# Patient Record
Sex: MALE | Race: WHITE | NOT HISPANIC OR LATINO | Employment: OTHER | URBAN - NONMETROPOLITAN AREA
[De-identification: names, ages, dates, MRNs, and addresses within clinical notes are randomized per-mention and may not be internally consistent; named-entity substitution may affect disease eponyms.]

---

## 2017-01-11 ENCOUNTER — ANTICOAGULATION THERAPY VISIT (OUTPATIENT)
Dept: ANTICOAGULATION | Facility: OTHER | Age: 77
End: 2017-01-11
Attending: FAMILY MEDICINE
Payer: MEDICARE

## 2017-01-11 DIAGNOSIS — Z79.01 LONG-TERM (CURRENT) USE OF ANTICOAGULANTS: Primary | ICD-10-CM

## 2017-01-11 LAB — INR POINT OF CARE: 1.5 (ref 0.86–1.14)

## 2017-01-11 PROCEDURE — 85610 PROTHROMBIN TIME: CPT | Mod: QW

## 2017-01-11 NOTE — MR AVS SNAPSHOT
Rey Tristan   1/11/2017 10:30 AM   Anticoagulation Therapy Visit    Description:  76 year old male   Provider:   ANTI COAGULATION   Department:   Anti Coagulation           INR as of 1/11/2017     Selected INR 1.5! (1/11/2017)      Anticoagulation Summary as of 1/11/2017     INR goal 2.0-3.0   Selected INR 1.5! (1/11/2017)   Full instructions 1/11: 10 mg; 1/12: 10 mg; Otherwise 6 mg every day   Next INR check 1/25/2017    Indications   Long-term (current) use of anticoagulants [Z79.01] [Z79.01]  DVT (deep venous thrombosis) (H) [I82.409]  Pulmonary embolism (H) [I26.99]         Your next Anticoagulation Clinic appointment(s)     Jan 11, 2017 10:30 AM   Anticoagulation Visit with  ANTI COAGULATION   [facility] ([facility])    [address] [address]   [phone]            Jan 25, 2017 10:30 AM   Anticoagulation Visit with  ANTI COAGULATION   [facility] ([facility])    [address] [facility] [address]   [phone]              January 2017 Details    Sun Mon Tue Wed Thu Fri Sat     1               2               3               4               5               6               7                 8               9               10               11      10 mg   See details      12      10 mg         13      6 mg         14      6 mg           15      6 mg         16      6 mg         17      6 mg         18      6 mg         19      6 mg         20      6 mg         21      6 mg           22      6 mg         23      6 mg         24      6 mg         25            26               27               28                 29               30               31                    Date Details   01/11 This INR check       Date of next INR:  1/25/2017         How to take your warfarin dose     To take:  6 mg Take 1.5 of the 4 mg tablets.    To take:  10 mg Take 2.5 of the 4 mg tablets.

## 2017-01-11 NOTE — PROGRESS NOTES
ANTICOAGULATION FOLLOW-UP CLINIC VISIT    Patient Name:  Rey Tristan  Date:  1/11/2017  Contact Type:  Face to Face    SUBJECTIVE:     Patient Findings     Positives No Problem Findings           OBJECTIVE    INR PROTIME   Date Value Ref Range Status   01/11/2017 1.5* 0.86 - 1.14 Final       ASSESSMENT / PLAN  INR assessment SUB    Recheck INR In: 2 WEEKS    INR Location Clinic      Anticoagulation Summary as of 1/11/2017     INR goal 2.0-3.0   Selected INR 1.5! (1/11/2017)   Maintenance plan 6 mg (4 mg x 1.5) every day   Full instructions 1/11: 10 mg; 1/12: 10 mg; Otherwise 6 mg every day   Weekly total 42 mg   Plan last modified Vanessa Jacob RN (7/27/2016)   Next INR check 1/25/2017   Target end date     Indications   Long-term (current) use of anticoagulants [Z79.01] [Z79.01]  DVT (deep venous thrombosis) (H) [I82.409]  Pulmonary embolism (H) [I26.99]         Anticoagulation Episode Summary     INR check location     Preferred lab     Send INR reminders to  ANTICOAG POOL    Comments       Anticoagulation Care Providers     Provider Role Specialty Phone number    Gianna Richard MD [facility] [phone]            See the Encounter Report to view Anticoagulation Flowsheet and Dosing Calendar (Go to Encounters tab in chart review, and find the Anticoagulation Therapy Visit)        Lala Boss, RN

## 2017-01-25 ENCOUNTER — ANTICOAGULATION THERAPY VISIT (OUTPATIENT)
Dept: ANTICOAGULATION | Facility: OTHER | Age: 77
End: 2017-01-25
Attending: FAMILY MEDICINE
Payer: MEDICARE

## 2017-01-25 ENCOUNTER — OFFICE VISIT (OUTPATIENT)
Dept: FAMILY MEDICINE | Facility: OTHER | Age: 77
End: 2017-01-25
Attending: FAMILY MEDICINE
Payer: COMMERCIAL

## 2017-01-25 VITALS
HEIGHT: 63 IN | RESPIRATION RATE: 20 BRPM | HEART RATE: 67 BPM | DIASTOLIC BLOOD PRESSURE: 62 MMHG | BODY MASS INDEX: 29.59 KG/M2 | OXYGEN SATURATION: 98 % | TEMPERATURE: 97.6 F | SYSTOLIC BLOOD PRESSURE: 100 MMHG | WEIGHT: 167 LBS

## 2017-01-25 DIAGNOSIS — I82.409 DVT (DEEP VENOUS THROMBOSIS) (H): ICD-10-CM

## 2017-01-25 DIAGNOSIS — Z79.01 LONG-TERM (CURRENT) USE OF ANTICOAGULANTS: Primary | ICD-10-CM

## 2017-01-25 DIAGNOSIS — M54.50 ACUTE LOW BACK PAIN WITHOUT SCIATICA, UNSPECIFIED BACK PAIN LATERALITY: Primary | ICD-10-CM

## 2017-01-25 DIAGNOSIS — I26.99 PULMONARY EMBOLISM (H): ICD-10-CM

## 2017-01-25 DIAGNOSIS — E78.5 HYPERLIPIDEMIA, UNSPECIFIED HYPERLIPIDEMIA TYPE: ICD-10-CM

## 2017-01-25 LAB — INR POINT OF CARE: 3.7 (ref 0.86–1.14)

## 2017-01-25 PROCEDURE — 99212 OFFICE O/P EST SF 10 MIN: CPT

## 2017-01-25 PROCEDURE — 99213 OFFICE O/P EST LOW 20 MIN: CPT | Performed by: FAMILY MEDICINE

## 2017-01-25 PROCEDURE — 85610 PROTHROMBIN TIME: CPT | Mod: QW

## 2017-01-25 RX ORDER — ATORVASTATIN CALCIUM 10 MG/1
10 TABLET, FILM COATED ORAL DAILY
Qty: 90 TABLET | Refills: 2 | Status: SHIPPED | OUTPATIENT
Start: 2017-01-25 | End: 2017-10-09

## 2017-01-25 RX ORDER — METHOCARBAMOL 500 MG/1
500 TABLET, FILM COATED ORAL 2 TIMES DAILY PRN
Qty: 15 TABLET | Refills: 1 | Status: SHIPPED | OUTPATIENT
Start: 2017-01-25 | End: 2017-09-03

## 2017-01-25 ASSESSMENT — PAIN SCALES - GENERAL: PAINLEVEL: MODERATE PAIN (5)

## 2017-01-25 NOTE — PROGRESS NOTES
SUBJECTIVE:  Rey is a 76 year old male who comes in today for low back pain for the past week after shoveling.  Feels he twisted it, no fall, but tightened up later that day.  Is on Coumadin, no NSAIDs.  Tylenol doesn't help much.  Had similar issue a year ago and had Methocarbamol with good relief and tolerated well.  Prefers to avoid chiropractors, and doesn't think he needs PT.  Pain is in low back, bilateral, right more than left.  Occasional ache down right calf.  No foot numbness, tingling, weakness.  No swelling or bruising.    Current Outpatient Prescriptions   Medication     Multiple Vitamins-Minerals (MULTIVITAMIN ADULT PO)     atorvastatin (LIPITOR) 10 MG tablet     methocarbamol (ROBAXIN) 500 MG tablet     warfarin (COUMADIN) 4 MG tablet     aspirin 81 MG tablet     Cholecalciferol (VITAMIN D) 400 UNITS tablet     [DISCONTINUED] atorvastatin (LIPITOR) 10 MG tablet     No current facility-administered medications for this visit.      No Known Allergies    Past Medical History   Diagnosis Date     Vitamin D deficiency 1/1/2012     Chronic anticoagulation 1/1/2012     Pulmonary nodule      CT 5/2014, right; consider repeat 1 year if high risk     Hyperlipidemia      Elevated serum alkaline phosphatase level 5/4/2016     normal GGT, normal bone skeletal survery     Normocytic anemia 5/4/2016     Gastric ulcer      endoscopy 6/2016, repeat 6 months; PPI Carafate     Past Surgical History   Procedure Laterality Date     Pulmonary embolism       Left lower extremity dvt       Left knee meniscal tear       C. difficile with intussuseption       Nasal polypectomy       Lens implant       Cataract extraction       Colonoscopy       Angiogram  6/23/2014     C regular echo (fl)  6/23/2014     Dr. Cavazos     Endoscopy upper, colonoscopy, combined N/A 6/17/2016     Procedure: COMBINED ENDOSCOPY UPPER, COLONOSCOPY;  Surgeon: Andrea Stearns DO;  Location: [facility]     Colonoscopy  6/17     Family History  "  Problem Relation Age of Onset     CANCER Mother      Ovarian     Respiratory Father      emphysemia     Lung Cancer Brother      DIABETES No family hx of      Hypertension No family hx of      Hyperlipidemia No family hx of      Thyroid Disease No family hx of      Asthma No family hx of      Colon Cancer No family hx of      Prostate Cancer No family hx of      Social History     Social History     Marital Status:      Spouse Name: N/A     Number of Children: N/A     Years of Education: N/A     Occupational History      [employer]     Retired      Social History Main Topics     Smoking status: Former Smoker     Types: Cigarettes     Quit date: 10/28/2009     Smokeless tobacco: Never Used      Comment: Quit 2009     Alcohol Use: No     Drug Use: No     Sexual Activity: Not on file     Other Topics Concern      Service Yes     [employer]     Blood Transfusions Yes     Permits if needed     Caffeine Concern Yes     Tea     Occupational Exposure No     Hobby Hazards No     Sleep Concern No     Stress Concern No     Weight Concern No     Special Diet No     Back Care No     Exercise No     Seat Belt Yes     Self-Exams Yes     Parent/Sibling W/ Cabg, Mi Or Angioplasty Before 65f 55m? No     Social History Narrative       ROS:  General: negative  Skin: negative for, bruising, lumps or bumps  Musculoskeletal: positive for as above and back pain  Neurologic: negative for, local weakness and numbness or tingling of feet  Hematologic: positive for chronic anticoag    OBJECTIVE:  Filed Vitals:    01/25/17 1038   BP: 100/62   Pulse: 67   Temp: 97.6  F (36.4  C)   TempSrc: Tympanic   Resp: 20   Height: 5' 3\" (1.6 m)   Weight: 167 lb (75.751 kg)   SpO2: 98%     GENERAL APPEARANCE: healthy, alert and no distress  MS: extremities normal- no gross deformities noted, gait normal, normal muscle tone and tender to palpation mildly across lower back and paraspinal muscles, but no point " tenderness; negative SLR bilaterally; able to toe/heel raise; symmetric reflexes  NEURO: Normal strength and tone, sensory exam grossly normal, mentation intact and speech normal  PSYCH: mentation appears normal. and affect normal/bright    ASSESSMENT/ORDERS:    ICD-10-CM    1. Acute low back pain without sciatica, unspecified back pain laterality M54.5 methocarbamol (ROBAXIN) 500 MG tablet   2. Hyperlipidemia, unspecified hyperlipidemia type E78.5 atorvastatin (LIPITOR) 10 MG tablet     PLAN:  Patient Instructions   Symptomatic cares - rest, ice/heat, Tylenol.  Script for muscle relaxant for limited use.  Consider physical therapy if ongoing.  Follow up as needed.          Gianna Mckeon

## 2017-01-25 NOTE — NURSING NOTE
"Chief Complaint   Patient presents with     Back Pain     low back pain onset 1 week     *_* Health Care Directive *_*     Has info       Initial /62 mmHg  Pulse 67  Temp(Src) 97.6  F (36.4  C) (Tympanic)  Resp 20  Ht 5' 3\" (1.6 m)  Wt 167 lb (75.751 kg)  BMI 29.59 kg/m2  SpO2 98% Estimated body mass index is 29.59 kg/(m^2) as calculated from the following:    Height as of this encounter: 5' 3\" (1.6 m).    Weight as of this encounter: 167 lb (75.751 kg).  BP completed using cuff size: regular  Jayleen Fleming LPN      "

## 2017-01-25 NOTE — MR AVS SNAPSHOT
After Visit Summary   1/25/2017    Rey Tristan    MRN: [id number]           Patient Information     Date Of Birth          1940        Visit Information        Provider Department      1/25/2017 11:00 AM Gianna Richard MD [facility]        Today's Diagnoses     Acute low back pain without sciatica, unspecified back pain laterality    -  1     Hyperlipidemia, unspecified hyperlipidemia type           Care Instructions    Symptomatic cares - rest, ice/heat, Tylenol.  Script for muscle relaxant for limited use.  Consider physical therapy if ongoing.  Follow up as needed.        Follow-ups after your visit        Your next 10 appointments already scheduled     Jan 25, 2017 11:00 AM   (Arrive by 10:45 AM)   SHORT with Gianna Richard MD   [facility] ([facility])    [address] [facility] [address]   [phone]           Please have the patient arrive 15 minutes early.            Feb 15, 2017 11:00 AM   Anticoagulation Visit with  ANTI COAGULATION   [facility] ([facility])    [address]  [facility] [address]   [phone]              Who to contact     If you have questions or need follow up information about today's clinic visit or your schedule please contact [facility] directly at [phone].  Normal or non-critical lab and imaging results will be communicated to you by MyChart, letter or phone within 4 business days after the clinic has received the results. If you do not hear from us within 7 days, please contact the clinic through MyChart or phone. If you have a critical or abnormal lab result, we will notify you by phone as soon as possible.  Submit refill requests through [facility] or call your pharmacy and they will forward the refill request to us. Please allow 3 business days for your refill to be completed.          Additional Information About Your Visit        MyChart Information     [facility]  "lets you send messages to your doctor, view your test results, renew your prescriptions, schedule appointments and more. To sign up, go to [email or web address] . Click on \"Log in\" on the left side of the screen, which will take you to the Welcome page. Then click on \"Sign up Now\" on the right side of the page.     You will be asked to enter the access code listed below, as well as some personal information. Please follow the directions to create your username and password.     Your access code is: [email or web address]  Expires: 2017 10:54 AM     Your access code will  in 90 days. If you need help or a new code, please call your [place] clinic or [phone].        Care EveryWhere ID     This is your Care EveryWhere ID. This could be used by other organizations to access your [facility] medical records  [id number]        Your Vitals Were     Pulse Temperature Respirations Height BMI (Body Mass Index) Pulse Oximetry    67 97.6  F (36.4  C) (Tympanic) 20 5' 3\" (1.6 m) 29.59 kg/m2 98%       Blood Pressure from Last 3 Encounters:   17 100/62   16 134/82   16 131/72    Weight from Last 3 Encounters:   17 167 lb (75.751 kg)   16 168 lb (76.204 kg)   16 175 lb (79.379 kg)              Today, you had the following     No orders found for display         Today's Medication Changes          These changes are accurate as of: 17 10:54 AM.  If you have any questions, ask your nurse or doctor.               Start taking these medicines.        Dose/Directions    methocarbamol 500 MG tablet   Commonly known as:  ROBAXIN   Used for:  Acute low back pain without sciatica, unspecified back pain laterality   Started by:  Gianna Richard MD        Dose:  500 mg   Take 1 tablet (500 mg) by mouth 2 times daily as needed for muscle spasms   Quantity:  15 tablet   Refills:  1         These medicines have changed or have updated prescriptions.        Dose/Directions    atorvastatin 10 MG " tablet   Commonly known as:  LIPITOR   This may have changed:  See the new instructions.   Used for:  Hyperlipidemia, unspecified hyperlipidemia type   Changed by:  Gianna Richard MD        Dose:  10 mg   Take 1 tablet (10 mg) by mouth daily   Quantity:  90 tablet   Refills:  2            Where to get your medicines      These medications were sent to [id number] - [place], [place] - [address] AT [facility] [address] [place] [address]     Phone:  [phone]    - atorvastatin 10 MG tablet      These medications were sent to [facility] - [place], [place] - [address] AT [facility] [address]  [address], [facility] [address]     Phone:  [phone]    - methocarbamol 500 MG tablet             Primary Care Provider Office Phone # Fax #    Gianna Richard [phone]        [facility] [address]  [facility] [address]        Thank you!     Thank you for choosing [facility]  for your care. Our goal is always to provide you with excellent care. Hearing back from our patients is one way we can continue to improve our services. Please take a few minutes to complete the written survey that you may receive in the mail after your visit with us. Thank you!             Your Updated Medication List - Protect others around you: Learn how to safely use, store and throw away your medicines at www.disposemymeds.org.          This list is accurate as of: 1/25/17 10:54 AM.  Always use your most recent med list.                   Brand Name Dispense Instructions for use    aspirin 81 MG tablet     30 tablet    Take 1 tablet (81 mg) by mouth daily       atorvastatin 10 MG tablet    LIPITOR    90 tablet    Take 1 tablet (10 mg) by mouth daily       methocarbamol 500 MG tablet    ROBAXIN    15 tablet    Take 1 tablet (500 mg) by mouth 2 times daily as needed for muscle spasms       MULTIVITAMIN ADULT PO      Take 1  tablet by mouth       vitamin D 400 UNITS tablet      Take 1,000 Units by mouth daily       warfarin 4 MG tablet    COUMADIN    150 tablet    Take 6mg (1-1/2 pills) daily or as directed by [facility]

## 2017-01-25 NOTE — PATIENT INSTRUCTIONS
Symptomatic cares - rest, ice/heat, Tylenol.  Script for muscle relaxant for limited use.  Consider physical therapy if ongoing.  Follow up as needed.

## 2017-01-25 NOTE — PROGRESS NOTES
ANTICOAGULATION FOLLOW-UP CLINIC VISIT    Patient Name:  Rey Tristan  Date:  1/25/2017  Contact Type:  Face to Face    SUBJECTIVE:     Patient Findings     Positives OTC meds    Comments Changed multivit from one with vit K in it to one without vit K.           OBJECTIVE    INR PROTIME   Date Value Ref Range Status   01/25/2017 3.7* 0.86 - 1.14 Final       ASSESSMENT / PLAN  INR assessment SUPRA    Recheck INR In: 3 WEEKS    INR Location Clinic      Anticoagulation Summary as of 1/25/2017     INR goal 2.0-3.0   Selected INR 3.7! (1/25/2017)   Maintenance plan 6 mg (4 mg x 1.5) every day   Full instructions 1/25: 2 mg; Otherwise 6 mg every day   Weekly total 42 mg   Plan last modified Vanessa Jacob, RN (7/27/2016)   Next INR check 2/15/2017   Target end date     Indications   Long-term (current) use of anticoagulants [Z79.01] [Z79.01]  DVT (deep venous thrombosis) (H) [I82.409]  Pulmonary embolism (H) [I26.99]         Anticoagulation Episode Summary     INR check location     Preferred lab     Send INR reminders to  ANTICOAG POOL    Comments       Anticoagulation Care Providers     Provider Role Specialty Phone number    Gianna Richard MD [facility] [phone]            See the Encounter Report to view Anticoagulation Flowsheet and Dosing Calendar (Go to Encounters tab in chart review, and find the Anticoagulation Therapy Visit)        Lala Boss, RN

## 2017-02-15 ENCOUNTER — ANTICOAGULATION THERAPY VISIT (OUTPATIENT)
Dept: ANTICOAGULATION | Facility: OTHER | Age: 77
End: 2017-02-15
Attending: FAMILY MEDICINE
Payer: MEDICARE

## 2017-02-15 DIAGNOSIS — Z79.01 LONG-TERM (CURRENT) USE OF ANTICOAGULANTS: ICD-10-CM

## 2017-02-15 LAB — INR POINT OF CARE: 3.2 (ref 0.86–1.14)

## 2017-02-15 PROCEDURE — 85610 PROTHROMBIN TIME: CPT | Mod: QW

## 2017-02-15 NOTE — PROGRESS NOTES
ANTICOAGULATION FOLLOW-UP CLINIC VISIT    Patient Name:  Rey Tristan  Date:  2/15/2017  Contact Type:  Face to Face    SUBJECTIVE:     Patient Findings     Comments Finished multivit without K in it.. States he will be starting [employer] multivit chewables which does have  Vit K in it.           OBJECTIVE    INR Protime   Date Value Ref Range Status   02/15/2017 3.2 (A) 0.86 - 1.14 Final       ASSESSMENT / PLAN  INR assessment SUPRA    Recheck INR In: 2 WEEKS    INR Location Clinic      Anticoagulation Summary as of 2/15/2017     INR goal 2.0-3.0   Today's INR 3.2!   Maintenance plan 6 mg (4 mg x 1.5) every day   Full instructions 6 mg every day   Weekly total 42 mg   Plan last modified Vanessa Jacob RN (7/27/2016)   Next INR check 3/1/2017   Target end date     Indications   Long-term (current) use of anticoagulants [Z79.01] [Z79.01]  DVT (deep venous thrombosis) (H) [I82.409]  Pulmonary embolism (H) [I26.99]         Anticoagulation Episode Summary     INR check location     Preferred lab     Send INR reminders to  ANTICOAG POOL    Comments       Anticoagulation Care Providers     Provider Role Specialty Phone number    Gianna Richard MD [facility] [phone]            See the Encounter Report to view Anticoagulation Flowsheet and Dosing Calendar (Go to Encounters tab in chart review, and find the Anticoagulation Therapy Visit)        Lala Boss, RN

## 2017-03-01 ENCOUNTER — ANTICOAGULATION THERAPY VISIT (OUTPATIENT)
Dept: ANTICOAGULATION | Facility: OTHER | Age: 77
End: 2017-03-01
Attending: FAMILY MEDICINE
Payer: MEDICARE

## 2017-03-01 DIAGNOSIS — Z79.01 LONG-TERM (CURRENT) USE OF ANTICOAGULANTS: ICD-10-CM

## 2017-03-01 LAB — INR POINT OF CARE: 3.8 (ref 0.86–1.14)

## 2017-03-01 PROCEDURE — 85610 PROTHROMBIN TIME: CPT | Mod: QW

## 2017-03-01 NOTE — MR AVS SNAPSHOT
Rey Tristan   3/1/2017 11:15 AM   Anticoagulation Therapy Visit    Description:  76 year old male   Provider:   ANTI COAGULATION   Department:  Hc Anti Coagulation           INR as of 3/1/2017     Today's INR 3.8!      Anticoagulation Summary as of 3/1/2017     INR goal 2.0-3.0   Today's INR 3.8!   Full instructions 3/1: Hold; 3/2: 4 mg; Otherwise 6 mg every day   Next INR check 3/15/2017    Indications   Long-term (current) use of anticoagulants [Z79.01] [Z79.01]  DVT (deep venous thrombosis) (H) [I82.409]  Pulmonary embolism (H) [I26.99]         Your next Anticoagulation Clinic appointment(s)     Mar 01, 2017 11:15 AM CST   Anticoagulation Visit with  ANTI COAGULATION   [facility] ([facility] [place] [facility])    [address]  [place] [address]   [phone]            Mar 15, 2017 11:30 AM CDT   Anticoagulation Visit with  ANTI COAGULATION   [facility] [place] [facility])    [address] [address] [address]   [phone]              March 2017 Details    Sun Mon Tue Wed Thu Fri Sat        1      Hold   See details      2      4 mg         3      6 mg         4      6 mg           5      6 mg         6      6 mg         7      6 mg         8      6 mg         9      6 mg         10      6 mg         11      6 mg           12      6 mg         13      6 mg         14      6 mg         15            16               17               18                 19               20               21               22               23               24               25                 26               27               28               29               30               31                 Date Details   03/01 This INR check       Date of next INR:  3/15/2017         How to take your warfarin dose     To take:  4 mg Take 1 of the 4 mg tablets.    To take:  6 mg Take 1.5 of the 4 mg tablets.    Hold Do not take your warfarin dose. See the Details table to the right for additional  instructions.

## 2017-03-01 NOTE — PROGRESS NOTES
ANTICOAGULATION FOLLOW-UP CLINIC VISIT    Patient Name:  Rey Tristan  Date:  3/1/2017  Contact Type:  Face to Face    SUBJECTIVE:     Patient Findings     Positives OTC meds    Comments New multivit.  He also decreased oral vit K intake           OBJECTIVE    INR Protime   Date Value Ref Range Status   03/01/2017 3.8 (A) 0.86 - 1.14 Final       ASSESSMENT / PLAN  INR assessment SUPRA    Recheck INR In: 2 WEEKS    INR Location Clinic      Anticoagulation Summary as of 3/1/2017     INR goal 2.0-3.0   Today's INR 3.8!   Maintenance plan 6 mg (4 mg x 1.5) every day   Full instructions 3/1: Hold; 3/2: 4 mg; Otherwise 6 mg every day   Weekly total 42 mg   Plan last modified Vanessa Jacob RN (7/27/2016)   Next INR check 3/15/2017   Target end date     Indications   Long-term (current) use of anticoagulants [Z79.01] [Z79.01]  DVT (deep venous thrombosis) (H) [I82.409]  Pulmonary embolism (H) [I26.99]         Anticoagulation Episode Summary     INR check location     Preferred lab     Send INR reminders to  ANTICOAG POOL    Comments       Anticoagulation Care Providers     Provider Role Specialty Phone number    Gianna Richard MD [facility] [phone]            See the Encounter Report to view Anticoagulation Flowsheet and Dosing Calendar (Go to Encounters tab in chart review, and find the Anticoagulation Therapy Visit)        Lala Boss, RN

## 2017-03-15 ENCOUNTER — ANTICOAGULATION THERAPY VISIT (OUTPATIENT)
Dept: ANTICOAGULATION | Facility: OTHER | Age: 77
End: 2017-03-15
Attending: FAMILY MEDICINE
Payer: MEDICARE

## 2017-03-15 DIAGNOSIS — Z79.01 LONG-TERM (CURRENT) USE OF ANTICOAGULANTS: ICD-10-CM

## 2017-03-15 LAB — INR POINT OF CARE: 1.6 (ref 0.86–1.14)

## 2017-03-15 PROCEDURE — 85610 PROTHROMBIN TIME: CPT | Mod: QW

## 2017-03-15 NOTE — MR AVS SNAPSHOT
Rey Tristan   3/15/2017 11:30 AM   Anticoagulation Therapy Visit    Description:  76 year old male   Provider:   ANTI COAGULATION   Department:  Hc Anti Coagulation           INR as of 3/15/2017     Today's INR 1.6!      Anticoagulation Summary as of 3/15/2017     INR goal 2.0-3.0   Today's INR 1.6!   Full instructions 3/15: 8 mg; Otherwise 6 mg every day   Next INR check 3/27/2017    Indications   Long-term (current) use of anticoagulants [Z79.01] [Z79.01]  DVT (deep venous thrombosis) (H) [I82.409]  Pulmonary embolism (H) [I26.99]         Your next Anticoagulation Clinic appointment(s)     Mar 15, 2017 11:30 AM CDT   Anticoagulation Visit with  ANTI COAGULATION   [facility] ([facility])    [address] [address]   [phone]            Mar 27, 2017 11:30 AM CDT   Anticoagulation Visit with  ANTI COAGULATION   [facility] ([facility])    [address] [address]   [phone]              March 2017 Details    Sun Mon Tue Wed Thu Fri Sat        1               2               3               4                 5               6               7               8               9               10               11                 12               13               14               15      8 mg   See details      16      6 mg         17      6 mg         18      6 mg           19      6 mg         20      6 mg         21      6 mg         22      6 mg         23      6 mg         24      6 mg         25      6 mg           26      6 mg         27            28               29               30               31                 Date Details   03/15 This INR check       Date of next INR:  3/27/2017         How to take your warfarin dose     To take:  6 mg Take 1.5 of the 4 mg tablets.    To take:  8 mg Take 2 of the 4 mg tablets.

## 2017-03-15 NOTE — PROGRESS NOTES
ANTICOAGULATION FOLLOW-UP CLINIC VISIT    Patient Name:  Rey Tristan  Date:  3/15/2017  Contact Type:  Face to Face    SUBJECTIVE:     Patient Findings     Comments occassional nosebleeds, missed 2 doses of warfarin           OBJECTIVE    INR Protime   Date Value Ref Range Status   03/15/2017 1.6 (A) 0.86 - 1.14 Final       ASSESSMENT / PLAN  INR assessment SUB    Recheck INR In: 10 DAYS    INR Location Clinic      Anticoagulation Summary as of 3/15/2017     INR goal 2.0-3.0   Today's INR 1.6!   Maintenance plan 6 mg (4 mg x 1.5) every day   Full instructions 3/15: 8 mg; Otherwise 6 mg every day   Weekly total 42 mg   Plan last modified Vanessa Jacob RN (7/27/2016)   Next INR check 3/27/2017   Target end date     Indications   Long-term (current) use of anticoagulants [Z79.01] [Z79.01]  DVT (deep venous thrombosis) (H) [I82.409]  Pulmonary embolism (H) [I26.99]         Anticoagulation Episode Summary     INR check location     Preferred lab     Send INR reminders to  ANTICOAG POOL    Comments       Anticoagulation Care Providers     Provider Role Specialty Phone number    Gianna Richard MD [facility] [phone]            See the Encounter Report to view Anticoagulation Flowsheet and Dosing Calendar (Go to Encounters tab in chart review, and find the Anticoagulation Therapy Visit)        Lala Boss, RN

## 2017-03-19 DIAGNOSIS — E78.5 HYPERLIPEMIA: ICD-10-CM

## 2017-03-20 RX ORDER — ATORVASTATIN CALCIUM 10 MG/1
TABLET, FILM COATED ORAL
Qty: 90 TABLET | Refills: 0 | OUTPATIENT
Start: 2017-03-20

## 2017-03-27 ENCOUNTER — ANTICOAGULATION THERAPY VISIT (OUTPATIENT)
Dept: ANTICOAGULATION | Facility: OTHER | Age: 77
End: 2017-03-27
Attending: FAMILY MEDICINE
Payer: MEDICARE

## 2017-03-27 DIAGNOSIS — Z79.01 LONG-TERM (CURRENT) USE OF ANTICOAGULANTS: ICD-10-CM

## 2017-03-27 DIAGNOSIS — I26.99 PULMONARY EMBOLISM (H): ICD-10-CM

## 2017-03-27 DIAGNOSIS — I82.409 DVT (DEEP VENOUS THROMBOSIS) (H): ICD-10-CM

## 2017-03-27 LAB — INR POINT OF CARE: 2 (ref 0.86–1.14)

## 2017-03-27 PROCEDURE — 85610 PROTHROMBIN TIME: CPT | Mod: QW

## 2017-03-27 NOTE — PROGRESS NOTES
ANTICOAGULATION FOLLOW-UP CLINIC VISIT    Patient Name:  Rey Tristan  Date:  3/27/2017  Contact Type:  Face to Face    SUBJECTIVE:     Patient Findings     Positives No Problem Findings           OBJECTIVE    INR Protime   Date Value Ref Range Status   03/27/2017 2.0 (A) 0.86 - 1.14 Final       ASSESSMENT / PLAN  INR assessment THER    Recheck INR In: 4 WEEKS    INR Location Clinic      Anticoagulation Summary as of 3/27/2017     INR goal 2.0-3.0   Today's INR 2.0   Maintenance plan 6 mg (4 mg x 1.5) every day   Full instructions 6 mg every day   Weekly total 42 mg   No change documented Lala Tracy RN   Plan last modified Vanessa Jacob RN (7/27/2016)   Next INR check 4/25/2017   Target end date Indefinite    Indications   Long-term (current) use of anticoagulants [Z79.01] [Z79.01]  DVT (deep venous thrombosis) (H) [I82.409]  Pulmonary embolism (H) [I26.99]         Anticoagulation Episode Summary     INR check location     Preferred lab     Send INR reminders to  ANTICOAG POOL    Comments       Anticoagulation Care Providers     Provider Role Specialty Phone number    Gianna Richard MD [facility] [phone]            See the Encounter Report to view Anticoagulation Flowsheet and Dosing Calendar (Go to Encounters tab in chart review, and find the Anticoagulation Therapy Visit)        Lala Tracy, HOPE

## 2017-03-27 NOTE — MR AVS SNAPSHOT
Rey Tristan   3/27/2017 11:30 AM   Anticoagulation Therapy Visit    Description:  76 year old male   Provider:   ANTI COAGULATION   Department:  Hc Anti Coagulation           INR as of 3/27/2017     Today's INR 2.0      Anticoagulation Summary as of 3/27/2017     INR goal 2.0-3.0   Today's INR 2.0   Full instructions 6 mg every day   Next INR check 4/25/2017    Indications   Long-term (current) use of anticoagulants [Z79.01] [Z79.01]  DVT (deep venous thrombosis) (H) [I82.409]  Pulmonary embolism (H) [I26.99]         Your next Anticoagulation Clinic appointment(s)     Apr 25, 2017 10:45 AM CDT   Anticoagulation Visit with  ANTI COAGULATION   [facility] [place] ([facility])    [address] [place] [address]   [phone]              March 2017 Details    Sun Mon Tue Wed Thu Fri Sat        1               2               3               4                 5               6               7               8               9               10               11                 12               13               14               15               16               17               18                 19               20               21               22               23               24               25                 26               27      6 mg   See details      28      6 mg         29      6 mg         30      6 mg         31      6 mg           Date Details   03/27 This INR check               How to take your warfarin dose     To take:  6 mg Take 1.5 of the 4 mg tablets.           April 2017 Details    Sun Mon Tue Wed Thu Fri Sat           1      6 mg           2      6 mg         3      6 mg         4      6 mg         5      6 mg         6      6 mg         7      6 mg         8      6 mg           9      6 mg         10      6 mg         11      6 mg         12      6 mg         13      6 mg         14      6 mg         15      6 mg           16      6 mg         17      6 mg          18      6 mg         19      6 mg         20      6 mg         21      6 mg         22      6 mg           23      6 mg         24      6 mg         25            26               27               28               29                 30                      Date Details   No additional details    Date of next INR:  4/25/2017         How to take your warfarin dose     To take:  6 mg Take 1.5 of the 4 mg tablets.

## 2017-03-29 NOTE — PROGRESS NOTES
ANTICOAGULATION FOLLOW-UP CLINIC VISIT    Patient Name:  Rey Tristan  Date:  3/29/2017  Contact Type:  Face to Face    SUBJECTIVE:     Patient Findings     Positives Missed doses, No Problem Findings    Comments Telephoned AC clinic after the INR check to report he missed a dose yesterday.  Pt instructed to take 8mg Coumadin today vs the normal 6mg today.  Call ended with questions answered and understanding stated.             OBJECTIVE    INR Protime   Date Value Ref Range Status   03/27/2017 2.0 (A) 0.86 - 1.14 Final       ASSESSMENT / PLAN  INR assessment THER    Recheck INR In: 4 WEEKS    INR Location Clinic      Anticoagulation Summary as of 3/27/2017     INR goal 2.0-3.0   Today's INR 2.0   Maintenance plan 6 mg (4 mg x 1.5) every day   Full instructions 3/27: 8 mg; Otherwise 6 mg every day   Weekly total 42 mg   Plan last modified Vanessa Jacob RN (7/27/2016)   Next INR check 4/25/2017   Target end date Indefinite    Indications   Long-term (current) use of anticoagulants [Z79.01] [Z79.01]  DVT (deep venous thrombosis) (H) [I82.409]  Pulmonary embolism (H) [I26.99]         Anticoagulation Episode Summary     INR check location     Preferred lab     Send INR reminders to  ANTICOAG POOL    Comments       Anticoagulation Care Providers     Provider Role Specialty Phone number    Gianna Richard MD [facility] [phone]            See the Encounter Report to view Anticoagulation Flowsheet and Dosing Calendar (Go to Encounters tab in chart review, and find the Anticoagulation Therapy Visit)        Lala Tracy RN

## 2017-04-10 DIAGNOSIS — Z79.01 LONG TERM (CURRENT) USE OF ANTICOAGULANTS: ICD-10-CM

## 2017-04-11 RX ORDER — WARFARIN SODIUM 4 MG/1
TABLET ORAL
Qty: 150 TABLET | Refills: 0 | Status: SHIPPED | OUTPATIENT
Start: 2017-04-11 | End: 2017-05-01

## 2017-04-25 ENCOUNTER — ANTICOAGULATION THERAPY VISIT (OUTPATIENT)
Dept: ANTICOAGULATION | Facility: OTHER | Age: 77
End: 2017-04-25
Attending: FAMILY MEDICINE
Payer: MEDICARE

## 2017-04-25 DIAGNOSIS — I26.99 PULMONARY EMBOLISM (H): ICD-10-CM

## 2017-04-25 DIAGNOSIS — I82.409 DVT (DEEP VENOUS THROMBOSIS) (H): ICD-10-CM

## 2017-04-25 DIAGNOSIS — Z79.01 LONG-TERM (CURRENT) USE OF ANTICOAGULANTS: ICD-10-CM

## 2017-04-25 LAB — INR POINT OF CARE: 2.2 (ref 0.86–1.14)

## 2017-04-25 PROCEDURE — 85610 PROTHROMBIN TIME: CPT | Mod: QW

## 2017-04-25 NOTE — PROGRESS NOTES
ANTICOAGULATION FOLLOW-UP CLINIC VISIT    Patient Name:  Rey Tristan  Date:  4/25/2017  Contact Type:  Face to Face    SUBJECTIVE:     Patient Findings     Positives No Problem Findings           OBJECTIVE    INR Protime   Date Value Ref Range Status   04/25/2017 2.2 (A) 0.86 - 1.14 Final       ASSESSMENT / PLAN  INR assessment THER    Recheck INR In: 6 WEEKS    INR Location Clinic      Anticoagulation Summary as of 4/25/2017     INR goal 2.0-3.0   Today's INR 2.2   Maintenance plan 6 mg (4 mg x 1.5) every day   Full instructions 6 mg every day   Weekly total 42 mg   No change documented Lala Tracy RN   Plan last modified Vanessa Jacob RN (7/27/2016)   Next INR check 6/6/2017   Target end date Indefinite    Indications   Long-term (current) use of anticoagulants [Z79.01] [Z79.01]  DVT (deep venous thrombosis) (H) [I82.409]  Pulmonary embolism (H) [I26.99]         Anticoagulation Episode Summary     INR check location     Preferred lab     Send INR reminders to  ANTICOAG POOL    Comments       Anticoagulation Care Providers     Provider Role Specialty Phone number    Gianna Richard MD [facility] [phone]            See the Encounter Report to view Anticoagulation Flowsheet and Dosing Calendar (Go to Encounters tab in chart review, and find the Anticoagulation Therapy Visit)        Lala Tracy, HOPE

## 2017-04-25 NOTE — MR AVS SNAPSHOT
Rey Tristan   4/25/2017 10:45 AM   Anticoagulation Therapy Visit    Description:  77 year old male   Provider:   ANTI COAGULATION   Department:  Hc Anti Coagulation           INR as of 4/25/2017     Today's INR 2.2      Anticoagulation Summary as of 4/25/2017     INR goal 2.0-3.0   Today's INR 2.2   Full instructions 6 mg every day   Next INR check 6/6/2017    Indications   Long-term (current) use of anticoagulants [Z79.01] [Z79.01]  DVT (deep venous thrombosis) (H) [I82.409]  Pulmonary embolism (H) [I26.99]         Your next Anticoagulation Clinic appointment(s)     Jun 06, 2017 10:45 AM CDT   Anticoagulation Visit with  ANTI COAGULATION   [facility] [place] ([facility])    [address] [place] [address]   [phone]              April 2017 Details    Sun Mon Tue Wed Thu Fri Sat           1                 2               3               4               5               6               7               8                 9               10               11               12               13               14               15                 16               17               18               19               20               21               22                 23               24               25      6 mg   See details      26      6 mg         27      6 mg         28      6 mg         29      6 mg           30      6 mg                Date Details   04/25 This INR check               How to take your warfarin dose     To take:  6 mg Take 1.5 of the 4 mg tablets.           May 2017 Details    Sun Mon Tue Wed Thu Fri Sat      1      6 mg         2      6 mg         3      6 mg         4      6 mg         5      6 mg         6      6 mg           7      6 mg         8      6 mg         9      6 mg         10      6 mg         11      6 mg         12      6 mg         13      6 mg           14      6 mg         15      6 mg         16      6 mg         17      6 mg         18      6 mg          19      6 mg         20      6 mg           21      6 mg         22      6 mg         23      6 mg         24      6 mg         25      6 mg         26      6 mg         27      6 mg           28      6 mg         29      6 mg         30      6 mg         31      6 mg             Date Details   No additional details            How to take your warfarin dose     To take:  6 mg Take 1.5 of the 4 mg tablets.           June 2017 Details    Sun Mon Tue Wed Thu Fri Sat         1      6 mg         2      6 mg         3      6 mg           4      6 mg         5      6 mg         6            7               8               9               10                 11               12               13               14               15               16               17                 18               19               20               21               22               23               24                 25               26               27               28               29               30                 Date Details   No additional details    Date of next INR:  6/6/2017         How to take your warfarin dose     To take:  6 mg Take 1.5 of the 4 mg tablets.

## 2017-05-01 ENCOUNTER — TELEPHONE (OUTPATIENT)
Dept: FAMILY MEDICINE | Facility: OTHER | Age: 77
End: 2017-05-01

## 2017-05-01 DIAGNOSIS — Z79.01 LONG TERM (CURRENT) USE OF ANTICOAGULANTS: ICD-10-CM

## 2017-05-01 RX ORDER — WARFARIN SODIUM 4 MG/1
TABLET ORAL
Qty: 150 TABLET | Refills: 0 | Status: SHIPPED | OUTPATIENT
Start: 2017-05-01 | End: 2017-11-09

## 2017-05-01 NOTE — TELEPHONE ENCOUNTER
Reason for call:  Medication    1. Medication Name? Warfarin  2. Is this request for a refill? Yes  3. What Pharmacy do you use? [facility]  4. Have you contacted your pharmacy? Yes    5. If yes, when?  (Please note that the turn-around-time for prescriptions is 72 business hours; I am sending your request at this time. SEND TO  [facility]  )  Description: Pt stated the pharmacy was too busy to send request. If you have any questions please call him back at [phone]  Was an appointment offered for this a call? No   Preferred method for responding to this messageTelephone Call  If we cannot reach you directly, may we leave a detailed response at the number you provided? Yes  Can this message wait until your PCP/Provider returns if not available today? Not applicable

## 2017-05-09 ENCOUNTER — ANTICOAGULATION THERAPY VISIT (OUTPATIENT)
Dept: ANTICOAGULATION | Facility: OTHER | Age: 77
End: 2017-05-09

## 2017-05-09 DIAGNOSIS — Z79.01 LONG-TERM (CURRENT) USE OF ANTICOAGULANTS: ICD-10-CM

## 2017-05-09 DIAGNOSIS — I26.99 PULMONARY EMBOLISM (H): ICD-10-CM

## 2017-05-09 DIAGNOSIS — I82.409 DVT (DEEP VENOUS THROMBOSIS) (H): ICD-10-CM

## 2017-05-09 NOTE — PROGRESS NOTES
"  ANTICOAGULATION FOLLOW-UP CLINIC VISIT    Patient Name:  Rey Tristan  Date:  5/9/2017  Contact Type:  Telephone    SUBJECTIVE:     Patient Findings     Positives Nose bleeds    Comments Pt telephoned the  clinic reporting he was having frequent nose bleeds.  Pt inquiring about increasing his Vit K to raise the INR and if he has done this it there was way to tell if his blood is thin or thick.  Pt informed Vit K thickens the blood/lowers the INR, therefore more Coumadin would maybe warranted.  Pt stated he eats a salad frequently but in researching this during the call, it was revealed the lettuce type is Iceberg.  Pt stated he had the Vit K Foods list and noted it was low in Vit K and thought this would help.  Pt informed there is basically no Vit K value in the foods listed as \"low\" on the guide and of which has no impact on the INR/Coumadin dose.  Pt stated \"well how much Vit K should a person eat to keep the blood where it needs to be?\" - discussed the need for consistency within the diet - discussed the foods that might impact his INR; discussed a regime some pt's follow of eating greens approx 2-3x/wk or more.  Pt inquired about increasing cranberries; pt informed they are known to thin the blood more when consumed while on Coumadin; therefore increasing may cause increased nosebleeds.  Pt informed there may be a medical reason for the frequent nose bleeds that may not be related to the INR and was encouraged at least 3 times to consider scheduling an appt w/his PCP.  Pt stated he had seen someone in the past in the [facility] and had polyps removed.  Pt stated there was no ENTs locally - pt was corrected on this thought and informed Dr Nuñez @ the [facility] is an ENT; pt informed he would need a referral from his PCP to be seen, due to the specialty; which would probably be the process if an ENT is sought elsewhere.  Pt stated he was leaving town for a few days and would not have a chance to " see someone at this time.  Pt also encouraged to swab the nares at HS with a lubricant ie: K-Y; Vaseline, or triple antibiotic ointment; stated he tried that once and it did not work - pt informed he may need to do this nightly for awhile.  Pt was disinterested in this option.  Pt informed the only true way to know if the INR was high or low was to have it checked, but pt was disinterested in this option.  Call ended after 15mins of discussion with pt stating he would keep his AC visit for 6/6/17.           OBJECTIVE    INR Protime   Date Value Ref Range Status   04/25/2017 2.2 (A) 0.86 - 1.14 Final       ASSESSMENT / PLAN  No question data found.  Anticoagulation Summary as of 5/9/2017     INR goal 2.0-3.0   Today's INR No new INR was available at the time of this encounter.   Maintenance plan 6 mg (4 mg x 1.5) every day   Full instructions 6 mg every day   Weekly total 42 mg   No change documented Lala Tracy RN   Plan last modified Vanessa Jacob RN (7/27/2016)   Next INR check 6/6/2017   Target end date Indefinite    Indications   Long-term (current) use of anticoagulants [Z79.01] [Z79.01]  DVT (deep venous thrombosis) (H) [I82.409]  Pulmonary embolism (H) [I26.99]         Anticoagulation Episode Summary     INR check location     Preferred lab     Send INR reminders to  ANTICOAG POOL    Comments       Anticoagulation Care Providers     Provider Role Specialty Phone number    Gianna Richard MD [facility] [phone]            See the Encounter Report to view Anticoagulation Flowsheet and Dosing Calendar (Go to Encounters tab in chart review, and find the Anticoagulation Therapy Visit)        Lala Tracy RN

## 2017-05-09 NOTE — MR AVS SNAPSHOT
Rey Tristan   5/9/2017   Anticoagulation Therapy Visit    Description:  77 year old male   Provider:  Gianna Richard MD   Department:  Hc Anti Coagulation           INR as of 5/9/2017     Today's INR No new INR was available at the time of this encounter.      Anticoagulation Summary as of 5/9/2017     INR goal 2.0-3.0   Today's INR No new INR was available at the time of this encounter.   Full instructions 6 mg every day   Next INR check 6/6/2017    Indications   Long-term (current) use of anticoagulants [Z79.01] [Z79.01]  DVT (deep venous thrombosis) (H) [I82.409]  Pulmonary embolism (H) [I26.99]         Your next Anticoagulation Clinic appointment(s)     Jun 06, 2017 10:45 AM CDT   Anticoagulation Visit with  ANTI COAGULATION   [facility] [place] ([facility])    [address] [facility]  [place] [address]   [phone]              May 2017 Details    Sun Mon Tue Wed Thu Fri Sat      1               2               3               4               5               6                 7               8               9      6 mg   See details      10      6 mg         11      6 mg         12      6 mg         13      6 mg           14      6 mg         15      6 mg         16      6 mg         17      6 mg         18      6 mg         19      6 mg         20      6 mg           21      6 mg         22      6 mg         23      6 mg         24      6 mg         25      6 mg         26      6 mg         27      6 mg           28      6 mg         29      6 mg         30      6 mg         31      6 mg             Date Details   05/09 This INR check               How to take your warfarin dose     To take:  6 mg Take 1.5 of the 4 mg tablets.           June 2017 Details    Sun Mon Tue Wed Thu Fri Sat         1      6 mg         2      6 mg         3      6 mg           4      6 mg         5      6 mg         6            7               8               9               10                 11                12               13               14               15               16               17                 18               19               20               21               22               23               24                 25               26               27               28               29               30                 Date Details   No additional details    Date of next INR:  6/6/2017         How to take your warfarin dose     To take:  6 mg Take 1.5 of the 4 mg tablets.

## 2017-06-06 ENCOUNTER — ANTICOAGULATION THERAPY VISIT (OUTPATIENT)
Dept: ANTICOAGULATION | Facility: OTHER | Age: 77
End: 2017-06-06
Attending: FAMILY MEDICINE
Payer: MEDICARE

## 2017-06-06 DIAGNOSIS — Z79.01 LONG-TERM (CURRENT) USE OF ANTICOAGULANTS: ICD-10-CM

## 2017-06-06 LAB — INR POINT OF CARE: 2.3 (ref 0.86–1.14)

## 2017-06-06 PROCEDURE — 85610 PROTHROMBIN TIME: CPT | Mod: QW,ZL

## 2017-06-06 NOTE — MR AVS SNAPSHOT
Rey Tristan   6/6/2017 10:45 AM   Anticoagulation Therapy Visit    Description:  77 year old male   Provider:   ANTI COAGULATION   Department:   Anti Coagulation           INR as of 6/6/2017     Today's INR 2.3      Anticoagulation Summary as of 6/6/2017     INR goal 2.0-3.0   Today's INR 2.3   Full instructions 6 mg every day   Next INR check 7/18/2017    Indications   Long-term (current) use of anticoagulants [Z79.01] [Z79.01]  DVT (deep venous thrombosis) (H) [I82.409]  Pulmonary embolism (H) [I26.99]         Your next Anticoagulation Clinic appointment(s)     Jun 06, 2017 10:45 AM CDT   Anticoagulation Visit with  ANTI COAGULATION   [facility] [place] ([facility] [place] [facility])    [address]  [place] [address]   [phone]            Jul 18, 2017 11:00 AM CDT   Anticoagulation Visit with  ANTI COAGULATION   [facility] ([facility] [facility])    [address]  [facility] [address]   [phone]              June 2017 Details    Sun Mon Tue Wed Thu Fri Sat         1               2               3                 4               5               6      6 mg   See details      7      6 mg         8      6 mg         9      6 mg         10      6 mg           11      6 mg         12      6 mg         13      6 mg         14      6 mg         15      6 mg         16      6 mg         17      6 mg           18      6 mg         19      6 mg         20      6 mg         21      6 mg         22      6 mg         23      6 mg         24      6 mg           25      6 mg         26      6 mg         27      6 mg         28      6 mg         29      6 mg         30      6 mg           Date Details   06/06 This INR check               How to take your warfarin dose     To take:  6 mg Take 1.5 of the 4 mg tablets.           July 2017 Details    Sun Mon Tue Wed Thu Fri Sat           1      6 mg           2      6 mg         3      6 mg         4      6 mg         5      6 mg         6       6 mg         7      6 mg         8      6 mg           9      6 mg         10      6 mg         11      6 mg         12      6 mg         13      6 mg         14      6 mg         15      6 mg           16      6 mg         17      6 mg         18            19               20               21               22                 23               24               25               26               27               28               29                 30               31                     Date Details   No additional details    Date of next INR:  7/18/2017         How to take your warfarin dose     To take:  6 mg Take 1.5 of the 4 mg tablets.

## 2017-06-06 NOTE — PROGRESS NOTES
ANTICOAGULATION FOLLOW-UP CLINIC VISIT    Patient Name:  Rey Tristan  Date:  6/6/2017  Contact Type:  Face to Face    SUBJECTIVE:     Patient Findings     Positives No Problem Findings           OBJECTIVE    INR Protime   Date Value Ref Range Status   06/06/2017 2.3 (A) 0.86 - 1.14 Final       ASSESSMENT / PLAN  INR assessment THER    Recheck INR In: 6 WEEKS    INR Location Clinic      Anticoagulation Summary as of 6/6/2017     INR goal 2.0-3.0   Today's INR 2.3   Maintenance plan 6 mg (4 mg x 1.5) every day   Full instructions 6 mg every day   Weekly total 42 mg   No change documented Lala Boss RN   Plan last modified Vanessa Jacob RN (7/27/2016)   Next INR check 7/18/2017   Target end date Indefinite    Indications   Long-term (current) use of anticoagulants [Z79.01] [Z79.01]  DVT (deep venous thrombosis) (H) [I82.409]  Pulmonary embolism (H) [I26.99]         Anticoagulation Episode Summary     INR check location     Preferred lab     Send INR reminders to  ANTICOAG POOL    Comments       Anticoagulation Care Providers     Provider Role Specialty Phone number    Gianna Richard MD [facility] [phone]            See the Encounter Report to view Anticoagulation Flowsheet and Dosing Calendar (Go to Encounters tab in chart review, and find the Anticoagulation Therapy Visit)        Lala Boss, RN

## 2017-07-18 ENCOUNTER — ANTICOAGULATION THERAPY VISIT (OUTPATIENT)
Dept: ANTICOAGULATION | Facility: OTHER | Age: 77
End: 2017-07-18
Attending: FAMILY MEDICINE
Payer: MEDICARE

## 2017-07-18 DIAGNOSIS — Z79.01 LONG-TERM (CURRENT) USE OF ANTICOAGULANTS: ICD-10-CM

## 2017-07-18 LAB — INR POINT OF CARE: 2.6 (ref 0.86–1.14)

## 2017-07-18 PROCEDURE — 85610 PROTHROMBIN TIME: CPT | Mod: QW,ZL

## 2017-07-18 NOTE — MR AVS SNAPSHOT
Rey Tristan   7/18/2017 11:00 AM   Anticoagulation Therapy Visit    Description:  77 year old male   Provider:   ANTI COAGULATION   Department:   Anti Coagulation           INR as of 7/18/2017     Today's INR 2.6      Anticoagulation Summary as of 7/18/2017     INR goal 2.0-3.0   Today's INR 2.6   Full instructions 6 mg every day   Next INR check 8/29/2017    Indications   Long-term (current) use of anticoagulants [Z79.01] [Z79.01]  DVT (deep venous thrombosis) (H) [I82.409]  Pulmonary embolism (H) [I26.99]         Your next Anticoagulation Clinic appointment(s)     Jul 18, 2017 11:00 AM CDT   Anticoagulation Visit with  ANTI COAGULATION   [facility] ([facility] )    [address] [facility] [address]   [phone]            Aug 29, 2017 11:00 AM CDT   Anticoagulation Visit with  ANTI COAGULATION   [facility] ([facility] )    [address] [facility] [address]   [phone]              July 2017 Details    Sun Mon Tue Wed Thu Fri Sat           1                 2               3               4               5               6               7               8                 9               10               11               12               13               14               15                 16               17               18      6 mg   See details      19      6 mg         20      6 mg         21      6 mg         22      6 mg           23      6 mg         24      6 mg         25      6 mg         26      6 mg         27      6 mg         28      6 mg         29      6 mg           30      6 mg         31      6 mg               Date Details   07/18 This INR check               How to take your warfarin dose     To take:  6 mg Take 1.5 of the 4 mg tablets.           August 2017 Details    Sun Mon Tue Wed Thu Fri Sat       1      6 mg         2      6 mg         3      6 mg         4      6 mg         5      6 mg            6      6 mg         7      6 mg         8      6 mg         9      6 mg         10      6 mg         11      6 mg         12      6 mg           13      6 mg         14      6 mg         15      6 mg         16      6 mg         17      6 mg         18      6 mg         19      6 mg           20      6 mg         21      6 mg         22      6 mg         23      6 mg         24      6 mg         25      6 mg         26      6 mg           27      6 mg         28      6 mg         29            30               31                  Date Details   No additional details    Date of next INR:  8/29/2017         How to take your warfarin dose     To take:  6 mg Take 1.5 of the 4 mg tablets.

## 2017-07-18 NOTE — PROGRESS NOTES
ANTICOAGULATION FOLLOW-UP CLINIC VISIT    Patient Name:  Rey Tristan  Date:  7/18/2017  Contact Type:  Face to Face    SUBJECTIVE:     Patient Findings     Positives No Problem Findings           OBJECTIVE    INR Protime   Date Value Ref Range Status   07/18/2017 2.6 (A) 0.86 - 1.14 Final       ASSESSMENT / PLAN  INR assessment THER    Recheck INR In: 6 WEEKS    INR Location Clinic      Anticoagulation Summary as of 7/18/2017     INR goal 2.0-3.0   Today's INR 2.6   Maintenance plan 6 mg (4 mg x 1.5) every day   Full instructions 6 mg every day   Weekly total 42 mg   No change documented Lala Boss RN   Plan last modified Vanessa Jacob RN (7/27/2016)   Next INR check 8/29/2017   Target end date Indefinite    Indications   Long-term (current) use of anticoagulants [Z79.01] [Z79.01]  DVT (deep venous thrombosis) (H) [I82.409]  Pulmonary embolism (H) [I26.99]         Anticoagulation Episode Summary     INR check location     Preferred lab     Send INR reminders to  ANTICOAG POOL    Comments       Anticoagulation Care Providers     Provider Role Specialty Phone number    Gianna Richard MD [facility] [phone]            See the Encounter Report to view Anticoagulation Flowsheet and Dosing Calendar (Go to Encounters tab in chart review, and find the Anticoagulation Therapy Visit)        Lala Boss, RN

## 2017-08-22 ENCOUNTER — ANTICOAGULATION THERAPY VISIT (OUTPATIENT)
Dept: ANTICOAGULATION | Facility: OTHER | Age: 77
End: 2017-08-22
Attending: FAMILY MEDICINE
Payer: MEDICARE

## 2017-08-22 DIAGNOSIS — Z79.01 LONG-TERM (CURRENT) USE OF ANTICOAGULANTS: ICD-10-CM

## 2017-08-22 LAB — INR POINT OF CARE: 3.4 (ref 0.86–1.14)

## 2017-08-22 PROCEDURE — 85610 PROTHROMBIN TIME: CPT | Mod: QW,ZL

## 2017-08-22 NOTE — PROGRESS NOTES
ANTICOAGULATION FOLLOW-UP CLINIC VISIT    Patient Name:  Rey Tristan  Date:  8/22/2017  Contact Type:  Face to Face    SUBJECTIVE:     Patient Findings     Positives Missed doses    Comments Missed one dose           OBJECTIVE    INR Protime   Date Value Ref Range Status   08/22/2017 3.4 (A) 0.86 - 1.14 Final       ASSESSMENT / PLAN  INR assessment SUPRA    Recheck INR In: 4 WEEKS    INR Location Clinic      Anticoagulation Summary as of 8/22/2017     INR goal 2.0-3.0   Today's INR 3.4!   Maintenance plan 6 mg (4 mg x 1.5) every day   Full instructions 8/22: 4 mg; Otherwise 6 mg every day   Weekly total 42 mg   Plan last modified Vanessa Jacob, RN (7/27/2016)   Next INR check 9/19/2017   Target end date Indefinite    Indications   Long-term (current) use of anticoagulants [Z79.01] [Z79.01]  DVT (deep venous thrombosis) (H) [I82.409]  Pulmonary embolism (H) [I26.99]         Anticoagulation Episode Summary     INR check location     Preferred lab     Send INR reminders to  ANTICOAG POOL    Comments       Anticoagulation Care Providers     Provider Role Specialty Phone number    Gianna Richard MD [facility] [phone]            See the Encounter Report to view Anticoagulation Flowsheet and Dosing Calendar (Go to Encounters tab in chart review, and find the Anticoagulation Therapy Visit)        Lala Boss, RN

## 2017-08-22 NOTE — MR AVS SNAPSHOT
Rey Tristan   8/22/2017 11:15 AM   Anticoagulation Therapy Visit    Description:  77 year old male   Provider:   ANTI COAGULATION   Department:   Anti Coagulation           INR as of 8/22/2017     Today's INR 3.4!      Anticoagulation Summary as of 8/22/2017     INR goal 2.0-3.0   Today's INR 3.4!   Full instructions 8/22: 4 mg; Otherwise 6 mg every day   Next INR check 9/19/2017    Indications   Long-term (current) use of anticoagulants [Z79.01] [Z79.01]  DVT (deep venous thrombosis) (H) [I82.409]  Pulmonary embolism (H) [I26.99]         Your next Anticoagulation Clinic appointment(s)     Aug 22, 2017 11:15 AM CDT   Anticoagulation Visit with  ANTI COAGULATION   [facility] ([facility] )    [address] [facility] [address]   [phone]            Sep 19, 2017 11:00 AM CDT   Anticoagulation Visit with  ANTI COAGULATION   [facility] )    [address] [address]   [phone]              August 2017 Details    Sun Mon Tue Wed Thu Fri Sat       1               2               3               4               5                 6               7               8               9               10               11               12                 13               14               15               16               17               18               19                 20               21               22      4 mg   See details      23      6 mg         24      6 mg         25      6 mg         26      6 mg           27      6 mg         28      6 mg         29      6 mg         30      6 mg         31      6 mg            Date Details   08/22 This INR check               How to take your warfarin dose     To take:  4 mg Take 1 of the 4 mg tablets.    To take:  6 mg Take 1.5 of the 4 mg tablets.           September 2017 Details    Sun Mon Tue Wed Thu Fri Sat          1      6 mg         2      6 mg           3       6 mg         4      6 mg         5      6 mg         6      6 mg         7      6 mg         8      6 mg         9      6 mg           10      6 mg         11      6 mg         12      6 mg         13      6 mg         14      6 mg         15      6 mg         16      6 mg           17      6 mg         18      6 mg         19            20               21               22               23                 24               25               26               27               28               29               30                Date Details   No additional details    Date of next INR:  9/19/2017         How to take your warfarin dose     To take:  6 mg Take 1.5 of the 4 mg tablets.

## 2017-09-03 ENCOUNTER — HOSPITAL ENCOUNTER (EMERGENCY)
Facility: HOSPITAL | Age: 77
Discharge: HOME | End: 2017-09-03
Payer: MEDICARE

## 2017-09-03 VITALS
TEMPERATURE: 98.1 F | DIASTOLIC BLOOD PRESSURE: 82 MMHG | OXYGEN SATURATION: 96 % | BODY MASS INDEX: 28.34 KG/M2 | SYSTOLIC BLOOD PRESSURE: 113 MMHG | RESPIRATION RATE: 16 BRPM | WEIGHT: 160 LBS | HEART RATE: 66 BPM

## 2017-09-03 DIAGNOSIS — E05.90 HYPERTHYROIDISM: ICD-10-CM

## 2017-09-03 DIAGNOSIS — R55 SYNCOPE, UNSPECIFIED SYNCOPE TYPE: ICD-10-CM

## 2017-09-03 LAB
ALBUMIN SERPL-MCNC: 3.1 G/DL (ref 3.4–5)
ALBUMIN UR-MCNC: NEGATIVE MG/DL
ALP SERPL-CCNC: 152 U/L (ref 40–150)
ALT SERPL W P-5'-P-CCNC: 24 U/L (ref 0–70)
ANION GAP SERPL CALCULATED.3IONS-SCNC: 6 MMOL/L (ref 3–14)
APPEARANCE UR: CLEAR
AST SERPL W P-5'-P-CCNC: 27 U/L (ref 0–45)
BASOPHILS # BLD AUTO: 0 10E9/L (ref 0–0.2)
BASOPHILS NFR BLD AUTO: 0.5 %
BILIRUB SERPL-MCNC: 0.4 MG/DL (ref 0.2–1.3)
BILIRUB UR QL STRIP: NEGATIVE
BUN SERPL-MCNC: 14 MG/DL (ref 7–30)
CALCIUM SERPL-MCNC: 8.4 MG/DL (ref 8.5–10.1)
CHLORIDE SERPL-SCNC: 107 MMOL/L (ref 94–109)
CO2 SERPL-SCNC: 27 MMOL/L (ref 20–32)
COLOR UR AUTO: NORMAL
CREAT SERPL-MCNC: 0.79 MG/DL (ref 0.66–1.25)
DIFFERENTIAL METHOD BLD: ABNORMAL
EOSINOPHIL # BLD AUTO: 0.1 10E9/L (ref 0–0.7)
EOSINOPHIL NFR BLD AUTO: 1.9 %
ERYTHROCYTE [DISTWIDTH] IN BLOOD BY AUTOMATED COUNT: 16.2 % (ref 10–15)
GFR SERPL CREATININE-BSD FRML MDRD: >90 ML/MIN/1.7M2
GLUCOSE SERPL-MCNC: 78 MG/DL (ref 70–99)
GLUCOSE UR STRIP-MCNC: NEGATIVE MG/DL
HCT VFR BLD AUTO: 34.2 % (ref 40–53)
HGB BLD-MCNC: 10.9 G/DL (ref 13.3–17.7)
HGB UR QL STRIP: NEGATIVE
IMM GRANULOCYTES # BLD: 0 10E9/L (ref 0–0.4)
IMM GRANULOCYTES NFR BLD: 0.2 %
INR PPP: 3.5 (ref 0.8–1.2)
KETONES UR STRIP-MCNC: NEGATIVE MG/DL
LEUKOCYTE ESTERASE UR QL STRIP: NEGATIVE
LYMPHOCYTES # BLD AUTO: 0.5 10E9/L (ref 0.8–5.3)
LYMPHOCYTES NFR BLD AUTO: 9 %
MCH RBC QN AUTO: 25.6 PG (ref 26.5–33)
MCHC RBC AUTO-ENTMCNC: 31.9 G/DL (ref 31.5–36.5)
MCV RBC AUTO: 81 FL (ref 78–100)
MONOCYTES # BLD AUTO: 0.6 10E9/L (ref 0–1.3)
MONOCYTES NFR BLD AUTO: 9.7 %
NEUTROPHILS # BLD AUTO: 4.6 10E9/L (ref 1.6–8.3)
NEUTROPHILS NFR BLD AUTO: 78.7 %
NITRATE UR QL: NEGATIVE
NRBC # BLD AUTO: 0 10*3/UL
NRBC BLD AUTO-RTO: 0 /100
PH UR STRIP: 5 PH (ref 4.7–8)
PLATELET # BLD AUTO: 175 10E9/L (ref 150–450)
POTASSIUM SERPL-SCNC: 4.3 MMOL/L (ref 3.4–5.3)
PROT SERPL-MCNC: 7.6 G/DL (ref 6.8–8.8)
RBC # BLD AUTO: 4.25 10E12/L (ref 4.4–5.9)
SODIUM SERPL-SCNC: 140 MMOL/L (ref 133–144)
SOURCE: NORMAL
SP GR UR STRIP: 1 (ref 1–1.03)
T4 FREE SERPL-MCNC: 1.93 NG/DL (ref 0.76–1.46)
TROPONIN I SERPL-MCNC: <0.015 UG/L (ref 0–0.04)
TSH SERPL DL<=0.005 MIU/L-ACNC: <0.01 MU/L (ref 0.4–4)
UROBILINOGEN UR STRIP-MCNC: NORMAL MG/DL (ref 0–2)
WBC # BLD AUTO: 5.9 10E9/L (ref 4–11)

## 2017-09-03 PROCEDURE — 85025 COMPLETE CBC W/AUTO DIFF WBC: CPT

## 2017-09-03 PROCEDURE — 96360 HYDRATION IV INFUSION INIT: CPT

## 2017-09-03 PROCEDURE — 99285 EMERGENCY DEPT VISIT HI MDM: CPT

## 2017-09-03 PROCEDURE — 93005 ELECTROCARDIOGRAM TRACING: CPT

## 2017-09-03 PROCEDURE — 36415 COLL VENOUS BLD VENIPUNCTURE: CPT

## 2017-09-03 PROCEDURE — 85610 PROTHROMBIN TIME: CPT

## 2017-09-03 PROCEDURE — 70450 CT HEAD/BRAIN W/O DYE: CPT | Mod: TC

## 2017-09-03 PROCEDURE — 84439 ASSAY OF FREE THYROXINE: CPT

## 2017-09-03 PROCEDURE — 80053 COMPREHEN METABOLIC PANEL: CPT

## 2017-09-03 PROCEDURE — 99285 EMERGENCY DEPT VISIT HI MDM: CPT | Mod: 25

## 2017-09-03 PROCEDURE — 25000128 H RX IP 250 OP 636

## 2017-09-03 PROCEDURE — 96361 HYDRATE IV INFUSION ADD-ON: CPT

## 2017-09-03 PROCEDURE — 93010 ELECTROCARDIOGRAM REPORT: CPT | Performed by: INTERNAL MEDICINE

## 2017-09-03 PROCEDURE — 84484 ASSAY OF TROPONIN QUANT: CPT

## 2017-09-03 PROCEDURE — 84443 ASSAY THYROID STIM HORMONE: CPT

## 2017-09-03 PROCEDURE — 81003 URINALYSIS AUTO W/O SCOPE: CPT

## 2017-09-03 RX ORDER — SODIUM CHLORIDE 9 MG/ML
1000 INJECTION, SOLUTION INTRAVENOUS CONTINUOUS
Status: DISCONTINUED | OUTPATIENT
Start: 2017-09-03 | End: 2017-09-03 | Stop reason: HOSPADM

## 2017-09-03 RX ORDER — LYCOPENE 10 MG
CAPSULE ORAL
COMMUNITY
End: 2022-02-24

## 2017-09-03 RX ADMIN — SODIUM CHLORIDE 1000 ML: 9 INJECTION, SOLUTION INTRAVENOUS at 15:30

## 2017-09-03 RX ADMIN — SODIUM CHLORIDE 1000 ML: 9 INJECTION, SOLUTION INTRAVENOUS at 14:01

## 2017-09-03 ASSESSMENT — ENCOUNTER SYMPTOMS
HEADACHES: 0
CONFUSION: 0
ARTHRALGIAS: 0
ABDOMINAL PAIN: 0
SHORTNESS OF BREATH: 0
DIFFICULTY URINATING: 0
COLOR CHANGE: 0
NECK STIFFNESS: 0
WEAKNESS: 1
DIZZINESS: 1
FEVER: 0
EYE REDNESS: 0

## 2017-09-03 NOTE — ED NOTES
"Pt declines IV at this time stating, \"ill get my blood drawn but I dont need an IV, I can drink my fluids.\" encouraged to provide urine sample. Pt reports he was incontinent of urine at home. Pt states \"i feel just fine now.\" Pt gowned, monitors on, call light in reach.     "

## 2017-09-03 NOTE — ED NOTES
Pt up to walk around the department with staff. Pt wearing oximeter and oxygen levels reading 91-92% on RA and HR measuring 75-82 while up and walking. Pt asymptomatic on walk and denies SOB, denies Chest pain, denies dizziness or weakness. PA notified.

## 2017-09-03 NOTE — ED AVS SNAPSHOT
[facility]    [address]    [place] [address]    Phone:  [phone]                                       Rey Tristan   MRN: [id number]    Department:  [facility]   Date of Visit:  9/3/2017           Patient Information     Date Of Birth          1940        Your diagnoses for this visit were:     Syncope, unspecified syncope type     Hyperthyroidism        You were seen by Maryse Buck APRN FNP.      Follow-up Information     Follow up with Gianna Richard MD In 1 week.    Specialty:  Family Practice    Why:  recheck    Contact information:    [facility]  [address]  [place] [address] [phone]          Follow up with [facility].    Specialty:  EMERGENCY MEDICINE    Why:  As needed, If symptoms worsen    Contact information:    [address]  [place] [address] [phone]    Additional information:    From [place]: Take [address]. Turn left at [address]. Turn left at the first stoplight on [address]. At the first stop sign, take a right onto [address]. Take a left into the parking lot and continue through until you reach the North enterance of the building.       From [place]: Take [address]. Take the [address] ramp towards [place]. Turn left onto [address]. Take a slight right onto [address]. Turn left at the first stoplight on [address]. At the first stop sign, take a right onto [address]. Take a left into the parking lot and continue through until you reach the North enterance of the building.       From [place]: Take [address]. Take a right at [address]. At the first stop sign, take a right onto [address]. Take a left into the parking lot and continue through until you reach the North enterance of the building.         Discharge Instructions         See attached for home care  Rest, increase  fluids  Fall precautions, deliberate, careful movements  Zio patch - you will be called on Tuesday  F/u with PCP next week for recheck  Return to ED with worsening sx.   Possible Causes of Dizziness or Fainting    Dizziness and fainting can have many causes. Below are some examples of possible causes your healthcare provider will look to rule out.  Benign paroxysmal positional vertigo (BPPV)  BPPV results when calcium crystals inside the inner ear shift into the wrong position. BPPV causes episodes of vertigo (a spinning sensation). Episodes most often happen when the head is moved in a certain way. This is more common in people 65 and older.   Infection or inflammation  The semicircular canals of the ear may become infected or inflamed. In this case, they can send the wrong balance signals. This can cause vertigo.  Meniere disease  Meniere disease happens when there is too much fluid in the semicircular canals. This can cause vertigo. It also can cause hearing problems and buzzing or ringing in the ears (called tinnitus). You may also have a feeling of pressure or fullness in the ear.  Syncope  Syncope is fainting that happens when the brain doesn t get enough oxygen-rich blood. It can be caused by low heart rate or low blood pressure. This is called vasovagal syncope. It can also be caused by sitting or standing up too quickly. This is called orthostatic hypotension. Syncope may also be due to a heart valve problem, an abnormal heart rhythm, or other heart problems. Dizziness can also happen from stroke, hemorrhage in the brain, or other problems in the brain. Your healthcare provider may do certain tests to rule out these conditions.  Other causes  Other causes include:    Medicines. Certain medicines can cause dizziness and even fainting. In some cases, stopping a medicine too quickly can lead to withdrawal symptoms, including dizziness and fainting.    Anxiety. Being anxious can lead to breathing changes, such  as hyperventilation. These can lead to dizziness and fainting.  Additional causes for dizziness and fainting also exist. Talk to your healthcare provider for more information.     Date Last Reviewed: 10/6/2015    2137-1024 [employer]. [address] [place] [address], [place] [address]. All rights reserved. This information is not intended as a substitute for professional medical care. Always follow your healthcare professional's instructions.          Hyperthyroidism    You have hyperthyroidism. This means you have a thyroid gland that makes too much thyroid hormone. This hormone is vital to body growth and metabolism. If you make too much thyroid hormone, many body processes speed up and may not work right. This can cause symptoms throughout the body.  There are a number of causes of hyperthyroidism. The most common cause is Grave s disease. This occurs when the body s immune system causes the thyroid to grow and make more thyroid hormone than needed.  Symptoms of hyperthyroidism include:    Nervousness, anxiety, irritability    Shaking (tremors) that affects the hands and fingers    Weight loss despite having a normal or increased appetite    Low tolerance to heat    Sweating more than normal    Fast or irregular heartbeat    Lighter or irregular periods (women only)    More frequent bowel movements    Enlarged thyroid gland (goiter)    Bulging eyes    Problems sleeping    Muscle weakness    Fatigue    Swelling of the hands, ankles, or feet (older adults only)  Treatment for hyperthyroidism may include taking medicines. For instance, antithyroid medicines may be prescribed. These help lower the amount of thyroid hormone made by the thyroid gland. Beta-blockers may be prescribed as well. Tips for taking medicines are given below.  Radioiodine ablation or surgery may also be advised. Your healthcare provider will tell you more about these options if needed.  Home care  Tips for taking medicines    Take any  medicines you re prescribed as directed.    Take your medicine at the same times each day.    Use a pillbox labeled with the days of the week. This will help you remember to take your medicine each day.    Tell your provider if you have any side effects from your medicines that bother you.    Never stop taking medicines on your own. If you do, your symptoms will return.  General care    Always talk with your provider before trying other medicines or treatments for your thyroid problem.    If you see other healthcare providers, be sure to let them know about your thyroid problem.  Follow-up care  See your healthcare provider for checkups as advised. You may need regular tests to check the level of thyroid hormone in your blood.  When to seek medical advice  Call your healthcare provider right away if any of these occur:    New symptoms occur    Symptoms return, continue, or worsen even after treatment    Extreme fatigue    Puffy hands, face, or feet    Confusion  Call 911  Call 911 right away if any of these occur:    Fainting    Chest pain    Shortness of breath or trouble breathing  Date Last Reviewed: 8/24/2015 2000-2017 [employer]. [address] [place] [address]. All rights reserved. This information is not intended as a substitute for professional medical care. Always follow your healthcare professional's instructions.          Future Appointments        Provider Department Dept Phone Center    9/19/2017 11:00 AM  [facility] [phone] Jonathon Calles         Review of your medicines      Our records show that you are taking the medicines listed below. If these are incorrect, please call your family doctor or clinic.        Dose / Directions Last dose taken    aspirin 81 MG tablet   Dose:  81 mg   Quantity:  30 tablet        Take 1 tablet (81 mg) by mouth daily   Refills:  0        atorvastatin 10 MG tablet   Commonly known as:  LIPITOR   Dose:  10 mg  "  Quantity:  90 tablet        Take 1 tablet (10 mg) by mouth daily   Refills:  2        Lycopene 10 MG Caps        Refills:  0        MULTIVITAMIN ADULT PO   Dose:  1 tablet        Take 1 tablet by mouth   Refills:  0        vitamin D 400 UNITS tablet   Dose:  1000 Units        Take 1,000 Units by mouth daily   Refills:  0        warfarin 4 MG tablet   Commonly known as:  COUMADIN   Quantity:  150 tablet        TAKE 1 1/2 TABLETS BY MOUTH DAILY OR AS DIRECTED PER WARFARIN CLINIC   Refills:  0                Procedures and tests performed during your visit     CBC with platelets differential    CT Head w/o Contrast    Comprehensive metabolic panel    EKG 12-lead, tracing only    INR    T4 free    TSH    Troponin I    UA reflex to Microscopic and Culture      Orders Needing Specimen Collection     None      Pending Results     Date and Time Order Name Status Description    9/3/2017 1535 CT Head w/o Contrast Preliminary             Pending Culture Results     No orders found from 9/1/2017 to 9/4/2017.            Thank you for choosing [facility]       Thank you for choosing [facility] for your care. Our goal is always to provide you with excellent care. Hearing back from our patients is one way we can continue to improve our services. Please take a few minutes to complete the written survey that you may receive in the mail after you visit with us. Thank you!        [employer] Information     [employer] lets you send messages to your doctor, view your test results, renew your prescriptions, schedule appointments and more. To sign up, go to [email or web address] . Click on \"Log in\" on the left side of the screen, which will take you to the Welcome page. Then click on \"Sign up Now\" on the right side of the page.     You will be asked to enter the access code listed below, as well as some personal information. Please follow the directions to create your username and password.     Your access code is: [email or web address]  Expires: " 2017  5:46 PM     Your access code will  in 90 days. If you need help or a new code, please call your [facility] clinic or [phone].        Care EveryWhere ID     This is your Care EveryWhere ID. This could be used by other organizations to access your [facility] medical records  [id number]        Equal Access to Services     NIK GRIDER : Cyndy Garrido, wasandhya diallo, aiden helmaljuanito denise, pb orellana . So [facility] [phone].    ATENCIÓN: Si habla español, tiene a dodson disposición servicios gratuitos de asistencia lingüística. Llame al [phone].    We comply with applicable federal civil rights laws and [place] laws. We do not discriminate on the basis of race, color, national origin, age, disability sex, sexual orientation or gender identity.            After Visit Summary       This is your record. Keep this with you and show to your community pharmacist(s) and doctor(s) at your next visit.

## 2017-09-03 NOTE — ED AVS SNAPSHOT
[facility]    [address]    [place] [address]    Phone:  [phone]                                       Rey Tristan   MRN: [id number]    Department:  [facility]   Date of Visit:  9/3/2017           After Visit Summary Signature Page     I have received my discharge instructions, and my questions have been answered. I have discussed any challenges I see with this plan with the nurse or doctor.    ..........................................................................................................................................  Patient/Patient Representative Signature      ..........................................................................................................................................  Patient Representative Print Name and Relationship to Patient    ..................................................               ................................................  Date                                            Time    ..........................................................................................................................................  Reviewed by Signature/Title    ...................................................              ..............................................  Date                                                            Time

## 2017-09-03 NOTE — ED PROVIDER NOTES
"  History     Chief Complaint   Patient presents with     Loss of Consciousness     The history is provided by the patient. No  was used.     Rey Tristan is a 77 year old male who lives at home with wife, hx of DVT, PE, on long term anticoagulation, PUD, presents to ED via EMS s/p syncopal episode. Pt states he was outside this morning, doing chores when he felt a sudden onset of dizziness. Sat down for a few minutes, felt a little better, went into house, sat in recliner. Next thing he remembers, his wife was calling 911. She reports \"eyes rolled back and he was unresponsive for 2-3 minutes\". Pt did not fall, did not hit head. He was noted to be incontinent of urine. Pt denies hx of seizure disorder, no jerking movements noted by wife, he states he felt well until this morning, denies fever, no cp, no sob, no n/v/d. Reports to normal breakfast this AM.     I have reviewed the Medications, Allergies, Past Medical and Surgical History, and Social History in the Epic system.    Allergies: No Known Allergies      No current facility-administered medications on file prior to encounter.   Current Outpatient Prescriptions on File Prior to Encounter:  warfarin (COUMADIN) 4 MG tablet TAKE 1 1/2 TABLETS BY MOUTH DAILY OR AS DIRECTED PER [facility] (Patient taking differently: 6 mg TAKE 1 1/2 TABLETS BY MOUTH DAILY OR AS DIRECTED PER [facility])   Multiple Vitamins-Minerals (MULTIVITAMIN ADULT PO) Take 1 tablet by mouth   atorvastatin (LIPITOR) 10 MG tablet Take 1 tablet (10 mg) by mouth daily   aspirin 81 MG tablet Take 1 tablet (81 mg) by mouth daily   Cholecalciferol (VITAMIN D) 400 UNITS tablet Take 1,000 Units by mouth daily        Patient Active Problem List   Diagnosis     DVT (deep venous thrombosis) (H)     Pulmonary embolism (H)     Advanced care planning/counseling discussion     Hyperlipidemia     Colonoscopy refused     ACP (advance care planning)     Elevated serum alkaline " "phosphatase level     Normocytic anemia     Colitis due to Clostridium difficile     History of tobacco use     Hyponatremia     Deep vein thrombosis (DVT) of lower extremity (H)     Vitamin D deficiency     Long-term (current) use of anticoagulants [Z79.01]     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 Cavazos     C. Difficile with intussuseption       cataract extraction       colonoscopy       COLONOSCOPY  6/17     ENDOSCOPY UPPER, COLONOSCOPY, COMBINED N/A 6/17/2016    Procedure: COMBINED ENDOSCOPY UPPER, COLONOSCOPY;  Surgeon: Andrea Stearns DO;  Location: [facility]     left knee meniscal tear       Left lower extremity DVT       lens implant       nasal polypectomy       Pulmonary Embolism         Social History   Substance Use Topics     Smoking status: Former Smoker     Types: Cigarettes     Quit date: 10/28/2009     Smokeless tobacco: Never Used      Comment: Quit 2009     Alcohol use No       Most Recent Immunizations   Administered Date(s) Administered     Pneumococcal 23 valent 06/15/2015       BMI: Estimated body mass index is 28.34 kg/(m^2) as calculated from the following:    Height as of 1/25/17: 1.6 m (5' 3\").    Weight as of this encounter: 72.6 kg (160 lb).      Review of Systems   Constitutional: Negative for fever.   HENT: Negative for congestion.    Eyes: Negative for redness.   Respiratory: Negative for shortness of breath.    Cardiovascular: Negative for chest pain.   Gastrointestinal: Negative for abdominal pain.   Genitourinary: Negative for difficulty urinating.   Musculoskeletal: Negative for arthralgias and neck stiffness.   Skin: Negative for color change.   Neurological: Positive for dizziness, syncope and weakness. Negative for headaches.   Psychiatric/Behavioral: Negative for confusion.       Physical Exam      Physical Exam "   Constitutional: He is oriented to person, place, and time. No distress.   Eyes: Conjunctivae are normal. Pupils are equal, round, and reactive to light.   Cardiovascular: Normal rate and regular rhythm.    Pulmonary/Chest: Effort normal and breath sounds normal. No respiratory distress.   Abdominal: Soft. Bowel sounds are normal. He exhibits no distension.   Genitourinary:   Genitourinary Comments: Incontinent of urine   Musculoskeletal: Normal range of motion.   Neurological: He is alert and oriented to person, place, and time. He has normal strength. No cranial nerve deficit or sensory deficit. He displays a negative Romberg sign. GCS eye subscore is 4. GCS verbal subscore is 5. GCS motor subscore is 6.   Skin: Skin is warm and dry. He is not diaphoretic.   Nursing note and vitals reviewed.      ED Course     Procedures        Labs Ordered and Resulted from Time of ED Arrival Up to the Time of Departure from the ED   CBC WITH PLATELETS DIFFERENTIAL - Abnormal; Notable for the following:        Result Value    RBC Count 4.25 (*)     Hemoglobin 10.9 (*)     Hematocrit 34.2 (*)     MCH 25.6 (*)     RDW 16.2 (*)     Absolute Lymphocytes 0.5 (*)     All other components within normal limits   COMPREHENSIVE METABOLIC PANEL - Abnormal; Notable for the following:     Calcium 8.4 (*)     Albumin 3.1 (*)     Alkaline Phosphatase 152 (*)     All other components within normal limits   TSH - Abnormal; Notable for the following:     TSH <0.01 (*)     All other components within normal limits   INR - Abnormal; Notable for the following:     INR 3.50 (*)     All other components within normal limits   T4 FREE - Abnormal; Notable for the following:     T4 Free 1.93 (*)     All other components within normal limits   UA MACROSCOPIC WITH REFLEX TO MICRO AND CULTURE   TROPONIN I          EKG Interpretation:      Interpreted by Maryse Buck  Time reviewed:1330   Symptoms at time of EKG: syncopal episode   Rhythm: Normal sinus    Rate: Normal  Axis: Normal  Ectopy: None  Conduction: Normal  ST Segments/ T Waves: No ST-T wave changes and No acute ischemic changes  Q Waves: None  Comparison to prior: Unchanged    Clinical Impression: normal EKG    HEAD CT    FINDINGS:  The ventricular system is normal in size.  There are no  masses, ventricular shifts or extracerebral collections.  Brainstem  and cerebellum appear normal.  The cranial vault is intact.  There is  complete opacification of the frontal and anterior ethmoid air cells.  Postoperative changes are seen in the maxillary sinuses.    IMPRESSION:  NORMAL BRAIN.  OPACIFIED FRONTAL AND ANTERIOR ETHMOID AIR  CELLS.  Exam Date: Sep 03, 2017 04:19:00 PM  Author: YOANA SAUCEDO    Assessments & Plan (with Medical Decision Making)   Pt presents s/p syncopal episode with associated urinary incontinence. Upon arrival, pt is alert and talkative, oriented x 4. Neuro exam WNL. ECG NSR.   IV established, fluid bolus initiated, labs obtained, results as above, hgb is 10.9 and stable, TSH <0.01 with 1.93 T4 free. INR 3.5. Troponin negative, CT head negative.  Pt observed for 3 hours and remained stable with no recurrent syncopal, dizziness.   Pt's pulse did tabatha down to mid 40's - pt remained asymptomatic.   Consulted with Dr. Gutierrez who felt this was vagovagal type episode and given no return of sx, he could be discharged to home with close f/u. Pt able to tolerate po fluids and ambulated in ED without return of sx. Ordered zio patch, unable to obtain til next Tuesday. Will d/c to home with [facility] discharge instructions to f/u with any return of sx. Pt verbalizes understanding and agrees with plan.  Epic discharge instructions given. Pt discharged in stable condition.     I have reviewed the nursing notes.    I have reviewed the findings, diagnosis, plan and need for follow up with the patient.    Discharge Medication List as of 9/3/2017  5:50 PM          Final diagnoses:   Syncope, unspecified  syncope type   Hyperthyroidism     See attached for home care  Rest, increase fluids  Fall precautions, deliberate, careful movements  Zio patch - you will be called on Tuesday  F/u with PCP next week for recheck  Return to ED with worsening sx.     9/3/2017   [facility]     Maryse Buck, VALERIE FNP  09/03/17 0129

## 2017-09-03 NOTE — ED NOTES
"Pt to room 4 of the ED by [facility]. Around 1200 pt got lightheaded and weak and pt walked from outside to inside and sat down in the recliner and wife said he was unresponsive and eyes rolled into the back of my head for about 3 minutes and pt became diaphoretic. Pt declines IV stating \"i dont need that.\" Pt gowned, monitors on, call light in reach.     "

## 2017-09-03 NOTE — DISCHARGE INSTRUCTIONS
See attached for home care  Rest, increase fluids  Fall precautions, deliberate, careful movements  Zio patch - you will be called on Tuesday  F/u with PCP next week for recheck  Return to ED with worsening sx.   Possible Causes of Dizziness or Fainting    Dizziness and fainting can have many causes. Below are some examples of possible causes your healthcare provider will look to rule out.  Benign paroxysmal positional vertigo (BPPV)  BPPV results when calcium crystals inside the inner ear shift into the wrong position. BPPV causes episodes of vertigo (a spinning sensation). Episodes most often happen when the head is moved in a certain way. This is more common in people 65 and older.   Infection or inflammation  The semicircular canals of the ear may become infected or inflamed. In this case, they can send the wrong balance signals. This can cause vertigo.  Meniere disease  Meniere disease happens when there is too much fluid in the semicircular canals. This can cause vertigo. It also can cause hearing problems and buzzing or ringing in the ears (called tinnitus). You may also have a feeling of pressure or fullness in the ear.  Syncope  Syncope is fainting that happens when the brain doesn t get enough oxygen-rich blood. It can be caused by low heart rate or low blood pressure. This is called vasovagal syncope. It can also be caused by sitting or standing up too quickly. This is called orthostatic hypotension. Syncope may also be due to a heart valve problem, an abnormal heart rhythm, or other heart problems. Dizziness can also happen from stroke, hemorrhage in the brain, or other problems in the brain. Your healthcare provider may do certain tests to rule out these conditions.  Other causes  Other causes include:    Medicines. Certain medicines can cause dizziness and even fainting. In some cases, stopping a medicine too quickly can lead to withdrawal symptoms, including dizziness and fainting.    Anxiety. Being  anxious can lead to breathing changes, such as hyperventilation. These can lead to dizziness and fainting.  Additional causes for dizziness and fainting also exist. Talk to your healthcare provider for more information.     Date Last Reviewed: 10/6/2015    5878-2338 [employer]. [address] [place] [address]36. All rights reserved. This information is not intended as a substitute for professional medical care. Always follow your healthcare professional's instructions.          Hyperthyroidism    You have hyperthyroidism. This means you have a thyroid gland that makes too much thyroid hormone. This hormone is vital to body growth and metabolism. If you make too much thyroid hormone, many body processes speed up and may not work right. This can cause symptoms throughout the body.  There are a number of causes of hyperthyroidism. The most common cause is Grave s disease. This occurs when the body s immune system causes the thyroid to grow and make more thyroid hormone than needed.  Symptoms of hyperthyroidism include:    Nervousness, anxiety, irritability    Shaking (tremors) that affects the hands and fingers    Weight loss despite having a normal or increased appetite    Low tolerance to heat    Sweating more than normal    Fast or irregular heartbeat    Lighter or irregular periods (women only)    More frequent bowel movements    Enlarged thyroid gland (goiter)    Bulging eyes    Problems sleeping    Muscle weakness    Fatigue    Swelling of the hands, ankles, or feet (older adults only)  Treatment for hyperthyroidism may include taking medicines. For instance, antithyroid medicines may be prescribed. These help lower the amount of thyroid hormone made by the thyroid gland. Beta-blockers may be prescribed as well. Tips for taking medicines are given below.  Radioiodine ablation or surgery may also be advised. Your healthcare provider will tell you more about these options if needed.  Home  care  Tips for taking medicines    Take any medicines you re prescribed as directed.    Take your medicine at the same times each day.    Use a pillbox labeled with the days of the week. This will help you remember to take your medicine each day.    Tell your provider if you have any side effects from your medicines that bother you.    Never stop taking medicines on your own. If you do, your symptoms will return.  General care    Always talk with your provider before trying other medicines or treatments for your thyroid problem.    If you see other healthcare providers, be sure to let them know about your thyroid problem.  Follow-up care  See your healthcare provider for checkups as advised. You may need regular tests to check the level of thyroid hormone in your blood.  When to seek medical advice  Call your healthcare provider right away if any of these occur:    New symptoms occur    Symptoms return, continue, or worsen even after treatment    Extreme fatigue    Puffy hands, face, or feet    Confusion  Call 911  Call 911 right away if any of these occur:    Fainting    Chest pain    Shortness of breath or trouble breathing  Date Last Reviewed: 8/24/2015 2000-2017 [employer]. [address] [place] [address], [place] [address]. All rights reserved. This information is not intended as a substitute for professional medical care. Always follow your healthcare professional's instructions.

## 2017-09-03 NOTE — ED NOTES
Pt family reports pts heart monitor was reading 49 HR. PA notified. DI and nuclear med called for zio patch. No staff available today to give zio patch. PA notified.

## 2017-09-06 ENCOUNTER — HOSPITAL ENCOUNTER (OUTPATIENT)
Dept: NUCLEAR MEDICINE | Facility: HOSPITAL | Age: 77
Discharge: HOME | End: 2017-09-06
Payer: MEDICARE

## 2017-09-06 ENCOUNTER — OFFICE VISIT (OUTPATIENT)
Dept: FAMILY MEDICINE | Facility: OTHER | Age: 77
End: 2017-09-06
Attending: FAMILY MEDICINE
Payer: MEDICARE

## 2017-09-06 VITALS
OXYGEN SATURATION: 94 % | SYSTOLIC BLOOD PRESSURE: 110 MMHG | RESPIRATION RATE: 20 BRPM | HEART RATE: 60 BPM | HEIGHT: 63 IN | WEIGHT: 160 LBS | TEMPERATURE: 96.9 F | DIASTOLIC BLOOD PRESSURE: 60 MMHG | BODY MASS INDEX: 28.35 KG/M2

## 2017-09-06 DIAGNOSIS — R79.89 ABNORMAL SERUM THYROXINE (T4) LEVEL: ICD-10-CM

## 2017-09-06 DIAGNOSIS — Z13.1 SCREENING FOR DIABETES MELLITUS: ICD-10-CM

## 2017-09-06 DIAGNOSIS — Z12.5 SCREENING FOR PROSTATE CANCER: ICD-10-CM

## 2017-09-06 DIAGNOSIS — R00.1 BRADYCARDIA: ICD-10-CM

## 2017-09-06 DIAGNOSIS — R79.89 ABNORMAL TSH: Primary | ICD-10-CM

## 2017-09-06 DIAGNOSIS — R55 SYNCOPE, UNSPECIFIED SYNCOPE TYPE: ICD-10-CM

## 2017-09-06 DIAGNOSIS — E78.5 HYPERLIPIDEMIA, UNSPECIFIED: ICD-10-CM

## 2017-09-06 PROCEDURE — 99212 OFFICE O/P EST SF 10 MIN: CPT | Mod: 25

## 2017-09-06 PROCEDURE — 0296T ZZHC  EXT ECG > 48HR TO 21 DAY RCRD W/CONECT INTL RCRD: CPT | Mod: TC

## 2017-09-06 PROCEDURE — 0298T ZZC EXT ECG > 48HR TO 21 DAY REVIEW AND INTERPRETATN: CPT | Performed by: INTERNAL MEDICINE

## 2017-09-06 PROCEDURE — 99214 OFFICE O/P EST MOD 30 MIN: CPT | Performed by: FAMILY MEDICINE

## 2017-09-06 ASSESSMENT — ANXIETY QUESTIONNAIRES
3. WORRYING TOO MUCH ABOUT DIFFERENT THINGS: NOT AT ALL
IF YOU CHECKED OFF ANY PROBLEMS ON THIS QUESTIONNAIRE, HOW DIFFICULT HAVE THESE PROBLEMS MADE IT FOR YOU TO DO YOUR WORK, TAKE CARE OF THINGS AT HOME, OR GET ALONG WITH OTHER PEOPLE: NOT DIFFICULT AT ALL
2. NOT BEING ABLE TO STOP OR CONTROL WORRYING: NOT AT ALL
4. TROUBLE RELAXING: NOT AT ALL
1. FEELING NERVOUS, ANXIOUS, OR ON EDGE: NOT AT ALL
GAD7 TOTAL SCORE: 0
7. FEELING AFRAID AS IF SOMETHING AWFUL MIGHT HAPPEN: NOT AT ALL
6. BECOMING EASILY ANNOYED OR IRRITABLE: NOT AT ALL
5. BEING SO RESTLESS THAT IT IS HARD TO SIT STILL: NOT AT ALL

## 2017-09-06 ASSESSMENT — PAIN SCALES - GENERAL: PAINLEVEL: NO PAIN (0)

## 2017-09-06 ASSESSMENT — PATIENT HEALTH QUESTIONNAIRE - PHQ9: SUM OF ALL RESPONSES TO PHQ QUESTIONS 1-9: 0

## 2017-09-06 NOTE — PROGRESS NOTES
SUBJECTIVE:  Rey is a 77 year old male who comes in today for ER follow up from 9/3 for syncopal episode.  Was outdoors, doing chores, felt dizzy, sat down to rest, went in house, and doesn't recall what happened.  Wife had reported loss of consciousness for 2-3 minutes, eyes rolled back.  No fall, no head injury.  Was incontinence of urine.  Was in his recliner at the time.    EKG stable.  Head CT negative other than sinusitis.  Pulse did go as low as 40s.  Zio patch was ordered.  TSH was noted to be suppressed with elevated free T4.  Glucose in ambulance was 196, but 78 in the ER.  Unsure if first reading accurate.    Prior angiogram after stress test with Dr. Cavazos, 2014, stable.      Current Outpatient Prescriptions   Medication     Lycopene 10 MG CAPS     warfarin (COUMADIN) 4 MG tablet     Multiple Vitamins-Minerals (MULTIVITAMIN ADULT PO)     atorvastatin (LIPITOR) 10 MG tablet     aspirin 81 MG tablet     Cholecalciferol (VITAMIN D) 400 UNITS tablet     No current facility-administered medications for this visit.       No Known Allergies    Past Medical History:   Diagnosis Date     Chronic anticoagulation 1/1/2012     Elevated serum alkaline phosphatase level 5/4/2016    normal GGT, normal bone skeletal survery     Gastric ulcer     endoscopy 6/2016, repeat 6 months; PPI Carafate     Hyperlipidemia      Normocytic anemia 5/4/2016     Pulmonary nodule     CT 5/2014, right; consider repeat 1 year if high risk     Vitamin D deficiency 1/1/2012     Past Surgical History:   Procedure Laterality Date     ANGIOGRAM  6/23/2014     C REGULAR ECHO ([place])  6/23/2014    Dr. Cavazos     C. Difficile with intussuseption       cataract extraction       colonoscopy       COLONOSCOPY  6/17     ENDOSCOPY UPPER, COLONOSCOPY, COMBINED N/A 6/17/2016    Procedure: COMBINED ENDOSCOPY UPPER, COLONOSCOPY;  Surgeon: Andrea Stearns DO;  Location: [facility]     left knee meniscal tear       Left lower extremity DVT       lens  implant       nasal polypectomy       Pulmonary Embolism       Social History     Social History     Marital status:      Spouse name: N/A     Number of children: N/A     Years of education: N/A     Occupational History      [employer]     Retired      Social History Main Topics     Smoking status: Former Smoker     Types: Cigarettes     Quit date: 10/28/2009     Smokeless tobacco: Never Used      Comment: Quit 2009     Alcohol use No     Drug use: No     Sexual activity: Not on file     Other Topics Concern      Service Yes     [employer]     Blood Transfusions Yes     Permits if needed     Caffeine Concern Yes     Tea     Occupational Exposure No     Hobby Hazards No     Sleep Concern No     Stress Concern No     Weight Concern No     Special Diet No     Back Care No     Exercise No     Seat Belt Yes     Self-Exams Yes     Parent/Sibling W/ Cabg, Mi Or Angioplasty Before 65f 55m? No     Social History Narrative         ROS:  General: negative for, fever, chills, headaches, dizziness, fatigue, weakness  Skin: negative for, rash, bruising  Eyes: negative for, visual blurring, double vision  ENT: negative for, ear pain both, sinus congestion  Resp: No shortness of breath, No dyspnea on exertion, No cough and No hemoptysis  CV: positive for as above and syncope or near-syncope, negative for, palpitations, chest pain, exertional chest pain or pressure and lower extremity edema  GI: negative for, poor appetite, nausea, vomiting, abdominal pain, melena, hematochezia, constipation and diarrhea  : negative for, dysuria and frequency  Musculoskeletal: negative for, joint pain, joint swelling and muscular weakness  Neurologic: positive for as above and syncope, negative for, headaches, stroke, seizures, local weakness, numbness or tingling of hands, numbness or tingling of feet and incoordination  Endocrine: positive for as above and thyroid disorder, negative for and  "diabetes      OBJECTIVE:  Vitals:    09/06/17 1000   BP: 110/60   Pulse: 60   Resp: 20   Temp: 96.9  F (36.1  C)   TempSrc: Tympanic   SpO2: 94%   Weight: 160 lb (72.6 kg)   Height: 5' 3\" (1.6 m)     GENERAL APPEARANCE: healthy, alert and no distress  NECK: no adenopathy, no asymmetry, masses, or scars and thyroid normal to palpation  RESP: lungs clear to auscultation - no rales, rhonchi or wheezes  CV: regular rates and rhythm, normal S1 S2, no S3 or S4 and no murmur, click or rub -  ABDOMEN:  soft, nontender, no HSM or masses and bowel sounds normal  MS: extremities normal- no gross deformities noted, no evidence of inflammation in joints, FROM in all extremities.  SKIN: no suspicious lesions or rashes  NEURO: Normal strength and tone, sensory exam grossly normal, mentation intact and speech normal    ASSESSMENT/ORDERS:    ICD-10-CM    1. Abnormal TSH R94.6 TSH     T4 free     T3, Free     Thyroid peroxidase antibody     Thyroglobulin and antibody     US Thyroid   2. Abnormal serum thyroxine (T4) level R79.9 TSH     T4 free     T3, Free     Thyroid peroxidase antibody     Thyroglobulin and antibody     US Thyroid   3. Syncope, unspecified syncope type R55    4. Bradycardia R00.1    5. Hyperlipidemia, unspecified E78.5 Lipid Profile (Chol, Trig, HDL, LDL calc)   6. Screening for diabetes mellitus Z13.1 Glucose   7. Screening for prostate cancer Z12.5 PSA, screen     PLAN:  Patient Instructions   Zio patch to be set up today to evaluate heart rate, rhythm.  Return for fasting labs - due for lipids, prostate, glucose - but will recheck thyroid labs and additional thyroid markers.  Ultrasound of thyroid to be scheduled - can schedule for morning and do fasting labs same day.  Keep hydrated.  Consider additional evaluation - repeat stress testing?  EEG for underlying seizure activity?        Gianna Mckeon    "

## 2017-09-06 NOTE — NURSING NOTE
"Chief Complaint   Patient presents with     RECHECK     Follow up ER syncope episode and low pulse - will have zeo patch placed today     Thyroid Problem     Follow up ER hyperthyroidism     *_* Health Care Directive *_*     Has info       Initial /60  Pulse 60  Temp 96.9  F (36.1  C) (Tympanic)  Resp 20  Ht 5' 3\" (1.6 m)  Wt 160 lb (72.6 kg)  SpO2 94%  BMI 28.34 kg/m2 Estimated body mass index is 28.34 kg/(m^2) as calculated from the following:    Height as of this encounter: 5' 3\" (1.6 m).    Weight as of this encounter: 160 lb (72.6 kg).  Medication Reconciliation: complete   Jayleen Fleming LPN      "

## 2017-09-06 NOTE — MR AVS SNAPSHOT
After Visit Summary   9/6/2017    Rey Tristan    MRN: [id number]           Patient Information     Date Of Birth          1940        Visit Information        Provider Department      9/6/2017 10:00 AM Gianna Richard MD [facility]        Today's Diagnoses     Abnormal TSH    -  1    Abnormal serum thyroxine (T4) level        Syncope, unspecified syncope type        Bradycardia        Hyperlipidemia, unspecified        Screening for diabetes mellitus        Screening for prostate cancer          Care Instructions    Zio patch to be set up today to evaluate heart rate, rhythm.  Return for fasting labs - due for lipids, prostate, glucose - but will recheck thyroid labs and additional thyroid markers.  Ultrasound of thyroid to be scheduled - can schedule for morning and do fasting labs same day.  Keep hydrated.  Consider additional evaluation - repeat stress testing?  EEG for underlying seizure activity?          Follow-ups after your visit        Your next 10 appointments already scheduled     Sep 06, 2017 11:30 AM CDT   Radiology with [facility] Nuclear [facility] ([facility] )    [address] [place] [id number] [phone]            Sep 19, 2017 11:00 AM CDT   Anticoagulation Visit with  ANTI COAGULATION   [facility] ([facility] )    [address] [place] [address]   [phone]              Future tests that were ordered for you today     Open Future Orders        Priority Expected Expires Ordered    PSA, screen Routine  10/6/2017 9/6/2017    Lipid Profile (Chol, Trig, HDL, LDL calc) Routine  10/6/2017 9/6/2017    Glucose Routine  10/6/2017 9/6/2017    TSH Routine  10/6/2017 9/6/2017    T4 free Routine  10/6/2017 9/6/2017    T3, Free Routine  10/6/2017 9/6/2017    Thyroid peroxidase antibody Routine  10/6/2017 9/6/2017    Thyroglobulin and antibody Routine  10/6/2017 9/6/2017            Who to  "contact     If you have questions or need follow up information about today's clinic visit or your schedule please contact [facility] directly at [phone].  Normal or non-critical lab and imaging results will be communicated to you by MyChart, letter or phone within 4 business days after the clinic has received the results. If you do not hear from us within 7 days, please contact the clinic through MyChart or phone. If you have a critical or abnormal lab result, we will notify you by phone as soon as possible.  Submit refill requests through [email or web address] or call your pharmacy and they will forward the refill request to us. Please allow 3 business days for your refill to be completed.          Additional Information About Your Visit        [place]  Information     [email or web address] lets you send messages to your doctor, view your test results, renew your prescriptions, schedule appointments and more. To sign up, go to [email or web address] . Click on \"Log in\" on the left side of the screen, which will take you to the Welcome page. Then click on \"Sign up Now\" on the right side of the page.     You will be asked to enter the access code listed below, as well as some personal information. Please follow the directions to create your username and password.     Your access code is: [email or web address]  Expires: 2017  5:46 PM     Your access code will  in 90 days. If you need help or a new code, please call your [place] clinic or [phone].        Care EveryWhere ID     This is your Care EveryWhere ID. This could be used by other organizations to access your [place] medical records  [id number]        Your Vitals Were     Pulse Temperature Respirations Height Pulse Oximetry BMI (Body Mass Index)    60 96.9  F (36.1  C) (Tympanic) 20 5' 3\" (1.6 m) 94% 28.34 kg/m2       Blood Pressure from Last 3 Encounters:   17 110/60   17 113/82   17 100/62    Weight from Last 3 Encounters:   17 160 lb " (72.6 kg)   09/03/17 160 lb (72.6 kg)   01/25/17 167 lb (75.8 kg)              We Performed the Following     US Thyroid          Today's Medication Changes          These changes are accurate as of: 9/6/17 10:30 AM.  If you have any questions, ask your nurse or doctor.               These medicines have changed or have updated prescriptions.        Dose/Directions    warfarin 4 MG tablet   Commonly known as:  COUMADIN   This may have changed:    - how much to take  - additional instructions   Used for:  Long term (current) use of anticoagulants        TAKE 1 1/2 TABLETS BY MOUTH DAILY OR AS DIRECTED PER [facility]   Quantity:  150 tablet   Refills:  0                Primary Care Provider Office Phone # Fax #    Gianna Richard [phone]       [facility] [address] [facility] [address] [facility] [address]        Equal Access to Services     NIK GRIDER : Cyndy colliero Socharmaine, waaxda luqadaha, qaybta kaalmada maggieyaangel, pb orellana . So [facility] [phone].    ATENCIÓN: Si habla español, tiene a dodson disposición servicios gratuitos de asistencia lingüística. Samy al [phone].    We comply with applicable federal civil rights laws and [place] laws. We do not discriminate on the basis of race, color, national origin, age, disability sex, sexual orientation or gender identity.            Thank you!     Thank you for choosing [facility]  for your care. Our goal is always to provide you with excellent care. Hearing back from our patients is one way we can continue to improve our services. Please take a few minutes to complete the written survey that you may receive in the mail after your visit with us. Thank you!             Your Updated Medication List - Protect others around you: Learn how to safely use, store and throw away your medicines at www.disposemymeds.org.          This list is accurate as of: 9/6/17 10:30 AM.  Always use your most  recent med list.                   Brand Name Dispense Instructions for use Diagnosis    aspirin 81 MG tablet     30 tablet    Take 1 tablet (81 mg) by mouth daily        atorvastatin 10 MG tablet    LIPITOR    90 tablet    Take 1 tablet (10 mg) by mouth daily    Hyperlipidemia, unspecified hyperlipidemia type       Lycopene 10 MG Caps           MULTIVITAMIN ADULT PO      Take 1 tablet by mouth        vitamin D 400 UNITS tablet      Take 1,000 Units by mouth daily        warfarin 4 MG tablet    COUMADIN    150 tablet    TAKE 1 1/2 TABLETS BY MOUTH DAILY OR AS DIRECTED PER [facility]    Long term (current) use of anticoagulants

## 2017-09-06 NOTE — PATIENT INSTRUCTIONS
Zio patch to be set up today to evaluate heart rate, rhythm.  Return for fasting labs - due for lipids, prostate, glucose - but will recheck thyroid labs and additional thyroid markers.  Ultrasound of thyroid to be scheduled - can schedule for morning and do fasting labs same day.  Keep hydrated.  Consider additional evaluation - repeat stress testing?  EEG for underlying seizure activity?

## 2017-09-07 ASSESSMENT — ANXIETY QUESTIONNAIRES: GAD7 TOTAL SCORE: 0

## 2017-09-13 ENCOUNTER — HOSPITAL ENCOUNTER (OUTPATIENT)
Dept: ULTRASOUND IMAGING | Facility: HOSPITAL | Age: 77
Discharge: HOME | End: 2017-09-13
Attending: FAMILY MEDICINE | Admitting: FAMILY MEDICINE
Payer: MEDICARE

## 2017-09-13 DIAGNOSIS — R79.89 ABNORMAL TSH: ICD-10-CM

## 2017-09-13 DIAGNOSIS — R79.89 ABNORMAL SERUM THYROXINE (T4) LEVEL: ICD-10-CM

## 2017-09-13 DIAGNOSIS — Z12.5 SCREENING FOR PROSTATE CANCER: ICD-10-CM

## 2017-09-13 DIAGNOSIS — Z13.1 SCREENING FOR DIABETES MELLITUS: ICD-10-CM

## 2017-09-13 DIAGNOSIS — E78.5 HYPERLIPIDEMIA, UNSPECIFIED: ICD-10-CM

## 2017-09-13 LAB
CHOLEST SERPL-MCNC: 113 MG/DL
GLUCOSE SERPL-MCNC: 98 MG/DL (ref 70–99)
HDLC SERPL-MCNC: 44 MG/DL
LDLC SERPL CALC-MCNC: 56 MG/DL
NONHDLC SERPL-MCNC: 69 MG/DL
PSA SERPL-ACNC: 0.84 UG/L (ref 0–4)
T3FREE SERPL-MCNC: 4.8 PG/ML (ref 2.3–4.2)
T4 FREE SERPL-MCNC: 1.96 NG/DL (ref 0.76–1.46)
TRIGL SERPL-MCNC: 67 MG/DL
TSH SERPL DL<=0.005 MIU/L-ACNC: <0.01 MU/L (ref 0.4–4)

## 2017-09-13 PROCEDURE — 99000 SPECIMEN HANDLING OFFICE-LAB: CPT | Performed by: FAMILY MEDICINE

## 2017-09-13 PROCEDURE — 36415 COLL VENOUS BLD VENIPUNCTURE: CPT | Mod: ZL | Performed by: FAMILY MEDICINE

## 2017-09-13 PROCEDURE — 82947 ASSAY GLUCOSE BLOOD QUANT: CPT | Mod: ZL | Performed by: FAMILY MEDICINE

## 2017-09-13 PROCEDURE — 80061 LIPID PANEL: CPT | Mod: ZL | Performed by: FAMILY MEDICINE

## 2017-09-13 PROCEDURE — 84439 ASSAY OF FREE THYROXINE: CPT | Mod: ZL | Performed by: FAMILY MEDICINE

## 2017-09-13 PROCEDURE — 84443 ASSAY THYROID STIM HORMONE: CPT | Mod: ZL | Performed by: FAMILY MEDICINE

## 2017-09-13 PROCEDURE — 86800 THYROGLOBULIN ANTIBODY: CPT | Mod: ZL | Performed by: FAMILY MEDICINE

## 2017-09-13 PROCEDURE — 76536 US EXAM OF HEAD AND NECK: CPT | Mod: TC

## 2017-09-13 PROCEDURE — 86376 MICROSOMAL ANTIBODY EACH: CPT | Mod: ZL | Performed by: FAMILY MEDICINE

## 2017-09-13 PROCEDURE — 84481 FREE ASSAY (FT-3): CPT | Mod: ZL | Performed by: FAMILY MEDICINE

## 2017-09-13 PROCEDURE — 84432 ASSAY OF THYROGLOBULIN: CPT | Mod: ZL | Performed by: FAMILY MEDICINE

## 2017-09-13 PROCEDURE — G0103 PSA SCREENING: HCPCS | Mod: ZL | Performed by: FAMILY MEDICINE

## 2017-09-14 ENCOUNTER — TELEPHONE (OUTPATIENT)
Dept: FAMILY MEDICINE | Facility: OTHER | Age: 77
End: 2017-09-14

## 2017-09-14 DIAGNOSIS — E05.90 HYPERTHYROIDISM: Primary | ICD-10-CM

## 2017-09-14 NOTE — TELEPHONE ENCOUNTER
Call with lab and u/s results.  Appears to have thyroiditis - overactive thyroid.  Please place endocrinology referral once notified.    I reviewed ultrasound results with patient and lab results, and agreeable to seeing Endocrinology - pended referral

## 2017-09-15 LAB — THYROPEROXIDASE AB SERPL-ACNC: 1085 IU/ML

## 2017-09-19 ENCOUNTER — ANTICOAGULATION THERAPY VISIT (OUTPATIENT)
Dept: ANTICOAGULATION | Facility: OTHER | Age: 77
End: 2017-09-19
Attending: FAMILY MEDICINE
Payer: MEDICARE

## 2017-09-19 ENCOUNTER — TELEPHONE (OUTPATIENT)
Dept: FAMILY MEDICINE | Facility: OTHER | Age: 77
End: 2017-09-19

## 2017-09-19 DIAGNOSIS — Z79.01 LONG-TERM (CURRENT) USE OF ANTICOAGULANTS: ICD-10-CM

## 2017-09-19 LAB — INR POINT OF CARE: 3.3 (ref 0.86–1.14)

## 2017-09-19 PROCEDURE — 85610 PROTHROMBIN TIME: CPT | Mod: QW,ZL

## 2017-09-19 NOTE — PROGRESS NOTES
ANTICOAGULATION FOLLOW-UP CLINIC VISIT    Patient Name:  Rey Tristan  Date:  9/19/2017  Contact Type:  Face to Face    SUBJECTIVE:     Patient Findings     Comments Had episode of loss of consciousness within past month and was seen in ER.  He will be seeing and endocrinologist as his thyroid labs are not normal.  No other episodes. No other complaints           OBJECTIVE    INR Protime   Date Value Ref Range Status   09/19/2017 3.3 (A) 0.86 - 1.14 Final       ASSESSMENT / PLAN  INR assessment SUPRA    Recheck INR In: 3 WEEKS    INR Location Clinic      Anticoagulation Summary as of 9/19/2017     INR goal 2.0-3.0   Today's INR 3.3!   Maintenance plan 6 mg (4 mg x 1.5) every day   Full instructions 9/19: 2 mg; Otherwise 6 mg every day   Weekly total 42 mg   Plan last modified Vanessa Jacob RN (7/27/2016)   Next INR check 10/10/2017   Target end date Indefinite    Indications   Long-term (current) use of anticoagulants [Z79.01] [Z79.01]  DVT (deep venous thrombosis) (H) [I82.409]  Pulmonary embolism (H) [I26.99]         Anticoagulation Episode Summary     INR check location     Preferred lab     Send INR reminders to [facility] POOL    Comments       Anticoagulation Care Providers     Provider Role Specialty Phone number    Gianna Richard MD [facility] [phone]            See the Encounter Report to view Anticoagulation Flowsheet and Dosing Calendar (Go to Encounters tab in chart review, and find the Anticoagulation Therapy Visit)        Lala Boss RN

## 2017-09-19 NOTE — MR AVS SNAPSHOT
Rey Tristan   9/19/2017 11:00 AM   Anticoagulation Therapy Visit    Description:  77 year old male   Provider:   ANTI COAGULATION   Department:   Anti Coagulation           INR as of 9/19/2017     Today's INR 3.3!      Anticoagulation Summary as of 9/19/2017     INR goal 2.0-3.0   Today's INR 3.3!   Full instructions 9/19: 2 mg; Otherwise 6 mg every day   Next INR check 10/10/2017    Indications   Long-term (current) use of anticoagulants [Z79.01] [Z79.01]  DVT (deep venous thrombosis) (H) [I82.409]  Pulmonary embolism (H) [I26.99]         Your next Anticoagulation Clinic appointment(s)     Sep 19, 2017 11:00 AM CDT   Anticoagulation Visit with  ANTI COAGULATION   [facility] ([facility] )    [address]  [place] [address]   [phone]            Oct 10, 2017 11:00 AM CDT   Anticoagulation Visit with  ANTI COAGULATION   [facility] )    [address] [facility] [address]   [phone]              September 2017 Details    Sun Mon Tue Wed Thu Fri Sat          1               2                 3               4               5               6               7               8               9                 10               11               12               13               14               15               16                 17               18               19      2 mg   See details      20      6 mg         21      6 mg         22      6 mg         23      6 mg           24      6 mg         25      6 mg         26      6 mg         27      6 mg         28      6 mg         29      6 mg         30      6 mg          Date Details   09/19 This INR check               How to take your warfarin dose     To take:  2 mg Take 0.5 of a 4 mg tablet.    To take:  6 mg Take 1.5 of the 4 mg tablets.           October 2017 Details    Sun Mon Tue Wed Thu Fri Sat     1      6 mg         2      6 mg         3      6 mg          4      6 mg         5      6 mg         6      6 mg         7      6 mg           8      6 mg         9      6 mg         10            11               12               13               14                 15               16               17               18               19               20               21                 22               23               24               25               26               27               28                 29               30               31                    Date Details   No additional details    Date of next INR:  10/10/2017         How to take your warfarin dose     To take:  6 mg Take 1.5 of the 4 mg tablets.

## 2017-09-19 NOTE — TELEPHONE ENCOUNTER
Patient concerned he has a appointment on 10/30/2017 with DR Simeon for his thyroid. Wanting to know if she has any concerns or treatment plan while waiting for appointment. Please advise

## 2017-09-25 NOTE — TELEPHONE ENCOUNTER
Patient notified of appointment on Thursday and will be able to make his appointment and grateful to be seen earlier

## 2017-09-25 NOTE — TELEPHONE ENCOUNTER
Did discuss case with Dr. Simeon.  He will see patient at noon this Thursday, 9/28/17.   Please notify patient and then forward on to Dannielle to be sure all records, labs, ultrasound, etc, have been sent to [facility] endocrinology.

## 2017-09-28 ENCOUNTER — TRANSFERRED RECORDS (OUTPATIENT)
Dept: HEALTH INFORMATION MANAGEMENT | Facility: HOSPITAL | Age: 77
End: 2017-09-28

## 2017-09-28 LAB — LAB SCANNED RESULT: NORMAL

## 2017-10-04 ENCOUNTER — TELEPHONE (OUTPATIENT)
Dept: FAMILY MEDICINE | Facility: OTHER | Age: 77
End: 2017-10-04

## 2017-10-04 DIAGNOSIS — E06.3 AUTOIMMUNE THYROIDITIS: Primary | ICD-10-CM

## 2017-10-04 NOTE — TELEPHONE ENCOUNTER
Patient had consult with DR. Simeon.  Has autoimmune thyroiditis.  Had transient hyperthyroid, but level now normal.  He recommends monthly thyroid hormone levels to monitor for developing hypothyroid.  I will place standing orders in the lab.  Please notify patient.

## 2017-10-09 ENCOUNTER — OFFICE VISIT (OUTPATIENT)
Dept: FAMILY MEDICINE | Facility: OTHER | Age: 77
End: 2017-10-09
Attending: FAMILY MEDICINE
Payer: COMMERCIAL

## 2017-10-09 VITALS
WEIGHT: 160 LBS | HEIGHT: 63 IN | OXYGEN SATURATION: 96 % | HEART RATE: 66 BPM | TEMPERATURE: 97.6 F | BODY MASS INDEX: 28.35 KG/M2 | RESPIRATION RATE: 20 BRPM | SYSTOLIC BLOOD PRESSURE: 118 MMHG | DIASTOLIC BLOOD PRESSURE: 70 MMHG

## 2017-10-09 DIAGNOSIS — R55 SYNCOPE, UNSPECIFIED SYNCOPE TYPE: Primary | ICD-10-CM

## 2017-10-09 DIAGNOSIS — R00.1 BRADYCARDIA: ICD-10-CM

## 2017-10-09 DIAGNOSIS — E78.5 HYPERLIPIDEMIA, UNSPECIFIED HYPERLIPIDEMIA TYPE: ICD-10-CM

## 2017-10-09 PROCEDURE — 99212 OFFICE O/P EST SF 10 MIN: CPT

## 2017-10-09 PROCEDURE — 99213 OFFICE O/P EST LOW 20 MIN: CPT | Performed by: FAMILY MEDICINE

## 2017-10-09 RX ORDER — ATORVASTATIN CALCIUM 10 MG/1
10 TABLET, FILM COATED ORAL DAILY
Qty: 90 TABLET | Refills: 3 | Status: SHIPPED | OUTPATIENT
Start: 2017-10-09 | End: 2018-09-25

## 2017-10-09 ASSESSMENT — ANXIETY QUESTIONNAIRES
IF YOU CHECKED OFF ANY PROBLEMS ON THIS QUESTIONNAIRE, HOW DIFFICULT HAVE THESE PROBLEMS MADE IT FOR YOU TO DO YOUR WORK, TAKE CARE OF THINGS AT HOME, OR GET ALONG WITH OTHER PEOPLE: NOT DIFFICULT AT ALL
3. WORRYING TOO MUCH ABOUT DIFFERENT THINGS: NOT AT ALL
GAD7 TOTAL SCORE: 0
7. FEELING AFRAID AS IF SOMETHING AWFUL MIGHT HAPPEN: NOT AT ALL
5. BEING SO RESTLESS THAT IT IS HARD TO SIT STILL: NOT AT ALL
1. FEELING NERVOUS, ANXIOUS, OR ON EDGE: NOT AT ALL
6. BECOMING EASILY ANNOYED OR IRRITABLE: NOT AT ALL
4. TROUBLE RELAXING: NOT AT ALL
2. NOT BEING ABLE TO STOP OR CONTROL WORRYING: NOT AT ALL

## 2017-10-09 ASSESSMENT — PAIN SCALES - GENERAL: PAINLEVEL: NO PAIN (0)

## 2017-10-09 ASSESSMENT — PATIENT HEALTH QUESTIONNAIRE - PHQ9: SUM OF ALL RESPONSES TO PHQ QUESTIONS 1-9: 0

## 2017-10-09 NOTE — MR AVS SNAPSHOT
After Visit Summary   10/9/2017    Rey Tristan    MRN: [id number]           Patient Information     Date Of Birth          1940        Visit Information        Provider Department      10/9/2017 11:30 AM Gianna Richard MD [facility] [place]        Today's Diagnoses     Syncope, unspecified syncope type    -  1    Bradycardia          Care Instructions    Continue to monitor thyroid labs every 2-3 months per Dr. Simeon.  Heart monitor without obvious rhythm issue.  Cardiology consult placed - possible need for stress testing vs additional rhythm monitoring.          Follow-ups after your visit        Additional Services     CARDIOLOGY EVAL ADULT REFERRAL       Your provider has referred you to:  Dr Pryor.    Please be aware that coverage of these services is subject to the terms and limitations of your health insurance plan.  Call member services at your health plan with any benefit or coverage questions.      Type of Referral:  New Cardiology Consult    Timeframe requested:  Within 1 month    Please bring the following to your appointment:  >>   Any x-rays, CTs or MRIs which have been performed.  Contact the facility where they were done to arrange for  prior to your scheduled appointment.    >>   List of current medications  >>   This referral request   >>   Any documents/labs given to you for this referral                  Your next 10 appointments already scheduled     Oct 10, 2017 11:00 AM CDT   Anticoagulation Visit with  ANTI COAGULATION   [facility] ([facility] )    [place] [address]   [phone]              Who to contact     If you have questions or need follow up information about today's clinic visit or your schedule please contact [facility] directly at [phone].  Normal or non-critical lab and imaging results will be communicated to you by MyChart, letter or phone within 4 business  "days after the clinic has received the results. If you do not hear from us within 7 days, please contact the clinic through [employer] Website or phone. If you have a critical or abnormal lab result, we will notify you by phone as soon as possible.  Submit refill requests through [employer] Website or call your pharmacy and they will forward the refill request to us. Please allow 3 business days for your refill to be completed.          Additional Information About Your Visit        [employer] Website Information     [employer] Website lets you send messages to your doctor, view your test results, renew your prescriptions, schedule appointments and more. To sign up, go to [email or web address] . Click on \"Log in\" on the left side of the screen, which will take you to the Welcome page. Then click on \"Sign up Now\" on the right side of the page.     You will be asked to enter the access code listed below, as well as some personal information. Please follow the directions to create your username and password.     Your access code is: [email or web address]  Expires: 2017  5:46 PM     Your access code will  in 90 days. If you need help or a new code, please call your [place] clinic or [phone].        Care EveryWhere ID     This is your Care EveryWhere ID. This could be used by other organizations to access your [place] medical records  [id number]        Your Vitals Were     Pulse Temperature Respirations Height Pulse Oximetry BMI (Body Mass Index)    66 97.6  F (36.4  C) (Tympanic) 20 5' 3\" (1.6 m) 96% 28.34 kg/m2       Blood Pressure from Last 3 Encounters:   10/09/17 118/70   17 110/60   17 113/82    Weight from Last 3 Encounters:   10/09/17 160 lb (72.6 kg)   17 160 lb (72.6 kg)   17 160 lb (72.6 kg)              We Performed the Following     CARDIOLOGY EVAL ADULT REFERRAL          Today's Medication Changes          These changes are accurate as of: 10/9/17 11:53 AM.  If you have any questions, ask your nurse or doctor.    "            These medicines have changed or have updated prescriptions.        Dose/Directions    warfarin 4 MG tablet   Commonly known as:  COUMADIN   This may have changed:    - how much to take  - additional instructions   Used for:  Long term (current) use of anticoagulants        TAKE 1 1/2 TABLETS BY MOUTH DAILY OR AS DIRECTED PER [facility]   Quantity:  150 tablet   Refills:  0                Primary Care Provider Office Phone # Fax #    Gianna Richard [phone]       [facility] [address] [facility] [address]        Equal Access to Services     KOBE GRIDER : Hadii aad ku hadasho Soomaali, waaxda luqadaha, qaybta kaalmada adeegyada, waxay idiin hayaan maggie khnicolás orellana . So [facility] [phone].    ATENCIÓN: Si habla español, tiene a dodson disposición servicios gratuitos de asistencia lingüística. [facility] [phone].    We comply with applicable federal civil rights laws and [place] laws. We do not discriminate on the basis of race, color, national origin, age, disability, sex, sexual orientation, or gender identity.            Thank you!     Thank you for choosing [facility]  for your care. Our goal is always to provide you with excellent care. Hearing back from our patients is one way we can continue to improve our services. Please take a few minutes to complete the written survey that you may receive in the mail after your visit with us. Thank you!             Your Updated Medication List - Protect others around you: Learn how to safely use, store and throw away your medicines at www.disposemymeds.org.          This list is accurate as of: 10/9/17 11:53 AM.  Always use your most recent med list.                   Brand Name Dispense Instructions for use Diagnosis    aspirin 81 MG tablet     30 tablet    Take 1 tablet (81 mg) by mouth daily        atorvastatin 10 MG tablet    LIPITOR    90 tablet    Take 1 tablet (10 mg) by mouth daily     Hyperlipidemia, unspecified hyperlipidemia type       Lycopene 10 MG Caps           MULTIVITAMIN ADULT PO      Take 1 tablet by mouth        vitamin D 400 UNITS tablet      Take 1,000 Units by mouth daily        warfarin 4 MG tablet    COUMADIN    150 tablet    TAKE 1 1/2 TABLETS BY MOUTH DAILY OR AS DIRECTED PER [facility]    Long term (current) use of anticoagulants

## 2017-10-09 NOTE — PATIENT INSTRUCTIONS
Continue to monitor thyroid labs every 2-3 months per Dr. Simeon.  Heart monitor without obvious rhythm issue.  Cardiology consult placed - possible need for stress testing vs additional rhythm monitoring.

## 2017-10-09 NOTE — PROGRESS NOTES
SUBJECTIVE:  Rey is a 77 year old male who comes in today for follow up.  Was seen in ER for syncopal episode.  As ongoing work up he was diagnosed with silent hyperthyroiditis.  Saw Dr. Simeon.  Now euthyroid.  Suggest monitoring lab TSH every 2-3 months for developing hypothyroidism.     Also has Ziopatch, as he was bradycardic in ER in the 40s. Ziopatch with 2 short runs SVT, but otherwise normal sinus, average 76.    Very active outdoors and no exertional symptoms.  No recurrent of presyncope or syncope.    Did have prior abnormal stress test 2014, but not adequate heart rate achievement.  Had consult with Dr. Cavazos and negative echo and angiogram.      Current Outpatient Prescriptions   Medication     atorvastatin (LIPITOR) 10 MG tablet     Lycopene 10 MG CAPS     warfarin (COUMADIN) 4 MG tablet     Multiple Vitamins-Minerals (MULTIVITAMIN ADULT PO)     aspirin 81 MG tablet     Cholecalciferol (VITAMIN D) 400 UNITS tablet     [DISCONTINUED] atorvastatin (LIPITOR) 10 MG tablet     No current facility-administered medications for this visit.       No Known Allergies    Past Medical History:   Diagnosis Date     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 Simeon; silent; transient hyperthyroid, now normal; monitor TSH monthly for hypothyroidism     Chronic anticoagulation 1/1/2012     Elevated serum alkaline phosphatase level 5/4/2016    normal GGT, normal bone skeletal survery     Gastric ulcer     endoscopy 6/2016, repeat 6 months; PPI Carafate     Hyperlipidemia      Normocytic anemia 5/4/2016     Pulmonary nodule     CT 5/2014, right; consider repeat 1 year if high risk     Vitamin D deficiency 1/1/2012     Past Surgical History:   Procedure Laterality Date     ANGIOGRAM  6/23/2014     C REGULAR ECHO ([place])  6/23/2014    Dr. Cavazos     C. Difficile with intussuseption       cataract extraction       colonoscopy       COLONOSCOPY  6/17     ENDOSCOPY UPPER, COLONOSCOPY, COMBINED N/A 6/17/2016    Procedure:  "COMBINED ENDOSCOPY UPPER, COLONOSCOPY;  Surgeon: Andrea Stearns DO;  Location: [facility]     left knee meniscal tear       Left lower extremity DVT       lens implant       nasal polypectomy       Pulmonary Embolism       Social History     Social History     Marital status:      Spouse name: N/A     Number of children: N/A     Years of education: N/A     Occupational History      [employer]     Retired      Social History Main Topics     Smoking status: Former Smoker     Types: Cigarettes     Quit date: 10/28/2009     Smokeless tobacco: Never Used      Comment: Quit 2009     Alcohol use No     Drug use: No     Sexual activity: Not on file     Other Topics Concern      Service Yes     [employer]     Blood Transfusions Yes     Permits if needed     Caffeine Concern Yes     Tea     Occupational Exposure No     Hobby Hazards No     Sleep Concern No     Stress Concern No     Weight Concern No     Special Diet No     Back Care No     Exercise No     Seat Belt Yes     Self-Exams Yes     Parent/Sibling W/ Cabg, Mi Or Angioplasty Before 65f 55m? No     Social History Narrative       ROS:  General: negative for, fever, chills  Skin: negative for, rash  Resp: No shortness of breath, No dyspnea on exertion and No cough  CV: positive for as above and syncope or near-syncope, negative for, palpitations, irregular heart beat, chest pain and lower extremity edema  GI: negative for, poor appetite, nausea, vomiting and abdominal pain  : negative  Musculoskeletal: negative for, joint pain and joint swelling  Neurologic: negative for, local weakness, numbness or tingling of hands and numbness or tingling of feet      OBJECTIVE:  Vitals:    10/09/17 1127   BP: 118/70   Pulse: 66   Resp: 20   Temp: 97.6  F (36.4  C)   TempSrc: Tympanic   SpO2: 96%   Weight: 160 lb (72.6 kg)   Height: 5' 3\" (1.6 m)     GENERAL APPEARANCE: healthy, alert and no distress  NECK: no adenopathy, no asymmetry, masses, " or scars and thyroid normal to palpation  RESP: lungs clear to auscultation - no rales, rhonchi or wheezes  CV: regular rates and rhythm, normal S1 S2, no S3 or S4 and no murmur, click or rub -  MS: extremities normal- no gross deformities noted, no evidence of inflammation in joints, FROM in all extremities.  SKIN: no suspicious lesions or rashes  PSYCH: mentation appears normal. and affect normal/bright    ASSESSMENT/ORDERS:    ICD-10-CM    1. Syncope, unspecified syncope type R55 CARDIOLOGY EVAL ADULT REFERRAL   2. Bradycardia R00.1 CARDIOLOGY EVAL ADULT REFERRAL   3. Hyperlipidemia, unspecified hyperlipidemia type E78.5 atorvastatin (LIPITOR) 10 MG tablet     PLAN:  Unclear etiology of his syncopal episode.  This is frustrating for him.  Possible the thyroiditis was the cause.  Possible bradycardia or sick sinus syndrome, but didn't capture on Ziopatch.    Will consult with cardiology for any additional evaluation.      Patient Instructions   Continue to monitor thyroid labs every 2-3 months per Dr. Simeon.  Heart monitor without obvious rhythm issue.  Cardiology consult placed - possible need for stress testing vs additional rhythm monitoring.      Gianna Mckeon

## 2017-10-09 NOTE — NURSING NOTE
"Chief Complaint   Patient presents with     RECHECK     follow up zeo patch and Dr arellano for thyroid       Initial /70  Pulse 66  Temp 97.6  F (36.4  C) (Tympanic)  Resp 20  Ht 5' 3\" (1.6 m)  Wt 160 lb (72.6 kg)  SpO2 96%  BMI 28.34 kg/m2 Estimated body mass index is 28.34 kg/(m^2) as calculated from the following:    Height as of this encounter: 5' 3\" (1.6 m).    Weight as of this encounter: 160 lb (72.6 kg).  Medication Reconciliation: complete   Jayleen Fleming LPN      "

## 2017-10-10 ENCOUNTER — ANTICOAGULATION THERAPY VISIT (OUTPATIENT)
Dept: ANTICOAGULATION | Facility: OTHER | Age: 77
End: 2017-10-10
Attending: FAMILY MEDICINE
Payer: MEDICARE

## 2017-10-10 DIAGNOSIS — I26.99 PULMONARY EMBOLISM (H): ICD-10-CM

## 2017-10-10 DIAGNOSIS — Z79.01 LONG-TERM (CURRENT) USE OF ANTICOAGULANTS: ICD-10-CM

## 2017-10-10 LAB — INR POINT OF CARE: 3.4 (ref 0.86–1.14)

## 2017-10-10 PROCEDURE — 85610 PROTHROMBIN TIME: CPT | Mod: QW,ZL

## 2017-10-10 ASSESSMENT — ANXIETY QUESTIONNAIRES: GAD7 TOTAL SCORE: 0

## 2017-10-10 NOTE — MR AVS SNAPSHOT
Rey Tristan   10/10/2017 11:00 AM   Anticoagulation Therapy Visit    Description:  77 year old male   Provider:  [facility] ANTI COAGULATION   Department:  [facility] Anti Coagulation           INR as of 10/10/2017     Today's INR 3.4!      Anticoagulation Summary as of 10/10/2017     INR goal 2.0-3.0   Today's INR 3.4!   Full instructions 10/10: Hold; Otherwise 6 mg every day   Next INR check 10/20/2017    Indications   Long-term (current) use of anticoagulants [Z79.01] [Z79.01]  DVT (deep venous thrombosis) (H) [I82.409]  Pulmonary embolism (H) [I26.99]         Your next Anticoagulation Clinic appointment(s)     Oct 20, 2017  2:15 PM CDT   Anticoagulation Visit with  ANTI COAGULATION   [facility] [place] ([facility] [place] )    [address]  [place] [address]   [phone]              October 2017 Details    Sun Mon Tue Wed Thu Fri Sat     1               2               3               4               5               6               7                 8               9               10      Hold   See details      11      6 mg         12      6 mg         13      6 mg         14      6 mg           15      6 mg         16      6 mg         17      6 mg         18      6 mg         19      6 mg         20            21                 22               23               24               25               26               27               28                 29               30               31                    Date Details   10/10 This INR check       Date of next INR:  10/20/2017         How to take your warfarin dose     To take:  6 mg Take 1.5 of the 4 mg tablets.    Hold Do not take your warfarin dose. See the Details table to the right for additional instructions.

## 2017-10-10 NOTE — PROGRESS NOTES
ANTICOAGULATION FOLLOW-UP CLINIC VISIT    Patient Name:  Rey Tristan  Date:  10/10/2017  Contact Type:  Face to Face    SUBJECTIVE:     Patient Findings     Positives No Problem Findings           OBJECTIVE    INR Protime   Date Value Ref Range Status   10/10/2017 3.4 (A) 0.86 - 1.14 Final       ASSESSMENT / PLAN  INR assessment SUPRA    Recheck INR In: 10 DAYS    INR Location Clinic      Anticoagulation Summary as of 10/10/2017     INR goal 2.0-3.0   Today's INR 3.4!   Maintenance plan 6 mg (4 mg x 1.5) every day   Full instructions 10/10: Hold; Otherwise 6 mg every day   Weekly total 42 mg   Plan last modified Vanessa Jacob, RN (7/27/2016)   Next INR check 10/20/2017   Target end date Indefinite    Indications   Long-term (current) use of anticoagulants [Z79.01] [Z79.01]  DVT (deep venous thrombosis) (H) [I82.409]  Pulmonary embolism (H) [I26.99]         Anticoagulation Episode Summary     INR check location     Preferred lab     Send INR reminders to [facility] POOL    Comments       Anticoagulation Care Providers     Provider Role Specialty Phone number    Gianna Richard MD [facility] [phone]            See the Encounter Report to view Anticoagulation Flowsheet and Dosing Calendar (Go to Encounters tab in chart review, and find the Anticoagulation Therapy Visit)        Lala Boss, RN

## 2017-10-12 ENCOUNTER — PRE VISIT (OUTPATIENT)
Dept: CARDIOLOGY | Facility: OTHER | Age: 77
End: 2017-10-12

## 2017-10-12 NOTE — TELEPHONE ENCOUNTER
Office Visit     10/9/2017  [facility] Gianna Rapp MD   Family Practice    Syncope, unspecified syncope type +2 more   Dx    RECHECK    Reason for visit    Progress Notes   Gianna Richard MD (Physician)     Family Practice   Expand All Collapse All    SUBJECTIVE:  Rey is a 77 year old male who comes in today for follow up.  Was seen in ER for syncopal episode.  As ongoing work up he was diagnosed with silent hyperthyroiditis.  Saw Dr. Simeon.  Now euthyroid.  Suggest monitoring lab TSH every 2-3 months for developing hypothyroidism.     Also has Ziopatch, as he was bradycardic in ER in the 40s. Ziopatch with 2 short runs SVT, but otherwise normal sinus, average 76.    Very active outdoors and no exertional symptoms.  No recurrent of presyncope or syncope.    Did have prior abnormal stress test 2014, but not adequate heart rate achievement.  Had consult with Dr. Cavazos and negative echo and angiogram.           Current Outpatient Prescriptions   Medication     atorvastatin (LIPITOR) 10 MG tablet     Lycopene 10 MG CAPS     warfarin (COUMADIN) 4 MG tablet     Multiple Vitamins-Minerals (MULTIVITAMIN ADULT PO)     aspirin 81 MG tablet     Cholecalciferol (VITAMIN D) 400 UNITS tablet     [DISCONTINUED] atorvastatin (LIPITOR) 10 MG tablet      No current facility-administered medications for this visit.        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 Simeon; silent; transient hyperthyroid, now normal; monitor TSH monthly for hypothyroidism     Chronic anticoagulation 1/1/2012     Elevated serum alkaline phosphatase level 5/4/2016     normal GGT, normal bone skeletal survery     Gastric ulcer       endoscopy 6/2016, repeat 6 months; PPI Carafate     Hyperlipidemia       Normocytic anemia 5/4/2016     Pulmonary nodule       CT 5/2014, right; consider repeat 1 year if high risk     Vitamin D  deficiency 1/1/2012          Past Surgical History          Past Surgical History:   Procedure Laterality Date     ANGIOGRAM   6/23/2014     C REGULAR ECHO ([place])   6/23/2014     Dr. Cavazos     C. Difficile with intussuseption         cataract extraction         colonoscopy         COLONOSCOPY   6/17     ENDOSCOPY UPPER, COLONOSCOPY, COMBINED N/A 6/17/2016     Procedure: COMBINED ENDOSCOPY UPPER, COLONOSCOPY;  Surgeon: Andrea Stearns DO;  Location: [facility]     left knee meniscal tear         Left lower extremity DVT         lens implant         nasal polypectomy         Pulmonary Embolism              Social History    Social History            Social History     Marital status:        Spouse name: N/A     Number of children: N/A     Years of education: N/A            Occupational History       [employer]       Retired              Social History Main Topics     Smoking status: Former Smoker       Types: Cigarettes       Quit date: 10/28/2009     Smokeless tobacco: Never Used         Comment: Quit 2009     Alcohol use No     Drug use: No     Sexual activity: Not on file            Other Topics Concern      Service Yes       [employer]     Blood Transfusions Yes       Permits if needed     Caffeine Concern Yes       Tea     Occupational Exposure No     Hobby Hazards No     Sleep Concern No     Stress Concern No     Weight Concern No     Special Diet No     Back Care No     Exercise No     Seat Belt Yes     Self-Exams Yes     Parent/Sibling W/ Cabg, Mi Or Angioplasty Before 65f 55m? No      Social History Narrative            ROS:  General: negative for, fever, chills  Skin: negative for, rash  Resp: No shortness of breath, No dyspnea on exertion and No cough  CV: positive for as above and syncope or near-syncope, negative for, palpitations, irregular heart beat, chest pain and lower extremity edema  GI: negative for, poor appetite, nausea, vomiting and abdominal pain  :  "negative  Musculoskeletal: negative for, joint pain and joint swelling  Neurologic: negative for, local weakness, numbness or tingling of hands and numbness or tingling of feet        OBJECTIVE:   Vitals        Vitals:     10/09/17 1127   BP: 118/70   Pulse: 66   Resp: 20   Temp: 97.6  F (36.4  C)   TempSrc: Tympanic   SpO2: 96%   Weight: 160 lb (72.6 kg)   Height: 5' 3\" (1.6 m)         GENERAL APPEARANCE: healthy, alert and no distress  NECK: no adenopathy, no asymmetry, masses, or scars and thyroid normal to palpation  RESP: lungs clear to auscultation - no rales, rhonchi or wheezes  CV: regular rates and rhythm, normal S1 S2, no S3 or S4 and no murmur, click or rub -  MS: extremities normal- no gross deformities noted, no evidence of inflammation in joints, FROM in all extremities.  SKIN: no suspicious lesions or rashes  PSYCH: mentation appears normal. and affect normal/bright     ASSESSMENT/ORDERS:      ICD-10-CM     1. Syncope, unspecified syncope type R55 CARDIOLOGY EVAL ADULT REFERRAL   2. Bradycardia R00.1 CARDIOLOGY EVAL ADULT REFERRAL   3. Hyperlipidemia, unspecified hyperlipidemia type E78.5 atorvastatin (LIPITOR) 10 MG tablet      PLAN:  Unclear etiology of his syncopal episode.  This is frustrating for him.  Possible the thyroiditis was the cause.  Possible bradycardia or sick sinus syndrome, but didn't capture on Ziopatch.    Will consult with cardiology for any additional evaluation.       Patient Instructions   Continue to monitor thyroid labs every 2-3 months per Dr. Arellano.  Heart monitor without obvious rhythm issue.  Cardiology consult placed - possible need for stress testing vs additional rhythm monitoring.        Gianna Mckeon         Nursing Note   Jayleen Fleming LPN (Licensed Nurse)           Chief Complaint   Patient presents with     RECHECK       follow up zeo patch and Dr arellano for thyroid         Initial /70  Pulse 66  Temp 97.6  F (36.4  C) (Tympanic)  Resp 20  " "Ht 5' 3\" (1.6 m)  Wt 160 lb (72.6 kg)  SpO2 96%  BMI 28.34 kg/m2 Estimated body mass index is 28.34 kg/(m^2) as calculated from the following:    Height as of this encounter: 5' 3\" (1.6 m).    Weight as of this encounter: 160 lb (72.6 kg).  Medication Reconciliation: complete   Jayleen Fleming LPN            Instructions   Continue to monitor thyroid labs every 2-3 months per Dr. Simeon.  Heart monitor without obvious rhythm issue.  Cardiology consult placed - possible need for stress testing vs additional rhythm monitoring.          OP AVS (Printed 10/9/2017)   Additional Documentation   Vitals:     /70     Pulse 66     Temp 97.6  F (36.4  C) (Tympanic)      Resp 20     Ht 1.6 m (5' 3\")     Wt 72.6 kg (160 lb)     SpO2 96%     BMI 28.34 kg/m2     BSA 1.8 m2    Flowsheets:     Infection Screenings,     ACP Review/Resources Provided,     Fall Risk Assessment,     Abuse Screening,     ROMERO-7 ([employer], 2002; Used with Permission),     PHQ-9 Developed by Dimple Miller,Cindy Stearns,Alejandro Alfred and Colleagues,with an Educational Barrett From [employer].,     Vitals Reassessment,     Custom Formula Data,     NICU VS,     Anthropometrics     ...(9 more)   Encounter Info:     Billing Info,     History,     Allergies,     Detailed Report,     Reviewed This Encounter        Media   Assessment/Questionnaire - Scan on 10/10/2017 12:54 PM : PATIENT HEALTH QUESTIONNAIRE- 9 (PHQ-9)Assessment/Questionnaire - Scan on 10/10/2017 12:54 PM : PATIENT HEALTH QUESTIONNAIRE- 9 (PHQ-9)   Assessment/Questionnaire - Scan on 10/10/2017 11:59 AM : GENERAL ANXIETY DISORDER ASSESSMENT (ROMERO-7)     INR point of care   Collected:  10/10/2017 Order: [id number]          Ref Range & Units 2d ago       INR Protime 0.86 - 1.14 3.4 (A)     View Full Report                        T3, Free   Ordered:  10/4/2017  7:50 AM Status:  Active Order: [id number]       View Full Report                        T4, free   Ordered:  10/4/2017  7:50 " AM Status:  Active Order: [id number]       View Full Report                        TSH   Ordered:  10/4/2017  7:50 AM Status:  Active Order: [id number]       View Full Report                                     Ref Range & Units 4wk ago       Glucose 70 - 99 mg/dL 98     Comments: Fasting specimen     View Full Report                        Lipid Profile (Chol, Trig, HDL, LDL calc)   Collected:  9/13/2017  9:00 AM Order: [id number]              Ref Range & Units 4wk ago       Cholesterol <200 mg/dL 113     Triglycerides <150 mg/dL 67     Comments: Fasting specimen     HDL Cholesterol >39 mg/dL 44     LDL Cholesterol Calculated <100 mg/dL 56     Comments: Desirable:       <100 mg/dl     Non HDL Cholesterol <130 mg/dL 69     View Full Report                        Troponin I   Collected:  9/3/2017  1:41 PM Order: [id number]          Ref Range & Units 1mo ago       Troponin I ES 0.000 - 0.045 ug/L <0.015     Comments: The 99th percentile for upper reference range is 0.045 ug/L.  Troponin values   in the range of 0.045 - 0.120 ug/L may be associated with risks of adverse   clinical events.        View Full Report                       Comprehensive metabolic panel   Collected:  9/3/2017  1:41 PM Order: [id number]          Ref Range & Units 1mo ago       Sodium 133 - 144 mmol/L 140     Potassium 3.4 - 5.3 mmol/L 4.3     Chloride 94 - 109 mmol/L 107     Carbon Dioxide 20 - 32 mmol/L 27     Anion Gap 3 - 14 mmol/L 6     Glucose 70 - 99 mg/dL 78     Urea Nitrogen 7 - 30 mg/dL 14     Creatinine 0.66 - 1.25 mg/dL 0.79     GFR Estimate >60 mL/min/1.7m2 >90     Comments: Non  GFR Calc     GFR Estimate If Black >60 mL/min/1.7m2 >90     Comments:  GFR Calc     Calcium 8.5 - 10.1 mg/dL 8.4 (L)     Bilirubin Total 0.2 - 1.3 mg/dL 0.4     Albumin 3.4 - 5.0 g/dL 3.1 (L)     Protein Total 6.8 - 8.8 g/dL 7.6     Alkaline Phosphatase 40 - 150 U/L 152 (H)     ALT 0 - 70 U/L 24     AST 0 - 45 U/L 27     View  Full Report                       CBC with platelets differential   Collected:  9/3/2017  1:41 PM Order: [id number]          Ref Range & Units 1mo ago       WBC 4.0 - 11.0 10e9/L 5.9     RBC Count 4.4 - 5.9 10e12/L 4.25 (L)     Hemoglobin 13.3 - 17.7 g/dL 10.9 (L)     Hematocrit 40.0 - 53.0 % 34.2 (L)     MCV 78 - 100 fl 81     MCH 26.5 - 33.0 pg 25.6 (L)     MCHC 31.5 - 36.5 g/dL 31.9     RDW 10.0 - 15.0 % 16.2 (H)     Platelet Count 150 - 450 10e9/L 175     Diff Method  Automated Method     % Neutrophils % 78.7     % Lymphocytes % 9.0     % Monocytes % 9.7     % Eosinophils % 1.9     % Basophils % 0.5     % Immature Granulocytes % 0.2     Nucleated RBCs 0 /100 0     Absolute Neutrophil 1.6 - 8.3 10e9/L 4.6     Absolute Lymphocytes 0.8 - 5.3 10e9/L 0.5 (L)     Absolute Monocytes 0.0 - 1.3 10e9/L 0.6     Absolute Eosinophils 0.0 - 0.7 10e9/L 0.1     Absolute Basophils 0.0 - 0.2 10e9/L 0.0     Abs Immature Granulocytes 0 - 0.4 10e9/L 0.0     Absolute Nucleated RBC  0.0     View Full Report             Final result (Exam End: 9/28/2017  6:37 AM) Reviewed    Narrative       ZIO PATCH REPORT    ORDERING PHYSICIAN:  JOSSELYN Burns    INDICATIONS:  Syncopal episode    ENROLLMENT PERIOD:  September 6, 2014 to September 20, 2017 (14-days  of analysis time available)    MONITOR:  ZIO XT Patch    FINDINGS:  Review of the data show the minimum heart rate was 51,  average heart rate 79, and maximum heart rate was 193 beats per  minute. The patient had underlying sinus rhythm.  There was no  significant bradycardia, pauses, or heart block seen.  There were  occasional rare isolated PAC's.  There were two runs of SVT:  one for  5-beats with an average heart rate of 180 beats per minute, and one  for 9-beats with an average heart rate of 184 beats per minute.  There  were rare PVC's.  No ventricular tachycardia seen.  The patient had  two triggered events showing sinus rhythm.  There were no diary  entries.    ASSESSMENT:  [facility]  Patch Report revealing the followin.  Underlying sinus rhythm.  2.  No significant bradycardia, pauses, or heart block.  3.  Rare PAC's.  There were two SVT runs:  one for 5-beats and one for  9-beats.  The first one averaged  180 beats per minute and the second  one 184 beats per minute.  4.  Rare PVC's.  No ventricular tachycardia.  5.  Two triggered events, showing just sinus rhythm.  6.  There were no diary entries.            SIGNATURE PAGE ONLY  Exam Date: Sep 28, 2017 06:37:23 AM  Author: ISABELLA ESCALANTE  This report is final and signed

## 2017-10-20 ENCOUNTER — ANTICOAGULATION THERAPY VISIT (OUTPATIENT)
Dept: ANTICOAGULATION | Facility: OTHER | Age: 77
End: 2017-10-20
Attending: FAMILY MEDICINE
Payer: MEDICARE

## 2017-10-20 ENCOUNTER — OFFICE VISIT (OUTPATIENT)
Dept: CARDIOLOGY | Facility: OTHER | Age: 77
End: 2017-10-20
Attending: INTERNAL MEDICINE
Payer: COMMERCIAL

## 2017-10-20 VITALS
HEART RATE: 63 BPM | SYSTOLIC BLOOD PRESSURE: 128 MMHG | BODY MASS INDEX: 28.32 KG/M2 | HEIGHT: 65 IN | OXYGEN SATURATION: 95 % | TEMPERATURE: 98 F | WEIGHT: 170 LBS | DIASTOLIC BLOOD PRESSURE: 70 MMHG

## 2017-10-20 DIAGNOSIS — I82.4Y2 DEEP VEIN THROMBOSIS (DVT) OF PROXIMAL VEIN OF LEFT LOWER EXTREMITY, UNSPECIFIED CHRONICITY (H): ICD-10-CM

## 2017-10-20 DIAGNOSIS — Z87.891 HISTORY OF TOBACCO USE: ICD-10-CM

## 2017-10-20 DIAGNOSIS — Z79.01 LONG-TERM (CURRENT) USE OF ANTICOAGULANTS: ICD-10-CM

## 2017-10-20 DIAGNOSIS — Z86.711 HX PULMONARY EMBOLISM: ICD-10-CM

## 2017-10-20 DIAGNOSIS — Z86.718 HISTORY OF DVT OF LOWER EXTREMITY: ICD-10-CM

## 2017-10-20 DIAGNOSIS — R55 SYNCOPE, UNSPECIFIED SYNCOPE TYPE: Primary | ICD-10-CM

## 2017-10-20 DIAGNOSIS — R00.1 BRADYCARDIA: ICD-10-CM

## 2017-10-20 DIAGNOSIS — E78.00 PURE HYPERCHOLESTEROLEMIA: ICD-10-CM

## 2017-10-20 LAB — INR POINT OF CARE: 2.4 (ref 0.86–1.14)

## 2017-10-20 PROCEDURE — 85610 PROTHROMBIN TIME: CPT | Mod: QW,ZL

## 2017-10-20 PROCEDURE — 99204 OFFICE O/P NEW MOD 45 MIN: CPT | Performed by: INTERNAL MEDICINE

## 2017-10-20 PROCEDURE — 99212 OFFICE O/P EST SF 10 MIN: CPT

## 2017-10-20 PROCEDURE — 93010 ELECTROCARDIOGRAM REPORT: CPT | Performed by: INTERNAL MEDICINE

## 2017-10-20 PROCEDURE — 93005 ELECTROCARDIOGRAM TRACING: CPT

## 2017-10-20 ASSESSMENT — PAIN SCALES - GENERAL: PAINLEVEL: NO PAIN (0)

## 2017-10-20 NOTE — MR AVS SNAPSHOT
After Visit Summary   10/20/2017    Rey Tristan    MRN: [id number]           Patient Information     Date Of Birth          1940        Visit Information        Provider Department      10/20/2017 1:00 PM Dylon Pryor, DO [facility]        Today's Diagnoses     Syncope, unspecified syncope type        Bradycardia          Care Instructions    You were seen by Dr. Pryor, 10/20/2017.     1. An order has been placed for an Echocardiogram.  This is an ultrasound of the heart that will indicate heart function.  Diagnostic Imaging will contact you to schedule this procedure.       2. An implanted loop recorder is an option to take a better look at what your heart rate and rhythm is doing on a day to day basis.  Loop recorders are in place for 1 year.      You will follow up with Dr. Pryor in 1 month.       Please call the cardiology office with problems, questions, or concerns at [phone].    If you experience chest pain, chest pressure, chest tightness, shortness of breath, fainting, lightheadedness, nausea, vomiting, or other concerning symptoms, please report to the Emergency Department or call 911. These symptoms may be emergent, and best treated in the Emergency Department.     Sherry Stanford RN  Cardiology   [facility]  [phone]    Causes of Syncope  Syncope (fainting) has many causes. Sometimes it is not serious. In other cases, syncope is a sign of a heart problem. But treatment can help    When syncope is not serious  Your healthcare provider may call your problem vasovagal syncope, reflex syncope, or orthostatic hypotension. These types of syncope are generally not serious. They can be caused by:    Strong feelings, such as anxiety or fear. A nerve signal may briefly change your heart rate and lower your blood pressure too much.    Standing for too long. Standing may cause blood to pool in your legs. When this happens, your brain may not receive  all the blood it needs.    Standing up too quickly. Your blood pressure may not adjust fast enough to changes in posture and may drop too low. Certain medicines can also cause this problem. Examples of medicines that can cause a drop in blood pressure include diuretics, blood pressure medicines, and medicines for chest pain. Your pharmacist or healthcare provider can discuss these with you.    Reaction to normal body functions. When you go to the bathroom, have gastrointestinal discomfort, nausea, or pain, your heart may have a natural reflex to slow down and lower blood pressure. This can result in syncope. This may also follow exercise, eating, laughter, weight lifting, or playing musical instruments like the trumpet or trombone.  When heart trouble causes syncope  A heart problem can decrease the amount of oxygen-rich blood that reaches the brain. Heart trouble can be serious and even life threatening if not treated:    A slow heart rate. Electrical signals tell the chambers of the heart when to pump. But the signals may be slowed or blocked (heart block) as they travel on the heart s electrical pathways. This can be caused by aging, scarred heart tissue, or damage from heart disease. When the heart rate slows, not enough blood is pumped.    A fast heart rate. Certain problems can make the heart race. For instance, after a heart attack, also known as acute myocardial infarction, or AMI, abnormal electrical signals may be created. These signals can make the heart suddenly beat very fast. The heart pumps before the chambers can fill with blood. So less blood reaches the brain and other parts of the body. Illegal drugs, certain medicines, heart disease, or an inherited condition can also cause this.    A heart valve problem. Blood travels through the chambers of the heart as it is pumped. Heart valves open and close to help move blood in the right direction. But a valve may not open or close fully, if it s hardened  or scarred. As a result, less blood is pumped through the heart to the brain and body. Most often, syncope occurs when a person's aortic valve is critically narrowed and he or she participates in  a strenuous activity.    A heart muscle problem. Some people develop a thickened heart muscle that blocks blood flow out of the heart to the body. This is called hypertrophic cardiomyopathy. Being dehydrated and having hypertrophic cardiomyopathy can increase the risk for syncope.  Whatever the cause of syncope, it is important to be evaluated by your healthcare provider. You may need to be seen by a cardiologist, neurologist, or an ear, nose, and throat specialist. Do not drive, operate heavy machinery, or participate in activities in which you would be at risk for falls and injury if you have syncope and have not been evaluated.  Date Last Reviewed: 5/1/2016 2000-2017 [employer] [address] [place] [address]. All rights reserved. This information is not intended as a substitute for professional medical care. Always follow your healthcare professional's instructions.          Fainting: Vagal Reaction  Fainting (syncope) is a temporary loss of consciousness that is associated with a loss of postural tone. It s also called passing out. It occurs when blood flow to the brain is less than normal. Your healthcare provider believes that your fainting was because of a vagal reaction. This condition is not a sign of serious disease.  A vagal reaction is a response in your body that causes your pulse to slow down or the blood vessels to expand. This causes your blood pressure to fall. And this sends less blood to your brain if you are standing or sitting. That results in dizziness, near-fainting, or fainting. Lying down usually stops the reaction within 60 seconds.  This response can occur during sudden fear, severe pain, emotional stress, overexertion, overheating, hunger, nausea or vomiting,  prolonged standing, or standing up after sitting or lying for a long time.  Home care  Follow these guidelines when caring for yourself at home:    Rest today. Go back to your normal activities as soon as you are feeling back to normal.    Stay hydrated and avoid skipping meals.    If you feel lightheaded or dizzy, lie down right away. Or sit with your head lowered between your knees.  Follow-up care  Follow up with your healthcare provider, or as advised.  When to seek medical advice  Call your healthcare provider right away if any of these occur:    Another fainting spell that s not explained by the common causes listed above    Pain in your chest, arm, neck, jaw, back, or abdomen    Shortness of breath    Severe headache or seizure    Your heart beats very rapidly, very slowly, or irregularly (palpitations)  Date Last Reviewed: 12/1/2016 2000-2017 [employer]. [address] [place] [address]. All rights reserved. This information is not intended as a substitute for professional medical care. Always follow your healthcare professional's instructions.                Follow-ups after your visit        Your next 10 appointments already scheduled     Oct 20, 2017  2:15 PM CDT   Anticoagulation Visit with  ANTI COAGULATION   [facility] ([facility] )    [address] [place] [address]  [place] [address]   [phone]              Who to contact     If you have questions or need follow up information about today's clinic visit or your schedule please contact [facility] directly at [phone].  Normal or non-critical lab and imaging results will be communicated to you by MyChart, letter or phone within 4 business days after the clinic has received the results. If you do not hear from us within 7 days, please contact the clinic through MyChart or phone. If you have a critical or abnormal lab result, we will notify you by phone as soon as possible.  Submit  "refill requests through GetNotes or call your pharmacy and they will forward the refill request to us. Please allow 3 business days for your refill to be completed.          Additional Information About Your Visit        [facility] Information     GetNotes lets you send messages to your doctor, view your test results, renew your prescriptions, schedule appointments and more. To sign up, go to [email or web address] . Click on \"Log in\" on the left side of the screen, which will take you to the Welcome page. Then click on \"Sign up Now\" on the right side of the page.     You will be asked to enter the access code listed below, as well as some personal information. Please follow the directions to create your username and password.     Your access code is: [email or web address]  Expires: 2017  5:46 PM     Your access code will  in 90 days. If you need help or a new code, please call your [facility] clinic or [phone].        Care EveryWhere ID     This is your Care EveryWhere ID. This could be used by other organizations to access your [facility] medical records  [id number]        Your Vitals Were     Pulse Temperature Height Pulse Oximetry BMI (Body Mass Index)       63 98  F (36.7  C) (Tympanic) 1.651 m (5' 5\") 95% 28.29 kg/m2        Blood Pressure from Last 3 Encounters:   10/20/17 128/70   10/09/17 118/70   17 110/60    Weight from Last 3 Encounters:   10/20/17 77.1 kg (170 lb)   10/09/17 72.6 kg (160 lb)   17 72.6 kg (160 lb)              Today, you had the following     No orders found for display         Today's Medication Changes          These changes are accurate as of: 10/20/17  1:45 PM.  If you have any questions, ask your nurse or doctor.               These medicines have changed or have updated prescriptions.        Dose/Directions    warfarin 4 MG tablet   Commonly known as:  COUMADIN   This may have changed:    - how much to take  - additional instructions   Used for:  Long term (current) use " of anticoagulants        TAKE 1 1/2 TABLETS BY MOUTH DAILY OR AS DIRECTED PER [facility]   Quantity:  150 tablet   Refills:  0                Primary Care Provider Office Phone # Fax #    Gianna Richard [phone]       [facility] [address] [place] [address]        Equal Access to Services     JACKSONKOBE MARNI : Hadii aad ku hadasho Soomaali, waaxda luqadaha, qaybta kaalmada adeegyada, waxay idiin hayaan adeeg felicechasity lamatteon . So [facility] [phone].    ATENCIÓN: Si habla español, tiene a dodson disposición servicios gratuitos de asistencia lingüística. Llame al [phone].    We comply with applicable federal civil rights laws and [place] laws. We do not discriminate on the basis of race, color, national origin, age, disability, sex, sexual orientation, or gender identity.            Thank you!     Thank you for choosing [facility]  for your care. Our goal is always to provide you with excellent care. Hearing back from our patients is one way we can continue to improve our services. Please take a few minutes to complete the written survey that you may receive in the mail after your visit with us. Thank you!             Your Updated Medication List - Protect others around you: Learn how to safely use, store and throw away your medicines at www.disposemymeds.org.          This list is accurate as of: 10/20/17  1:45 PM.  Always use your most recent med list.                   Brand Name Dispense Instructions for use Diagnosis    aspirin 81 MG tablet     30 tablet    Take 1 tablet (81 mg) by mouth daily        atorvastatin 10 MG tablet    LIPITOR    90 tablet    Take 1 tablet (10 mg) by mouth daily    Hyperlipidemia, unspecified hyperlipidemia type       Lycopene 10 MG Caps           MULTIVITAMIN ADULT PO      Take 1 tablet by mouth        vitamin D 400 UNITS tablet      Take 1,000 Units by mouth daily        warfarin 4 MG tablet    COUMADIN    150 tablet    TAKE 1  1/2 TABLETS BY MOUTH DAILY OR AS DIRECTED PER [facility]    Long term (current) use of anticoagulants

## 2017-10-20 NOTE — PATIENT INSTRUCTIONS
You were seen by Dr. rPyor, 10/20/2017.     1. An order has been placed for an Echocardiogram.  This is an ultrasound of the heart that will indicate heart function.  Diagnostic Imaging will contact you to schedule this procedure.       2. An implanted loop recorder is an option to take a better look at what your heart rate and rhythm is doing on a day to day basis.  Loop recorders are in place for 1 year.      You will follow up with Dr. Pryor in 1 month.       Please call the cardiology office with problems, questions, or concerns at [phone].    If you experience chest pain, chest pressure, chest tightness, shortness of breath, fainting, lightheadedness, nausea, vomiting, or other concerning symptoms, please report to the Emergency Department or call 911. These symptoms may be emergent, and best treated in the Emergency Department.     Sherry Stanford RN  Cardiology   [facility]  [phone]    Causes of Syncope  Syncope (fainting) has many causes. Sometimes it is not serious. In other cases, syncope is a sign of a heart problem. But treatment can help    When syncope is not serious  Your healthcare provider may call your problem vasovagal syncope, reflex syncope, or orthostatic hypotension. These types of syncope are generally not serious. They can be caused by:    Strong feelings, such as anxiety or fear. A nerve signal may briefly change your heart rate and lower your blood pressure too much.    Standing for too long. Standing may cause blood to pool in your legs. When this happens, your brain may not receive all the blood it needs.    Standing up too quickly. Your blood pressure may not adjust fast enough to changes in posture and may drop too low. Certain medicines can also cause this problem. Examples of medicines that can cause a drop in blood pressure include diuretics, blood pressure medicines, and medicines for chest pain. Your pharmacist or healthcare provider can discuss these with  you.    Reaction to normal body functions. When you go to the bathroom, have gastrointestinal discomfort, nausea, or pain, your heart may have a natural reflex to slow down and lower blood pressure. This can result in syncope. This may also follow exercise, eating, laughter, weight lifting, or playing musical instruments like the trumpet or trombone.  When heart trouble causes syncope  A heart problem can decrease the amount of oxygen-rich blood that reaches the brain. Heart trouble can be serious and even life threatening if not treated:    A slow heart rate. Electrical signals tell the chambers of the heart when to pump. But the signals may be slowed or blocked (heart block) as they travel on the heart s electrical pathways. This can be caused by aging, scarred heart tissue, or damage from heart disease. When the heart rate slows, not enough blood is pumped.    A fast heart rate. Certain problems can make the heart race. For instance, after a heart attack, also known as acute myocardial infarction, or AMI, abnormal electrical signals may be created. These signals can make the heart suddenly beat very fast. The heart pumps before the chambers can fill with blood. So less blood reaches the brain and other parts of the body. Illegal drugs, certain medicines, heart disease, or an inherited condition can also cause this.    A heart valve problem. Blood travels through the chambers of the heart as it is pumped. Heart valves open and close to help move blood in the right direction. But a valve may not open or close fully, if it s hardened or scarred. As a result, less blood is pumped through the heart to the brain and body. Most often, syncope occurs when a person's aortic valve is critically narrowed and he or she participates in  a strenuous activity.    A heart muscle problem. Some people develop a thickened heart muscle that blocks blood flow out of the heart to the body. This is called hypertrophic cardiomyopathy.  Being dehydrated and having hypertrophic cardiomyopathy can increase the risk for syncope.  Whatever the cause of syncope, it is important to be evaluated by your healthcare provider. You may need to be seen by a cardiologist, neurologist, or an ear, nose, and throat specialist. Do not drive, operate heavy machinery, or participate in activities in which you would be at risk for falls and injury if you have syncope and have not been evaluated.  Date Last Reviewed: 5/1/2016 2000-2017 [employer]. [address] [place] [address]. All rights reserved. This information is not intended as a substitute for professional medical care. Always follow your healthcare professional's instructions.          Fainting: Vagal Reaction  Fainting (syncope) is a temporary loss of consciousness that is associated with a loss of postural tone. It s also called passing out. It occurs when blood flow to the brain is less than normal. Your healthcare provider believes that your fainting was because of a vagal reaction. This condition is not a sign of serious disease.  A vagal reaction is a response in your body that causes your pulse to slow down or the blood vessels to expand. This causes your blood pressure to fall. And this sends less blood to your brain if you are standing or sitting. That results in dizziness, near-fainting, or fainting. Lying down usually stops the reaction within 60 seconds.  This response can occur during sudden fear, severe pain, emotional stress, overexertion, overheating, hunger, nausea or vomiting, prolonged standing, or standing up after sitting or lying for a long time.  Home care  Follow these guidelines when caring for yourself at home:    Rest today. Go back to your normal activities as soon as you are feeling back to normal.    Stay hydrated and avoid skipping meals.    If you feel lightheaded or dizzy, lie down right away. Or sit with your head lowered between your  knees.  Follow-up care  Follow up with your healthcare provider, or as advised.  When to seek medical advice  Call your healthcare provider right away if any of these occur:    Another fainting spell that s not explained by the common causes listed above    Pain in your chest, arm, neck, jaw, back, or abdomen    Shortness of breath    Severe headache or seizure    Your heart beats very rapidly, very slowly, or irregularly (palpitations)  Date Last Reviewed: 12/1/2016 2000-2017 [employer]. [address] [place], [place] [id number]. All rights reserved. This information is not intended as a substitute for professional medical care. Always follow your healthcare professional's instructions.

## 2017-10-20 NOTE — PROGRESS NOTES
"[facility] HEART CARE   CARDIOLOGY CONSULT     Rey TERRA Kun     Gianna Richard     Chief Complaint   Patient presents with     Establish Care     referred by Dr. Richard -sept 3rd dizzy and \"passed out\" - was brought to ER dehydration- irregular heart rythm - pt states due to thyroid         HPI:   Mr. Tristan is a 77-year-old gentleman who is being seen by cardiology with a recent episode of syncope. He subsequently presentation to the ER. He was recently found to have thyroiditis. 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    He presented to the ER on 9/13/17. He had been working outside in the yard for about 2.5 hours. He was moving around and cutting wood for his new building. While he was doing this, he became dizzy. He described the room spinning. He apparently sat down in a lawn chair outside and when the symptoms did not go away, he went inside and rested in a chair. His wife gave him a cold glass of water and he proceeded to pass out. It was felt to resolve after approximately 3 minutes. Just prior to passing out, he was sweaty. He was not fatigued afterward. He did not have any palpitations, racing heart, or dysrhythmia before or after. He has not had chest pain or chest discomfort. He has not passed out before or after this episode. Once in the ER, he reports he was given 2 L of fluid with minimal urine output. His heart rate was noted to be in the 40's and did bounce between the 40's and a normal heart rate. He was not dizzy when his hrt rate was in the 40's as he could see it drop on the monitor. He was noted to have thyroid issues and has since been seen in [place] and was diagnosed with autoimmune thyroiditis. He continues to deny dizziness, lightheadedness, near syncope, or syncope. 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 He's been on " Coumadin and has been supertherapeutic since August.     He had a Zio patch placed on 17 placed secondary to bradycardia. This showed sinus rhythm with his rhythms as low as 51 beats per minute but no pauses or block. There was rare PAC's and PVC's. He had 2 episodes of SVT lasting 5 and 9 beats with rates in the 180's. He had 2 events that showed sinus rhythm. There were no diary entries.    He does have a history of tobacco abuse. He describes shortness of breath chronically secondary to COPD.    He had a positive stress test completed on 14. He went on to have a  Cardiac catheterization through [facility] and is described as having  insignificant disease.      IMAGING RESULTS:   ZIO PATCH REPORT    ORDERING PHYSICIAN:  JOSSELYN Burns    INDICATIONS:  Syncopal episode    ENROLLMENT PERIOD:  2014 to 2017 (14-days  of analysis time available)    MONITOR:  ZIO XT Patch    FINDINGS:  Review of the data show the minimum heart rate was 51,  average heart rate 79, and maximum heart rate was 193 beats per  minute. The patient had underlying sinus rhythm.  There was no  significant bradycardia, pauses, or heart block seen.  There were  occasional rare isolated PAC's.  There were two runs of SVT:  one for  5-beats with an average heart rate of 180 beats per minute, and one  for 9-beats with an average heart rate of 184 beats per minute.  There  were rare PVC's.  No ventricular tachycardia seen.  The patient had  two triggered events showing sinus rhythm.  There were no diary  entries.    ASSESSMENT:  ZIO Patch Report revealing the followin.  Underlying sinus rhythm.  2.  No significant bradycardia, pauses, or heart block.  3.  Rare PAC's.  There were two SVT runs:  one for 5-beats and one for  9-beats.  The first one averaged  180 beats per minute and the second  one 184 beats per minute.  4.  Rare PVC's.  No ventricular tachycardia.  5.  Two triggered events, showing just sinus  rhythm.  6.  There were no diary entries.    Cardiolite stress test completed on 6/2/14.    IMPRESSION:  Small areas of infarct suggested at apical and  mid-segments of inferior wall and apical segment of lateral wall, with  no discrete wall motion abnormalities in these areas.  Minimal  ischemic change is also suggested at mid-segment of inferior wall.  Minimal wall motion hypokinesia at septal wall.   Normal ejection  fraction of 61%, with no large areas of infarct or ischemia noted.    Exam Date: Jun 02, 2014 09:12:57 AM  Author: ISABELLA ESCALANTE  This report is final and signed      PAST MEDICAL HISTORY:   Past Medical History:   Diagnosis Date     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 Simeon; silent; transient hyperthyroid, now normal; monitor TSH monthly for hypothyroidism     Chronic anticoagulation 1/1/2012     Elevated serum alkaline phosphatase level 5/4/2016    normal GGT, normal bone skeletal survery     Gastric ulcer     endoscopy 6/2016, repeat 6 months; PPI Carafate     Hyperlipidemia      Normocytic anemia 5/4/2016     Pulmonary nodule     CT 5/2014, right; consider repeat 1 year if high risk     Vitamin D deficiency 1/1/2012          FAMILY HISTORY:   Family History   Problem Relation Age of Onset     CANCER Mother      Ovarian     Respiratory Father      emphysemia     Lung Cancer Brother      DIABETES No family hx of      Hypertension No family hx of      Hyperlipidemia No family hx of      Thyroid Disease No family hx of      Asthma No family hx of      Colon Cancer No family hx of      Prostate Cancer No family hx of           PAST SURGICAL HISTORY:   Past Surgical History:   Procedure Laterality Date     ANGIOGRAM  6/23/2014     C REGULAR ECHO ([place])  6/23/2014    Dr. Cavazos     C. Difficile with intussuseption       cataract extraction       colonoscopy       COLONOSCOPY  6/17     ENDOSCOPY UPPER, COLONOSCOPY, COMBINED N/A 6/17/2016    Procedure: COMBINED ENDOSCOPY UPPER, COLONOSCOPY;   Surgeon: Andrea Stearns DO;  Location: [facility]     left knee meniscal tear       Left lower extremity DVT       lens implant       nasal polypectomy       Pulmonary Embolism            SOCIAL HISTORY:   Social History     Social History     Marital status:      Spouse name: N/A     Number of children: N/A     Years of education: N/A     Occupational History      [employer]     Retired      Social History Main Topics     Smoking status: Former Smoker     Types: Cigarettes     Quit date: 10/28/2009     Smokeless tobacco: Never Used      Comment: Quit 2009     Alcohol use No     Drug use: No     Sexual activity: Not Asked     Other Topics Concern      Service Yes     [employer]     Blood Transfusions Yes     Permits if needed     Caffeine Concern Yes     Tea     Occupational Exposure No     Hobby Hazards No     Sleep Concern No     Stress Concern No     Weight Concern No     Special Diet No     Back Care No     Exercise No     Seat Belt Yes     Self-Exams Yes     Parent/Sibling W/ Cabg, Mi Or Angioplasty Before 65f 55m? No     Social History Narrative          CURRENT MEDICATIONS:   Current Outpatient Prescriptions   Medication     atorvastatin (LIPITOR) 10 MG tablet     Lycopene 10 MG CAPS     warfarin (COUMADIN) 4 MG tablet     Multiple Vitamins-Minerals (MULTIVITAMIN ADULT PO)     aspirin 81 MG tablet     Cholecalciferol (VITAMIN D) 400 UNITS tablet     No current facility-administered medications for this visit.           ALLERGIES:   No Known Allergies       ROS:   CONSTITUTIONAL: No weight loss, fever, chills, weakness or fatigue.   HEENT: Eyes: No visual changes. Ears, Nose, Throat: No hearing loss, congestion or difficulty swallowing.   CARDIOVASCULAR: No chest pain, chest pressure or chest discomfort. No palpitations or lower extremity edema.   RESPIRATORY: (+) shortness of breath, but denies dyspnea upon exertion, cough or sputum production.   GASTROINTESTINAL: No  abdominal pain. No anorexia, nausea, vomiting or diarrhea.   NEUROLOGICAL: No headache, lightheadedness, dizziness, but he did have one episode of syncope, but denies ataxia or weakness.   HEMATOLOGIC: No anemia, bleeding or bruising.   PSYCHIATRIC: No history of depression or anxiety.   ENDOCRINOLOGIC: (+)  reports of sweating, but no cold or heat intolerance. No polyuria or polydipsia.   SKIN: No abnormal rashes or itching.       PHYSICAL EXAM:   GENERAL: The patient is a well-developed, well-nourished, in no apparent distress. Alert and oriented x3.   HEENT: Head is normocephalic and atraumatic. Eyes are symmetrical with normal visual tracking.   HEART: Regular rate and rhythm, S1S2 present without murmur, rub or gallop.   LUNGS: Respirations regular and unlabored. Clear to auscultation.   GI: Abdomen is soft and nondistended.  EXTREMITIES: No peripheral edema present.   MUSCULOSKELETAL: No joint swelling.   NEUROLOGIC: Alert and oriented X3.   SKIN: No jaundice. No rashes or visible skin lesions present.       LAB RESULTS:   Anticoagulation Therapy Visit on 10/10/2017   Component Date Value Ref Range Status     INR Protime 10/10/2017 3.4* 0.86 - 1.14 Final   Anticoagulation Therapy Visit on 09/19/2017   Component Date Value Ref Range Status     INR Protime 09/19/2017 3.3* 0.86 - 1.14 Final   Orders Only on 09/13/2017   Component Date Value Ref Range Status     PSA 09/13/2017 0.84  0 - 4 ug/L Final     Cholesterol 09/13/2017 113  <200 mg/dL Final     Triglycerides 09/13/2017 67  <150 mg/dL Final     HDL Cholesterol 09/13/2017 44  >39 mg/dL Final     LDL Cholesterol Calculated 09/13/2017 56  <100 mg/dL Final     Non HDL Cholesterol 09/13/2017 69  <130 mg/dL Final     Glucose 09/13/2017 98  70 - 99 mg/dL Final     TSH 09/13/2017 <0.01* 0.40 - 4.00 mU/L Final     T4 Free 09/13/2017 1.96* 0.76 - 1.46 ng/dL Final     Free T3 09/13/2017 4.8* 2.3 - 4.2 pg/mL Final     Thyroid Peroxidase Antibody 09/13/2017 1085* <35  IU/mL Final     Lab Scanned Result 09/13/2017 THYROG & ANTIBODIES-Scanned   Final   Anticoagulation Therapy Visit on 08/22/2017   Component Date Value Ref Range Status     INR Protime 08/22/2017 3.4* 0.86 - 1.14 Final   Anticoagulation Therapy Visit on 07/18/2017   Component Date Value Ref Range Status     INR Protime 07/18/2017 2.6* 0.86 - 1.14 Final   Anticoagulation Therapy Visit on 06/06/2017   Component Date Value Ref Range Status     INR Protime 06/06/2017 2.3* 0.86 - 1.14 Final   Anticoagulation Therapy Visit on 04/25/2017   Component Date Value Ref Range Status     INR Protime 04/25/2017 2.2* 0.86 - 1.14 Final          ASSESSMENT:   Mr. Tristan is a 77-year-old gentleman who is being seen by cardiology with a recent episode of syncope. He subsequently presentation to the ER. He was recently found to have thyroiditis. 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      PLAN:   1.  He was given the results of the Zio patch. This study did not show a reason for syncope. As noted, in the ER he had heart rates in the 40s that would bounce around. It was explained to him that based on this study his syncope is not the result of his rhythm. However, to fully rule out his rhythm as a cause, he would need a loop recorder which is placed under the skin and watches his heart rate up to 3 years.That way nothing is missed. He was not interested in having this placed today. It was also suggested that he had an echocardiogram to rule out structural causes as a problem of this syncope. He was agreeable to this. It was explained to him that if this happens in the future, he will definitely need additional monitoring.  2.  He will have an echocardiogram to rule out structural abnormalities as a cause of his syncope.  3.  Seems likely that his symptoms are vasovagal in nature.  4.  He'll be seen in 4 weeks to follow up on his  echocardiogram and see how he is doing.      Thank you for allowing me to participate in the care of your patient. Please do not hesitate to contact me if you have any questions.     Dylon Pryor

## 2017-10-20 NOTE — MR AVS SNAPSHOT
Rey Tristan   10/20/2017 2:15 PM   Anticoagulation Therapy Visit    Description:  77 year old male   Provider:   ANTI COAGULATION   Department:  [facility] Anti Coagulation           INR as of 10/20/2017     Today's INR 2.4      Anticoagulation Summary as of 10/20/2017     INR goal 2.0-3.0   Today's INR 2.4   Full instructions 6 mg every day   Next INR check 11/13/2017    Indications   Long-term (current) use of anticoagulants [Z79.01] [Z79.01]  DVT (deep venous thrombosis) (H) [I82.409]  Pulmonary embolism (H) [I26.99]         Your next Anticoagulation Clinic appointment(s)     Oct 20, 2017  2:15 PM CDT   Anticoagulation Visit with  ANTI COAGULATION   [facility] ([facility] )    [address] [facility] [facility] [place] [address]   [phone]            Nov 13, 2017 11:00 AM CST   Anticoagulation Visit with  ANTI COAGULATION   [facility] )    [address] [address]   [phone]              October 2017 Details    Sun Mon Tue Wed Thu Fri Sat     1               2               3               4               5               6               7                 8               9               10               11               12               13               14                 15               16               17               18               19               20      6 mg   See details      21      6 mg           22      6 mg         23      6 mg         24      6 mg         25      6 mg         26      6 mg         27      6 mg         28      6 mg           29      6 mg         30      6 mg         31      6 mg              Date Details   10/20 This INR check               How to take your warfarin dose     To take:  6 mg Take 1.5 of the 4 mg tablets.           November 2017 Details    Sun Mon Tue Wed u Fri Sat        1      6 mg         2      6 mg         3      6 mg         4      6 mg           5      6 mg         6       6 mg         7      6 mg         8      6 mg         9      6 mg         10      6 mg         11      6 mg           12      6 mg         13            14               15               16               17               18                 19               20               21               22               23               24               25                 26               27               28               29               30                  Date Details   No additional details    Date of next INR:  11/13/2017         How to take your warfarin dose     To take:  6 mg Take 1.5 of the 4 mg tablets.

## 2017-10-20 NOTE — PROGRESS NOTES
ANTICOAGULATION FOLLOW-UP CLINIC VISIT    Patient Name:  Rey Tristan  Date:  10/20/2017  Contact Type:  Face to Face    SUBJECTIVE:     Patient Findings     Positives No Problem Findings           OBJECTIVE    INR Protime   Date Value Ref Range Status   10/20/2017 2.4 (A) 0.86 - 1.14 Final       ASSESSMENT / PLAN  INR assessment THER    Recheck INR In: 4 WEEKS    INR Location Clinic      Anticoagulation Summary as of 10/20/2017     INR goal 2.0-3.0   Today's INR 2.4   Maintenance plan 6 mg (4 mg x 1.5) every day   Full instructions 6 mg every day   Weekly total 42 mg   No change documented Lala Boss RN   Plan last modified Vanessa Jacob RN (7/27/2016)   Next INR check 11/13/2017   Target end date Indefinite    Indications   Long-term (current) use of anticoagulants [Z79.01] [Z79.01]  DVT (deep venous thrombosis) (H) [I82.409]  Pulmonary embolism (H) [I26.99]         Anticoagulation Episode Summary     INR check location     Preferred lab     Send INR reminders to  ANTICOAG POOL    Comments       Anticoagulation Care Providers     Provider Role Specialty Phone number    Gianna Richard MD [facility] [phone]            See the Encounter Report to view Anticoagulation Flowsheet and Dosing Calendar (Go to Encounters tab in chart review, and find the Anticoagulation Therapy Visit)        Lala Boss, RN

## 2017-10-25 ENCOUNTER — HOSPITAL ENCOUNTER (OUTPATIENT)
Dept: CARDIOLOGY | Facility: HOSPITAL | Age: 77
Discharge: HOME | End: 2017-10-25
Attending: INTERNAL MEDICINE | Admitting: INTERNAL MEDICINE
Payer: MEDICARE

## 2017-10-25 DIAGNOSIS — R55 SYNCOPE, UNSPECIFIED SYNCOPE TYPE: ICD-10-CM

## 2017-10-25 DIAGNOSIS — R00.1 BRADYCARDIA: ICD-10-CM

## 2017-10-25 PROCEDURE — 93306 TTE W/DOPPLER COMPLETE: CPT | Mod: 26 | Performed by: INTERNAL MEDICINE

## 2017-10-25 PROCEDURE — 93306 TTE W/DOPPLER COMPLETE: CPT | Mod: TC

## 2017-10-26 ENCOUNTER — TELEPHONE (OUTPATIENT)
Dept: CARDIOLOGY | Facility: OTHER | Age: 77
End: 2017-10-26

## 2017-10-26 NOTE — TELEPHONE ENCOUNTER
12:59 PM    Reason for Call: Phone Call    Description: Pt called and stated he had a call from the nurse stating he needs to set up an appt for results. He would prefer just to get info in a phone call. Please call him back at [phone]    Was an appointment offered for this call? No  If yes : Appointment type              Date    Preferred method for responding to this message: Telephone Call  What is your phone number ?    If we cannot reach you directly, may we leave a detailed response at the number you provided? Yes    Can this message wait until your PCP/provider returns, if available today? No, provider out and he would like call back KT Lilly

## 2017-10-26 NOTE — TELEPHONE ENCOUNTER
Patient states preference to receive echocardiogram results via phone. Patient notified of Echo findings, normal left ventricular systolic function with trace to mild diastolic dysfunction (stiff heart that is not relaxing as well). No hemodynamically significant valvular findings. Otherwise normal echocardiogram. No structural heart disease that would be a cause of his recent syncopal episode. He denies any further lightheadedness, near syncope or syncope since his last visit. Largely asymptomatic with no increased shortness of breath, chest discomfort or edema.   Again discussed Loop device if return of symptoms for more complete monitoring, this can watch his heart rate for up to 3 years. He will keep this in mind. He bleives symptoms related to autoimmune thyroiditis for which he was recently diagnosed. He will follow-up on as needed basis.   VALERIE Nunez, LATHANP

## 2017-11-13 ENCOUNTER — ANTICOAGULATION THERAPY VISIT (OUTPATIENT)
Dept: ANTICOAGULATION | Facility: OTHER | Age: 77
End: 2017-11-13
Attending: PHYSICIAN ASSISTANT
Payer: MEDICARE

## 2017-11-13 ENCOUNTER — TRANSFERRED RECORDS (OUTPATIENT)
Dept: HEALTH INFORMATION MANAGEMENT | Facility: HOSPITAL | Age: 77
End: 2017-11-13

## 2017-11-13 DIAGNOSIS — Z79.01 LONG-TERM (CURRENT) USE OF ANTICOAGULANTS: ICD-10-CM

## 2017-11-13 DIAGNOSIS — Z86.711 HX PULMONARY EMBOLISM: ICD-10-CM

## 2017-11-13 DIAGNOSIS — E06.3 AUTOIMMUNE THYROIDITIS: ICD-10-CM

## 2017-11-13 DIAGNOSIS — Z86.718 HISTORY OF DVT OF LOWER EXTREMITY: ICD-10-CM

## 2017-11-13 LAB
INR POINT OF CARE: 3.3 (ref 0.86–1.14)
T3FREE SERPL-MCNC: 2.3 PG/ML (ref 2.3–4.2)
T4 FREE SERPL-MCNC: 0.73 NG/DL (ref 0.76–1.46)
TSH SERPL DL<=0.005 MIU/L-ACNC: 8.52 MU/L (ref 0.4–4)

## 2017-11-13 PROCEDURE — 85610 PROTHROMBIN TIME: CPT | Mod: QW,ZL

## 2017-11-13 PROCEDURE — 84481 FREE ASSAY (FT-3): CPT | Mod: ZL | Performed by: FAMILY MEDICINE

## 2017-11-13 PROCEDURE — 84443 ASSAY THYROID STIM HORMONE: CPT | Mod: ZL | Performed by: FAMILY MEDICINE

## 2017-11-13 PROCEDURE — 99000 SPECIMEN HANDLING OFFICE-LAB: CPT | Performed by: FAMILY MEDICINE

## 2017-11-13 PROCEDURE — 84439 ASSAY OF FREE THYROXINE: CPT | Mod: ZL | Performed by: FAMILY MEDICINE

## 2017-11-13 RX ORDER — LEVOTHYROXINE SODIUM 50 UG/1
50 TABLET ORAL DAILY
Qty: 30 TABLET | Refills: 1 | Status: SHIPPED | OUTPATIENT
Start: 2017-11-13 | End: 2018-11-16

## 2017-11-13 NOTE — MR AVS SNAPSHOT
Rey Tristan   11/13/2017 11:00 AM   Anticoagulation Therapy Visit    Description:  77 year old male   Provider:   ANTI COAGULATION   Department:   Anti Coagulation           INR as of 11/13/2017     Today's INR 3.3!      Anticoagulation Summary as of 11/13/2017     INR goal 2.0-3.0   Today's INR 3.3!   Full instructions 11/13: 4 mg; Otherwise 6 mg every day   Next INR check 12/18/2017    Indications   Long-term (current) use of anticoagulants [Z79.01] [Z79.01]  History of DVT of lower extremity [Z86.718]  Hx pulmonary embolism [Z86.711]         Your next Anticoagulation Clinic appointment(s)     Nov 13, 2017 11:00 AM CST   Anticoagulation Visit with  ANT[facility] COAGULATION   [facility]    [address] [address]   [phone]            Dec 18, 2017 11:15 AM CST   Anticoagulation Visit with  ANTI COAGULATION   [facility]    [address] [address]   [phone]              November 2017 Details    Sun Mon Tue Wed Thu Fri Sat        1               2               3               4                 5               6               7               8               9               10               11                 12               13      4 mg   See details      14      6 mg         15      6 mg         16      6 mg         17      6 mg         18      6 mg           19      6 mg         20      6 mg         21      6 mg         22      6 mg         23      6 mg         24      6 mg         25      6 mg           26      6 mg         27      6 mg         28      6 mg         29      6 mg         30      6 mg            Date Details   11/13 This INR check               How to take your warfarin dose     To take:  4 mg Take 1 of the 4 mg tablets.    To take:  6 mg Take 1.5 of the 4 mg tablets.           December 2017 Details    Sun Mon Tue Wed Thu Fri Sat          1      6 mg         2       6 mg           3      6 mg         4      6 mg         5      6 mg         6      6 mg         7      6 mg         8      6 mg         9      6 mg           10      6 mg         11      6 mg         12      6 mg         13      6 mg         14      6 mg         15      6 mg         16      6 mg           17      6 mg         18            19               20               21               22               23                 24               25               26               27               28               29               30                 31                      Date Details   No additional details    Date of next INR:  12/18/2017         How to take your warfarin dose     To take:  6 mg Take 1.5 of the 4 mg tablets.

## 2017-11-14 ENCOUNTER — DOCUMENTATION ONLY (OUTPATIENT)
Dept: VASCULAR SURGERY | Facility: CLINIC | Age: 77
End: 2017-11-14

## 2017-11-14 DIAGNOSIS — Z13.6 ENCOUNTER FOR ABDOMINAL AORTIC ANEURYSM (AAA) SCREENING: Primary | ICD-10-CM

## 2017-11-15 ENCOUNTER — TELEPHONE (OUTPATIENT)
Dept: FAMILY MEDICINE | Facility: OTHER | Age: 77
End: 2017-11-15

## 2017-11-15 NOTE — TELEPHONE ENCOUNTER
"Please update patient.  We received fax from Dr. Simeon.    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  This frequently can be a transient condition, and so unless he is symptomatic, I recommend taking a watchful waiting approach and repeating his thyroid hormone levels again in 1 month.\"    I had called in Synthroid for him, but as noted above, if no symptoms, can just hold off.  Repeat labs again 1 month.   "

## 2017-11-21 ENCOUNTER — TELEPHONE (OUTPATIENT)
Dept: CARDIOLOGY | Facility: OTHER | Age: 77
End: 2017-11-21

## 2017-11-21 NOTE — TELEPHONE ENCOUNTER
Called patient to schedule his cardiology follow up with Dr Pryor and the patient would like to speak with his nurse regarding why he needs to be seen and patient stated he already had his echocardiogram results.

## 2017-11-21 NOTE — TELEPHONE ENCOUNTER
----- Message from Norma Jennings RN sent at 11/21/2017 12:14 PM CST -----  Kalli, please attempt to reach this patient again.  Thank you, Norma  ----- Message -----     From: Giuliana Jeong     Sent: 10/26/2017   4:57 PM       To: Norma Jennings RN    [facility] for him to schedule      ----- Message -----     From: Norma Jennings RN     Sent: 10/26/2017  11:15 AM       To: Giuliana Giordano  Please call patient and schedule one month follow up for Dr. Pryor.  Patient was seen 10/20/17  Thanks, Norma

## 2017-11-22 NOTE — TELEPHONE ENCOUNTER
Patient called and informed of message recorded below per Dr. Pryor.  Patient declined an appointment with Dr. Pryor at this time and states he has not had any further syncope episodes.  Patient also states he does not wish to have a loop recorder placed at this time.  Patient states he will call back to schedule an appointment on a PRN basis with Dr. Pryor and will that he will continue to follow-up with Dr. Richard.  Patient states he has been following with Dr. Richard and his specialist regarding his thyroid.  This message sent to Dr. Richard as ROSLYN.

## 2017-11-22 NOTE — TELEPHONE ENCOUNTER
If he has not had anymore episodes of syncope, he would not need to be seen in follow-up. He could be seen PRN. We did discuss putting in a loop recorder. He did not want this placed. If he changes his mind on this with concerns for ongoing syncope, then he should be seen. Otherwise, he can cont to follow-up with his PCP. Thanks!    Devon Pryor

## 2017-11-30 ENCOUNTER — ANESTHESIA EVENT (OUTPATIENT)
Dept: SURGERY | Facility: HOSPITAL | Age: 77
DRG: 378 | End: 2017-11-30
Payer: MEDICARE

## 2017-11-30 ENCOUNTER — HOSPITAL ENCOUNTER (INPATIENT)
Facility: HOSPITAL | Age: 77
LOS: 1 days | Discharge: HOME | DRG: 378 | End: 2017-12-01
Attending: INTERNAL MEDICINE | Admitting: INTERNAL MEDICINE
Payer: MEDICARE

## 2017-11-30 ENCOUNTER — APPOINTMENT (OUTPATIENT)
Dept: GENERAL RADIOLOGY | Facility: HOSPITAL | Age: 77
DRG: 378 | End: 2017-11-30
Attending: INTERNAL MEDICINE
Payer: MEDICARE

## 2017-11-30 ENCOUNTER — ANESTHESIA (OUTPATIENT)
Dept: SURGERY | Facility: HOSPITAL | Age: 77
DRG: 378 | End: 2017-11-30
Payer: MEDICARE

## 2017-11-30 DIAGNOSIS — K22.70 BARRETT'S ESOPHAGUS WITHOUT DYSPLASIA: ICD-10-CM

## 2017-11-30 DIAGNOSIS — K92.2 ACUTE UPPER GASTROINTESTINAL BLEEDING: Primary | ICD-10-CM

## 2017-11-30 DIAGNOSIS — K92.2 GI BLEED: ICD-10-CM

## 2017-11-30 DIAGNOSIS — E06.3 AUTOIMMUNE THYROIDITIS: ICD-10-CM

## 2017-11-30 LAB
ABO + RH BLD: NORMAL
ABO + RH BLD: NORMAL
ALBUMIN SERPL-MCNC: 2.5 G/DL (ref 3.4–5)
ALP SERPL-CCNC: 104 U/L (ref 40–150)
ALT SERPL W P-5'-P-CCNC: 27 U/L (ref 0–70)
ANION GAP SERPL CALCULATED.3IONS-SCNC: 9 MMOL/L (ref 3–14)
AST SERPL W P-5'-P-CCNC: 28 U/L (ref 0–45)
BASOPHILS # BLD AUTO: 0 10E9/L (ref 0–0.2)
BASOPHILS NFR BLD AUTO: 0.3 %
BILIRUB SERPL-MCNC: 0.2 MG/DL (ref 0.2–1.3)
BLD GP AB SCN SERPL QL: NORMAL
BLD PROD TYP BPU: NORMAL
BLD UNIT ID BPU: 0
BLOOD BANK CMNT PATIENT-IMP: NORMAL
BLOOD BANK CMNT PATIENT-IMP: NORMAL
BLOOD PRODUCT CODE: NORMAL
BPU ID: NORMAL
BUN SERPL-MCNC: 46 MG/DL (ref 7–30)
CALCIUM SERPL-MCNC: 7.7 MG/DL (ref 8.5–10.1)
CHLORIDE SERPL-SCNC: 106 MMOL/L (ref 94–109)
CO2 SERPL-SCNC: 23 MMOL/L (ref 20–32)
CREAT SERPL-MCNC: 0.7 MG/DL (ref 0.66–1.25)
DIFFERENTIAL METHOD BLD: ABNORMAL
EOSINOPHIL # BLD AUTO: 0.1 10E9/L (ref 0–0.7)
EOSINOPHIL NFR BLD AUTO: 0.6 %
ERYTHROCYTE [DISTWIDTH] IN BLOOD BY AUTOMATED COUNT: 16.9 % (ref 10–15)
GFR SERPL CREATININE-BSD FRML MDRD: >90 ML/MIN/1.7M2
GLUCOSE SERPL-MCNC: 155 MG/DL (ref 70–99)
HCT VFR BLD AUTO: 20.6 % (ref 40–53)
HEMOCCULT SP1 STL QL: POSITIVE
HGB BLD-MCNC: 6.5 G/DL (ref 13.3–17.7)
HGB BLD-MCNC: 6.5 G/DL (ref 13.3–17.7)
HGB BLD-MCNC: 8.2 G/DL (ref 13.3–17.7)
HGB BLD-MCNC: 9.1 G/DL (ref 13.3–17.7)
IMM GRANULOCYTES # BLD: 0.1 10E9/L (ref 0–0.4)
IMM GRANULOCYTES NFR BLD: 0.6 %
INR PPP: 1.72 (ref 0.8–1.2)
INR PPP: 3.89 (ref 0.8–1.2)
LACTATE SERPL-SCNC: 3.3 MMOL/L (ref 0.4–2)
LIPASE SERPL-CCNC: 192 U/L (ref 73–393)
LYMPHOCYTES # BLD AUTO: 1.8 10E9/L (ref 0.8–5.3)
LYMPHOCYTES NFR BLD AUTO: 15.8 %
MCH RBC QN AUTO: 25.6 PG (ref 26.5–33)
MCHC RBC AUTO-ENTMCNC: 31.6 G/DL (ref 31.5–36.5)
MCV RBC AUTO: 81 FL (ref 78–100)
MONOCYTES # BLD AUTO: 0.9 10E9/L (ref 0–1.3)
MONOCYTES NFR BLD AUTO: 8 %
NEUTROPHILS # BLD AUTO: 8.4 10E9/L (ref 1.6–8.3)
NEUTROPHILS NFR BLD AUTO: 74.7 %
NRBC # BLD AUTO: 0 10*3/UL
NRBC BLD AUTO-RTO: 0 /100
NUM BPU REQUESTED: 4
PLATELET # BLD AUTO: 166 10E9/L (ref 150–450)
POTASSIUM SERPL-SCNC: 4.2 MMOL/L (ref 3.4–5.3)
PROT SERPL-MCNC: 6.1 G/DL (ref 6.8–8.8)
RBC # BLD AUTO: 2.54 10E12/L (ref 4.4–5.9)
SODIUM SERPL-SCNC: 138 MMOL/L (ref 133–144)
SPECIMEN EXP DATE BLD: NORMAL
TRANSFUSION STATUS PATIENT QL: NORMAL
WBC # BLD AUTO: 11.2 10E9/L (ref 4–11)

## 2017-11-30 PROCEDURE — 86901 BLOOD TYPING SEROLOGIC RH(D): CPT | Performed by: FAMILY MEDICINE

## 2017-11-30 PROCEDURE — 36415 COLL VENOUS BLD VENIPUNCTURE: CPT | Performed by: INTERNAL MEDICINE

## 2017-11-30 PROCEDURE — 80053 COMPREHEN METABOLIC PANEL: CPT | Performed by: INTERNAL MEDICINE

## 2017-11-30 PROCEDURE — 86850 RBC ANTIBODY SCREEN: CPT | Performed by: INTERNAL MEDICINE

## 2017-11-30 PROCEDURE — 85018 HEMOGLOBIN: CPT | Performed by: INTERNAL MEDICINE

## 2017-11-30 PROCEDURE — 25000128 H RX IP 250 OP 636: Performed by: INTERNAL MEDICINE

## 2017-11-30 PROCEDURE — 40000344 ZZHCL STATISTIC THAWING COMPONENT: Performed by: INTERNAL MEDICINE

## 2017-11-30 PROCEDURE — 25000128 H RX IP 250 OP 636: Performed by: NURSE ANESTHETIST, CERTIFIED REGISTERED

## 2017-11-30 PROCEDURE — 0DJ08ZZ INSPECTION OF UPPER INTESTINAL TRACT, VIA NATURAL OR ARTIFICIAL OPENING ENDOSCOPIC: ICD-10-PCS | Performed by: SURGERY

## 2017-11-30 PROCEDURE — 86900 BLOOD TYPING SEROLOGIC ABO: CPT | Performed by: INTERNAL MEDICINE

## 2017-11-30 PROCEDURE — 20000003 ZZH R&B ICU

## 2017-11-30 PROCEDURE — 36000050 ZZH SURGERY LEVEL 2 1ST 30 MIN: Performed by: SURGERY

## 2017-11-30 PROCEDURE — 96361 HYDRATE IV INFUSION ADD-ON: CPT

## 2017-11-30 PROCEDURE — 85610 PROTHROMBIN TIME: CPT | Performed by: INTERNAL MEDICINE

## 2017-11-30 PROCEDURE — 25000125 ZZHC RX 250: Performed by: INTERNAL MEDICINE

## 2017-11-30 PROCEDURE — 96365 THER/PROPH/DIAG IV INF INIT: CPT

## 2017-11-30 PROCEDURE — 36430 TRANSFUSION BLD/BLD COMPNT: CPT

## 2017-11-30 PROCEDURE — 30233N1 TRANSFUSION OF NONAUTOLOGOUS RED BLOOD CELLS INTO PERIPHERAL VEIN, PERCUTANEOUS APPROACH: ICD-10-PCS | Performed by: INTERNAL MEDICINE

## 2017-11-30 PROCEDURE — 43239 EGD BIOPSY SINGLE/MULTIPLE: CPT | Performed by: NURSE ANESTHETIST, CERTIFIED REGISTERED

## 2017-11-30 PROCEDURE — 93005 ELECTROCARDIOGRAM TRACING: CPT

## 2017-11-30 PROCEDURE — 71010 XR CHEST PORT 1 VW: CPT | Mod: TC

## 2017-11-30 PROCEDURE — 40000786 ZZHCL STATISTIC ACTIVE MRSA SURVEILLANCE CULTURE: Performed by: INTERNAL MEDICINE

## 2017-11-30 PROCEDURE — 86900 BLOOD TYPING SEROLOGIC ABO: CPT | Performed by: FAMILY MEDICINE

## 2017-11-30 PROCEDURE — 86901 BLOOD TYPING SEROLOGIC RH(D): CPT | Performed by: INTERNAL MEDICINE

## 2017-11-30 PROCEDURE — 99100 ANES PT EXTEME AGE<1 YR&>70: CPT | Performed by: NURSE ANESTHETIST, CERTIFIED REGISTERED

## 2017-11-30 PROCEDURE — 30233K1 TRANSFUSION OF NONAUTOLOGOUS FROZEN PLASMA INTO PERIPHERAL VEIN, PERCUTANEOUS APPROACH: ICD-10-PCS | Performed by: INTERNAL MEDICINE

## 2017-11-30 PROCEDURE — P9059 PLASMA, FRZ BETWEEN 8-24HOUR: HCPCS | Performed by: INTERNAL MEDICINE

## 2017-11-30 PROCEDURE — 86922 COMPATIBILITY TEST ANTIGLOB: CPT | Performed by: FAMILY MEDICINE

## 2017-11-30 PROCEDURE — 87040 BLOOD CULTURE FOR BACTERIA: CPT | Performed by: INTERNAL MEDICINE

## 2017-11-30 PROCEDURE — 99222 1ST HOSP IP/OBS MODERATE 55: CPT | Mod: 25 | Performed by: SURGERY

## 2017-11-30 PROCEDURE — 85610 PROTHROMBIN TIME: CPT | Performed by: FAMILY MEDICINE

## 2017-11-30 PROCEDURE — 96372 THER/PROPH/DIAG INJ SC/IM: CPT

## 2017-11-30 PROCEDURE — 93010 ELECTROCARDIOGRAM REPORT: CPT | Performed by: INTERNAL MEDICINE

## 2017-11-30 PROCEDURE — 99285 EMERGENCY DEPT VISIT HI MDM: CPT | Performed by: INTERNAL MEDICINE

## 2017-11-30 PROCEDURE — 96375 TX/PRO/DX INJ NEW DRUG ADDON: CPT

## 2017-11-30 PROCEDURE — 85025 COMPLETE CBC W/AUTO DIFF WBC: CPT | Performed by: FAMILY MEDICINE

## 2017-11-30 PROCEDURE — 82274 ASSAY TEST FOR BLOOD FECAL: CPT | Performed by: INTERNAL MEDICINE

## 2017-11-30 PROCEDURE — 37000008 ZZH ANESTHESIA TECHNICAL FEE, 1ST 30 MIN: Performed by: SURGERY

## 2017-11-30 PROCEDURE — 99285 EMERGENCY DEPT VISIT HI MDM: CPT | Mod: 25

## 2017-11-30 PROCEDURE — P9016 RBC LEUKOCYTES REDUCED: HCPCS | Performed by: FAMILY MEDICINE

## 2017-11-30 PROCEDURE — 25000125 ZZHC RX 250: Performed by: NURSE ANESTHETIST, CERTIFIED REGISTERED

## 2017-11-30 PROCEDURE — 99223 1ST HOSP IP/OBS HIGH 75: CPT | Mod: AI | Performed by: INTERNAL MEDICINE

## 2017-11-30 PROCEDURE — 86920 COMPATIBILITY TEST SPIN: CPT | Performed by: FAMILY MEDICINE

## 2017-11-30 PROCEDURE — 40000306 ZZH STATISTIC PRE PROC ASSESS II: Performed by: SURGERY

## 2017-11-30 PROCEDURE — 83690 ASSAY OF LIPASE: CPT | Performed by: INTERNAL MEDICINE

## 2017-11-30 PROCEDURE — 36416 COLLJ CAPILLARY BLOOD SPEC: CPT | Performed by: INTERNAL MEDICINE

## 2017-11-30 PROCEDURE — 86850 RBC ANTIBODY SCREEN: CPT | Performed by: FAMILY MEDICINE

## 2017-11-30 PROCEDURE — S0164 INJECTION PANTROPRAZOLE: HCPCS | Performed by: INTERNAL MEDICINE

## 2017-11-30 PROCEDURE — 83605 ASSAY OF LACTIC ACID: CPT | Performed by: INTERNAL MEDICINE

## 2017-11-30 PROCEDURE — 43235 EGD DIAGNOSTIC BRUSH WASH: CPT | Performed by: SURGERY

## 2017-11-30 RX ORDER — PROCHLORPERAZINE 25 MG
12.5 SUPPOSITORY, RECTAL RECTAL EVERY 12 HOURS PRN
Status: DISCONTINUED | OUTPATIENT
Start: 2017-11-30 | End: 2017-12-01 | Stop reason: HOSPADM

## 2017-11-30 RX ORDER — BISACODYL 10 MG
10 SUPPOSITORY, RECTAL RECTAL DAILY PRN
Status: DISCONTINUED | OUTPATIENT
Start: 2017-11-30 | End: 2017-12-01 | Stop reason: HOSPADM

## 2017-11-30 RX ORDER — LEVOTHYROXINE SODIUM ANHYDROUS 100 UG/5ML
25 INJECTION, POWDER, LYOPHILIZED, FOR SOLUTION INTRAVENOUS DAILY
Status: DISCONTINUED | OUTPATIENT
Start: 2017-11-30 | End: 2017-11-30

## 2017-11-30 RX ORDER — CEFTRIAXONE SODIUM 1 G/50ML
1 INJECTION, SOLUTION INTRAVENOUS ONCE
Status: COMPLETED | OUTPATIENT
Start: 2017-11-30 | End: 2017-11-30

## 2017-11-30 RX ORDER — SODIUM CHLORIDE 9 MG/ML
INJECTION, SOLUTION INTRAVENOUS
Status: DISCONTINUED
Start: 2017-11-30 | End: 2017-11-30 | Stop reason: HOSPADM

## 2017-11-30 RX ORDER — SODIUM CHLORIDE, SODIUM LACTATE, POTASSIUM CHLORIDE, CALCIUM CHLORIDE 600; 310; 30; 20 MG/100ML; MG/100ML; MG/100ML; MG/100ML
INJECTION, SOLUTION INTRAVENOUS CONTINUOUS
Status: DISCONTINUED | OUTPATIENT
Start: 2017-11-30 | End: 2017-12-01

## 2017-11-30 RX ORDER — PROCHLORPERAZINE MALEATE 5 MG
5 TABLET ORAL EVERY 6 HOURS PRN
Status: DISCONTINUED | OUTPATIENT
Start: 2017-11-30 | End: 2017-12-01 | Stop reason: HOSPADM

## 2017-11-30 RX ORDER — HYDROMORPHONE HYDROCHLORIDE 1 MG/ML
.5-1 INJECTION, SOLUTION INTRAMUSCULAR; INTRAVENOUS; SUBCUTANEOUS
Status: DISCONTINUED | OUTPATIENT
Start: 2017-11-30 | End: 2017-12-01

## 2017-11-30 RX ORDER — PANTOPRAZOLE SODIUM 40 MG/10ML
INJECTION, POWDER, LYOPHILIZED, FOR SOLUTION INTRAVENOUS
Status: DISCONTINUED
Start: 2017-11-30 | End: 2017-11-30 | Stop reason: HOSPADM

## 2017-11-30 RX ORDER — ONDANSETRON 2 MG/ML
4 INJECTION INTRAMUSCULAR; INTRAVENOUS EVERY 6 HOURS PRN
Status: DISCONTINUED | OUTPATIENT
Start: 2017-11-30 | End: 2017-12-01 | Stop reason: HOSPADM

## 2017-11-30 RX ORDER — PROPOFOL 10 MG/ML
INJECTION, EMULSION INTRAVENOUS PRN
Status: DISCONTINUED | OUTPATIENT
Start: 2017-11-30 | End: 2017-11-30

## 2017-11-30 RX ORDER — LIDOCAINE HYDROCHLORIDE 20 MG/ML
INJECTION, SOLUTION INFILTRATION; PERINEURAL PRN
Status: DISCONTINUED | OUTPATIENT
Start: 2017-11-30 | End: 2017-11-30

## 2017-11-30 RX ORDER — LIDOCAINE 40 MG/G
CREAM TOPICAL
Status: DISCONTINUED | OUTPATIENT
Start: 2017-11-30 | End: 2017-12-01

## 2017-11-30 RX ORDER — LIDOCAINE 40 MG/G
CREAM TOPICAL
Status: DISCONTINUED | OUTPATIENT
Start: 2017-11-30 | End: 2017-11-30 | Stop reason: HOSPADM

## 2017-11-30 RX ORDER — PHYTONADIONE 10 MG/ML
10 INJECTION, EMULSION INTRAMUSCULAR; INTRAVENOUS; SUBCUTANEOUS ONCE
Status: COMPLETED | OUTPATIENT
Start: 2017-11-30 | End: 2017-11-30

## 2017-11-30 RX ORDER — NALOXONE HYDROCHLORIDE 0.4 MG/ML
.1-.4 INJECTION, SOLUTION INTRAMUSCULAR; INTRAVENOUS; SUBCUTANEOUS
Status: DISCONTINUED | OUTPATIENT
Start: 2017-11-30 | End: 2017-12-01 | Stop reason: HOSPADM

## 2017-11-30 RX ORDER — ONDANSETRON 4 MG/1
4 TABLET, ORALLY DISINTEGRATING ORAL EVERY 6 HOURS PRN
Status: DISCONTINUED | OUTPATIENT
Start: 2017-11-30 | End: 2017-12-01 | Stop reason: HOSPADM

## 2017-11-30 RX ORDER — KETAMINE HYDROCHLORIDE 10 MG/ML
INJECTION, SOLUTION INTRAMUSCULAR; INTRAVENOUS PRN
Status: DISCONTINUED | OUTPATIENT
Start: 2017-11-30 | End: 2017-11-30

## 2017-11-30 RX ADMIN — CEFTRIAXONE SODIUM 1 G: 1 INJECTION, SOLUTION INTRAVENOUS at 05:05

## 2017-11-30 RX ADMIN — PROPOFOL 30 MG: 10 INJECTION, EMULSION INTRAVENOUS at 15:02

## 2017-11-30 RX ADMIN — LIDOCAINE HYDROCHLORIDE 40 MG: 20 INJECTION, SOLUTION INFILTRATION; PERINEURAL at 14:58

## 2017-11-30 RX ADMIN — SODIUM CHLORIDE 80 MG: 9 INJECTION, SOLUTION INTRAVENOUS at 06:00

## 2017-11-30 RX ADMIN — PROPOFOL 50 MG: 10 INJECTION, EMULSION INTRAVENOUS at 14:58

## 2017-11-30 RX ADMIN — SODIUM CHLORIDE 8 MG/HR: 9 INJECTION, SOLUTION INTRAVENOUS at 08:54

## 2017-11-30 RX ADMIN — PANTOPRAZOLE SODIUM 40 MG: 40 INJECTION, POWDER, FOR SOLUTION INTRAVENOUS at 04:19

## 2017-11-30 RX ADMIN — PHYTONADIONE 10 MG: 10 INJECTION, EMULSION INTRAMUSCULAR; INTRAVENOUS; SUBCUTANEOUS at 04:59

## 2017-11-30 RX ADMIN — SODIUM CHLORIDE 8 MG/HR: 9 INJECTION, SOLUTION INTRAVENOUS at 15:47

## 2017-11-30 RX ADMIN — SODIUM CHLORIDE 8 MG/HR: 9 INJECTION, SOLUTION INTRAVENOUS at 23:08

## 2017-11-30 RX ADMIN — SODIUM CHLORIDE 1000 ML: 9 INJECTION, SOLUTION INTRAVENOUS at 03:58

## 2017-11-30 RX ADMIN — KETAMINE HYDROCHLORIDE 20 MG: 10 INJECTION, SOLUTION INTRAMUSCULAR; INTRAVENOUS at 14:58

## 2017-11-30 RX ADMIN — SODIUM CHLORIDE, POTASSIUM CHLORIDE, SODIUM LACTATE AND CALCIUM CHLORIDE: 600; 310; 30; 20 INJECTION, SOLUTION INTRAVENOUS at 05:41

## 2017-11-30 RX ADMIN — SODIUM CHLORIDE, POTASSIUM CHLORIDE, SODIUM LACTATE AND CALCIUM CHLORIDE: 600; 310; 30; 20 INJECTION, SOLUTION INTRAVENOUS at 18:50

## 2017-11-30 ASSESSMENT — ENCOUNTER SYMPTOMS
COLOR CHANGE: 0
ANAL BLEEDING: 0
NAUSEA: 1
ABDOMINAL DISTENTION: 0
VOICE CHANGE: 0
COUGH: 0
HEADACHES: 0
VOMITING: 1
DYSURIA: 0
CHEST TIGHTNESS: 0
NUMBNESS: 0
MYALGIAS: 0
NECK PAIN: 0
FREQUENCY: 0
BLOOD IN STOOL: 1
SLEEP DISTURBANCE: 0
WEAKNESS: 0
WHEEZING: 0
FLANK PAIN: 0
ABDOMINAL PAIN: 0
DIZZINESS: 1
FEVER: 0
BACK PAIN: 0
PALPITATIONS: 0
LIGHT-HEADEDNESS: 0
CONFUSION: 0
DIAPHORESIS: 0
SHORTNESS OF BREATH: 0
CHILLS: 0

## 2017-11-30 ASSESSMENT — COPD QUESTIONNAIRES: COPD: 1

## 2017-11-30 NOTE — PROVIDER NOTIFICATION
Critical result of  Lactic acid value of 3.3  Reported to  Dr. Escoto  Time given to provider  4:57 AM  Lidia Phipps RN 4:57 AM 11/30/2017

## 2017-11-30 NOTE — PLAN OF CARE
[facility] Inpatient Admission Note:    Patient admitted to 3126/3126-1 at approximately 0520 via cart accompanied by spouse, transport tech and nurse from emergency room . Report received from HOPE Munoz in SBAR format at 0520 via face to face in room. Patient ambulated to bed via self.. Patient is alert and oriented X 3, denies pain; rates at 0 on 0-10 scale.  Patient oriented to room, unit, hourly rounding, and plan of care. Explained admission packet and patient handbook with patient bill of rights brochure. Will continue to monitor and document as needed.     Inpatient Nursing criteria listed below was met:      Health care directives status obtained and documented: Yes      Care Everywhere authorization obtained No      MRSA swab completed for patient 65 years and older: Yes      Patient identifies a surrogate decision maker: Yes If yes, who: matt Salas Contact Information: [phone]      Core Measure diagnosis present:No. If yes, state diagnosis: NA       Is initial lactic acid >2.0? Yes.       Vaccination assessment and education completed: Yes   Vaccinations received prior to admission: Pneumovax yes  Influenza(seasonal)  NO   Vaccination(s) ordered: patient declines      Clergy visit ordered if patient requests: No      Skin issues/needs documented: No      Isolation Patient: no Education given, correct sign in place and documentation row added to PCS:  NA      Fall Prevention Yes: Care plan updated, education given and documented, sticker and magnet in place: Yes      Care Plan initiated: Yes      Education Documented (including assessment): Yes      Patient has discharge needs : No If yes, please explain: NA

## 2017-11-30 NOTE — PROVIDER NOTIFICATION
DATE:  11/30/2017   TIME OF RECEIPT FROM LAB:  1136  LAB TEST:  Hemoglobin  LAB VALUE:  6.5  RESULTS GIVEN WITH READ-BACK TO (PROVIDER):  Dr. Conde  TIME LAB VALUE REPORTED TO PROVIDER:   0936

## 2017-11-30 NOTE — ANESTHESIA CARE TRANSFER NOTE
Patient: Rey Tristan    Procedure(s):  UPPER ENDOSCOPY - Wound Class: II-Clean Contaminated    Diagnosis: GI BLEED  Diagnosis Additional Information: No value filed.    Anesthesia Type:   MAC     Note:  Airway :Nasal Cannula  Patient transferred to:ICU  Comments: VSS.  Pt awake and comfortable.  Report to ICU RN.ICU Handoff: Call for PAUSE to initiate/utilize ICU HANDOFF, Identified Patient, Identified Responsible Provider, Reviewed the Pertinent Medical History, Discussed Surgical Course, Reviewed Intra-OP Anesthesia Management and Issues during Anesthesia, Set Expectations for Post Procedure Period and Allowed Opportunity for Questions and Acknowledgement of Understanding      Vitals: (Last set prior to Anesthesia Care Transfer)    CRNA VITALS  11/30/2017 1439 - 11/30/2017 1526      11/30/2017             Pulse: 79    SpO2: 97 %    Resp Rate (set): 8                Electronically Signed By: VALERIE Esteban CRNA  November 30, 2017  3:26 PM

## 2017-11-30 NOTE — ANESTHESIA PREPROCEDURE EVALUATION
Anesthesia Evaluation     . Pt has had prior anesthetic. Type: MAC    No history of anesthetic complications          ROS/MED HX    ENT/Pulmonary:     (+)COPD, , . .   (-) recent URI   Neurologic:  - neg neurologic ROS     Cardiovascular:     (+) Dyslipidemia, ----. : . . fainting (syncope) (dx as dehydration). :. . Previous cardiac testing Echodate:results:normaldate: results:ECG reviewed date: results:+/- per H&P date: results:          METS/Exercise Tolerance:     Hematologic:     (+) History of blood clots Anemia (Hgb 6.5 today), History of Transfusion no previous transfusion reaction Other Hematologic Disorder (anticoagulated on warfarin)-INR 1.72      Musculoskeletal:  - neg musculoskeletal ROS       GI/Hepatic:     (+) GERD (occasionally; diet related; asymptomatic today) Other GI/Hepatic (acute upper GIB)       Renal/Genitourinary:  - ROS Renal section negative       Endo:     (+) thyroid problem  Thyroid disease - Other autoimmune thyroiditis, .      Psychiatric:  - neg psychiatric ROS       Infectious Disease:  - neg infectious disease ROS       Malignancy:      - no malignancy   Other:    (+) no H/O Chronic Pain,no other significant disability                    Physical Exam  Normal systems: cardiovascular and pulmonary    Airway   Mallampati: I  TM distance: >3 FB  Neck ROM: full    Dental   (+) lower dentures and upper dentures  Comment: removed    Cardiovascular   Rhythm and rate: regular and normal      Pulmonary    breath sounds clear to auscultation                    Anesthesia Plan      History & Physical Review  History and physical reviewed and following examination; no interval change.    ASA Status:  3 emergent.    NPO Status:  > 8 hours    Plan for MAC with Intravenous and Propofol induction. Maintenance will be TIVA.  Reason for MAC:  Deep or markedly invasive procedure (G8)  PONV prophylaxis:  Ondansetron (or other 5HT-3)  Risks and benefits of MAC anesthetic discussed including dental  damage, aspiration, loss of airway, conversion to general anesthetic, CV complications, MI, stroke, death. Pt wishes to proceed.         Postoperative Care  Postoperative pain management:  IV analgesics.      Consents  Anesthetic plan, risks, benefits and alternatives discussed with:  Patient.  Use of blood products discussed: Yes.   Use of blood products discussed with Patient.  Consented to blood products.  .                          .

## 2017-11-30 NOTE — PROGRESS NOTES
[facility]    Hospitalist Progress Note    Date of Service (when I saw the patient): 11/30/2017    Assessment & Plan   Rey Tristan is a 77 year old male who was admitted on 11/30/2017.     1.  Acute Upper GI Bleed:  Patient presented with acute onset  hematemesis and melanotic stools last evening  No prior problems i.e. abdominal pain or bleeding is on warfarin for previous DVT/PE. Is on ASA 81 mg for cardiac prophylaxis.  No other nsaids.  Has ad EGD and colonoscopy done back in 6/2016,  Colon clean had a villous adenoma.  EGD  Stomach clear did have significant esophagitis though  Was on prilosec and Carafate ended up stopping.  Hgb last check was 10.9  On 9/3/17.  Presented with episode of syncope at home also.  Here BP 85-90 systolic .  HGB was 6.5 and  INR at 3.89.  Is getting  IVF and blood and FFP. Will follow serial HGB and follow up INR.  Does not appear to be actively bleeding no further stools here or hematemesis.  Once is fluid resuscitated and stable jeannine likely require repeat EGD.  Is on Protonix drip currently also.    2.  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    3. Cardiac:  Had episode of syncope while out working hard in yard.  Felt to be just dehydration   Was evaluated  With normal ECHO  zio patch with no arrhythmias.  Had previous heart cath done for Stress test that was +/- and had no obstructive disease.  Is on ASA and Lipitor prophylaxis    4.  Autoimmune thyroiditis  Seen by Michael on no therapy was hyper and now appear hypo on last TSH, but Endo telling not to start replacement yet is to follow up later this month.      DVT Prophylaxis: Pneumatic Compression Devices  Code Status: Full Code    Disposition: Expected discharge in 2-3 days once bleeding stopped.    Ortega Conde    Interval History     As above came in  with heat emesis melena and syncope.  Currently feels good no pain or sob or  lightheadedness.  No further obvious bleeding here.    -Data reviewed today: I reviewed all new labs and imaging results over the last 24 hours. I personally reviewed no images or EKG's today.    Physical Exam   Temp: 96.6  F (35.9  C) Temp src: Tympanic BP: 103/57 Pulse: 63 Heart Rate: 66 Resp: 14 SpO2: 96 % O2 Device: None (Room air)    Vitals:    11/30/17 0357   Weight: 71.7 kg (158 lb)     Vital Signs with Ranges  Temp:  [96.6  F (35.9  C)-97.8  F (36.6  C)] 96.6  F (35.9  C)  Pulse:  [63-67] 63  Heart Rate:  [60-95] 66  Resp:  [6-22] 14  BP: ()/(42-67) 103/57  SpO2:  [90 %-100 %] 96 %       Peripheral IV 11/30/17 Left (Active)   Site Assessment [facility] 11/30/2017  6:00 AM   Line Status Infusing 11/30/2017  6:00 AM   Phlebitis Scale 0-->no symptoms 11/30/2017  6:00 AM   Infiltration Scale 0 11/30/2017  6:00 AM   Number of days:0       Peripheral IV 11/30/17 Right (Active)   Site Assessment [facility] 11/30/2017  6:00 AM   Line Status Infusing 11/30/2017  6:00 AM   Phlebitis Scale 0-->no symptoms 11/30/2017  6:00 AM   Infiltration Scale 0 11/30/2017  6:00 AM   Number of days:0     No line/device    Constitutional: Alert and oriented x3. No distress    HEENT: NC/AT, PERRL, EOMI, mouth msit  Blood dried around mouth, neck supple, no LN.     Cardiovascular: RRR. no Murmur, no  rubs, or gallops.  JVD flat.  Bruits no.  Pulses 2+    Respiratory: Clear to auscultation bilaterally    Abdomen: Soft, NTND, no organomegaly. Bowel sounds present    Extremities: Warm/dry. No edema    Neuro:   Non focal, cranial nerves intact, Moves all extremities.    Medications     lactated ringers 75 mL/hr at 11/30/17 0541     pantoprazole (PROTONIX) infusion ADULT/PEDS GREATER than or EQUAL to 45 kg         sodium chloride (PF)  3 mL Intracatheter Q8H     levothyroxine  25 mcg Intravenous Daily     pantoprazole         NaCl         pantoprazole         NaCl           Data     Recent Labs  Lab 11/30/17  0400   WBC 11.2*   HGB 6.5*   MCV 81       INR 3.89*      POTASSIUM 4.2   CHLORIDE 106   CO2 23   BUN 46*   CR 0.70   ANIONGAP 9   RANDI 7.7*   *   ALBUMIN 2.5*   PROTTOTAL 6.1*   BILITOTAL 0.2   ALKPHOS 104   ALT 27   AST 28   LIPASE 192     Lactic Acid   Date Value Ref Range Status   11/30/2017 3.3 (H) 0.4 - 2.0 mmol/L Final     Comment:     Significant value called to and read back by  JERRI OG AT 0457 ON 11/30/17 BY WAL         No results found for this or any previous visit (from the past 24 hour(s)).

## 2017-11-30 NOTE — PLAN OF CARE
Patient transferred down to surgery for EGD at 1412. Patient A&O. VSS at time of transfer. Accompanied by wife.    Unit of blood transfusing to right peripheral IV at time of transfer with approximately 15 minutes left. Per Dr. Torres, may hold off on hanging second unit of RBC until patient returns to ICU.    Report called to Norma EATON RN in same day surgery at 1415.

## 2017-11-30 NOTE — CONSULTS
[facility] Surgery Consultation    CC:  GI bleed     HPI:  This 77 year old year old male is seen at the request of Dr. Conde for evaluation of upper GI bleed. This is a 77 y.o. Male who presents to the ED this am by EMS after a syncopal event. He notes that starting about this past Wednesday that he started noticing his stools darkening. Then he awoke early this am to go to the bathroom and passed out. He felt very dizzy. He then began vomiting blood. He is on Coumadin for a DVT/PE in the past. He did have an anemia work-up in 2016 that showed some esophagitis. When he presented he was supra-theraputic on his INR. He has received 2 units blood and 4 of FFP on arrival. His hbg remained unchanged so he is again receiving a blood transfusion.     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 Simeon; silent; transient hyperthyroid, now normal; monitor TSH monthly for hypothyroidism     Chronic anticoagulation 1/1/2012     Elevated serum alkaline phosphatase level 5/4/2016    normal GGT, normal bone skeletal survery     Gastric ulcer     endoscopy 6/2016, repeat 6 months; PPI Carafate     Hyperlipidemia      Normocytic anemia 5/4/2016     Pulmonary nodule     CT 5/2014, right; consider repeat 1 year if high risk     Vitamin D deficiency 1/1/2012       Past Surgical History:   Procedure Laterality Date     ANGIOGRAM  6/23/2014     C REGULAR ECHO (FL)  6/23/2014    Dr. Cavazos     C. Difficile with intussuseption       cataract extraction       colonoscopy       COLONOSCOPY  6/17     ENDOSCOPY UPPER, COLONOSCOPY, COMBINED N/A 6/17/2016    Procedure: COMBINED ENDOSCOPY UPPER, COLONOSCOPY;  Surgeon: Andrea Stearns, DO;  Location: [facility] OR     left knee meniscal tear       Left lower extremity DVT       lens implant       nasal polypectomy       Pulmonary Embolism         No Known Allergies    Current Facility-Administered Medications   Medication     lidocaine 1 % 1 mL      "lidocaine (LMX4) kit     sodium chloride (PF) 0.9% PF flush 3 mL     sodium chloride (PF) 0.9% PF flush 3 mL     naloxone (NARCAN) injection 0.1-0.4 mg     lactated ringers infusion     HYDROmorphone (PF) (DILAUDID) injection 0.5-1 mg     bisacodyl (DULCOLAX) Suppository 10 mg     ondansetron (ZOFRAN-ODT) ODT tab 4 mg    Or     ondansetron (ZOFRAN) injection 4 mg     prochlorperazine (COMPAZINE) injection 5 mg    Or     prochlorperazine (COMPAZINE) tablet 5 mg    Or     prochlorperazine (COMPAZINE) Suppository 12.5 mg     pantoprazole (PROTONIX) 80 mg in NaCl 0.9 % 100 mL infusion     pantoprazole (PROTONIX) 40 MG injection     NaCl 0.9 % infusion     pantoprazole (PROTONIX) 40 MG injection     NaCl 0.9 % infusion       HABITS:    Social History   Substance Use Topics     Smoking status: Former Smoker     Types: Cigarettes     Quit date: 10/28/2009     Smokeless tobacco: Never Used      Comment: Quit 2009     Alcohol use No       Family History   Problem Relation Age of Onset     CANCER Mother      Ovarian     Respiratory Father      emphysemia     Lung Cancer Brother      DIABETES No family hx of      Hypertension No family hx of      Hyperlipidemia No family hx of      Thyroid Disease No family hx of      Asthma No family hx of      Colon Cancer No family hx of      Prostate Cancer No family hx of        REVIEW OF SYSTEMS:  Ten point review of systems negative except those mentioned in the HPI.     OBJECTIVE:    /64  Pulse 63  Temp 99.5  F (37.5  C) (Tympanic)  Resp 15  Ht 5' 5\" (1.651 m)  Wt 167 lb 5.3 oz (75.9 kg)  SpO2 96%  BMI 27.85 kg/m2    GENERAL: Generally appears well, in no distress with appropriate affect.  HEENT:   Sclerae anicteric - No cervical, supra/infraclavciular lymphadenopathy,   Respiratory:  No acute distress, no splinting   Cardiovascular:  Regular Rate and Rhythm  Abdomen: Soft non-tender non-distended   :  deferred  Extremities:  Extremities normal. No deformities, edema, or " skin discoloration.  Skin:  no suspicious lesions or rashes  Neurological: grossly intact  Psych:  Alert, oriented, affect appropriate with normal decision making ability.    IMPRESSION:    With hx of black stools and vomiting blood upper GI source is most likely. Unclear if he is still bleeding but remains hemodynamically stable.     PLAN:    EGD to look for active bleeding source.     Narinder Torres MD,     11/30/2017  1:39 PM

## 2017-11-30 NOTE — ED NOTES
Patient presents to the emergency room with vomiting blood, bloody stools and fainting.  Patient is awake/alert and interactive.  Skin is pale, dry and intact.  Patient vomited x 2 during initial assessment of dark red blood.  Initially BP low, IV placed, labs drawn and IV fluids started.  Cardiac monitor on, pulse ox on and oxygen placed.  EKG obtained.  Patient's clothing removed and gown on.  Call light within reach and warm blanket given.  With minimal activity patient becomes very weak/winded.

## 2017-11-30 NOTE — IP AVS SNAPSHOT
14 Intensive Care Unit    [address] [place] [id number]    Phone:  [phone]    Fax:  [phone]                                       After Visit Summary   11/30/2017    Rey Tristan    MRN: [id number]           After Visit Summary Signature Page     I have received my discharge instructions, and my questions have been answered. I have discussed any challenges I see with this plan with the nurse or doctor.    ..........................................................................................................................................  Patient/Patient Representative Signature      ..........................................................................................................................................  Patient Representative Print Name and Relationship to Patient    ..................................................               ................................................  Date                                            Time    ..........................................................................................................................................  Reviewed by Signature/Title    ...................................................              ..............................................  Date                                                            Time

## 2017-11-30 NOTE — ED NOTES
Pt and spouse related that he got up to go to the BR around 0200 and passed liquid, dark stool. When he came back to bed, he bent down to pick something up off the floor and fainted. Wife states he was out for about 4 min.

## 2017-11-30 NOTE — ED NOTES
Spoke with lab.  Verified blood and plasma are still being prepared for the patient.  15-20 minutes for blood to be ready and 30 min for plasma to be ready.  ICU initially requesting patient to be held in ED until blood products could be started.

## 2017-11-30 NOTE — PROVIDER NOTIFICATION
O2 sats 89-91% on RA. Pt denies SOB. Lungs remain clear to auscultation. Dr. Conde updated. Will place on oxygen NC.

## 2017-11-30 NOTE — PROVIDER NOTIFICATION
Critical result of  HBG value of 6.5  Reported to  Dr. Escoto  Time given to provider  4:19 AM  Lidia Phipps RN 4:19 AM 11/30/2017

## 2017-11-30 NOTE — PROVIDER NOTIFICATION
IV Synthroid ordered for patient. Patient states he was directed by his primary MD to stop taking synthroid at home. Dr. Codne notified. See discontinuation order.

## 2017-11-30 NOTE — PROVIDER NOTIFICATION
Patient received plasma unit 4 of 4 and tolerated well. Currently infusing RBC unit 2 of 2. Per Dr. Conde, recheck HGB and INR once blood unit complete. Also, per MD, patient may have ice chips.

## 2017-11-30 NOTE — OP NOTE
Rey Tristan MRN# [id number]   YOB: 1940 Age: 77 year old      Date of Admission:  11/30/2017    Primary care provider: Gianna Richard    PREOPERATIVE DIAGNOSIS:  Gastrointestinal bleed       POSTOPERATIVE DIAGNOSES:  Mild duodenitis, hiatal hernia, small peptic stricture.       PROCEDURE:  Esophagogastroduodenoscopy       HISTORY OF PRESENT ILLNESS:  Please see consult note from earlier.      OPERATIVE FINDINGS: Small hiatal hernia, with small peptic stricture easily passed scope through, mild irritation of the duodenum, no ulcers or polyps. No stigmata of bleeding in the stomach.    Specimen(s) submitted to pathology: none     PROCEDURE:  With the patient in the supine position on the transport cart, IV sedation was administered by the nurse anesthetist.  His correct identity and planned procedure were confirmed during a requisite timeout pause.  A bite block was placed and the fiberoptic endoscope was introduced and negotiated through the cricopharyngeus without difficulty.  The length of the esophagus was examined with small peptic stricture at the proximal end of a hiatal hernia.    The stomach was entered and the pylorus was traversed easily.  The gastric mucosa was normal; there was no evidence of gastric polyp, ulcer or bleeding source.  The duodenum was similarly without finding though some mild irritation noted. .  The endoscope was returned to the body of the stomach and retroflex confirmed the absence of abnormality in the cardia.   The procedure was satisfactorially tolerated; there was no appreciable blood loss and the patient was returned to Day Surgery in good condition.      Narinder Torres  11/30/2017  3:52 PM

## 2017-11-30 NOTE — PROGRESS NOTES
Assessment completed see flow sheet.    Rey is laying in bed, his wife is present during the assessment. His PCP is Dr Richard, his dentist is Dr Kevin Escalera and he goes to the [facility]. He sees Dr Pryor for Cardiology and a thyroid specialist in [place]. He does not have a POA or a healthcare directive, he already has paperwork. He uses [facility]. He is a . [facility] has been notified.     Rey lives at home with his wife Maritza, he sates he feels safe and that his home is well maintained. He performs his own self cares, manages his medication and appointment schedules. They shop and cook together, they both drive and have reliable transportation. He retired in 2009.    Rey sleeps well and has no mood concerns. He quit smoking in 2010 and does not consume alcohol. His ivonne is not important to him. He denies stressors or worries. His support system is his wife, daughter Maame and son-in-law. He enjoys making and repairing things and working on cars.     His plan is to discharge to home, [facility] No other needs identified. CM will remain available.       LOC: alert    Others present: Patient and Family    Dx: GI Bleed    Lives with: Lives With: spouse    Living at: Living Arrangements: house    Equipment used:  None    Support System: Description of Support System: Supportive, Involved    Primary PCP: Gianna Richard    POA/Guardian: No    Health Care Directive: NO    Pharmacy: [facility]    :  No    Homecare/County Services:   No    Adequate Resources for needs (housing, utilities, food/med): YES    Meds and appointments management: YES    Work: NO    Transportation: YES     ADLs: Independent    Falls: No    Able to Return to Prior Living Arrangements: YES    : YES    Goals: To Get Out    Barriers: None identified    Needs: Demonstrates Competency    SHONA: None    ED Visits: 1

## 2017-11-30 NOTE — PLAN OF CARE
Problem: Patient Care Overview  Goal: Plan of Care/Patient Progress Review  Outcome: No Change  BP 80's-100's/50's-60's.  SR 60's.  Pale skin.  Temp 96.8 F.  Received 1of 2 unit RBC, and 3 of 4 units plasma, tolerated well.  Received 80 mg Protonix bolus IV over 15 minutes.  A&O.  C/o dizziness after transfer to bed from ED cart.  No stools or vomiting.  Lungs are clear on auscultation.    Face to face report given with opportunity to observe patient.    Report given to HOPE Rodrigues.    Kathi Nieves   11/30/2017  7:30 AM

## 2017-11-30 NOTE — ED PROVIDER NOTES
History     Chief Complaint   Patient presents with     Hematemesis     started yesterday around 1800     Rectal Bleeding     This AM around 0200     Fatigue     Loss of Consciousness     this AM     Patient is a 77 year old male presenting with vomiting. The history is provided by the patient, the EMS personnel and a relative.   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 08/23/2016     Priority: Medium     PUD (peptic ulcer disease) 08/23/2016     Priority: Medium     Long-term (current) use of anticoagulants [Z79.01] 07/27/2016     Priority: Medium     Elevated serum alkaline phosphatase level 05/04/2016     Priority: Medium     Normocytic anemia 05/04/2016     Priority: Medium     Colonoscopy refused 04/28/2016     Priority: Medium     Patient refused colonoscopy given occult stool kit         ACP (advance care planning) 04/28/2016     Priority: Medium     Advance Care Planning 4/28/2016: ACP Review of Chart / Resources Provided:  Reviewed chart for advance care plan.  Rey Tristan has been provided information and resources to begin or update their advance care plan.  Added by Jayleen Fleming             Hyperlipidemia      Priority: Medium     Hx pulmonary embolism 05/03/2013     Priority: Medium     History of DVT of lower extremity 04/19/2013     Priority: Medium     Advanced care  planning/counseling discussion 11/27/2012     Priority: Medium     Colitis due to Clostridium difficile 05/19/2011     Priority: Medium     History of tobacco use 05/17/2011     Priority: Medium     Hyponatremia 05/17/2011     Priority: Medium     Deep vein thrombosis (DVT) of lower extremity ([facility]) 05/17/2011     Priority: Medium     Vitamin D deficiency 05/17/2011     Priority: Medium        Past Medical History:    Past Medical History:   Diagnosis Date     Autoimmune thyroiditis 09/2017     Chronic anticoagulation 1/1/2012     Elevated serum alkaline phosphatase level 5/4/2016     Gastric ulcer      Hyperlipidemia      Normocytic anemia 5/4/2016     Pulmonary nodule      Vitamin D deficiency 1/1/2012       Past Surgical History:    Past Surgical History:   Procedure Laterality Date     ANGIOGRAM  6/23/2014     C REGULAR ECHO ([place])  6/23/2014    Dr. Cavazos     C. Difficile with intussuseption       cataract extraction       colonoscopy       COLONOSCOPY  6/17     ENDOSCOPY UPPER, COLONOSCOPY, COMBINED N/A 6/17/2016    Procedure: COMBINED ENDOSCOPY UPPER, COLONOSCOPY;  Surgeon: Andrea Stearns DO;  Location: [facility]     left knee meniscal tear       Left lower extremity DVT       lens implant       nasal polypectomy       Pulmonary Embolism         Family History:    Family History   Problem Relation Age of Onset     CANCER Mother      Ovarian     Respiratory Father      emphysemia     Lung Cancer Brother      DIABETES No family hx of      Hypertension No family hx of      Hyperlipidemia No family hx of      Thyroid Disease No family hx of      Asthma No family hx of      Colon Cancer No family hx of      Prostate Cancer No family hx of        Social History:  Marital Status:   [2]  Social History   Substance Use Topics     Smoking status: Former Smoker     Types: Cigarettes     Quit date: 10/28/2009     Smokeless tobacco: Never Used      Comment: Quit 2009     Alcohol use No        Medications:      No  "current outpatient prescriptions on file.      Review of Systems   Constitutional: Negative for chills, diaphoresis and fever.   HENT: Negative for voice change.    Eyes: Negative for visual disturbance.   Respiratory: Negative for cough, chest tightness, shortness of breath and wheezing.    Cardiovascular: Negative for chest pain, palpitations and leg swelling.   Gastrointestinal: Positive for blood in stool, nausea and vomiting. Negative for abdominal distention, abdominal pain and anal bleeding.   Genitourinary: Negative for decreased urine volume, dysuria, flank pain and frequency.   Musculoskeletal: Negative for back pain, gait problem, myalgias and neck pain.   Skin: Negative for color change, pallor and rash.   Neurological: Positive for dizziness and syncope. Negative for weakness, light-headedness, numbness and headaches.   Psychiatric/Behavioral: Negative for confusion, sleep disturbance and suicidal ideas.       Physical Exam   BP: 94/67  Pulse: 67  Heart Rate: 95  Temp: 97.4  F (36.3  C)  Resp: 22  Height: 167.6 cm (5' 6\")  Weight: 71.7 kg (158 lb)  SpO2: 94 %  Sitting Orthostatic BP: 90/55  Sitting Orthostatic Pulse: 76 bpm  Standing Orthostatic BP: 84/51  Standing Orthostatic Pulse: 78 bpm      Physical Exam   Constitutional: He is oriented to person, place, and time. He appears well-developed and well-nourished.   HENT:   Head: Normocephalic and atraumatic.   Mouth/Throat: No oropharyngeal exudate.   Eyes: Conjunctivae are normal. Pupils are equal, round, and reactive to light.   Neck: Normal range of motion. Neck supple. No JVD present. No tracheal deviation present. No thyromegaly present.   Cardiovascular: Normal rate, regular rhythm, normal heart sounds and intact distal pulses.  Exam reveals no gallop and no friction rub.    No murmur heard.  Pulmonary/Chest: Effort normal and breath sounds normal. No stridor. No respiratory distress. He has no wheezes. He has no rales. He exhibits no tenderness. "   Abdominal: Soft. Bowel sounds are normal. He exhibits no distension and no mass. There is no tenderness. There is no rebound and no guarding.   Genitourinary: Rectal exam shows guaiac positive stool. Rectal exam shows no external hemorrhoid, no internal hemorrhoid, no fissure, no mass, no tenderness and anal tone normal.   Genitourinary Comments: Black tarry stool   Musculoskeletal: Normal range of motion. He exhibits no edema or tenderness.   Lymphadenopathy:     He has no cervical adenopathy.   Neurological: He is alert and oriented to person, place, and time.   Skin: Skin is warm and dry. No rash noted. No erythema. No pallor.   Psychiatric: His behavior is normal.   Nursing note and vitals reviewed.      ED Course     ED Course     Procedures             Labs Ordered and Resulted from Time of ED Arrival Up to the Time of Departure from the ED   CBC WITH PLATELETS DIFFERENTIAL - Abnormal; Notable for the following:        Result Value    WBC 11.2 (*)     RBC Count 2.54 (*)     Hemoglobin 6.5 (*)     Hematocrit 20.6 (*)     MCH 25.6 (*)     RDW 16.9 (*)     Absolute Neutrophil 8.4 (*)     All other components within normal limits   INR - Abnormal; Notable for the following:     INR 3.89 (*)     All other components within normal limits   COMPREHENSIVE METABOLIC PANEL - Abnormal; Notable for the following:     Glucose 155 (*)     Urea Nitrogen 46 (*)     Calcium 7.7 (*)     Albumin 2.5 (*)     Protein Total 6.1 (*)     All other components within normal limits   IMMUNOS OCCULT BLOOD - Abnormal; Notable for the following:     Occult Blood Slide 1 Positive (*)     All other components within normal limits   LACTIC ACID - Abnormal; Notable for the following:     Lactic Acid 3.3 (*)     All other components within normal limits   LIPASE   PERIPHERAL IV CATHETER   CARDIAC CONTINUOUS MONITORING   PULSE OXIMETRY NURSING   CARDIAC CONTINUOUS MONITORING   ORTHOSTATIC BLOOD PRESSURE AND PULSE   ABO/RH TYPE AND SCREEN   RED  BLOOD CELL PREPARE ORDER UNIT   PLASMA PREPARE ORDER UNIT   BLOOD COMPONENT   BLOOD COMPONENT   ABO/RH TYPE AND SCREEN   BLOOD CULTURE   BLOOD CULTURE       Assessments & Plan (with Medical Decision Making)   Multiple episode of bloody vomiting accompanied with black and bloody stool  Feeling dizzy and had a brief episode of near syncope, denies head trauma  On warfarin for DVT  PPI, cardiac radha, IVF, labs  Type and screen and blood trasfusion starte, Hb 6.5  FFP + vitamin K  Lactate 3.3, blood cultures was sent, Iv ceftriaxone given due to high risk of sepsis complicating of GI bleeding  Spoke to Dr Enamorado, accepted for ICU admission    I have reviewed the nursing notes.    I have reviewed the findings, diagnosis, plan and need for follow up with the patient.      Current Discharge Medication List          Final diagnoses:   GI bleed       11/30/2017   14 INTENSIVE CARE UNIT     Paco Escoto MD  11/30/17 0599

## 2017-11-30 NOTE — PLAN OF CARE
Problem: Patient Care Overview  Goal: Plan of Care/Patient Progress Review  Outcome: Improving  Patient remains A&O. VSS. Afebrile. Bear Hugger on for comfort this AM. HR 70s-80s, sinus rhythm. BP stable 100s/60s. Pt denies any dizziness with standing today. Stand by assist out of bed. Required 1L of O2 this afternoon for O2 sats in 80s, but able to wean back to RA this evening. Lungs remain clear to auscultation bilaterally. Frequent cough, productive of clear sputum. Occasionally pt clears small burgandy/brown flecks out of his throat per pt report. No signs of active bleeding. No emesis or stools this shift. Upper scope performed this afternoon. Pt tolerated well. Has now received total of 4 units of plasma and 4 units of RBC. Voiding adequately. Trace edema to bilateral ankles pt states is normal for him. HGB remained 6.5 this AM. Recheck ordered for this evening. INR 1.72. Pt advanced to clear liquid diet. Tolerating well. Wife at bedside.    Face to face report given with opportunity to observe patient.    Report given to Ally J., RN Annelyse J. Stromberg   11/30/2017  3:15 PM

## 2017-11-30 NOTE — IP AVS SNAPSHOT
MRN:[id number]                      After Visit Summary   11/30/2017    Rey Tristan    MRN: [id number]           Thank you!     Thank you for choosing [facility] for your care. Our goal is always to provide you with excellent care. Hearing back from our patients is one way we can continue to improve our services. Please take a few minutes to complete the written survey that you may receive in the mail after you visit with us. Thank you!        Patient Information     Date Of Birth          1940        Designated Caregiver       Most Recent Value    Caregiver    Will someone help with your care after discharge? yes    Name of designated caregiver Maritza Tristan (spouse)    Phone number of caregiver see facesheet    Caregiver address see facesheet      About your hospital stay     You were admitted on:  November 30, 2017 You last received care in the:  14 Intensive Care Unit    You were discharged on:  December 1, 2017        Reason for your hospital stay       Upper GI bleed                  Who to Call     For medical emergencies, please call 911.  For non-urgent questions about your medical care, please call your primary care provider or clinic, [phone]  For questions related to your surgery, please call your surgery clinic        Attending Provider     Provider Specialty    Paco Escoto MD Emergency Medicine    Tyler Downing MD Internal Medicine    Ortega Conde MD Internal Medicine    Cayla Lambert, NP Nurse Practitioner       Primary Care Provider Office Phone # Fax #    Gianna Richard [phone]       When to contact your care team       Notify or present to ED if you develop nausea/vomiting, bloody stools, dizziness, shortness of breath, chest pain.                  After Care Instructions     Activity       Your activity upon discharge: activity as tolerated,avoid overly strenuous activities at least until recheck with Dr. Richard.             Diet       Follow this diet upon discharge: Orders Placed This Encounter      Advance Diet as Tolerated: Soft, bland            Discharge Instructions       Stop both Aspirin and the coumadin until seen in follow-up with Dr. Richard at which time you can discuss whether to restart them or not and when.                  Follow-up Appointments     Follow Up and recommended labs and tests       Follow up with primary care provider, Gianna Richard, within 7 days for hospital follow- up.  The following labs/tests are recommended: CBC, also states he is due for thyroid studies 12/13 and would like to get them done at same time.                  Your next 10 appointments already scheduled     Dec 08, 2017 11:30 AM CST   (Arrive by 11:15 AM)   SHORT with Gianna Richard MD   [facility] ([facility] )    [address]  [place] [address]   [phone]           Please have the patient arrive 15 minutes early.            Dec 18, 2017 11:15 AM CST   Anticoagulation Visit with  ANTI COAGULATION   [facility] ([facility]    [address]  [place] [address]   [phone]              Future tests that were ordered for you     **CBC with platelets FUTURE 14d       Last Lab Result: Hemoglobin (g/dL)       Date                     Value                 12/01/2017               8.1 (L)          ----------            **TSH with free T4 reflex FUTURE anytime       Last Lab Result: TSH (mU/L)       Date                     Value                 11/13/2017               8.52 (H)         ----------                  Pending Results     Date and Time Order Name Status Description    11/30/2017 0442 Plasma prepare order unit In process     11/30/2017 0428 Blood culture Preliminary     11/30/2017 0428 Blood culture Preliminary             Statement of Approval     Ordered          12/01/17 1059  I have reviewed and agree with all the recommendations  "and orders detailed in this document.  EFFECTIVE NOW     Approved and electronically signed by:  Cayla Lambert NP             Admission Information     Date & Time Department Dept. Phone    2017 14 Intensive Care Unit [phone]      Your Vitals Were     Blood Pressure Pulse Temperature Respirations Height Weight    96/62 63 98.2  F (36.8  C) (Tympanic) 12 1.651 m (5' 5\") 75.8 kg (167 lb 1.7 oz)    Pulse Oximetry BMI (Body Mass Index)                92% 27.81 kg/m2          [employer] Information     [employer] lets you send messages to your doctor, view your test results, renew your prescriptions, schedule appointments and more. To sign up, go to [email or web address] . Click on \"Log in\" on the left side of the screen, which will take you to the Welcome page. Then click on \"Sign up Now\" on the right side of the page.     You will be asked to enter the access code listed below, as well as some personal information. Please follow the directions to create your username and password.     Your access code is: [email or web address]  Expires: 2017  4:46 PM     Your access code will  in 90 days. If you need help or a new code, please call your [facility] clinic or [phone].        Care EveryWhere ID     This is your Care EveryWhere ID. This could be used by other organizations to access your [facility] medical records  [id number]        Equal Access to Services     [facility] MARNI AH: Hadii yajaira colliero Socharmaine, waaxda luqadaha, qaybta kaalmada adeegyada, pb raymundo. So [facility] [phone].    ATENCIÓN: Si habla español, tiene a dodson disposición servicios gratuitos de asistencia lingüística. Llame al [phone].    We comply with applicable federal civil rights laws and [place] laws. We do not discriminate on the basis of race, color, national origin, age, disability, sex, sexual orientation, or gender identity.               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        Dose:  40 mg   Take 1 capsule (40 mg) by mouth daily   Quantity:  30 capsule   Refills:  1         CONTINUE these medicines which have NOT CHANGED        Dose / Directions    atorvastatin 10 MG tablet   Commonly known as:  LIPITOR   Used for:  Hyperlipidemia, unspecified hyperlipidemia type        Dose:  10 mg   Take 1 tablet (10 mg) by mouth daily   Quantity:  90 tablet   Refills:  3       levothyroxine 50 MCG tablet   Commonly known as:  SYNTHROID/LEVOTHROID   Used for:  Autoimmune thyroiditis        Dose:  50 mcg   Take 1 tablet (50 mcg) by mouth daily   Quantity:  30 tablet   Refills:  1       Lycopene 10 MG Caps        Refills:  0       MULTIVITAMIN ADULT PO        Dose:  1 tablet   Take 1 tablet by mouth   Refills:  0       vitamin D 400 UNITS tablet        Dose:  1000 Units   Take 1,000 Units by mouth daily   Refills:  0         STOP taking     aspirin 81 MG tablet           warfarin 4 MG tablet   Commonly known as:  COUMADIN                Where to get your medicines      These medications were sent to [facility] [address] [place] [address], [place] - [address] [place] [facility] [address], [place] [address]     Phone:  [phone]     omeprazole 40 MG capsule                Protect others around you: Learn how to safely use, store and throw away your medicines at www.disposemymeds.org.             Medication List: This is a list of all your medications and when to take them. Check marks below indicate your daily home schedule. Keep this list as a reference.      Medications           Morning Afternoon Evening Bedtime As Needed    atorvastatin 10 MG tablet   Commonly known as:  LIPITOR   Take 1 tablet (10 mg) by mouth daily                                levothyroxine 50 MCG tablet   Commonly known as:  SYNTHROID/LEVOTHROID   Take 1 tablet (50 mcg) by mouth daily                                 Lycopene 10 MG Caps                                MULTIVITAMIN ADULT PO   Take 1 tablet by mouth                                omeprazole 40 MG capsule   Commonly known as:  priLOSEC   Take 1 capsule (40 mg) by mouth daily                                vitamin D 400 UNITS tablet   Take 1,000 Units by mouth daily                                          More Information        When You Have Gastrointestinal (GI) Bleeding    Blood in your vomit or stool can be a sign of gastrointestinal (GI) bleeding. GI bleeding can be scary. But the cause may not be serious. You should always see a doctor if GI bleeding occurs.  The GI tract  The GI tract is the path through which food travels in the body. Food passes from the mouth down the esophagus (the tube from the mouth to the stomach). Food begins to break down in the stomach. It then moves through the duodenum, the first part of the small intestine. Nutrients are absorbed as food travels through the small intestine. What is left passes into the colon (large intestine) as waste. The colon removes water from the waste. Waste continues from the colon to the rectum (where stool is stored). Waste then leaves the body through the anus.  Causes of GI bleeding  GI bleeding can be caused by many different problems. Some of the more common causes include:    Swollen veins in the anus (hemorrhoids)    Swollen veins in the esophagus (varices)    Sore on the lining of the GI tract (ulcer)    Cuts or scrapes in the mouth or throat    Infection caused by germs such as bacteria or parasites    Food allergies, such as milk allergy in young children    Medicines    Inflammation of the GI tract (gastritis or esophagitis)    Colitis (Crohn's disease or ulcerative colitis)    Cancer (tumors or polyps)    Abnormal pouches in the colon (diverticula)    Tears in the esophagus or anus    Nosebleed    Abnormal blood vessels in the GI tract (angiodysplasia)  Diagnosing  the cause of blood in stool  If blood is coming out in your stool, you may have a lower GI tract problem or a very fast upper GI tract bleed. Bleeding from the GI tract can be bright red. Or it may look dark and tarry. Tests may also find blood in your stool that can t be seen with the eye (occult blood). To find out the cause, tests that may be ordered include:    Blood tests. A blood sample is taken and sent to a lab for exam.    Hemoccult test. Checks a stool sample for blood.    Stool culture. Checks a stool sample for bacteria or parasites.    X-ray, ultrasound, or CT scan. Imaging tests that take pictures of the digestive tract.    Colonoscopy or sigmoidoscopy. This test uses a flexible tube with a tiny camera. The tube is inserted through your anus into your rectum to see the inside of your colon. Your provider can also take a tiny tissue sample (biopsy) and treat a bleeding source  Diagnosing the cause of blood in vomit  If you are vomiting blood or something that looks like coffee grounds, you may have an upper GI tract problem. To find the cause, tests that may be done include:    Upper Endoscopy. A flexible tube with a tiny camera is inserted through your mouth and throat to see inside your upper GI tract. This lets your provider take a tiny tissue sample (biopsy) and treat a bleeding source.    Nasogastric lavage. This can tell if you have upper GI or lower GI bleeding.    X-ray, ultrasound, or CT scan. Imaging tests that take pictures of your digestive tract.    Upper GI series. X-rays of the upper part of your GI tract taken from inside your body.    Enteroscopy. This sends a flexible tube or a small, swallowed capsule camera into your small intestine.  When to call your healthcare provider  Call your healthcare provider right away if you have any of the following:    Bleeding from your mouth or anus that can't be stopped    Fever of 100.4 F (38.0 ) or higher    Bleeding along with feeling lightheaded  or dizzy    Signs of fluid loss (dehydration). These include a dry, sticky mouth, decreased urine output; and very dark urine.    Belly (abdominal) pain   Date Last Reviewed: 7/1/2016 2000-2017 [employer]. [address] [place] [address]. All rights reserved. This information is not intended as a substitute for professional medical care. Always follow your healthcare professional's instructions.                Pantoprazole injection  Brand Name: Protonix  What is this medicine?  PANTOPRAZOLE (pan TOE pra zole) prevents the production of acid in the stomach. It is used to treat gastroesophageal reflux disease (GERD), inflammation of the esophagus, and Zollinger-Shane syndrome.  How should I use this medicine?  This medicine is for infusion into a vein. It is given by a health care professional in a hospital or clinic setting.  Talk to your pediatrician regarding the use of this medicine in children. Special care may be needed.  What side effects may I notice from receiving this medicine?  Side effects that you should report to your doctor or health care professional as soon as possible:    allergic reactions like skin rash, itching or hives, swelling of the face, lips, or tongue    bone, muscle or joint pain    breathing problems    chest pain or chest tightness    dark yellow or brown urine    dizziness    fast, irregular heartbeat    feeling faint or lightheaded    fever or sore throat    muscle spasm    palpitations    rash on cheeks or arms that gets worse in the sun    redness, blistering, peeling or loosening of the skin, including inside the mouth    seizures    tremors    unusual bleeding or bruising    unusually weak or tired    yellowing of the eyes or skin  Side effects that usually do not require medical attention (report to your doctor or health care professional if they continue or are bothersome):    constipation    diarrhea    dry mouth    headache    nausea  What may  interact with this medicine?  Do not take this medicine with any of the following medications:    atazanavir    nelfinavir  This medicine may also interact with the following medications:    ampicillin    delavirdine    erlotinib    iron salts    medicines for fungal infections like ketoconazole, itraconazole and voriconazole    methotrexate    mycophenolate mofetil    warfarin  What if I miss a dose?  This does not apply.  Where should I keep my medicine?  This does not apply. You will not be given this medicine to store at home.  What should I tell my health care provider before I take this medicine?  They need to know if you have any of these conditions:    liver disease    low levels of magnesium in the blood    lupus    an unusual or allergic reaction to omeprazole, lansoprazole, pantoprazole, rabeprazole, other medicines, foods, dyes, or preservatives    pregnant or trying to get pregnant    breast-feeding  What should I watch for while using this medicine?  It can take several days before your stomach pain gets better. Check with your doctor or health care professional if your condition does not start to get better, or if it gets worse.  You may need blood work done while you are taking this medicine.  NOTE:This sheet is a summary. It may not cover all possible information. If you have questions about this medicine, talk to your doctor, pharmacist, or health care provider. Copyright  2017 [employer]              Blood Transfusion  Questions and Answers  What is a transfusion?  During your treatment, we may need to give you blood. This is called a transfusion. Blood is given through a needle in the vein. It takes 1 to 4 hours. It may include:    Red blood cells. These help replace blood you may have lost through bleeding or illness. They will increase your blood s ability to carry oxygen.    Platelets. These help blood to clot. They are used for low platelet counts and some bleeding disorders.    Plasma and  cryoprecipitate. These help the blood to clot. They are used to treat some bleeding disorders.    Granulocytes (a type of white blood cell). These help your body fight infection.  If you need a transfusion, your doctor will prescribe one. We will ask you to sign a consent form before your first transfusion. Do not sign this form until all your questions have been answered.  Before your transfusion, we will double check your identity for your safety.  What is the risk of getting a disease from a transfusion?  The chances are low. Donors are carefully screened before giving blood. Also, before any donated blood is used, it must be tested for infectious diseases.   Here are example of your risk for infection:    Hepatitis B: 1 in 200,000    Hepatitis C: 1 in 1.8 million    HIV: 1 in 2.3 million    HTLV-I: 1 in 3 million    Bacterial infection:    1 in 500,000 if receiving red blood cells    1 in 75,000 if receiving platelets    Other infections (such as West Nile virus): less than 1 in 7 million    Other diseases (such as Chagas s disease): very rare    Babesiosis: rare, but risk is higher in summer  What are some of the side effects?  While most transfusions have no side effects, you could have some of the symptoms listed below. If you think you may be having a reaction, tell your doctor or nurse right away.  Common reactions include:    Fever or chills    Skin rash, hives, itching or flushing    Wheezing or trouble breathing    Chest or back pain    Facial swelling    New or ongoing cough    Bruising    Red or brown urine, or less urine output    Jaundice (yellow eyes and skin)    Irritation at the infusion site.  Some symptoms may occur up to four weeks after a transfusion. If you have any kind of symptom, call your doctor.  What are the risks of not having a transfusion?    Anemia (low red blood cells). This might cause a fast heartbeat and make you feel weak, tired, and breathless. If severe, it might lead to  organ failure and death.    Bleeding. If your blood doesn t clot well, blood loss might lead to anemia, brain damage or death.    Weaker immune system. Your body might be less able to fight infection or heal wounds.  Are there other options besides transfusion?  Medicine (such as erythropoietin or iron pills) can cause the body to make more red blood cells. It may take months to replace all of the red blood cells you have lost.  In some cases, you can donate your own blood before surgery. The blood may be given back to you during your surgery. This is called autologous donation.   For informational purposes only. Not to replace the advice of your health care provider.   Copyright   2005 [facility]. All rights reserved. [employer] 271934 - REV 04/16.

## 2017-12-01 VITALS
OXYGEN SATURATION: 92 % | HEIGHT: 65 IN | WEIGHT: 167.11 LBS | DIASTOLIC BLOOD PRESSURE: 62 MMHG | RESPIRATION RATE: 12 BRPM | SYSTOLIC BLOOD PRESSURE: 96 MMHG | TEMPERATURE: 98.2 F | HEART RATE: 63 BPM | BODY MASS INDEX: 27.84 KG/M2

## 2017-12-01 LAB
ALBUMIN SERPL-MCNC: 2.6 G/DL (ref 3.4–5)
ALP SERPL-CCNC: 95 U/L (ref 40–150)
ALT SERPL W P-5'-P-CCNC: 24 U/L (ref 0–70)
ANION GAP SERPL CALCULATED.3IONS-SCNC: 6 MMOL/L (ref 3–14)
AST SERPL W P-5'-P-CCNC: 25 U/L (ref 0–45)
BACTERIA SPEC CULT: NORMAL
BASOPHILS # BLD AUTO: 0 10E9/L (ref 0–0.2)
BASOPHILS NFR BLD AUTO: 0.6 %
BILIRUB SERPL-MCNC: 0.6 MG/DL (ref 0.2–1.3)
BUN SERPL-MCNC: 15 MG/DL (ref 7–30)
CALCIUM SERPL-MCNC: 7.5 MG/DL (ref 8.5–10.1)
CHLORIDE SERPL-SCNC: 111 MMOL/L (ref 94–109)
CO2 SERPL-SCNC: 25 MMOL/L (ref 20–32)
CREAT SERPL-MCNC: 0.7 MG/DL (ref 0.66–1.25)
DIFFERENTIAL METHOD BLD: ABNORMAL
EOSINOPHIL # BLD AUTO: 0.2 10E9/L (ref 0–0.7)
EOSINOPHIL NFR BLD AUTO: 3.2 %
ERYTHROCYTE [DISTWIDTH] IN BLOOD BY AUTOMATED COUNT: 16.6 % (ref 10–15)
GFR SERPL CREATININE-BSD FRML MDRD: >90 ML/MIN/1.7M2
GLUCOSE SERPL-MCNC: 91 MG/DL (ref 70–99)
HCT VFR BLD AUTO: 24.3 % (ref 40–53)
HGB BLD-MCNC: 8.1 G/DL (ref 13.3–17.7)
IMM GRANULOCYTES # BLD: 0 10E9/L (ref 0–0.4)
IMM GRANULOCYTES NFR BLD: 0.3 %
INR PPP: 1.32 (ref 0.8–1.2)
LYMPHOCYTES # BLD AUTO: 1.2 10E9/L (ref 0.8–5.3)
LYMPHOCYTES NFR BLD AUTO: 18 %
MCH RBC QN AUTO: 27.6 PG (ref 26.5–33)
MCHC RBC AUTO-ENTMCNC: 33.3 G/DL (ref 31.5–36.5)
MCV RBC AUTO: 83 FL (ref 78–100)
MONOCYTES # BLD AUTO: 0.7 10E9/L (ref 0–1.3)
MONOCYTES NFR BLD AUTO: 9.7 %
NEUTROPHILS # BLD AUTO: 4.7 10E9/L (ref 1.6–8.3)
NEUTROPHILS NFR BLD AUTO: 68.2 %
NRBC # BLD AUTO: 0 10*3/UL
NRBC BLD AUTO-RTO: 0 /100
PLATELET # BLD AUTO: 109 10E9/L (ref 150–450)
POTASSIUM SERPL-SCNC: 3.9 MMOL/L (ref 3.4–5.3)
PROT SERPL-MCNC: 6 G/DL (ref 6.8–8.8)
RBC # BLD AUTO: 2.94 10E12/L (ref 4.4–5.9)
SODIUM SERPL-SCNC: 142 MMOL/L (ref 133–144)
SPECIMEN SOURCE: NORMAL
WBC # BLD AUTO: 6.9 10E9/L (ref 4–11)

## 2017-12-01 PROCEDURE — 99239 HOSP IP/OBS DSCHRG MGMT >30: CPT | Performed by: NURSE PRACTITIONER

## 2017-12-01 PROCEDURE — 36415 COLL VENOUS BLD VENIPUNCTURE: CPT | Performed by: INTERNAL MEDICINE

## 2017-12-01 PROCEDURE — 80053 COMPREHEN METABOLIC PANEL: CPT | Performed by: INTERNAL MEDICINE

## 2017-12-01 PROCEDURE — 85610 PROTHROMBIN TIME: CPT | Performed by: INTERNAL MEDICINE

## 2017-12-01 PROCEDURE — 85025 COMPLETE CBC W/AUTO DIFF WBC: CPT | Performed by: INTERNAL MEDICINE

## 2017-12-01 RX ORDER — OMEPRAZOLE 40 MG/1
40 CAPSULE, DELAYED RELEASE ORAL DAILY
Qty: 30 CAPSULE | Refills: 1 | Status: SHIPPED | OUTPATIENT
Start: 2017-12-01 | End: 2018-01-26

## 2017-12-01 NOTE — PLAN OF CARE
Problem: Gastrointestinal Bleeding (Adult)  Goal: Signs and Symptoms of Listed Potential Problems Will be Absent, Minimized or Managed (Gastrointestinal Bleeding)  Signs and symptoms of listed potential problems will be absent, minimized or managed by discharge/transition of care (reference Gastrointestinal Bleeding (Adult) CPG).   Outcome: Improving   12/01/17 0545   Gastrointestinal Bleeding   Problems Assessed (GI Bleeding) all   Problems Present (GI Bleeding) none     Pt hgb down to 8.1 from 9.1 after blood. Denies pain. No signs of bleeding. Vitals stable. Did throw on 2L NC after sats persistently 88% when asleep. Using urinal. Up for standing weight this AM without dizziness. Refused SCDs so encouraged to walk today. Tolerating sips of clear. States don't want to over do it.     Face to face report given with opportunity to observe patient.    Report given to Bryleigh RN JILL K. FAUST   12/1/2017  7:16 AM

## 2017-12-01 NOTE — ANESTHESIA POSTPROCEDURE EVALUATION
Patient: Rey Tristan    Procedure(s):  UPPER ENDOSCOPY - Wound Class: II-Clean Contaminated    Diagnosis:GI BLEED  Diagnosis Additional Information: No value filed.    Anesthesia Type:  MAC    Note:  Anesthesia Post Evaluation    Patient location during evaluation: ICU  Patient participation: Able to fully participate in evaluation  Level of consciousness: awake and alert  Pain management: adequate  Airway patency: patent  Cardiovascular status: acceptable  Respiratory status: acceptable  Hydration status: acceptable  PONV: none     Anesthetic complications: None          Last vitals:  Vitals:    12/01/17 0624 12/01/17 0700 12/01/17 0800   BP:  114/63 96/62   Pulse:      Resp:  14 12   Temp:   98.2  F (36.8  C)   SpO2: 99% 93% 92%         Electronically Signed By: VALERIE Esteban CRNA  December 1, 2017  11:11 AM

## 2017-12-01 NOTE — PLAN OF CARE
Took over care of patient at 1530. Last unit of blood transfused, Hgb redraw at 1800 was 9.5. Protonix drip and maintenance fluid continue to infuse. Advanced to clear liquids after upper scope was completed, tolerating well. Voiding adequately. Makes needs known.     Face to face report given with opportunity to observe patient.    Report given to HOPE Antonio   11/30/2017  7:18 PM

## 2017-12-01 NOTE — DISCHARGE SUMMARY
[facility]    Discharge Summary  Hospitalist    Date of Admission:  11/30/2017  Date of Discharge:  12/1/2017 12:02 PM  Discharging Provider: Cayla Lambert CNP  Date of Service (when I saw the patient): 12/01/17    Discharge Diagnoses   Active Problems:    Acute upper gastrointestinal bleeding    Hyperlipidemia    Hx of PE    Chronic anticoagulation     PUD     Holt's esophagus     Autoimmune thyroiditis       History of Present Illness   Mr. Rey Tristan is a 77-year-old patient who reports that on the night prior to presentation after dinner he felt uneasy and fullness in the stomach and then vomited.  He notices blood in the first vomit.  Subsequently he had repeat bouts of vomiting blood and then later had dark stool.  The patient came to the emergency room in the early a.m. hours of the following morning.  He was evaluated.  Evaluation was consistent with an upper GI bleed and patient was admitted for further management.  The patient is on chronic warfarin for a DVT and aspirin for preventative measures.     Hospital Course   Rey Tristan was admitted on 11/30/2017.  The following problems were addressed during his hospitalization:    Active Problems:    Acute upper gastrointestinal bleeding: He did not have any further vomiting after arrival to the floor. Hemoglobin 6.5 on arrival, received  4 units of PRBC's bringing hemoglobin up to 9.1 post transfusion, 8.2 last evening and 8.1 this morning. He also received 2 units of FFP due to INR of 3.89. INR this AM<2. No signs of acute bleeding or obvious source of bleeding found on EGD-please see op note for specifics. He does have some current duodenitis as well as history of PUD and Holt's esophagus. He is instructed to stop ASA and Coumadin at least until follow up with his PCP at which time they can decide risk vs benefit of continuing, particularly on the coumadin. Prescription written for Prilosec for the next 60 days. Instructed  to return for any return of symptoms. He as able to eat breakfast, ambulate about the hallways and feels good. He is discharged home and will follow-up with PCP.      Chronic anticoagulation: As above       Autoimmune thyroiditis: he is currently being followed by endocrinology, he states he was instructed not to take synthroid and recheck Dec 13th.        Cayla Lambert, CNP      Code Status   Full Code       Primary Care Physician   Gianna Mckeon        Discharge Disposition   Discharged to home  Condition at discharge: Stable    Consultations This Hospital Stay   None    Time Spent on this Encounter   I, Cayla Lambert, personally saw the patient today and spent greater than 30 minutes discharging this patient.    Discharge Orders     Reason for your hospital stay   Upper GI bleed     Follow Up and recommended labs and tests   Follow up with primary care provider, Gianna Mckeon, within 7 days for hospital follow- up.  The following labs/tests are recommended: CBC, also states he is due for thyroid studies 12/13 and would like to get them done at same time.     When to contact your care team   Notify or present to ED if you develop nausea/vomiting, bloody stools, dizziness, shortness of breath, chest pain.     Full Code     Diet   Follow this diet upon discharge: Orders Placed This Encounter     Advance Diet as Tolerated: Soft, bland       Discharge Medications   Current Discharge Medication List      START taking these medications    Details   omeprazole (PRILOSEC) 40 MG capsule Take 1 capsule (40 mg) by mouth daily  Qty: 30 capsule, Refills: 1    Associated Diagnoses: Acute upper gastrointestinal bleeding; 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         CONTINUE these medications which have NOT CHANGED    Details   atorvastatin (LIPITOR) 10 MG tablet Take 1 tablet (10 mg) by mouth daily  Qty: 90 tablet, Refills: 3    Associated Diagnoses: Hyperlipidemia, unspecified hyperlipidemia type       Lycopene 10 MG CAPS       Multiple Vitamins-Minerals (MULTIVITAMIN ADULT PO) Take 1 tablet by mouth      Cholecalciferol (VITAMIN D) 400 UNITS tablet Take 1,000 Units by mouth daily       levothyroxine (SYNTHROID/LEVOTHROID) 50 MCG tablet Take 1 tablet (50 mcg) by mouth daily  Qty: 30 tablet, Refills: 1    Associated Diagnoses: Autoimmune thyroiditis         STOP taking these medications       warfarin (COUMADIN) 4 MG tablet Comments:   Reason for Stopping:         aspirin 81 MG tablet Comments:   Reason for Stopping:             Allergies   No Known Allergies  Data   Most Recent 3 CBC's:  Recent Labs   Lab Test  12/01/17 0515 11/30/17   2302  11/30/17   1807   11/30/17   0400  09/03/17   1341   WBC  6.9   --    --    --   11.2*  5.9   HGB  8.1*  8.2*  9.1*   < >  6.5*  10.9*   MCV  83   --    --    --   81  81   PLT  109*   --    --    --   166  175    < > = values in this interval not displayed.      Most Recent 3 BMP's:  Recent Labs   Lab Test  12/01/17 0515 11/30/17   0400  09/13/17   0900  09/03/17   1341   NA  142  138   --   140   POTASSIUM  3.9  4.2   --   4.3   CHLORIDE  111*  106   --   107   CO2  25  23   --   27   BUN  15  46*   --   14   CR  0.70  0.70   --   0.79   ANIONGAP  6  9   --   6   RANDI  7.5*  7.7*   --   8.4*   GLC  91  155*  98  78     Most Recent 2 LFT's:  Recent Labs   Lab Test  12/01/17 0515 11/30/17   0400   AST  25  28   ALT  24  27   ALKPHOS  95  104   BILITOTAL  0.6  0.2     Most Recent INR's and Anticoagulation Dosing History:  Anticoagulation Dose History     Recent Dosing and Labs Latest Ref Rng & Units 9/19/2017 10/10/2017 10/20/2017 11/13/2017 11/30/2017 11/30/2017 12/1/2017    INR 0.80 - 1.20 - - - - 3.89(H) 1.72(H) 1.32(H)    INR 0.86 - 1.14 3.3(A) 3.4(A) 2.4(A) 3.3(A) - - -    INR Point of Care 0.86 - 1.14 - - - - - - -        Most Recent 3 Troponin's:  Recent Labs   Lab Test  09/03/17   1341   TROPI  <0.015     Most Recent Cholesterol Panel:  Recent Labs   Lab Test   09/13/17   0900   CHOL  113   LDL  56   HDL  44   TRIG  67     Most Recent 6 Bacteria Isolates From Any Culture (See EPIC Reports for Culture Details):  Recent Labs   Lab Test  11/30/17   0635  11/30/17   0455  11/30/17   0450   CULT  No MRSA isolated  No growth after 1 day  No growth after 1 day     Most Recent TSH, T4 and A1c Labs:  Recent Labs   Lab Test  11/13/17   1015   TSH  8.52*   T4  0.73*     Results for orders placed or performed during the hospital encounter of 11/30/17   XR Chest Port 1 View    Narrative    PROCEDURE: XR CHEST PORT 1 VW 11/30/2017 4:40 AM    HISTORY: fall;     COMPARISONS: 5/22/2014.    TECHNIQUE: Single view.    FINDINGS: Heart is stable in size. There is mild elongation of the  thoracic aorta. No acute infiltrate or effusion is seen.         Impression    IMPRESSION: No acute disease.    LIMA KENT MD

## 2017-12-01 NOTE — PROGRESS NOTES
[facility] Progress Note    Hbg appears stable this am, no bleeding source identified on EGD. Agree with advancing diet and holding on placing the patient back on coumadin. If continues to have issues would try localizing study like tagged red cell scan with appropriate intervention to follow. Surgery will sign off.       Narinder Torres

## 2017-12-01 NOTE — H&P
CHIEF COMPLAINT:  Vomiting blood and dark stool.      HISTORY OF PRESENT ILLNESS:  Mr. Rey Tristan is a 77-year-old patient who reports that on the night prior to presentation after dinner he felt uneasy and fullness in the stomach and then vomited.  He notices blood in the first vomit.  Subsequently he had repeat bouts of vomiting blood and then later had dark stool.  The patient came to the emergency room in the early a.m. hours of the following morning.  He was evaluated.  Evaluation was consistent with an upper GI bleed and patient was admitted for further management.  The patient is on chronic warfarin for a DVT and aspirin for preventative measures.  The patient reports not taking any NSAIDs or other additional medications.  The patient reports he has never had an ulcer or GI bleed before.      PAST MEDICAL HISTORY:   1.  Hypothyroidism.   2.  Hypercholesterolemia.   3.  Chronic warfarin anticoagulation for DVT.   4.  Vitamin D deficiency.   5.  History of DVT and PE.   6.  Previous colonoscopies reported as normal.      MEDICATIONS ON PRESENTATION:   1.  Aspirin 81 mg p.o. daily.   2.  Warfarin.   3.  Levothyroxine 50 mcg daily.   4.  Vitamin D.   5.  Lipitor.   6.  Lycopene caps.   7.  MVI.      ALLERGIES:  No known drug allergies.      SOCIAL HISTORY:  The patient is living with his wife.  The patient served in the  as a .  The patient quit smoking years ago, he started at age 17.  No significant alcohol use.      FAMILY HISTORY:  No relevant family history.      REVIEW OF SYSTEMS:  Other than discussed in the HPI above, noncontributory on comprehensive review.      PHYSICAL EXAMINATION:   VITAL SIGNS:  Blood pressure was as low as 85/42, pulse of 70-92, oxygenation is normal on room air.   GENERAL:  The patient is resting in bed in no acute distress.   PSYCHIATRIC:  Pleasant affect.   NEUROLOGIC:  Alert and oriented.   HEENT:  Face is symmetrical.   PRECORDIUM:  Normal S1, normal S2.   Regular rhythm.   LUNGS:  Clear with no added lung sounds.   ABDOMEN:  Soft, nontender, no peritoneal signs.  Bowel sounds present.   EXTREMITIES:  No muscle wasting.   SKIN:  Warm and nondiaphoretic.      LABORATORY DATA:  Hemoglobin is 6.5.  MCV is normal.  MCHC is normal.  INR is 3.89.  Occult blood is positive.      IMPRESSION:   1.  Upper gastrointestinal bleed:  NPO.  Proton pump inhibitor was started, consider endoscopy.   2.  Acute blood loss anemia:  Blood has been ordered and will be started expeditiously.   3.  Elevated INR:  This is in the process of being reversed.   4.  Hypothyroidism, seems euthyroid:  Will continue replacement intravenously.   5.  Hypercholesterolemia:  We will stop statin for now, will restart when able to eat.   6.  History of deep vein thrombosis and pulmonary embolus:  The patient needs to stop warfarin due to his upper gastrointestinal bleed.  Depending on the upper gastrointestinal findings, will consider stopping warfarin indefinitely or continuing depending on the clinical course.   7.  Prophylaxis for deep venous thrombosis:  Strictly mechanical means.      CODE STATUS:  Full code.         AMBER MCGARRY MD             D: 2017 05:16   T: 2017 03:38   MT: [email or web address]      Name:     RADHA VALVERDE   MRN:      [id number]        Account:      [id number]   :      1940           Admitted:     [id number]      Document: [id number]

## 2017-12-01 NOTE — PROGRESS NOTES
Name: Rey Tristan    MRN#: [id number]    Reason for Hospitalization: GI bleed [K92.2]    Discharge Date: 12/1/2017    Patient / Family response to discharge plan: in agreement    Follow-Up Appt: Future Appointments  Date Time Provider Department Center   12/8/2017 11:30 AM Gianna Richard MD [facility]   12/18/2017 11:15 AM [facility] ANTI COAGULATION [facility]       Other Providers (Care Coordinator, County Services, PCA services etc): No    Discharge Disposition: home with spouse.    Moraima Staples

## 2017-12-01 NOTE — PLAN OF CARE
Patient discharged at 12:05 PM via ambulation accompanied by spouse and staff. Prescriptions sent to patients preferred pharmacy. All belongings sent with patient.     Discharge instructions reviewed with Rey and wife. Listed belongings gathered and returned to patient. Pt affirms he has all belongings    Patient discharged to home.   Report called to N/A    Core Measures and Vaccines  Core Measures applicable during stay: N/A. If yes, state diagnosis: n/A  Pneumonia and Influenza given prior to discharge, if indicated: N/A    Surgical Patient   Surgical Procedures during stay: n/a  Did patient receive discharge instruction on wound care and recognition of infection symptoms? N/A    MISC  Follow up appointment made:  Yes  Home and hospital aquired medications returned to patient: Yes  Patient reports pain was well managed at discharge: Yes

## 2017-12-02 ENCOUNTER — TELEPHONE (OUTPATIENT)
Dept: CASE MANAGEMENT | Facility: HOSPITAL | Age: 77
End: 2017-12-02

## 2017-12-02 NOTE — TELEPHONE ENCOUNTER
Rey ELLISON Tristan, was discharged to home on  12/1/17 from [facility]. I spoke today with him regarding his  discharge.   He indicates he has receive(d) sufficient information upon discharge. Medications were reviewed in full on discharge, including: Medications to be started; medications to be stopped; medications to be continued from preadmission and any side effects. Prescriptions were received at discharge and were able to be filled. Medications are being taken as prescribed.   He indicates they have an appointment scheduled for Dec 8  and have transportation available. They are able to confirm their appointment time and have it written down.   He did not have any questions regarding discharge instructions or condition.  Per their request, the following employee (s) can be recognized for their outstanding services delivered:  Wonderful care. I was spoiled .  Suggestions to improve service: nothing indicated  He was informed they may receive a survey in the mail from the hospital. Asked if they would kindly complete the survey in order for [facility] to know if services fully met patient needs.

## 2017-12-05 ENCOUNTER — TELEPHONE (OUTPATIENT)
Dept: FAMILY MEDICINE | Facility: OTHER | Age: 77
End: 2017-12-05

## 2017-12-05 NOTE — TELEPHONE ENCOUNTER
Contacted patient; advised to monitor for signs and/or symptoms of infection.  Advised to use a warm pack to area.  Patient does have a future appointment with Dr. Richard on Friday 12/8.

## 2017-12-05 NOTE — TELEPHONE ENCOUNTER
10:48 AM    Reason for Call: Phone Call    Description:Pt called and states that he had something put in his arm at the hospital for a blood transfusion and states that he has a knot in that spot now and does not know if he should be worried about it or what he should do about it now     Was an appointment offered for this call? No  If yes : Appointment type              Date    Preferred method for responding to this message: Telephone Call  What is your phone number ?    If we cannot reach you directly, may we leave a detailed response at the number you provided? Yes    Can this message wait until your PCP/provider returns, if available today? Not applicable, PCP is in     Lolis Lehman

## 2017-12-06 LAB
BACTERIA SPEC CULT: NORMAL
BACTERIA SPEC CULT: NORMAL
SPECIMEN SOURCE: NORMAL
SPECIMEN SOURCE: NORMAL

## 2017-12-08 DIAGNOSIS — K92.2 ACUTE UPPER GASTROINTESTINAL BLEEDING: ICD-10-CM

## 2017-12-08 DIAGNOSIS — E06.3 AUTOIMMUNE THYROIDITIS: ICD-10-CM

## 2017-12-08 LAB
ERYTHROCYTE [DISTWIDTH] IN BLOOD BY AUTOMATED COUNT: 17.5 % (ref 10–15)
HCT VFR BLD AUTO: 32.8 % (ref 40–53)
HGB BLD-MCNC: 10.1 G/DL (ref 13.3–17.7)
MCH RBC QN AUTO: 27.2 PG (ref 26.5–33)
MCHC RBC AUTO-ENTMCNC: 30.8 G/DL (ref 31.5–36.5)
MCV RBC AUTO: 88 FL (ref 78–100)
PLATELET # BLD AUTO: 223 10E9/L (ref 150–450)
RBC # BLD AUTO: 3.71 10E12/L (ref 4.4–5.9)
T4 FREE SERPL-MCNC: 0.84 NG/DL (ref 0.76–1.46)
TSH SERPL DL<=0.005 MIU/L-ACNC: 7.27 MU/L (ref 0.4–4)
WBC # BLD AUTO: 5 10E9/L (ref 4–11)

## 2017-12-08 PROCEDURE — 84439 ASSAY OF FREE THYROXINE: CPT | Mod: ZL | Performed by: NURSE PRACTITIONER

## 2017-12-08 PROCEDURE — 85027 COMPLETE CBC AUTOMATED: CPT | Mod: ZL | Performed by: NURSE PRACTITIONER

## 2017-12-08 PROCEDURE — 84443 ASSAY THYROID STIM HORMONE: CPT | Mod: ZL | Performed by: NURSE PRACTITIONER

## 2017-12-08 PROCEDURE — 36415 COLL VENOUS BLD VENIPUNCTURE: CPT | Mod: ZL | Performed by: NURSE PRACTITIONER

## 2017-12-11 ENCOUNTER — OFFICE VISIT (OUTPATIENT)
Dept: FAMILY MEDICINE | Facility: OTHER | Age: 77
End: 2017-12-11
Attending: FAMILY MEDICINE
Payer: COMMERCIAL

## 2017-12-11 VITALS
HEIGHT: 65 IN | SYSTOLIC BLOOD PRESSURE: 110 MMHG | HEART RATE: 83 BPM | BODY MASS INDEX: 27.84 KG/M2 | DIASTOLIC BLOOD PRESSURE: 52 MMHG | TEMPERATURE: 97.6 F | WEIGHT: 167.11 LBS | RESPIRATION RATE: 20 BRPM | OXYGEN SATURATION: 96 %

## 2017-12-11 DIAGNOSIS — D62 ANEMIA DUE TO BLOOD LOSS, ACUTE: ICD-10-CM

## 2017-12-11 DIAGNOSIS — Z86.718 HISTORY OF DVT OF LOWER EXTREMITY: ICD-10-CM

## 2017-12-11 DIAGNOSIS — Z79.01 CHRONIC ANTICOAGULATION: ICD-10-CM

## 2017-12-11 DIAGNOSIS — E06.3 AUTOIMMUNE THYROIDITIS: Primary | ICD-10-CM

## 2017-12-11 DIAGNOSIS — Z86.711 HX PULMONARY EMBOLISM: ICD-10-CM

## 2017-12-11 PROCEDURE — 99212 OFFICE O/P EST SF 10 MIN: CPT

## 2017-12-11 PROCEDURE — 99213 OFFICE O/P EST LOW 20 MIN: CPT | Performed by: FAMILY MEDICINE

## 2017-12-11 ASSESSMENT — PATIENT HEALTH QUESTIONNAIRE - PHQ9
SUM OF ALL RESPONSES TO PHQ QUESTIONS 1-9: 0
5. POOR APPETITE OR OVEREATING: NOT AT ALL

## 2017-12-11 ASSESSMENT — ANXIETY QUESTIONNAIRES
1. FEELING NERVOUS, ANXIOUS, OR ON EDGE: NOT AT ALL
IF YOU CHECKED OFF ANY PROBLEMS ON THIS QUESTIONNAIRE, HOW DIFFICULT HAVE THESE PROBLEMS MADE IT FOR YOU TO DO YOUR WORK, TAKE CARE OF THINGS AT HOME, OR GET ALONG WITH OTHER PEOPLE: NOT DIFFICULT AT ALL
7. FEELING AFRAID AS IF SOMETHING AWFUL MIGHT HAPPEN: NOT AT ALL
5. BEING SO RESTLESS THAT IT IS HARD TO SIT STILL: NOT AT ALL
6. BECOMING EASILY ANNOYED OR IRRITABLE: NOT AT ALL
GAD7 TOTAL SCORE: 0
3. WORRYING TOO MUCH ABOUT DIFFERENT THINGS: NOT AT ALL
2. NOT BEING ABLE TO STOP OR CONTROL WORRYING: NOT AT ALL

## 2017-12-11 ASSESSMENT — PAIN SCALES - GENERAL: PAINLEVEL: NO PAIN (0)

## 2017-12-11 NOTE — PROGRESS NOTES
SUBJECTIVE:  Rey is a 77 year old male who comes in today for hospital follow up.  Admitted 11/30-12/1/17 with upper GI bleed.  He was on chronic coumadin for prior DVT, PE.  He was transfused 4 units PRBC''s and 2 units FFP.  All anticoagulation placed on hold, including Aspirin.  He was started on Prilosec.    He has not had any recurrence.  Denies any fever, chills.  Is eating well.  Still with some fatigue, noted with shoveling snow.  Is a bit frustrated that there is no specific source of his GI bleed.    Also, had monthly thyroid levels, following his autoimmune thyroiditis, now in subclinical hypothyroid state, per Dr. Simeon.    Current Outpatient Prescriptions   Medication     omeprazole (PRILOSEC) 40 MG capsule     atorvastatin (LIPITOR) 10 MG tablet     Lycopene 10 MG CAPS     Multiple Vitamins-Minerals (MULTIVITAMIN ADULT PO)     Cholecalciferol (VITAMIN D) 400 UNITS tablet     levothyroxine (SYNTHROID/LEVOTHROID) 50 MCG tablet     No current facility-administered medications for this visit.       No Known Allergies    Past Medical History:   Diagnosis Date     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 Simeon; silent; transient hyperthyroid, now normal; monitor TSH monthly for hypothyroidism     Chronic anticoagulation 1/1/2012     Elevated serum alkaline phosphatase level 5/4/2016    normal GGT, normal bone skeletal survery     Gastric ulcer     endoscopy 6/2016, repeat 6 months; PPI Carafate     Hyperlipidemia      Normocytic anemia 5/4/2016     Pulmonary nodule     CT 5/2014, right; consider repeat 1 year if high risk     Upper GI bleed 11/30/2017     Vitamin D deficiency 1/1/2012     Past Surgical History:   Procedure Laterality Date     ANGIOGRAM  6/23/2014     C REGULAR ECHO ([place])  6/23/2014    Dr. Cavazos     C. Difficile with intussuseption       cataract extraction       colonoscopy       COLONOSCOPY  6/17     ENDOSCOPY UPPER, COLONOSCOPY, COMBINED N/A 6/17/2016    Procedure: COMBINED  ENDOSCOPY UPPER, COLONOSCOPY;  Surgeon: Andrea Stearns DO;  Location: [facility]     ESOPHAGOSCOPY, GASTROSCOPY, DUODENOSCOPY (EGD), COMBINED N/A 11/30/2017    Procedure: COMBINED ESOPHAGOSCOPY, GASTROSCOPY, DUODENOSCOPY (EGD);  UPPER ENDOSCOPY;  Surgeon: Narinder Torres MD;  Location: [facility] OR     left knee meniscal tear       Left lower extremity DVT       lens implant       nasal polypectomy       Pulmonary Embolism       Family History   Problem Relation Age of Onset     CANCER Mother      Ovarian     Respiratory Father      emphysemia     Lung Cancer Brother      DIABETES No family hx of      Hypertension No family hx of      Hyperlipidemia No family hx of      Thyroid Disease No family hx of      Asthma No family hx of      Colon Cancer No family hx of      Prostate Cancer No family hx of      Social History     Social History     Marital status:      Spouse name: N/A     Number of children: N/A     Years of education: N/A     Occupational History      [employer]     Retired      Social History Main Topics     Smoking status: Former Smoker     Types: Cigarettes     Quit date: 10/28/2009     Smokeless tobacco: Never Used      Comment: Quit 2009     Alcohol use No     Drug use: No     Sexual activity: Not on file     Other Topics Concern      Service Yes     [employer]     Blood Transfusions Yes     Permits if needed     Caffeine Concern Yes     Tea     Occupational Exposure No     Hobby Hazards No     Sleep Concern No     Stress Concern No     Weight Concern No     Special Diet No     Back Care No     Exercise No     Seat Belt Yes     Self-Exams Yes     Parent/Sibling W/ Cabg, Mi Or Angioplasty Before 65f 55m? No     Social History Narrative       ROS:  General: positive for fatigue, negative for, fever, chills, dizziness  Skin: negative for, rash  Resp: Shortness of breath- mild and No cough  CV: negative for, chest pain, lower extremity edema and syncope or  "near-syncope  GI: negative for, poor appetite, nausea, vomiting, abdominal pain, melena, hematochezia, constipation and diarrhea  : negative for, dysuria and hematuria  Musculoskeletal: negative for, joint pain and joint swelling  Neurologic: negative for, local weakness, numbness or tingling of hands and numbness or tingling of feet  Hematologic: positive for as above  PHQ-9 SCORE 12/11/2017   Total Score 0       OBJECTIVE:  Vitals:    12/11/17 0940   BP: 110/52   Pulse: 83   Resp: 20   Temp: 97.6  F (36.4  C)   TempSrc: Tympanic   SpO2: 96%   Weight: 167 lb 1.7 oz (75.8 kg)   Height: 5' 5\" (1.651 m)     GENERAL APPEARANCE: healthy, alert and no distress  RESP: lungs clear to auscultation - no rales, rhonchi or wheezes  CV: regular rates and rhythm, normal S1 S2, no S3 or S4 and no murmur, click or rub -  ABDOMEN:  soft, nontender, no HSM or masses and bowel sounds normal  MS: extremities normal- no gross deformities noted, no evidence of inflammation in joints, FROM in all extremities.  SKIN: no suspicious lesions or rashes  PSYCH: mentation appears normal. and affect normal/bright    Results for orders placed or performed in visit on 12/08/17   **CBC with platelets FUTURE 14d   Result Value Ref [facility]    WBC 5.0 4.0 - 11.0 10e9/L    RBC Count 3.71 (L) 4.4 - 5.9 10e12/L    Hemoglobin 10.1 (L) 13.3 - 17.7 g/dL    Hematocrit 32.8 (L) 40.0 - 53.0 %    MCV 88 78 - 100 fl    MCH 27.2 26.5 - 33.0 pg    MCHC 30.8 (L) 31.5 - 36.5 g/dL    RDW 17.5 (H) 10.0 - 15.0 %    Platelet Count 223 150 - 450 10e9/L   **TSH with free T4 reflex FUTURE anytime   Result Value Ref [facility]    TSH 7.27 (H) 0.40 - 4.00 mU/L   T4 free   Result Value Ref [facility]    T4 Free 0.84 0.76 - 1.46 ng/dL     ASSESSMENT/ORDERS:    ICD-10-CM    1. Autoimmune thyroiditis E06.3 TSH     T4 free   2. Anemia due to blood loss, acute D62 Hemoglobin     ONC/HEME ADULT REFERRAL   3. History of DVT of lower extremity Z86.718 ONC/HEME ADULT REFERRAL   4. Hx pulmonary " embolism Z86.711 ONC/HEME ADULT REFERRAL   5. Chronic anticoagulation Z79.01 ONC/HEME ADULT REFERRAL     PLAN:  TSH stable from last month.  T4 still normal.  Hemoglobin steady.  See below.    Patient Instructions   Continue to remain off any blood thinners.  Continue the daily Prilosec to protect stomach.  Referral to Dr. Alfaro to discuss any need for restarting anticoagulation and at what point.    Standing lab orders for thyroid and hemoglobin monthly.  Last done 12/8, so due 1/8 week.         Gianna Mckeon

## 2017-12-11 NOTE — NURSING NOTE
"Chief Complaint   Patient presents with     RECHECK     Hospital follow up from GI bleed and labs       Initial /52  Pulse 83  Temp 97.6  F (36.4  C) (Tympanic)  Resp 20  Ht 5' 5\" (1.651 m)  Wt 167 lb 1.7 oz (75.8 kg)  SpO2 96%  BMI 27.81 kg/m2 Estimated body mass index is 27.81 kg/(m^2) as calculated from the following:    Height as of this encounter: 5' 5\" (1.651 m).    Weight as of this encounter: 167 lb 1.7 oz (75.8 kg).  Medication Reconciliation: complete   AILEEN ESCOBAR LPN  "

## 2017-12-11 NOTE — PATIENT INSTRUCTIONS
Continue to remain off any blood thinners.  Continue the daily Prilosec to protect stomach.  Referral to Dr. Alfaro to discuss any need for restarting anticoagulation and at what point.    Standing lab orders for thyroid and hemoglobin monthly.  Last done 12/8, so due 1/8 week.

## 2017-12-11 NOTE — MR AVS SNAPSHOT
After Visit Summary   12/11/2017    Rey Tristan    MRN: [id number]           Patient Information     Date Of Birth          1940        Visit Information        Provider Department      12/11/2017 10:00 AM Gianna Richard MD [facility]        Today's Diagnoses     Autoimmune thyroiditis    -  1    Anemia due to blood loss, acute        History of DVT of lower extremity        Hx pulmonary embolism        Chronic anticoagulation          Care Instructions    Continue to remain off any blood thinners.  Continue the daily Prilosec to protect stomach.  Referral to Dr. Alfaro to discuss any need for restarting anticoagulation and at what point.    Standing lab orders for thyroid and hemoglobin monthly.  Last done 12/8, so due 1/8 week.          Follow-ups after your visit        Additional Services     ONC/HEME ADULT REFERRAL       Your provider has referred you to: Dr Alfaro or Nirmala    Please be aware that coverage of these services is subject to the terms and limitations of your health insurance plan.  Call member services at your health plan with any benefit or coverage questions.      Please bring the following with you to your appointment:    (1) Any X-Rays, CTs or MRIs which have been performed.  Contact the facility where they were done to arrange for  prior to your scheduled appointment.   (2) List of current medications  (3) This referral request   (4) Any documents/labs given to you for this referral                  Your next 10 appointments already scheduled     Dec 18, 2017 11:15 AM CST   Anticoagulation Visit with  ANTI COAGULATION   [facility] ([facility] )    [address] [place] [address]   [phone]              Future tests that were ordered for you today     Open Standing Orders        Priority Remaining Interval Expires Ordered    Hemoglobin Routine 12/12 12/11/2018 12/11/2017    TSH Routine 12/12   "2018    T4 free Routine 2018            Who to contact     If you have questions or need follow up information about today's clinic visit or your schedule please contact [facility] directly at [phone].  Normal or non-critical lab and imaging results will be communicated to you by MyChart, letter or phone within 4 business days after the clinic has received the results. If you do not hear from us within 7 days, please contact the clinic through MyChart or phone. If you have a critical or abnormal lab result, we will notify you by phone as soon as possible.  Submit refill requests through [email or web address] or call your pharmacy and they will forward the refill request to us. Please allow 3 business days for your refill to be completed.          Additional Information About Your Visit        MyChart Information     [email or web address] lets you send messages to your doctor, view your test results, renew your prescriptions, schedule appointments and more. To sign up, go to [email or web address] . Click on \"Log in\" on the left side of the screen, which will take you to the Welcome page. Then click on \"Sign up Now\" on the right side of the page.     You will be asked to enter the access code listed below, as well as some personal information. Please follow the directions to create your username and password.     Your access code is: [email or web address]  Expires: 3/11/2018 10:04 AM     Your access code will  in 90 days. If you need help or a new code, please call your [place] clinic or [phone].        Care EveryWhere ID     This is your Care EveryWhere ID. This could be used by other organizations to access your [place] medical records  [id number]        Your Vitals Were     Pulse Temperature Respirations Height Pulse Oximetry BMI (Body Mass Index)    83 97.6  F (36.4  C) (Tympanic) 20 5' 5\" (1.651 m) 96% 27.81 kg/m2       Blood Pressure from Last 3 Encounters:   17 " 110/52   12/01/17 96/62   10/20/17 128/70    Weight from Last 3 Encounters:   12/11/17 167 lb 1.7 oz (75.8 kg)   12/01/17 167 lb 1.7 oz (75.8 kg)   10/20/17 170 lb (77.1 kg)              We Performed the Following     ONC/HEME ADULT REFERRAL        Primary Care Provider Office Phone # Fax #    Gianna Richrad [phone]       [facility] [address]  [facility] [address]        Equal Access to Services     [facility]: Hadii aad ku hadasho Soomaali, waaxda luqadaha, qaybta kaalmada adeegyada, waxjamar orellana . So [facility] [phone].    ATENCIÓN: Si habla español, tiene a dodson disposición servicios gratuitos de asistencia lingüística. [facility] [phone].    We comply with applicable federal civil rights laws and [place] laws. We do not discriminate on the basis of race, color, national origin, age, disability, sex, sexual orientation, or gender identity.            Thank you!     Thank you for choosing [facility]  for your care. Our goal is always to provide you with excellent care. Hearing back from our patients is one way we can continue to improve our services. Please take a few minutes to complete the written survey that you may receive in the mail after your visit with us. Thank you!             Your Updated Medication List - Protect others around you: Learn how to safely use, store and throw away your medicines at www.disposemymeds.org.          This list is accurate as of: 12/11/17 10:04 AM.  Always use your most recent med list.                   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

## 2017-12-12 ASSESSMENT — ANXIETY QUESTIONNAIRES: GAD7 TOTAL SCORE: 0

## 2018-01-02 ENCOUNTER — ONCOLOGY VISIT (OUTPATIENT)
Dept: ONCOLOGY | Facility: OTHER | Age: 78
End: 2018-01-02
Attending: FAMILY MEDICINE
Payer: MEDICARE

## 2018-01-02 VITALS
BODY MASS INDEX: 29.59 KG/M2 | TEMPERATURE: 97.9 F | OXYGEN SATURATION: 94 % | SYSTOLIC BLOOD PRESSURE: 105 MMHG | RESPIRATION RATE: 18 BRPM | WEIGHT: 177.6 LBS | HEIGHT: 65 IN | DIASTOLIC BLOOD PRESSURE: 59 MMHG | HEART RATE: 70 BPM

## 2018-01-02 DIAGNOSIS — Z79.01 CHRONIC ANTICOAGULATION: ICD-10-CM

## 2018-01-02 DIAGNOSIS — Z86.711 HX PULMONARY EMBOLISM: ICD-10-CM

## 2018-01-02 DIAGNOSIS — Z86.718 HISTORY OF DVT OF LOWER EXTREMITY: ICD-10-CM

## 2018-01-02 DIAGNOSIS — D62 ANEMIA DUE TO BLOOD LOSS, ACUTE: ICD-10-CM

## 2018-01-02 LAB
ALBUMIN SERPL-MCNC: 3.6 G/DL (ref 3.4–5)
ALP SERPL-CCNC: 160 U/L (ref 40–150)
ALT SERPL W P-5'-P-CCNC: 25 U/L (ref 0–70)
ANION GAP SERPL CALCULATED.3IONS-SCNC: 7 MMOL/L (ref 3–14)
AST SERPL W P-5'-P-CCNC: 28 U/L (ref 0–45)
BASOPHILS # BLD AUTO: 0.1 10E9/L (ref 0–0.2)
BASOPHILS NFR BLD AUTO: 1.2 %
BILIRUB SERPL-MCNC: 0.3 MG/DL (ref 0.2–1.3)
BUN SERPL-MCNC: 16 MG/DL (ref 7–30)
CALCIUM SERPL-MCNC: 8.5 MG/DL (ref 8.5–10.1)
CHLORIDE SERPL-SCNC: 103 MMOL/L (ref 94–109)
CO2 SERPL-SCNC: 28 MMOL/L (ref 20–32)
CREAT SERPL-MCNC: 0.77 MG/DL (ref 0.66–1.25)
DIFFERENTIAL METHOD BLD: ABNORMAL
EOSINOPHIL # BLD AUTO: 0.2 10E9/L (ref 0–0.7)
EOSINOPHIL NFR BLD AUTO: 4 %
ERYTHROCYTE [DISTWIDTH] IN BLOOD BY AUTOMATED COUNT: 16.5 % (ref 10–15)
ERYTHROCYTE [SEDIMENTATION RATE] IN BLOOD BY WESTERGREN METHOD: 82 MM/H (ref 0–20)
FERRITIN SERPL-MCNC: 11 NG/ML (ref 26–388)
GFR SERPL CREATININE-BSD FRML MDRD: >90 ML/MIN/1.7M2
GLUCOSE SERPL-MCNC: 83 MG/DL (ref 70–99)
HCT VFR BLD AUTO: 31.8 % (ref 40–53)
HGB BLD-MCNC: 10.2 G/DL (ref 13.3–17.7)
IMM GRANULOCYTES # BLD: 0 10E9/L (ref 0–0.4)
IMM GRANULOCYTES NFR BLD: 0.3 %
IRON SATN MFR SERPL: 6 % (ref 15–46)
IRON SERPL-MCNC: 28 UG/DL (ref 35–180)
LDH SERPL L TO P-CCNC: 171 U/L (ref 85–227)
LYMPHOCYTES # BLD AUTO: 1 10E9/L (ref 0.8–5.3)
LYMPHOCYTES NFR BLD AUTO: 16.6 %
MCH RBC QN AUTO: 26.4 PG (ref 26.5–33)
MCHC RBC AUTO-ENTMCNC: 32.1 G/DL (ref 31.5–36.5)
MCV RBC AUTO: 82 FL (ref 78–100)
MONOCYTES # BLD AUTO: 0.6 10E9/L (ref 0–1.3)
MONOCYTES NFR BLD AUTO: 10.3 %
NEUTROPHILS # BLD AUTO: 4.1 10E9/L (ref 1.6–8.3)
NEUTROPHILS NFR BLD AUTO: 67.6 %
NRBC # BLD AUTO: 0 10*3/UL
NRBC BLD AUTO-RTO: 0 /100
PLATELET # BLD AUTO: 231 10E9/L (ref 150–450)
POTASSIUM SERPL-SCNC: 4 MMOL/L (ref 3.4–5.3)
PROT SERPL-MCNC: 8.7 G/DL (ref 6.8–8.8)
RBC # BLD AUTO: 3.86 10E12/L (ref 4.4–5.9)
RETICS # AUTO: 34 10E9/L (ref 25–95)
RETICS/RBC NFR AUTO: 0.9 % (ref 0.5–2)
SODIUM SERPL-SCNC: 138 MMOL/L (ref 133–144)
TIBC SERPL-MCNC: 464 UG/DL (ref 240–430)
WBC # BLD AUTO: 6 10E9/L (ref 4–11)

## 2018-01-02 PROCEDURE — 85303 CLOT INHIBIT PROT C ACTIVITY: CPT | Mod: ZL | Performed by: INTERNAL MEDICINE

## 2018-01-02 PROCEDURE — G0463 HOSPITAL OUTPT CLINIC VISIT: HCPCS

## 2018-01-02 PROCEDURE — 84165 PROTEIN E-PHORESIS SERUM: CPT | Mod: ZL | Performed by: INTERNAL MEDICINE

## 2018-01-02 PROCEDURE — 83021 HEMOGLOBIN CHROMOTOGRAPHY: CPT | Mod: ZL | Performed by: INTERNAL MEDICINE

## 2018-01-02 PROCEDURE — 81291 MTHFR GENE: CPT | Mod: ZL | Performed by: INTERNAL MEDICINE

## 2018-01-02 PROCEDURE — 86146 BETA-2 GLYCOPROTEIN ANTIBODY: CPT | Mod: ZL | Performed by: INTERNAL MEDICINE

## 2018-01-02 PROCEDURE — 82746 ASSAY OF FOLIC ACID SERUM: CPT | Mod: ZL | Performed by: INTERNAL MEDICINE

## 2018-01-02 PROCEDURE — 00000167 ZZHCL STATISTIC INR NC: Mod: ZL | Performed by: INTERNAL MEDICINE

## 2018-01-02 PROCEDURE — 40000847 ZZHCL STATISTIC MORPHOLOGY W/INTERP HISTOLOGY TC 85060: Mod: ZL | Performed by: INTERNAL MEDICINE

## 2018-01-02 PROCEDURE — 85045 AUTOMATED RETICULOCYTE COUNT: CPT | Mod: ZL | Performed by: INTERNAL MEDICINE

## 2018-01-02 PROCEDURE — 83550 IRON BINDING TEST: CPT | Mod: ZL | Performed by: INTERNAL MEDICINE

## 2018-01-02 PROCEDURE — 85300 ANTITHROMBIN III ACTIVITY: CPT | Mod: ZL | Performed by: INTERNAL MEDICINE

## 2018-01-02 PROCEDURE — 86431 RHEUMATOID FACTOR QUANT: CPT | Mod: ZL | Performed by: INTERNAL MEDICINE

## 2018-01-02 PROCEDURE — 85025 COMPLETE CBC W/AUTO DIFF WBC: CPT | Mod: ZL | Performed by: INTERNAL MEDICINE

## 2018-01-02 PROCEDURE — 85730 THROMBOPLASTIN TIME PARTIAL: CPT | Mod: ZL | Performed by: INTERNAL MEDICINE

## 2018-01-02 PROCEDURE — 82784 ASSAY IGA/IGD/IGG/IGM EACH: CPT | Mod: ZL | Performed by: INTERNAL MEDICINE

## 2018-01-02 PROCEDURE — 86334 IMMUNOFIX E-PHORESIS SERUM: CPT | Mod: ZL | Performed by: INTERNAL MEDICINE

## 2018-01-02 PROCEDURE — 83540 ASSAY OF IRON: CPT | Mod: ZL | Performed by: INTERNAL MEDICINE

## 2018-01-02 PROCEDURE — 82607 VITAMIN B-12: CPT | Mod: ZL | Performed by: INTERNAL MEDICINE

## 2018-01-02 PROCEDURE — 85652 RBC SED RATE AUTOMATED: CPT | Mod: ZL | Performed by: INTERNAL MEDICINE

## 2018-01-02 PROCEDURE — 85306 CLOT INHIBIT PROT S FREE: CPT | Mod: ZL | Performed by: INTERNAL MEDICINE

## 2018-01-02 PROCEDURE — 36415 COLL VENOUS BLD VENIPUNCTURE: CPT | Mod: ZL | Performed by: INTERNAL MEDICINE

## 2018-01-02 PROCEDURE — 00000402 ZZHCL STATISTIC TOTAL PROTEIN: Mod: ZL | Performed by: INTERNAL MEDICINE

## 2018-01-02 PROCEDURE — 82728 ASSAY OF FERRITIN: CPT | Mod: ZL | Performed by: INTERNAL MEDICINE

## 2018-01-02 PROCEDURE — 83615 LACTATE (LD) (LDH) ENZYME: CPT | Mod: ZL | Performed by: INTERNAL MEDICINE

## 2018-01-02 PROCEDURE — 80053 COMPREHEN METABOLIC PANEL: CPT | Mod: ZL | Performed by: INTERNAL MEDICINE

## 2018-01-02 PROCEDURE — 85613 RUSSELL VIPER VENOM DILUTED: CPT | Mod: ZL | Performed by: INTERNAL MEDICINE

## 2018-01-02 PROCEDURE — 81240 F2 GENE: CPT | Mod: ZL | Performed by: INTERNAL MEDICINE

## 2018-01-02 PROCEDURE — 86147 CARDIOLIPIN ANTIBODY EA IG: CPT | Mod: ZL | Performed by: INTERNAL MEDICINE

## 2018-01-02 PROCEDURE — 83010 ASSAY OF HAPTOGLOBIN QUANT: CPT | Mod: ZL | Performed by: INTERNAL MEDICINE

## 2018-01-02 PROCEDURE — 99205 OFFICE O/P NEW HI 60 MIN: CPT | Performed by: INTERNAL MEDICINE

## 2018-01-02 PROCEDURE — 99000 SPECIMEN HANDLING OFFICE-LAB: CPT | Performed by: INTERNAL MEDICINE

## 2018-01-02 PROCEDURE — 81241 F5 GENE: CPT | Mod: ZL | Performed by: INTERNAL MEDICINE

## 2018-01-02 PROCEDURE — 83090 ASSAY OF HOMOCYSTEINE: CPT | Mod: ZL | Performed by: INTERNAL MEDICINE

## 2018-01-02 ASSESSMENT — PATIENT HEALTH QUESTIONNAIRE - PHQ9: SUM OF ALL RESPONSES TO PHQ QUESTIONS 1-9: 0

## 2018-01-02 NOTE — NURSING NOTE
"Chief Complaint   Patient presents with     Establish Care     anemia       Initial /59  Pulse 70  Temp 97.9  F (36.6  C) (Tympanic)  Resp 18  Ht 1.651 m (5' 5\")  Wt 80.6 kg (177 lb 9.6 oz)  SpO2 94%  BMI 29.55 kg/m2 Estimated body mass index is 29.55 kg/(m^2) as calculated from the following:    Height as of this encounter: 1.651 m (5' 5\").    Weight as of this encounter: 80.6 kg (177 lb 9.6 oz).  Medication Reconciliation: complete     Patient was assessed using the NCCN psychosocial distress thermometer. Patient rated the score as a 1. Patient rated current stressors as driving here. Stressors will be brought to the attention of provider or Oncology RN Care Coordinator for a score of 6 or greater or per nurses discretion.   Cherelle Mares                "

## 2018-01-02 NOTE — PATIENT INSTRUCTIONS
We would like to see you back after your bone marrow biopsy and CT chest,abdomen and pelvis.Need to have a history and physical with your PCP before your bone marrow biopsy.When you are in need of a refill of your medications, please call your pharmacy and they will send us the request. If you have any questions please call [phone]      Bone Marrow Aspiration and Biopsy  Does this test have other names?  Bone marrow exam  What is this test?  This is a two-part test that looks at the blood cells in a sample of bone marrow, the spongy tissue within certain bones. This test may help your healthcare provider diagnose or monitor a blood disease or health condition affecting your marrow.  Your bone marrow has a liquid part and a solid part. Aspiration uses a needle to remove a sample of the liquid part of bone marrow. Biopsy uses a larger needle to remove a small amount of bone with its marrow.  Part of the job of bone marrow is to make blood cells. This test can find out how well your bone marrow is working. This test is also done to find some types of cancer.  Why do I need this test?  You might have this test if your healthcare provider wants to find out the health of your bone marrow or to check on how well your marrow is making blood cells.  You may have an aspiration to check for:    The health of your bone marrow for a transplant    Acute leukemia    Multiple myeloma  In some cases, bone marrow aspiration is used to confirm chromosome disorders in newborns.  You may have an aspiration followed by a biopsy if you could have:    Bacterial, fungal, or parasitic infection    Unexplained anemia, leucopenia, or thrombocytopenia    Metastatic cancer or many other diseases  What other tests might I have along with this test?  Your healthcare provider may also order these tests:    Complete blood count, or CBC    Reticulocyte count to find out your red blood cell survival rate  What do my test results mean?  Many  things may affect your lab test results. These include the method each lab uses to do the test. Even if your test results are different from the normal value, you may not have a problem. To learn what the results mean for you, talk with your healthcare provider.  The lab will look at different aspects of your bone marrow to help find certain diseases or conditions. These aspects include:    Type and number of blood cells    Any abnormalities in the size, shape, or look of cells    Level of iron in the bone marrow    Abnormal amount of young white blood cells, called blasts    Any chromosomal abnormalities  Depending on what is seen, your results may mean you have an infection, a blood disease, leukemia, or cancer that has spread to the bone marrow from another site.  Your healthcare provider will take your results and combine this information with information from your physical exam, health history, and other types of tests to make a diagnosis.   If your results are negative, your provider may order other tests to diagnose your condition.   How is this test done?  These tests require a sample of bone marrow. A number of sites on your body can be used for marrow aspiration, but the hip bone is a common spot. You will likely lie on your side or stomach on an exam table. Your healthcare provider will numb the area of the test. You may feel a slight prick from the needle that the provider uses to give the numbing agent.  Does this test pose any risks?  It's not possible to numb the bone, so you may feel slight pain during the procedure. But you shouldn't feel any pain afterward. Risks from a bone marrow test are rare, but you could have bleeding or an infection.  What might affect my test results?  Other factors aren't likely to affect your results.  How do I get ready for this test?  Tell your healthcare provider if you take aspirin or have any allergies. Also tell your provider if you are pregnant, take any  blood-thinner medicines, or have a history of bleeding problems.  Be sure your provider knows about all other medicines, herbs, vitamins, and supplements you are taking. This includes medicines that don't need a prescription and any illicit drugs you may use.       1537-2138 [employer]. [address] [place] [address]. All rights reserved. This information is not intended as a substitute for professional medical care. Always follow your healthcare professional's instructions.

## 2018-01-02 NOTE — MR AVS SNAPSHOT
After Visit Summary   1/2/2018    Rey Tristan    MRN: [id number]           Patient Information     Date Of Birth          1940        Visit Information        Provider Department      1/2/2018 11:30 AM Tio Alfaro MD [facility] [place]        Today's Diagnoses     Anemia due to blood loss, acute        History of DVT of lower extremity        Hx pulmonary embolism        Chronic anticoagulation          Care Instructions    We would like to see you back after your bone marrow biopsy and CT chest,abdomen and pelvis.Need to have a history and physical with your PCP before your bone marrow biopsy.When you are in need of a refill of your medications, please call your pharmacy and they will send us the request. If you have any questions please call [phone]      Bone Marrow Aspiration and Biopsy  Does this test have other names?  Bone marrow exam  What is this test?  This is a two-part test that looks at the blood cells in a sample of bone marrow, the spongy tissue within certain bones. This test may help your healthcare provider diagnose or monitor a blood disease or health condition affecting your marrow.  Your bone marrow has a liquid part and a solid part. Aspiration uses a needle to remove a sample of the liquid part of bone marrow. Biopsy uses a larger needle to remove a small amount of bone with its marrow.  Part of the job of bone marrow is to make blood cells. This test can find out how well your bone marrow is working. This test is also done to find some types of cancer.  Why do I need this test?  You might have this test if your healthcare provider wants to find out the health of your bone marrow or to check on how well your marrow is making blood cells.  You may have an aspiration to check for:    The health of your bone marrow for a transplant    Acute leukemia    Multiple myeloma  In some cases, bone marrow aspiration is used to confirm chromosome disorders in  newborns.  You may have an aspiration followed by a biopsy if you could have:    Bacterial, fungal, or parasitic infection    Unexplained anemia, leucopenia, or thrombocytopenia    Metastatic cancer or many other diseases  What other tests might I have along with this test?  Your healthcare provider may also order these tests:    Complete blood count, or CBC    Reticulocyte count to find out your red blood cell survival rate  What do my test results mean?  Many things may affect your lab test results. These include the method each lab uses to do the test. Even if your test results are different from the normal value, you may not have a problem. To learn what the results mean for you, talk with your healthcare provider.  The lab will look at different aspects of your bone marrow to help find certain diseases or conditions. These aspects include:    Type and number of blood cells    Any abnormalities in the size, shape, or look of cells    Level of iron in the bone marrow    Abnormal amount of young white blood cells, called blasts    Any chromosomal abnormalities  Depending on what is seen, your results may mean you have an infection, a blood disease, leukemia, or cancer that has spread to the bone marrow from another site.  Your healthcare provider will take your results and combine this information with information from your physical exam, health history, and other types of tests to make a diagnosis.   If your results are negative, your provider may order other tests to diagnose your condition.   How is this test done?  These tests require a sample of bone marrow. A number of sites on your body can be used for marrow aspiration, but the hip bone is a common spot. You will likely lie on your side or stomach on an exam table. Your healthcare provider will numb the area of the test. You may feel a slight prick from the needle that the provider uses to give the numbing agent.  Does this test pose any risks?  It's not  possible to numb the bone, so you may feel slight pain during the procedure. But you shouldn't feel any pain afterward. Risks from a bone marrow test are rare, but you could have bleeding or an infection.  What might affect my test results?  Other factors aren't likely to affect your results.  How do I get ready for this test?  Tell your healthcare provider if you take aspirin or have any allergies. Also tell your provider if you are pregnant, take any blood-thinner medicines, or have a history of bleeding problems.  Be sure your provider knows about all other medicines, herbs, vitamins, and supplements you are taking. This includes medicines that don't need a prescription and any illicit drugs you may use.       4582-9501 [employer]. [address] [place] [address], [place] [address]. All rights reserved. This information is not intended as a substitute for professional medical care. Always follow your healthcare professional's instructions.                  Follow-ups after your visit        Your next 10 appointments already scheduled     Jan 09, 2018 12:00 PM CST   (Arrive by 11:45 AM)   CT CHEST W CONTRAST with HICT1   HI CT SCAN ([facility] )    [address] [place] [id number] [phone]           Please bring any scans or X-rays taken at other hospitals, if similar tests were done. Also bring a list of your medicines, including vitamins, minerals and over-the-counter drugs. It is safest to leave personal items at home.  Be sure to tell your doctor:   If you have any allergies.   If there s any chance you are pregnant.   If you are breastfeeding.   If you have any special needs.  You will have contrast for this exam. To prepare:   Do not eat or drink for 2 hours before your exam. If you need to take medicine, you may take it with small sips of water. (We may ask you to take liquid medicine as well.)   The day before your exam, drink extra fluids at least six 8-ounce glasses  (unless your doctor tells you to restrict your fluids).  Patients over 70 or patients with diabetes or kidney problems:   If you haven t had a blood test (creatinine test) within the last 30 days, go to your clinic or Diagnostic Imaging Department for this test.  If you have diabetes:   If your kidney function is normal, continue taking your metformin (Avandamet, Glucophage, Glucovance, Metaglip) on the day of your exam.   If your kidney function is abnormal, wait 48 hours before restarting this medicine.  Please wear loose clothing, such as a sweat suit or jogging clothes. Avoid snaps, zippers and other metal. We may ask you to undress and put on a hospital gown.  If you have any questions, please call the Imaging Department where you will have your exam.            Jan 09, 2018 12:30 PM CST   (Arrive by 11:00 AM)   CT ABDOMEN PELVIS W CONTRAST with HICT1   HI CT SCAN ([facility] )    [address] [place] [id number] [phone]           Please bring any scans or X-rays taken at other hospitals, if similar tests were done. Also bring a list of your medicines, including vitamins, minerals and over-the-counter drugs. It is safest to leave personal items at home.  Be sure to tell your doctor:   If you have any allergies.   If there s any chance you are pregnant.   If you are breastfeeding.   If you have any special needs.  You may have contrast for this exam. To prepare:   Do not eat or drink for 2 hours before your exam. If you need to take medicine, you may take it with small sips of water. (We may ask you to take liquid medicine as well.)   The day before your exam, drink extra fluids at least six 8-ounce glasses (unless your doctor tells you to restrict your fluids).  Patients over 70 or patients with diabetes or kidney problems:   If you haven t had a blood test (creatinine test) within the last 30 days, go to your clinic or Diagnostic Imaging Department for this test.  If you have  diabetes:   If your kidney function is normal, continue taking your metformin (Avandamet, Glucophage, Glucovance, Metaglip) on the day of your exam.   If your kidney function is abnormal, wait 48 hours before restarting this medicine.  You will have oral contrast for this exam:   You will drink the contrast at home. Get this from your clinic or Diagnostic Imaging Department. Please follow the directions given.  Please wear loose clothing, such as a sweat suit or jogging clothes. Avoid snaps, zippers and other metal. We may ask you to undress and put on a hospital gown.  If you have any questions, please call the Imaging Department where you will have your exam.            Jan 12, 2018 10:15 AM CST   (Arrive by 10:00 AM)   Pre-Op physical with Gianna Richard MD   [facility] [place] [facility] ([facility] )    [address] [place] [address]   [phone]              Future tests that were ordered for you today     Open Future Orders        Priority Expected Expires Ordered    CT Chest w Contrast Routine  2/16/2018 1/2/2018    CT Abdomen Pelvis w Contrast Routine  2/16/2018 1/2/2018    Bone marrow biopsy Routine  7/1/2018 1/2/2018    Leukemia Lymphoma Evaluation (Flow Cytometry) Routine  7/1/2018 1/2/2018            Who to contact     If you have questions or need follow up information about today's clinic visit or your schedule please contact [facility] at [phone].  Normal or non-critical lab and imaging results will be communicated to you by MyChart, letter or phone within 4 business days after the clinic has received the results. If you do not hear from us within 7 days, please contact the clinic through MyChart or phone. If you have a critical or abnormal lab result, we will notify you by phone as soon as possible.  Submit refill requests through [employer] or call your pharmacy and they will forward the refill request to us. Please allow 3 business days for your  "refill to be completed.          Additional Information About Your Visit        Ethe[employer] Information     [employer] lets you send messages to your doctor, view your test results, renew your prescriptions, schedule appointments and more. To sign up, go to [email or web address] . Click on \"Log in\" on the left side of the screen, which will take you to the Welcome page. Then click on \"Sign up Now\" on the right side of the page.     You will be asked to enter the access code listed below, as well as some personal information. Please follow the directions to create your username and password.     Your access code is: [email or web address]  Expires: 3/11/2018 10:04 AM     Your access code will  in 90 days. If you need help or a new code, please call your [place] clinic or [phone].        Care EveryWhere ID     This is your Care EveryWhere ID. This could be used by other organizations to access your [place] medical records  [id number]        Your Vitals Were     Pulse Temperature Respirations Height Pulse Oximetry BMI (Body Mass Index)    70 97.9  F (36.6  C) (Tympanic) 18 1.651 m (5' 5\") 94% 29.55 kg/m2       Blood Pressure from Last 3 Encounters:   18 105/59   17 110/52   17 96/62    Weight from Last 3 Encounters:   18 80.6 kg (177 lb 9.6 oz)   17 75.8 kg (167 lb 1.7 oz)   17 75.8 kg (167 lb 1.7 oz)              We Performed the Following     Antithrombin III     Beta 2 Glycoprotein 1 Antibody IgG     Beta 2 Glycoprotein 1 Antibody IgM     Bld morphology pathology review     Cardiolipin Tamica IgG and IgM     CBC with platelets differential     Comprehensive metabolic panel     ELP reflex to IELP     Erythrocyte sedimentation rate auto     F2 prothrombin 08319M Mut Anal     Factor 5 leiden mutation analysis     Ferritin     Folate     Haptoglobin     HGB Eval Reflex to ELP or RBC Solubility     Homocysteine     Iron and iron binding capacity     Lactate Dehydrogenase     Lupus " Anticoagulant Panel     MTHFR genotype     Protein C chromogenic     Protein S Antigen Free     Reticulocyte count     Rheumatoid factor     Vitamin B12        Primary Care Provider Office Phone # Fax #    Gianna Richard [phone]       [facility] [address] [place] [address]        Equal Access to Services     NIK GRIDER : Hadii aad ku hadasho Soomaali, waaxda luqadaha, qaybta kaalmada adeegyada, waxay idiin hayaan maggie plazamichaelchasity raymundo. So [facility] [phone].    ATENCIÓN: Si habla español, tiene a dodson disposición servicios gratuitos de asistencia lingüística. Pedroame al [phone].    We comply with applicable federal civil rights laws and [place] laws. We do not discriminate on the basis of race, color, national origin, age, disability, sex, sexual orientation, or gender identity.            Thank you!     Thank you for choosing [facility]  for your care. Our goal is always to provide you with excellent care. Hearing back from our patients is one way we can continue to improve our services. Please take a few minutes to complete the written survey that you may receive in the mail after your visit with us. Thank you!             Your Updated Medication List - Protect others around you: Learn how to safely use, store and throw away your medicines at www.disposemymeds.org.          This list is accurate as of: 1/2/18 12:52 PM.  Always use your most recent med list.                   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

## 2018-01-03 DIAGNOSIS — D64.9 ANEMIA, UNSPECIFIED TYPE: Primary | ICD-10-CM

## 2018-01-03 LAB
COPATH REPORT: NORMAL
VIT B12 SERPL-MCNC: 787 PG/ML (ref 193–986)

## 2018-01-03 RX ORDER — LIDOCAINE HYDROCHLORIDE 10 MG/ML
8-10 INJECTION, SOLUTION EPIDURAL; INFILTRATION; INTRACAUDAL; PERINEURAL
Status: CANCELLED | OUTPATIENT
Start: 2018-01-03

## 2018-01-03 NOTE — PROGRESS NOTES
HEMATOLOGY ONCOLOGY CONSULTATION       DATE OF SERVICE:  01/02/2018      REASON FOR CONSULTATION:  Anemia.      REQUESTING PHYSICIAN:  Gianna Richard MD      HISTORY OF PRESENT ILLNESS:  Mr. Rey Tristan is a 77-year-old white male with a previous history of DVT, pulmonary embolus, chronic anemia , status post GI bleed.  Apparently was recently admitted to the hospital after he developed emesis associated with blood to the point where he was vomiting pure blood.  This occurred on 11/30/2017.  He was admitted to the hospital with an upper GI bleed.  It was noted he was on chronic warfarin therapy for DVT and history of PE.  It was also noted that he had dark stools.  Approximately a year ago he says he had been noted to be anemic and had undergone a colonoscopy which was negative.  During this admission his hemoglobin was noted to be 6.5.  He received 4 units of packed red cells and 2 units of FFP.  His INR was 3.89.  An EGD was performed and he was found to have duodenitis as well as peptic ulcer disease and Holt esophagus.  He was told to stop aspirin and Coumadin.  He has been off Coumadin since then.  He was discharged on Prilosec 40 mg daily.  The patient otherwise does not recall having a hypercoagulability workup for his DVT and PE.  Otherwise currently he denies any melena or hematemesis.  He says he is feeling much better.  He still gets some epigastric discomfort but this has been relieved with Prilosec.  He does have a history of COPD and a history of tobacco abuse.      PAST MEDICAL HISTORY:  Hypothyroidism, hyperlipidemia, chronic warfarin anticoagulation for DVT and PE.  Vitamin D deficiency.      MEDICATIONS:   1.  Prilosec 40 mg daily.   2.  Synthroid 50 mcg daily.   3.  Lipitor 10 mg daily.   4.  Vitamin D 1000 units daily.   5.  Multivitamin 1 tablet daily.      SOCIAL HISTORY:  He smoked half pack per day for 40 years He quit approximately 10 years ago.  Alcohol is negative.  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      FAMILY HISTORY:  Significant for mother with question of breast cancer and ovarian cancer.      REVIEW OF SYSTEMS:  As per HPI.      PHYSICAL EXAMINATION:   GENERAL:  He is an elderly white male in no acute distress.   VITAL SIGNS:  Blood pressure 105/59, pulse 70, respirations 18, temperature 97.9.   HEENT:  Atraumatic, normocephalic.  Oropharynx is nonerythematous.   NECK:  Supple.  No thyromegaly.   LUNGS:  Reveal a few expiratory wheezes.   HEART:  Regular rhythm, S1, S2 normal.   ABDOMEN:  Soft, normoactive bowel sounds.  No masses or tenderness.   LYMPHATICS:  No cervical, supraclavicular or axillary nodes.   EXTREMITIES:  No edema.   NEUROLOGIC:  Nonfocal.      LABORATORY DATA:  CBC:  White count 6.0, H&H 10.2 and 31.8, MCV 82, RDW 16.5.  BUN 16, creatinine 0.77.  LFTs reveal an alk phos of 160.  Sed rate is 82.      IMPRESSION:     1.  History of chronic anemia, now exacerbated by acute GI bleed requiring packed red cell transfusions, felt to be secondary to duodenitis, peptic ulcer disease.  The patient still remains anemic.  We would like to confirm that he has iron deficiency or possibly other etiologies of his anemia including MDS by obtaining a bone marrow aspiration biopsy to confirm iron deficiency for his MDS.  Given his elevated sed rate would like to rule out underlying leukemic process as well or MDS by obtaining bone marrow aspiration biopsy.  Will send it for flow cytometry.  Will also obtain serum protein electrophoresis, iron studies, B12, folate.  Will also obtain scans given his history of tobacco abuse to rule out occult malignancy including CT chest, abdomen and pelvis.     2.  History of deep venous thrombosis and pulmonary embolus.  Would like to rule out hypercoagulable state.  The patient is currently off Coumadin.  Will obtain factor V Leiden, prothrombin 2 mutation, sed rate, rheumatoid factor, AMOR, lupus anticoagulant, anticardiolipin  antibodies, beta-2 glycoprotein antibodies.        Otherwise will see the patient after the above workup.  The patient agrees and wants to proceed.       Seventy minutes were spent with the patient, greater than half the time was spent in counseling, discussing lab results, multiple etiologies of underlying anemia including MDS, iron deficiency, and we would like to obtain a bone marrow aspiration biopsy to evaluate his acute acute on chronic anemia.  We also spent time ordering thrombophilia workup to assess the role of anticoagulation in this patient.         DUY KEYES MD             D: 2018 20:32   T: 2018 06:14   MT: lisa      Name:     RADHA VALVERDE   MRN:      [id number]        Account:      [id number]   :      1940           Visit Date:   2018      Document: [id number]       cc: Gianna Richard MD

## 2018-01-04 LAB
ALBUMIN SERPL ELPH-MCNC: 3.9 G/DL (ref 3.7–5.1)
ALPHA1 GLOB SERPL ELPH-MCNC: 0.4 G/DL (ref 0.2–0.4)
ALPHA2 GLOB SERPL ELPH-MCNC: 0.9 G/DL (ref 0.5–0.9)
AT III ACT/NOR PPP CHRO: 88 % (ref 85–135)
B-GLOBULIN SERPL ELPH-MCNC: 2.1 G/DL (ref 0.6–1)
B2 GLYCOPROT1 IGG SERPL IA-ACNC: 1.2 U/ML
B2 GLYCOPROT1 IGM SERPL IA-ACNC: 1.9 U/ML
CARDIOLIPIN ANTIBODY IGG: <1.6 GPL-U/ML (ref 0–19.9)
CARDIOLIPIN ANTIBODY IGM: 0.3 MPL-U/ML (ref 0–19.9)
FOLATE SERPL-MCNC: 58.9 NG/ML
GAMMA GLOB SERPL ELPH-MCNC: 0.8 G/DL (ref 0.7–1.6)
HAPTOGLOB SERPL-MCNC: 226 MG/DL (ref 35–175)
IGA SERPL-MCNC: 170 MG/DL (ref 70–380)
IGG SERPL-MCNC: 917 MG/DL (ref 695–1620)
IGM SERPL-MCNC: 1530 MG/DL (ref 60–265)
M PROTEIN SERPL ELPH-MCNC: 1.1 G/DL
PROT PATTERN SERPL ELPH-IMP: ABNORMAL
PROT PATTERN SERPL IFE-IMP: ABNORMAL
RHEUMATOID FACT SER NEPH-ACNC: <20 IU/ML (ref 0–20)

## 2018-01-05 LAB
COPATH REPORT: NORMAL
COPATH REPORT: NORMAL
HGB A1 MFR BLD: 97.1 % (ref 95–97.9)
HGB A2 MFR BLD: 2.6 % (ref 2–3.5)
HGB C MFR BLD: 0 % (ref 0–0)
HGB E MFR BLD: 0 % (ref 0–0)
HGB F MFR BLD: 0.3 % (ref 0–2.1)
HGB FRACT BLD ELPH-IMP: NORMAL
HGB OTHER MFR BLD: 0 % (ref 0–0)
HGB S BLD QL SOLY: NORMAL
HGB S MFR BLD: 0 % (ref 0–0)
PATH INTERP BLD-IMP: NORMAL

## 2018-01-06 LAB — LA PPP-IMP: NEGATIVE

## 2018-01-08 LAB
PROT C ACT/NOR PPP CHRO: 97 % (ref 70–170)
PROT S FREE AG ACT/NOR PPP IA: 82 % (ref 70–148)

## 2018-01-10 LAB — HCYS SERPL-SCNC: 7.3 UMOL/L (ref 4–12)

## 2018-01-12 ENCOUNTER — HOSPITAL ENCOUNTER (OUTPATIENT)
Dept: CT IMAGING | Facility: HOSPITAL | Age: 78
Discharge: HOME | End: 2018-01-12
Attending: INTERNAL MEDICINE | Admitting: FAMILY MEDICINE
Payer: MEDICARE

## 2018-01-12 ENCOUNTER — OFFICE VISIT (OUTPATIENT)
Dept: FAMILY MEDICINE | Facility: OTHER | Age: 78
End: 2018-01-12
Attending: FAMILY MEDICINE
Payer: MEDICARE

## 2018-01-12 ENCOUNTER — TELEPHONE (OUTPATIENT)
Dept: FAMILY MEDICINE | Facility: OTHER | Age: 78
End: 2018-01-12

## 2018-01-12 VITALS
HEIGHT: 65 IN | BODY MASS INDEX: 27.49 KG/M2 | RESPIRATION RATE: 14 BRPM | TEMPERATURE: 98.4 F | SYSTOLIC BLOOD PRESSURE: 115 MMHG | HEART RATE: 74 BPM | OXYGEN SATURATION: 96 % | DIASTOLIC BLOOD PRESSURE: 68 MMHG | WEIGHT: 165 LBS

## 2018-01-12 DIAGNOSIS — Z86.718 HISTORY OF DVT OF LOWER EXTREMITY: ICD-10-CM

## 2018-01-12 DIAGNOSIS — E06.3 AUTOIMMUNE THYROIDITIS: ICD-10-CM

## 2018-01-12 DIAGNOSIS — Z86.711 HX PULMONARY EMBOLISM: ICD-10-CM

## 2018-01-12 DIAGNOSIS — D62 ANEMIA DUE TO BLOOD LOSS, ACUTE: ICD-10-CM

## 2018-01-12 DIAGNOSIS — Z01.818 PREOP GENERAL PHYSICAL EXAM: Primary | ICD-10-CM

## 2018-01-12 DIAGNOSIS — Z79.01 CHRONIC ANTICOAGULATION: ICD-10-CM

## 2018-01-12 LAB
HGB BLD-MCNC: 9.5 G/DL (ref 13.3–17.7)
T4 FREE SERPL-MCNC: 0.81 NG/DL (ref 0.76–1.46)
TSH SERPL DL<=0.005 MIU/L-ACNC: 6.79 MU/L (ref 0.4–4)

## 2018-01-12 PROCEDURE — 84439 ASSAY OF FREE THYROXINE: CPT | Mod: ZL | Performed by: FAMILY MEDICINE

## 2018-01-12 PROCEDURE — 25000128 H RX IP 250 OP 636: Performed by: RADIOLOGY

## 2018-01-12 PROCEDURE — 85018 HEMOGLOBIN: CPT | Mod: ZL | Performed by: FAMILY MEDICINE

## 2018-01-12 PROCEDURE — 36415 COLL VENOUS BLD VENIPUNCTURE: CPT | Mod: ZL | Performed by: FAMILY MEDICINE

## 2018-01-12 PROCEDURE — 99214 OFFICE O/P EST MOD 30 MIN: CPT | Performed by: FAMILY MEDICINE

## 2018-01-12 PROCEDURE — 84443 ASSAY THYROID STIM HORMONE: CPT | Mod: ZL | Performed by: FAMILY MEDICINE

## 2018-01-12 PROCEDURE — 74177 CT ABD & PELVIS W/CONTRAST: CPT | Mod: TC

## 2018-01-12 RX ORDER — IOPAMIDOL 612 MG/ML
100 INJECTION, SOLUTION INTRAVASCULAR ONCE
Status: COMPLETED | OUTPATIENT
Start: 2018-01-12 | End: 2018-01-12

## 2018-01-12 RX ADMIN — DIATRIZOATE MEGLUMINE AND DIATRIZOATE SODIUM 30 ML: 660; 100 SOLUTION ORAL; RECTAL at 12:12

## 2018-01-12 RX ADMIN — IOPAMIDOL 100 ML: 612 INJECTION, SOLUTION INTRAVENOUS at 12:13

## 2018-01-12 ASSESSMENT — ANXIETY QUESTIONNAIRES
2. NOT BEING ABLE TO STOP OR CONTROL WORRYING: NOT AT ALL
5. BEING SO RESTLESS THAT IT IS HARD TO SIT STILL: NOT AT ALL
7. FEELING AFRAID AS IF SOMETHING AWFUL MIGHT HAPPEN: NOT AT ALL
4. TROUBLE RELAXING: NOT AT ALL
3. WORRYING TOO MUCH ABOUT DIFFERENT THINGS: NOT AT ALL
IF YOU CHECKED OFF ANY PROBLEMS ON THIS QUESTIONNAIRE, HOW DIFFICULT HAVE THESE PROBLEMS MADE IT FOR YOU TO DO YOUR WORK, TAKE CARE OF THINGS AT HOME, OR GET ALONG WITH OTHER PEOPLE: NOT DIFFICULT AT ALL
GAD7 TOTAL SCORE: 0
6. BECOMING EASILY ANNOYED OR IRRITABLE: NOT AT ALL
1. FEELING NERVOUS, ANXIOUS, OR ON EDGE: NOT AT ALL

## 2018-01-12 ASSESSMENT — PAIN SCALES - GENERAL: PAINLEVEL: NO PAIN (0)

## 2018-01-12 ASSESSMENT — PATIENT HEALTH QUESTIONNAIRE - PHQ9: SUM OF ALL RESPONSES TO PHQ QUESTIONS 1-9: 0

## 2018-01-12 NOTE — MR AVS SNAPSHOT
After Visit Summary   1/12/2018    Rey Tristan    MRN: [id number]           Patient Information     Date Of Birth          1940        Visit Information        Provider Department      1/12/2018 10:15 AM Gianna Richard MD [facility]        Today's Diagnoses     Preop general physical exam    -  1      Care Instructions      Before Your Surgery      Call your surgeon if there is any change in your health. This includes signs of a cold or flu (such as a sore throat, runny nose, cough, rash or fever).    Do not smoke, drink alcohol or take over the counter medicine (unless your surgeon or primary care doctor tells you to) for the 24 hours before and after surgery.    If you take prescribed drugs: Follow your doctor s orders about which medicines to take and which to stop until after surgery.    Eating and drinking prior to surgery: follow the instructions from your surgeon    Take a shower or bath the night before surgery. Use the soap your surgeon gave you to gently clean your skin. If you do not have soap from your surgeon, use your regular soap. Do not shave or scrub the surgery site.  Wear clean pajamas and have clean sheets on your bed.           Follow-ups after your visit        Your next 10 appointments already scheduled     Jan 12, 2018 12:00 PM CST   (Arrive by 11:00 AM)   CT ABDOMEN PELVIS W CONTRAST with HICT1   HI CT SCAN ([facility] )    [address] [place] [id number] [phone]           Please bring any scans or X-rays taken at other hospitals, if similar tests were done. Also bring a list of your medicines, including vitamins, minerals and over-the-counter drugs. It is safest to leave personal items at home.  Be sure to tell your doctor:   If you have any allergies.   If there s any chance you are pregnant.   If you are breastfeeding.   If you have any special needs.  You may have contrast for this exam. To prepare:   Do not eat  or drink for 2 hours before your exam. If you need to take medicine, you may take it with small sips of water. (We may ask you to take liquid medicine as well.)   The day before your exam, drink extra fluids at least six 8-ounce glasses (unless your doctor tells you to restrict your fluids).  Patients over 70 or patients with diabetes or kidney problems:   If you haven t had a blood test (creatinine test) within the last 30 days, go to your clinic or Diagnostic Imaging Department for this test.  If you have diabetes:   If your kidney function is normal, continue taking your metformin (Avandamet, Glucophage, Glucovance, Metaglip) on the day of your exam.   If your kidney function is abnormal, wait 48 hours before restarting this medicine.  You will have oral contrast for this exam:   You will drink the contrast at home. Get this from your clinic or Diagnostic Imaging Department. Please follow the directions given.  Please wear loose clothing, such as a sweat suit or jogging clothes. Avoid snaps, zippers and other metal. We may ask you to undress and put on a hospital gown.  If you have any questions, please call the Imaging Department where you will have your exam.            Jan 16, 2018   Procedure with Nuno Castro MD   [facility] ([facility] )    [address] [place] [id number] [phone]            Jan 23, 2018 10:30 AM CST   (Arrive by 10:15 AM)   Return Visit with Tio Alfaro MD   [facility] ([facility] )    [address] [place] [address]   [phone]              Who to contact     If you have questions or need follow up information about today's clinic visit or your schedule please contact [facility] directly at [phone].  Normal or non-critical lab and imaging results will be communicated to you by MyChart, letter or phone within 4 business days after the clinic has received the results. If you do  "not hear from us within 7 days, please contact the clinic through [employer] or phone. If you have a critical or abnormal lab result, we will notify you by phone as soon as possible.  Submit refill requests through [employer] or call your pharmacy and they will forward the refill request to us. Please allow 3 business days for your refill to be completed.          Additional Information About Your Visit        [facility] Information     [employer] lets you send messages to your doctor, view your test results, renew your prescriptions, schedule appointments and more. To sign up, go to [email or web address] . Click on \"Log in\" on the left side of the screen, which will take you to the Welcome page. Then click on \"Sign up Now\" on the right side of the page.     You will be asked to enter the access code listed below, as well as some personal information. Please follow the directions to create your username and password.     Your access code is: [email or web address]  Expires: 3/11/2018 10:04 AM     Your access code will  in 90 days. If you need help or a new code, please call your [facility] clinic or [phone].        Care EveryWhere ID     This is your Care EveryWhere ID. This could be used by other organizations to access your [facility] medical records  [id number]        Your Vitals Were     Pulse Temperature Respirations Height Pulse Oximetry BMI (Body Mass Index)    74 98.4  F (36.9  C) (Tympanic) 14 5' 5\" (1.651 m) 96% 27.46 kg/m2       Blood Pressure from Last 3 Encounters:   18 115/68   18 105/59   17 110/52    Weight from Last 3 Encounters:   18 165 lb (74.8 kg)   18 177 lb 9.6 oz (80.6 kg)   17 167 lb 1.7 oz (75.8 kg)              Today, you had the following     No orders found for display       Primary Care Provider Office Phone # Fax #    Gianna Richard [phone]       [address]  [place] [address]        Equal Access to Services  "    NIK GRIDER : Hadii aad ku viet Garrido, waaxda luqadaha, qaybta kaalmada lenorekathrynangel, waxjamar barbara haydeonna plazamichaelchasity orellana . So [facility] [phone].    ATENCIÓN: Si habla español, tiene a dodson disposición servicios gratuitos de asistencia lingüística. Llame al [phone].    We comply with applicable federal civil rights laws and [place] laws. We do not discriminate on the basis of race, color, national origin, age, disability, sex, sexual orientation, or gender identity.            Thank you!     Thank you for choosing [facility]  for your care. Our goal is always to provide you with excellent care. Hearing back from our patients is one way we can continue to improve our services. Please take a few minutes to complete the written survey that you may receive in the mail after your visit with us. Thank you!             Your Updated Medication List - Protect others around you: Learn how to safely use, store and throw away your medicines at www.disposemymeds.org.          This list is accurate as of: 1/12/18 10:53 AM.  Always use your most recent med list.                   Brand Name Dispense Instructions for use Diagnosis    atorvastatin 10 MG tablet    LIPITOR    90 tablet    Take 1 tablet (10 mg) by mouth daily    Hyperlipidemia, unspecified hyperlipidemia type       levothyroxine 50 MCG tablet    SYNTHROID/LEVOTHROID    30 tablet    Take 1 tablet (50 mcg) by mouth daily    Autoimmune thyroiditis       Lycopene 10 MG Caps           MULTIVITAMIN ADULT PO      Take 1 tablet by mouth        omeprazole 40 MG capsule    priLOSEC    30 capsule    Take 1 capsule (40 mg) by mouth daily    Acute upper gastrointestinal bleeding, 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       vitamin D 400 UNITS tablet      Take 1,000 Units by mouth daily

## 2018-01-12 NOTE — H&P (VIEW-ONLY)
[facility] [place]  [address] [place] [address]  [phone]  Dept: [phone]    PRE-OP EVALUATION:  Today's date: 2018    Rey Tristan (: 1940) presents for pre-operative evaluation assessment as requested by Dr. Castro.  He requires evaluation and anesthesia risk assessment prior to undergoing surgery/procedure for evaluation of chronic anemia.  Proposed procedure: Bone Marrow Biopsy    Date of Surgery/ Procedure: 18  Time of Surgery/ Procedure: TBD-9:30  Hospital/Surgical Facility: [facility]  Primary Physician: Gianna Richard  Type of Anesthesia Anticipated: to be determined    Patient has a Health Care Directive or Living Will:  NO    1. NO - Do you have a history of heart attack, stroke, stent, bypass or surgery on an artery in the head, neck, heart or legs?  2. NO - Do you ever have any pain or discomfort in your chest?  3. NO - Do you have a history of  Heart Failure?  4. YES - ARE YOUR TROUBLED BY SHORTNESS OF BREATH WHEN WALKING ON THE LEVEL, UP A SLIGHT HILL OR AT NIGHT? Patient reports having COPD  5. NO - Do you currently have a cold, bronchitis or other respiratory infection?  6. NO - Do you have a cough, shortness of breath or wheezing?  7. NO - Do you sometimes get pains in the calves of your legs when you walk?  8. YES - DO YOU OR ANYONE IN YOUR FAMILY HAVE PREVIOUS HISTORY OF BLOOD CLOTS?  Prior DVT, PE  9. YES - DO YOU OR DOES ANYONE IN YOUR FAMILY HAVE A SERIOUS BLEEDING PROBLEM SUCH AS PROLONGED BLEEDING FOLLOWING SURGERIES OR CUTS? Recent GI bleed; gastric ulcer  10. YES - HAVE YOU EVER HAD PROBLEMS WITH ANEMIA OR BEEN TOLD TO TAKE IRON PILLS? Is anemic but not currently taking any iron  11. YES - HAVE YOU HAD ANY ABNORMAL BLOOD LOSS SUCH AS BLACK, TARRY OR BLOODY STOOLS, OR ABNORMAL VAGINAL BLEEDING? GI bleed 17  12. YES - HAVE YOU EVER HAD A BLOOD TRANSFUSION? 17  13. NO - Have you or any of your relatives ever had problems with  anesthesia?  14. YES - DO YOU HAVE SLEEP APNEA, EXCESSIVE SNORING OR DAYTIME DROWSINESS? Patient reports snoring only  15. NO - Do you have any prosthetic heart valves?  16. NO - Do you have prosthetic joints?  17. NO - Is there any chance that you may be pregnant?        HPI:                                                      Brief HPI related to upcoming procedure: patient with complicated recent medical history.  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  He has had a recurrent GI bleed, requiring transfusion.  Did see Dr. Alfaro in consultation, and bone marrow biopsy has been recommended as part of his anemia evaluation.  He is also undergoing hypercoaguable evaluation.    HYPERLIPIDEMIA - Patient has a long history of significant Hyperlipidemia requiring medication for treatment with recent good control. Patient reports no problems or side effects with the medication.                                                                                                                                                         COPD - Patient is a former smoker and reports COPD.  No active symptoms or treatment.                                                                                                   HYPOTHYROIDISM - Patient has recent disgnosis of autoimmune thyroiditis.  Endocrinology recommends following labs and monitoring for hypothyroidism.  Currently, has remained in subclinical hypothyroidism status.                                                                                               MEDICAL HISTORY:                                                    Patient Active Problem List    Diagnosis Date Noted     Acute upper gastrointestinal bleeding 11/30/2017     Priority: Medium     Syncope, unspecified syncope type 10/20/2017     Priority: Medium     Bradycardia 10/20/2017     Priority: Medium     Autoimmune thyroiditis 09/01/2017     Priority: Medium     Dr. Simeon;  silent; transient hyperthyroid, now normal; monitor TSH monthly for hypothyroidism       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 08/23/2016     Priority: Medium     PUD (peptic ulcer disease) 08/23/2016     Priority: Medium     Long-term (current) use of anticoagulants [Z79.01] 07/27/2016     Priority: Medium     Elevated serum alkaline phosphatase level 05/04/2016     Priority: Medium     Normocytic anemia 05/04/2016     Priority: Medium     Colonoscopy refused 04/28/2016     Priority: Medium     Patient refused colonoscopy given occult stool kit         ACP (advance care planning) 04/28/2016     Priority: Medium     Advance Care Planning 4/28/2016: ACP Review of Chart / Resources Provided:  Reviewed chart for advance care plan.  Rey ELLISON Kun has been provided information and resources to begin or update their advance care plan.  Added by Jayleen Fleming             Hyperlipidemia      Priority: Medium     Hx pulmonary embolism 05/03/2013     Priority: Medium     History of DVT of lower extremity 04/19/2013     Priority: Medium     Advanced care planning/counseling discussion 11/27/2012     Priority: Medium     Colitis due to Clostridium difficile 05/19/2011     Priority: Medium     History of tobacco use 05/17/2011     Priority: Medium     Hyponatremia 05/17/2011     Priority: Medium     Deep vein thrombosis (DVT) of lower extremity (H) 05/17/2011     Priority: Medium     Vitamin D deficiency 05/17/2011     Priority: Medium      Past Medical History:   Diagnosis Date     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 Simeon; silent; transient hyperthyroid, now normal; monitor TSH monthly for hypothyroidism     Chronic anticoagulation 1/1/2012     Elevated serum alkaline phosphatase level 5/4/2016    normal GGT, normal bone skeletal survery     Gastric ulcer     endoscopy 6/2016, repeat 6 months; PPI Carafate     Hyperlipidemia      Normocytic anemia 5/4/2016     Pulmonary nodule     CT 5/2014, right; consider repeat  1 year if high risk     Upper GI bleed 11/30/2017     Vitamin D deficiency 1/1/2012     Past Surgical History:   Procedure Laterality Date     ANGIOGRAM  6/23/2014     C REGULAR ECHO (FL)  6/23/2014    Dr. Cavazos     C. Difficile with intussuseption       cataract extraction       colonoscopy       COLONOSCOPY  6/17     ENDOSCOPY UPPER, COLONOSCOPY, COMBINED N/A 6/17/2016    Procedure: COMBINED ENDOSCOPY UPPER, COLONOSCOPY;  Surgeon: Andrea Stearns DO;  Location: [facility] OR     ESOPHAGOSCOPY, GASTROSCOPY, DUODENOSCOPY (EGD), COMBINED N/A 11/30/2017    Procedure: COMBINED ESOPHAGOSCOPY, GASTROSCOPY, DUODENOSCOPY (EGD);  UPPER ENDOSCOPY;  Surgeon: Narinder Torres MD;  Location: [facility] OR     left knee meniscal tear       Left lower extremity DVT       lens implant       nasal polypectomy       Pulmonary Embolism       Current Outpatient Prescriptions   Medication Sig Dispense Refill     omeprazole (PRILOSEC) 40 MG capsule Take 1 capsule (40 mg) by mouth daily 30 capsule 1     atorvastatin (LIPITOR) 10 MG tablet Take 1 tablet (10 mg) by mouth daily 90 tablet 3     Lycopene 10 MG CAPS        Multiple Vitamins-Minerals (MULTIVITAMIN ADULT PO) Take 1 tablet by mouth       Cholecalciferol (VITAMIN D) 400 UNITS tablet Take 1,000 Units by mouth daily        levothyroxine (SYNTHROID/LEVOTHROID) 50 MCG tablet Take 1 tablet (50 mcg) by mouth daily (Patient not taking: Reported on 1/12/2018) 30 tablet 1     OTC products: None, except as noted above    No Known Allergies   Latex Allergy: NO    Social History   Substance Use Topics     Smoking status: Former Smoker     Types: Cigarettes     Quit date: 10/28/2009     Smokeless tobacco: Never Used      Comment: Quit 2009     Alcohol use No     History   Drug Use No       REVIEW OF SYSTEMS:                                                    C: NEGATIVE for fever, chills, change in weight  I: NEGATIVE for worrisome rashes, moles or lesions  E: NEGATIVE for vision changes or  "irritation  E/M: NEGATIVE for ear, mouth and throat problems  R: NEGATIVE for significant cough or SOB  B: NEGATIVE for masses, tenderness or discharge  CV: NEGATIVE for chest pain, palpitations or peripheral edema  GI: NEGATIVE for nausea, abdominal pain, heartburn, or change in bowel habits  : NEGATIVE for frequency, dysuria, or hematuria  M: NEGATIVE for significant arthralgias or myalgia  N: NEGATIVE for weakness, dizziness or paresthesias  E: NEGATIVE for temperature intolerance, skin/hair changes  HEME/ALLERGY/IMMUNE: POSITIVE  for anemia  P: NEGATIVE for changes in mood or affect    EXAM:                                                    /68 (BP Location: Left arm, Patient Position: Sitting, Cuff Size: Adult Large)  Pulse 74  Temp 98.4  F (36.9  C) (Tympanic)  Resp 14  Ht 5' 5\" (1.651 m)  Wt 165 lb (74.8 kg)  SpO2 96%  BMI 27.46 kg/m2    GENERAL APPEARANCE: healthy, alert and no distress     EYES: EOMI,  PERRL     HENT: ear canals and TM's normal and nose and mouth without ulcers or lesions     HENT: dentures     NECK: no adenopathy, no asymmetry, masses, or scars and thyroid normal to palpation     RESP: lungs clear to auscultation - no rales, rhonchi or wheezes     CV: regular rates and rhythm, normal S1 S2, no S3 or S4 and no murmur, click or rub     ABDOMEN:  soft, nontender, no HSM or masses and bowel sounds normal     MS: extremities normal- no gross deformities noted, no evidence of inflammation in joints, FROM in all extremities.     SKIN: dry; few small patches of pink/erythema, lower legs     NEURO: Normal strength and tone, sensory exam grossly normal, mentation intact and speech normal     PSYCH: mentation appears normal. and affect normal/bright     LYMPHATICS: No axillary, cervical, or supraclavicular nodes    DIAGNOSTICS:                                                    EKG: appears normal, NSR, normal axis, normal intervals, no acute ST/T changes c/w ischemia, no LVH by " voltage criteria, unchanged from previous tracings, and this EKG was done 11/30/17    Results for orders placed or performed in visit on 01/12/18 (from the past 24 hour(s))   Hemoglobin   Result Value Ref Range    Hemoglobin 9.5 (L) 13.3 - 17.7 g/dL   TSH   Result Value Ref Range    TSH 6.79 (H) 0.40 - 4.00 mU/L   T4 free   Result Value Ref Range    T4 Free 0.81 0.76 - 1.46 ng/dL       Recent Labs   Lab Test  01/12/18   0922  01/02/18   1248  12/08/17   0857  12/01/17   0515   11/30/17   1115   HGB  9.5*  10.2*  10.1*  8.1*   < >  6.5*   PLT   --   231  223  109*   --    --    INR   --    --    --   1.32*   --   1.72*   NA   --   138   --   142   --    --    POTASSIUM   --   4.0   --   3.9   --    --    CR   --   0.77   --   0.70   --    --     < > = values in this interval not displayed.      IMPRESSION:                                                    Reason for surgery/procedure: anemia, chronic; bone marrow biopsy  Diagnosis/reason for consult: cardiopulmonary clearance    The proposed surgical procedure is considered INTERMEDIATE risk.    REVISED CARDIAC RISK INDEX  The patient has the following serious cardiovascular risks for perioperative complications such as (MI, PE, VFib and 3  AV Block):  No serious cardiac risks  INTERPRETATION: 0 risks: Class I (very low risk - 0.4% complication rate)    The patient has the following additional risks for perioperative complications:    Significant bleeding history - as discussed above        ICD-10-CM    1. Preop general physical exam Z01.818        RECOMMENDATIONS:                                                        --Patient is to take all scheduled medications on the day of surgery EXCEPT for modifications listed below.  No Aspirin, NSAIDs, anticoagulation - all on hold.    APPROVAL GIVEN to proceed with proposed procedure, without further diagnostic evaluation     Declines flu shot.  Defers PCV13 to after procedure.    Signed Electronically by: Gianna DA SILVA  MD Mike    Copy of this evaluation report is provided to requesting physician.    [facility] Preop Guidelines

## 2018-01-12 NOTE — NURSING NOTE
"Chief Complaint   Patient presents with     Pre-Op Exam     Bone Marrow biopsy  1/16/18       Initial /68 (BP Location: Left arm, Patient Position: Sitting, Cuff Size: Adult Large)  Pulse 74  Temp 98.4  F (36.9  C) (Tympanic)  Resp 14  Ht 5' 5\" (1.651 m)  Wt 165 lb (74.8 kg)  SpO2 96%  BMI 27.46 kg/m2 Estimated body mass index is 27.46 kg/(m^2) as calculated from the following:    Height as of this encounter: 5' 5\" (1.651 m).    Weight as of this encounter: 165 lb (74.8 kg).  Medication Reconciliation: complete     Ritika Sosa      "

## 2018-01-12 NOTE — PROGRESS NOTES
[facility] [place]  [address] [place] [address]  [phone]  Dept: [phone]    PRE-OP EVALUATION:  Today's date: 2018    Rey Tristan (: 1940) presents for pre-operative evaluation assessment as requested by Dr. Castro.  He requires evaluation and anesthesia risk assessment prior to undergoing surgery/procedure for evaluation of chronic anemia.  Proposed procedure: Bone Marrow Biopsy    Date of Surgery/ Procedure: 18  Time of Surgery/ Procedure: TBD-9:30  [place] [facility] [place] Physician: Gianna Richard  Type of Anesthesia Anticipated: to be determined    Patient has a Health Care Directive or Living Will:  NO    1. NO - Do you have a history of heart attack, stroke, stent, bypass or surgery on an artery in the head, neck, heart or legs?  2. NO - Do you ever have any pain or discomfort in your chest?  3. NO - Do you have a history of  Heart Failure?  4. YES - ARE YOUR TROUBLED BY SHORTNESS OF BREATH WHEN WALKING ON THE LEVEL, UP A SLIGHT HILL OR AT NIGHT? Patient reports having COPD  5. NO - Do you currently have a cold, bronchitis or other respiratory infection?  6. NO - Do you have a cough, shortness of breath or wheezing?  7. NO - Do you sometimes get pains in the calves of your legs when you walk?  8. YES - DO YOU OR ANYONE IN YOUR FAMILY HAVE PREVIOUS HISTORY OF BLOOD CLOTS?  Prior DVT, PE  9. YES - DO YOU OR DOES ANYONE IN YOUR FAMILY HAVE A SERIOUS BLEEDING PROBLEM SUCH AS PROLONGED BLEEDING FOLLOWING SURGERIES OR CUTS? Recent GI bleed; gastric ulcer  10. YES - HAVE YOU EVER HAD PROBLEMS WITH ANEMIA OR BEEN TOLD TO TAKE IRON PILLS? Is anemic but not currently taking any iron  11. YES - HAVE YOU HAD ANY ABNORMAL BLOOD LOSS SUCH AS BLACK, TARRY OR BLOODY STOOLS, OR ABNORMAL VAGINAL BLEEDING? GI bleed 17  12. YES - HAVE YOU EVER HAD A BLOOD TRANSFUSION? 17  13. NO - Have you or any of your relatives ever had problems with  anesthesia?  14. YES - DO YOU HAVE SLEEP APNEA, EXCESSIVE SNORING OR DAYTIME DROWSINESS? Patient reports snoring only  15. NO - Do you have any prosthetic heart valves?  16. NO - Do you have prosthetic joints?  17. NO - Is there any chance that you may be pregnant?        HPI:                                                      Brief HPI related to upcoming procedure: patient with complicated recent medical history.  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  He has had a recurrent GI bleed, requiring transfusion.  Did see Dr. Alfaro in consultation, and bone marrow biopsy has been recommended as part of his anemia evaluation.  He is also undergoing hypercoaguable evaluation.    HYPERLIPIDEMIA - Patient has a long history of significant Hyperlipidemia requiring medication for treatment with recent good control. Patient reports no problems or side effects with the medication.                                                                                                                                                         COPD - Patient is a former smoker and reports COPD.  No active symptoms or treatment.                                                                                                   HYPOTHYROIDISM - Patient has recent disgnosis of autoimmune thyroiditis.  Endocrinology recommends following labs and monitoring for hypothyroidism.  Currently, has remained in subclinical hypothyroidism status.                                                                                               MEDICAL HISTORY:                                                    Patient Active Problem List    Diagnosis Date Noted     Acute upper gastrointestinal bleeding 11/30/2017     Priority: Medium     Syncope, unspecified syncope type 10/20/2017     Priority: Medium     Bradycardia 10/20/2017     Priority: Medium     Autoimmune thyroiditis 09/01/2017     Priority: Medium     Dr. Simeon;  silent; transient hyperthyroid, now normal; monitor TSH monthly for hypothyroidism       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 08/23/2016     Priority: Medium     PUD (peptic ulcer disease) 08/23/2016     Priority: Medium     Long-term (current) use of anticoagulants [Z79.01] 07/27/2016     Priority: Medium     Elevated serum alkaline phosphatase level 05/04/2016     Priority: Medium     Normocytic anemia 05/04/2016     Priority: Medium     Colonoscopy refused 04/28/2016     Priority: Medium     Patient refused colonoscopy given occult stool kit         ACP (advance care planning) 04/28/2016     Priority: Medium     Advance Care Planning 4/28/2016: ACP Review of Chart / Resources Provided:  Reviewed chart for advance care plan.  Rey ELLISON Kun has been provided information and resources to begin or update their advance care plan.  Added by Jayleen Fleming             Hyperlipidemia      Priority: Medium     Hx pulmonary embolism 05/03/2013     Priority: Medium     History of DVT of lower extremity 04/19/2013     Priority: Medium     Advanced care planning/counseling discussion 11/27/2012     Priority: Medium     Colitis due to Clostridium difficile 05/19/2011     Priority: Medium     History of tobacco use 05/17/2011     Priority: Medium     Hyponatremia 05/17/2011     Priority: Medium     Deep vein thrombosis (DVT) of lower extremity (H) 05/17/2011     Priority: Medium     Vitamin D deficiency 05/17/2011     Priority: Medium      Past Medical History:   Diagnosis Date     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 Simeon; silent; transient hyperthyroid, now normal; monitor TSH monthly for hypothyroidism     Chronic anticoagulation 1/1/2012     Elevated serum alkaline phosphatase level 5/4/2016    normal GGT, normal bone skeletal survery     Gastric ulcer     endoscopy 6/2016, repeat 6 months; PPI Carafate     Hyperlipidemia      Normocytic anemia 5/4/2016     Pulmonary nodule     CT 5/2014, right; consider repeat  1 year if high risk     Upper GI bleed 11/30/2017     Vitamin D deficiency 1/1/2012     Past Surgical History:   Procedure Laterality Date     ANGIOGRAM  6/23/2014     C REGULAR ECHO (FL)  6/23/2014    Dr. Cavazos     C. Difficile with intussuseption       cataract extraction       colonoscopy       COLONOSCOPY  6/17     ENDOSCOPY UPPER, COLONOSCOPY, COMBINED N/A 6/17/2016    Procedure: COMBINED ENDOSCOPY UPPER, COLONOSCOPY;  Surgeon: Andrea Stearns DO;  Location: [facility] OR     ESOPHAGOSCOPY, GASTROSCOPY, DUODENOSCOPY (EGD), COMBINED N/A 11/30/2017    Procedure: COMBINED ESOPHAGOSCOPY, GASTROSCOPY, DUODENOSCOPY (EGD);  UPPER ENDOSCOPY;  Surgeon: Narinder Torres MD;  Location: [facility] OR     left knee meniscal tear       Left lower extremity DVT       lens implant       nasal polypectomy       Pulmonary Embolism       Current Outpatient Prescriptions   Medication Sig Dispense Refill     omeprazole (PRILOSEC) 40 MG capsule Take 1 capsule (40 mg) by mouth daily 30 capsule 1     atorvastatin (LIPITOR) 10 MG tablet Take 1 tablet (10 mg) by mouth daily 90 tablet 3     Lycopene 10 MG CAPS        Multiple Vitamins-Minerals (MULTIVITAMIN ADULT PO) Take 1 tablet by mouth       Cholecalciferol (VITAMIN D) 400 UNITS tablet Take 1,000 Units by mouth daily        levothyroxine (SYNTHROID/LEVOTHROID) 50 MCG tablet Take 1 tablet (50 mcg) by mouth daily (Patient not taking: Reported on 1/12/2018) 30 tablet 1     OTC products: None, except as noted above    No Known Allergies   Latex Allergy: NO    Social History   Substance Use Topics     Smoking status: Former Smoker     Types: Cigarettes     Quit date: 10/28/2009     Smokeless tobacco: Never Used      Comment: Quit 2009     Alcohol use No     History   Drug Use No       REVIEW OF SYSTEMS:                                                    C: NEGATIVE for fever, chills, change in weight  I: NEGATIVE for worrisome rashes, moles or lesions  E: NEGATIVE for vision changes or  "irritation  E/M: NEGATIVE for ear, mouth and throat problems  R: NEGATIVE for significant cough or SOB  B: NEGATIVE for masses, tenderness or discharge  CV: NEGATIVE for chest pain, palpitations or peripheral edema  GI: NEGATIVE for nausea, abdominal pain, heartburn, or change in bowel habits  : NEGATIVE for frequency, dysuria, or hematuria  M: NEGATIVE for significant arthralgias or myalgia  N: NEGATIVE for weakness, dizziness or paresthesias  E: NEGATIVE for temperature intolerance, skin/hair changes  HEME/ALLERGY/IMMUNE: POSITIVE  for anemia  P: NEGATIVE for changes in mood or affect    EXAM:                                                    /68 (BP Location: Left arm, Patient Position: Sitting, Cuff Size: Adult Large)  Pulse 74  Temp 98.4  F (36.9  C) (Tympanic)  Resp 14  Ht 5' 5\" (1.651 m)  Wt 165 lb (74.8 kg)  SpO2 96%  BMI 27.46 kg/m2    GENERAL APPEARANCE: healthy, alert and no distress     EYES: EOMI,  PERRL     HENT: ear canals and TM's normal and nose and mouth without ulcers or lesions     HENT: dentures     NECK: no adenopathy, no asymmetry, masses, or scars and thyroid normal to palpation     RESP: lungs clear to auscultation - no rales, rhonchi or wheezes     CV: regular rates and rhythm, normal S1 S2, no S3 or S4 and no murmur, click or rub     ABDOMEN:  soft, nontender, no HSM or masses and bowel sounds normal     MS: extremities normal- no gross deformities noted, no evidence of inflammation in joints, FROM in all extremities.     SKIN: dry; few small patches of pink/erythema, lower legs     NEURO: Normal strength and tone, sensory exam grossly normal, mentation intact and speech normal     PSYCH: mentation appears normal. and affect normal/bright     LYMPHATICS: No axillary, cervical, or supraclavicular nodes    DIAGNOSTICS:                                                    EKG: appears normal, NSR, normal axis, normal intervals, no acute ST/T changes c/w ischemia, no LVH by " voltage criteria, unchanged from previous tracings, and this EKG was done 11/30/17    Results for orders placed or performed in visit on 01/12/18 (from the past 24 hour(s))   Hemoglobin   Result Value Ref Range    Hemoglobin 9.5 (L) 13.3 - 17.7 g/dL   TSH   Result Value Ref Range    TSH 6.79 (H) 0.40 - 4.00 mU/L   T4 free   Result Value Ref Range    T4 Free 0.81 0.76 - 1.46 ng/dL       Recent Labs   Lab Test  01/12/18   0922  01/02/18   1248  12/08/17   0857  12/01/17   0515   11/30/17   1115   HGB  9.5*  10.2*  10.1*  8.1*   < >  6.5*   PLT   --   231  223  109*   --    --    INR   --    --    --   1.32*   --   1.72*   NA   --   138   --   142   --    --    POTASSIUM   --   4.0   --   3.9   --    --    CR   --   0.77   --   0.70   --    --     < > = values in this interval not displayed.      IMPRESSION:                                                    Reason for surgery/procedure: anemia, chronic; bone marrow biopsy  Diagnosis/reason for consult: cardiopulmonary clearance    The proposed surgical procedure is considered INTERMEDIATE risk.    REVISED CARDIAC RISK INDEX  The patient has the following serious cardiovascular risks for perioperative complications such as (MI, PE, VFib and 3  AV Block):  No serious cardiac risks  INTERPRETATION: 0 risks: Class I (very low risk - 0.4% complication rate)    The patient has the following additional risks for perioperative complications:    Significant bleeding history - as discussed above        ICD-10-CM    1. Preop general physical exam Z01.818        RECOMMENDATIONS:                                                        --Patient is to take all scheduled medications on the day of surgery EXCEPT for modifications listed below.  No Aspirin, NSAIDs, anticoagulation - all on hold.    APPROVAL GIVEN to proceed with proposed procedure, without further diagnostic evaluation     Declines flu shot.  Defers PCV13 to after procedure.    Signed Electronically by: Gianna DA SILVA  MD Mike    Copy of this evaluation report is provided to requesting physician.    [facility] Preop Guidelines

## 2018-01-13 ASSESSMENT — ANXIETY QUESTIONNAIRES: GAD7 TOTAL SCORE: 0

## 2018-01-15 ENCOUNTER — ANESTHESIA EVENT (OUTPATIENT)
Dept: SURGERY | Facility: HOSPITAL | Age: 78
End: 2018-01-15
Payer: MEDICARE

## 2018-01-15 ASSESSMENT — COPD QUESTIONNAIRES: COPD: 1

## 2018-01-15 ASSESSMENT — LIFESTYLE VARIABLES: TOBACCO_USE: 1

## 2018-01-15 NOTE — ANESTHESIA PREPROCEDURE EVALUATION
Anesthesia Evaluation     . Pt has had prior anesthetic. Type: General and MAC    No history of anesthetic complications          ROS/MED HX    ENT/Pulmonary:     (+)[place] risk factors snores loudly, tobacco use, Past use COPD, , . .    Neurologic:  - neg neurologic ROS   (+)other neuro Hx syncope    Cardiovascular:     (+) Dyslipidemia, ----. : . . BATISTA, . :. . Previous cardiac testing date:results:date: results:ECG reviewed date:11/30/17 results:NSR date: results:          METS/Exercise Tolerance:     Hematologic:     (+) History of blood clots pt is anticoagulated, Anemia, History of Transfusion no previous transfusion reaction -      Musculoskeletal:  - neg musculoskeletal ROS       GI/Hepatic:     (+) GERD Asymptomatic on medication, Other GI/Hepatic PUD, Barrets     Liver disease: PUD.   Renal/Genitourinary:  - ROS Renal section negative       Endo:     (+) thyroid problem hypothyroidism, .      Psychiatric:  - neg psychiatric ROS       Infectious Disease:   (+) Other Infectious Disease hx C Diff      Malignancy:      - no malignancy   Other:    - neg other ROS                 Physical Exam  Normal systems: cardiovascular and pulmonary    Airway   Mallampati: II  TM distance: >3 FB  Neck ROM: full    Dental   (+) upper dentures and lower dentures    Cardiovascular   Rhythm and rate: regular and normal      Pulmonary    breath sounds clear to auscultation                    Anesthesia Plan      History & Physical Review  History and physical reviewed and following examination; no interval change.    ASA Status:  3 .        Plan for MAC with Intravenous and Propofol induction. Maintenance will be TIVA.  Reason for MAC:  Chronic cardiopulmonary disease (G9) and Deep or markedly invasive procedure (G8)         Postoperative Care      Consents  Anesthetic plan, risks, benefits and alternatives discussed with:  Patient..                          .

## 2018-01-16 ENCOUNTER — ANESTHESIA (OUTPATIENT)
Dept: SURGERY | Facility: HOSPITAL | Age: 78
End: 2018-01-16
Payer: MEDICARE

## 2018-01-16 ENCOUNTER — HOSPITAL ENCOUNTER (OUTPATIENT)
Facility: HOSPITAL | Age: 78
Discharge: HOME | End: 2018-01-16
Attending: PATHOLOGY | Admitting: PATHOLOGY
Payer: MEDICARE

## 2018-01-16 VITALS
WEIGHT: 168 LBS | HEIGHT: 65 IN | OXYGEN SATURATION: 95 % | SYSTOLIC BLOOD PRESSURE: 129 MMHG | RESPIRATION RATE: 16 BRPM | BODY MASS INDEX: 27.99 KG/M2 | TEMPERATURE: 97.1 F | DIASTOLIC BLOOD PRESSURE: 78 MMHG

## 2018-01-16 DIAGNOSIS — D62 ANEMIA DUE TO BLOOD LOSS, ACUTE: ICD-10-CM

## 2018-01-16 DIAGNOSIS — Z79.01 CHRONIC ANTICOAGULATION: ICD-10-CM

## 2018-01-16 DIAGNOSIS — Z86.718 HISTORY OF DVT OF LOWER EXTREMITY: ICD-10-CM

## 2018-01-16 DIAGNOSIS — Z86.711 HX PULMONARY EMBOLISM: ICD-10-CM

## 2018-01-16 LAB
BASOPHILS # BLD AUTO: 0.1 10E9/L (ref 0–0.2)
BASOPHILS NFR BLD AUTO: 1.1 %
DIFFERENTIAL METHOD BLD: ABNORMAL
EOSINOPHIL # BLD AUTO: 0.1 10E9/L (ref 0–0.7)
EOSINOPHIL NFR BLD AUTO: 2.5 %
ERYTHROCYTE [DISTWIDTH] IN BLOOD BY AUTOMATED COUNT: 16.5 % (ref 10–15)
HCT VFR BLD AUTO: 31.3 % (ref 40–53)
HGB BLD-MCNC: 9.8 G/DL (ref 13.3–17.7)
IMM GRANULOCYTES # BLD: 0 10E9/L (ref 0–0.4)
IMM GRANULOCYTES NFR BLD: 0.2 %
LYMPHOCYTES # BLD AUTO: 0.8 10E9/L (ref 0.8–5.3)
LYMPHOCYTES NFR BLD AUTO: 17.1 %
MCH RBC QN AUTO: 25.3 PG (ref 26.5–33)
MCHC RBC AUTO-ENTMCNC: 31.3 G/DL (ref 31.5–36.5)
MCV RBC AUTO: 81 FL (ref 78–100)
MONOCYTES # BLD AUTO: 0.5 10E9/L (ref 0–1.3)
MONOCYTES NFR BLD AUTO: 10.9 %
NEUTROPHILS # BLD AUTO: 3 10E9/L (ref 1.6–8.3)
NEUTROPHILS NFR BLD AUTO: 68.2 %
NRBC # BLD AUTO: 0 10*3/UL
NRBC BLD AUTO-RTO: 0 /100
PLATELET # BLD AUTO: 199 10E9/L (ref 150–450)
RBC # BLD AUTO: 3.88 10E12/L (ref 4.4–5.9)
RETICS # AUTO: 31.7 10E9/L (ref 25–95)
RETICS/RBC NFR AUTO: 0.8 % (ref 0.5–2)
WBC # BLD AUTO: 4.4 10E9/L (ref 4–11)

## 2018-01-16 PROCEDURE — 88184 FLOWCYTOMETRY/ TC 1 MARKER: CPT | Performed by: PATHOLOGY

## 2018-01-16 PROCEDURE — 25000125 ZZHC RX 250: Performed by: PATHOLOGY

## 2018-01-16 PROCEDURE — 88185 FLOWCYTOMETRY/TC ADD-ON: CPT | Performed by: PATHOLOGY

## 2018-01-16 PROCEDURE — 88341 IMHCHEM/IMCYTCHM EA ADD ANTB: CPT | Mod: TC | Performed by: INTERNAL MEDICINE

## 2018-01-16 PROCEDURE — 88311 DECALCIFY TISSUE: CPT | Mod: TC | Performed by: INTERNAL MEDICINE

## 2018-01-16 PROCEDURE — 25000128 H RX IP 250 OP 636: Performed by: NURSE ANESTHETIST, CERTIFIED REGISTERED

## 2018-01-16 PROCEDURE — 85025 COMPLETE CBC W/AUTO DIFF WBC: CPT | Performed by: PATHOLOGY

## 2018-01-16 PROCEDURE — 88342 IMHCHEM/IMCYTCHM 1ST ANTB: CPT | Mod: TC,59 | Performed by: INTERNAL MEDICINE

## 2018-01-16 PROCEDURE — 40001005 ZZHCL STATISTIC FLOW >15 ABY TC 88189: Performed by: PATHOLOGY

## 2018-01-16 PROCEDURE — 85045 AUTOMATED RETICULOCYTE COUNT: CPT | Performed by: PATHOLOGY

## 2018-01-16 PROCEDURE — 88305 TISSUE EXAM BY PATHOLOGIST: CPT | Mod: TC | Performed by: INTERNAL MEDICINE

## 2018-01-16 PROCEDURE — 40000948 ZZHCL STATISTIC BONE MARROW ASP TC 85097: Performed by: INTERNAL MEDICINE

## 2018-01-16 PROCEDURE — 37000008 ZZH ANESTHESIA TECHNICAL FEE, 1ST 30 MIN: Performed by: PATHOLOGY

## 2018-01-16 PROCEDURE — 88161 CYTOPATH SMEAR OTHER SOURCE: CPT | Mod: TC | Performed by: INTERNAL MEDICINE

## 2018-01-16 PROCEDURE — 36000050 ZZH SURGERY LEVEL 2 1ST 30 MIN: Performed by: PATHOLOGY

## 2018-01-16 PROCEDURE — 88313 SPECIAL STAINS GROUP 2: CPT | Mod: TC | Performed by: INTERNAL MEDICINE

## 2018-01-16 PROCEDURE — 27210794 ZZH OR GENERAL SUPPLY STERILE: Performed by: PATHOLOGY

## 2018-01-16 PROCEDURE — 40000305 ZZH STATISTIC PRE PROC ASSESS I: Performed by: PATHOLOGY

## 2018-01-16 PROCEDURE — 36415 COLL VENOUS BLD VENIPUNCTURE: CPT | Performed by: PATHOLOGY

## 2018-01-16 PROCEDURE — 71000027 ZZH RECOVERY PHASE 2 EACH 15 MINS: Performed by: PATHOLOGY

## 2018-01-16 PROCEDURE — 99100 ANES PT EXTEME AGE<1 YR&>70: CPT | Performed by: NURSE ANESTHETIST, CERTIFIED REGISTERED

## 2018-01-16 PROCEDURE — 40000847 ZZHCL STATISTIC MORPHOLOGY W/INTERP HISTOLOGY TC 85060: Performed by: INTERNAL MEDICINE

## 2018-01-16 PROCEDURE — 38221 DX BONE MARROW BIOPSIES: CPT | Performed by: NURSE ANESTHETIST, CERTIFIED REGISTERED

## 2018-01-16 RX ORDER — SODIUM CHLORIDE, SODIUM LACTATE, POTASSIUM CHLORIDE, CALCIUM CHLORIDE 600; 310; 30; 20 MG/100ML; MG/100ML; MG/100ML; MG/100ML
INJECTION, SOLUTION INTRAVENOUS CONTINUOUS PRN
Status: DISCONTINUED | OUTPATIENT
Start: 2018-01-16 | End: 2018-01-16

## 2018-01-16 RX ORDER — DEXAMETHASONE SODIUM PHOSPHATE 4 MG/ML
4 INJECTION, SOLUTION INTRA-ARTICULAR; INTRALESIONAL; INTRAMUSCULAR; INTRAVENOUS; SOFT TISSUE EVERY 10 MIN PRN
Status: DISCONTINUED | OUTPATIENT
Start: 2018-01-16 | End: 2018-01-16 | Stop reason: HOSPADM

## 2018-01-16 RX ORDER — LIDOCAINE HYDROCHLORIDE 10 MG/ML
INJECTION, SOLUTION EPIDURAL; INFILTRATION; INTRACAUDAL; PERINEURAL
Status: DISCONTINUED
Start: 2018-01-16 | End: 2018-01-17 | Stop reason: WASHOUT

## 2018-01-16 RX ORDER — ONDANSETRON 4 MG/1
4 TABLET, ORALLY DISINTEGRATING ORAL EVERY 30 MIN PRN
Status: DISCONTINUED | OUTPATIENT
Start: 2018-01-16 | End: 2018-01-16 | Stop reason: HOSPADM

## 2018-01-16 RX ORDER — FENTANYL CITRATE 50 UG/ML
25-50 INJECTION, SOLUTION INTRAMUSCULAR; INTRAVENOUS
Status: DISCONTINUED | OUTPATIENT
Start: 2018-01-16 | End: 2018-01-16 | Stop reason: HOSPADM

## 2018-01-16 RX ORDER — FENTANYL CITRATE 50 UG/ML
INJECTION, SOLUTION INTRAMUSCULAR; INTRAVENOUS PRN
Status: DISCONTINUED | OUTPATIENT
Start: 2018-01-16 | End: 2018-01-16

## 2018-01-16 RX ORDER — NALOXONE HYDROCHLORIDE 0.4 MG/ML
.1-.4 INJECTION, SOLUTION INTRAMUSCULAR; INTRAVENOUS; SUBCUTANEOUS
Status: DISCONTINUED | OUTPATIENT
Start: 2018-01-16 | End: 2018-01-16 | Stop reason: HOSPADM

## 2018-01-16 RX ORDER — LIDOCAINE HYDROCHLORIDE 10 MG/ML
8-10 INJECTION, SOLUTION EPIDURAL; INFILTRATION; INTRACAUDAL; PERINEURAL
Status: DISCONTINUED | OUTPATIENT
Start: 2018-01-16 | End: 2018-01-16 | Stop reason: HOSPADM

## 2018-01-16 RX ORDER — SODIUM CHLORIDE, SODIUM LACTATE, POTASSIUM CHLORIDE, CALCIUM CHLORIDE 600; 310; 30; 20 MG/100ML; MG/100ML; MG/100ML; MG/100ML
INJECTION, SOLUTION INTRAVENOUS CONTINUOUS
Status: DISCONTINUED | OUTPATIENT
Start: 2018-01-16 | End: 2018-01-16 | Stop reason: HOSPADM

## 2018-01-16 RX ORDER — METOPROLOL TARTRATE 1 MG/ML
1-2 INJECTION, SOLUTION INTRAVENOUS EVERY 5 MIN PRN
Status: DISCONTINUED | OUTPATIENT
Start: 2018-01-16 | End: 2018-01-16 | Stop reason: HOSPADM

## 2018-01-16 RX ORDER — ONDANSETRON 2 MG/ML
4 INJECTION INTRAMUSCULAR; INTRAVENOUS EVERY 30 MIN PRN
Status: DISCONTINUED | OUTPATIENT
Start: 2018-01-16 | End: 2018-01-16 | Stop reason: HOSPADM

## 2018-01-16 RX ORDER — PROPOFOL 10 MG/ML
INJECTION, EMULSION INTRAVENOUS PRN
Status: DISCONTINUED | OUTPATIENT
Start: 2018-01-16 | End: 2018-01-16

## 2018-01-16 RX ADMIN — PROPOFOL 10 MG: 10 INJECTION, EMULSION INTRAVENOUS at 09:44

## 2018-01-16 RX ADMIN — SODIUM CHLORIDE, POTASSIUM CHLORIDE, SODIUM LACTATE AND CALCIUM CHLORIDE: 600; 310; 30; 20 INJECTION, SOLUTION INTRAVENOUS at 09:15

## 2018-01-16 RX ADMIN — FENTANYL CITRATE 50 MCG: 50 INJECTION, SOLUTION INTRAMUSCULAR; INTRAVENOUS at 09:26

## 2018-01-16 RX ADMIN — SODIUM CHLORIDE, POTASSIUM CHLORIDE, SODIUM LACTATE AND CALCIUM CHLORIDE: 600; 310; 30; 20 INJECTION, SOLUTION INTRAVENOUS at 09:19

## 2018-01-16 RX ADMIN — PROPOFOL 20 MG: 10 INJECTION, EMULSION INTRAVENOUS at 09:34

## 2018-01-16 RX ADMIN — FENTANYL CITRATE 50 MCG: 50 INJECTION, SOLUTION INTRAMUSCULAR; INTRAVENOUS at 09:32

## 2018-01-16 RX ADMIN — PROPOFOL 10 MG: 10 INJECTION, EMULSION INTRAVENOUS at 09:41

## 2018-01-16 RX ADMIN — PROPOFOL 30 MG: 10 INJECTION, EMULSION INTRAVENOUS at 09:32

## 2018-01-16 RX ADMIN — PROPOFOL 10 MG: 10 INJECTION, EMULSION INTRAVENOUS at 09:38

## 2018-01-16 RX ADMIN — PROPOFOL 10 MG: 10 INJECTION, EMULSION INTRAVENOUS at 09:36

## 2018-01-16 NOTE — OR NURSING
Patient and responsible adult given discharge instructions with no questions regarding instructions. Ravi score 20/20. Pain level 0/10.  Discharged from unit via ambulatory. Patient discharged to home.

## 2018-01-16 NOTE — PROCEDURES
Bone Marrow Biopsy or Aspiration:       Rey Tristan  MRN# [id number]   2018, 10:00 AM Diagnosis or Indication: anemia     Patient denies allergies to iodine or local anesthesia.  ALLERGIES:  No Known Allergies       Premedication per physician order (see patient's chart).    The informed consent process including the risks, benefits, and alternatives were discussed with the patient/representative.  Information discussed included the followin. The major risk is bleeding, which occurs in approximately 1 out of 100 patients.  If that occurs, the patient is to return to the Emergency Room and the treatment is pressure.  The patient would notice blood soaking through the bandage or running down the leg.    2. It's extremely rare, but the patient may get a skin infection or an infection of the bone.  If the patient gets a skin infection, the patient may notice a red or draining skin wound. The patient is to go to the clinic or the Emergency Room, depending on the time of day or the day of the week, for treatment.  Infection in the bone may show up days later with increasing pain, fever, chills, or even confusion.  This would be a medical emergency requiring antibiotic therapy, for which the patient needs to go immediately to the clinic or the Emergency Room.    The patient does understand the risks, benefits and the procedure and agrees to the performance of a bone marrow biopsy and aspirate.        The patient's identification was positively verified by identification band and verbal assent (name and date of birth).  Informed consent was obtained (see the completed Affirmation of Consent for Bone Marrow Aspiration and/or Biopsy Procedure(s) form in the patient's chart).   The patient was placed in the prone position and the bony landmarks of the pelvis were identified.  The patient was then given anesthesia and was under MAC.  Medical staff reconfirmed the patient's name, date of birth,  procedure, and the procedure site marking.  The skin surface over the posterior superior iliac crest was scrubbed with Betadine and draped in a sterile fashion with sterile towels to create a sterile field.  The skin and periosteal surface of the:    Left posterior iliac crest (LPIC)    was anesthetized with a total of 5 mL of 1% Lidocaine and a small incision was made through the skin over the corresponding site with a scalpel.    Trephine bone marrow core was obtained from the LPIC (1 cm).  His bone is soft so it took two attempts to get one cm.  Bone marrow aspirate was obtained from the LPIC (9 mL) for:      Morphology  Immunophenotyping  Cytogenetics    The aspirate was dilute and  It took three attempts to get adequate particles.  Direct pressure was applied to the biopsy site with sterile gauze.  The biopsy site was cleaned and dressed by attending nurse.   Post-procedure wound care instructions, including routine dressing instructions and analgesia, were given to the patient.  Discharge instructions included refraining from excessive physical activity for the remainder of the day and keep the wound clean and dry.  Tomorrow, the patient may resume all normal activities, including bathing.  The patient may remove the bandage tomorrow and replace it with a band-aid, if it is still oozing.                 Attestation:  This patient has been seen and evaluated by me, Nuno Castro MD.                                      The patient is told to refrain from excessive physical activity for the remainder of the day and keep the wound clean and dry.  Tomorrow, the patient may resume all normal activities, including bathing.  The patient may remove the bandage tomorrow and replace it with a band-aid, if it is still oozing.     l.

## 2018-01-16 NOTE — IP AVS SNAPSHOT
[facility]    [address] [address]    Phone:  [phone]                                       After Visit Summary   1/16/2018    Rey Tristan    MRN: [id number]           After Visit Summary Signature Page     I have received my discharge instructions, and my questions have been answered. I have discussed any challenges I see with this plan with the nurse or doctor.    ..........................................................................................................................................  Patient/Patient Representative Signature      ..........................................................................................................................................  Patient Representative Print Name and Relationship to Patient    ..................................................               ................................................  Date                                            Time    ..........................................................................................................................................  Reviewed by Signature/Title    ...................................................              ..............................................  Date                                                            Time

## 2018-01-16 NOTE — ANESTHESIA POSTPROCEDURE EVALUATION
Patient: Rey Tristan    Procedure(s):  BONE MARROW BIOPSY - Wound Class: I-Clean    Diagnosis:ANEMIA  Diagnosis Additional Information: No value filed.    Anesthesia Type:  MAC    Note:  Anesthesia Post Evaluation    Patient location during evaluation: Phase 2  Patient participation: Able to fully participate in evaluation  Level of consciousness: awake and alert  Pain management: adequate  Airway patency: patent  Cardiovascular status: acceptable  Respiratory status: acceptable  Hydration status: acceptable  PONV: none     Anesthetic complications: None          Last vitals:  Vitals:    01/16/18 0913   BP: 134/76   Resp: 16   Temp: 97.1  F (36.2  C)   SpO2: 96%         Electronically Signed By: VALERIE Esteban CRNA  January 16, 2018  10:00 AM

## 2018-01-16 NOTE — ANESTHESIA CARE TRANSFER NOTE
Patient: Rey Tristan    Procedure(s):  BONE MARROW BIOPSY - Wound Class: I-Clean    Diagnosis: ANEMIA  Diagnosis Additional Information: No value filed.    Anesthesia Type:   MAC     Note:  Airway :Nasal Cannula  Patient transferred to:Phase II  Handoff Report: Identifed the Patient, Identified the Reponsible Provider, Reviewed the pertinent medical history, Discussed the surgical course, Reviewed Intra-OP anesthesia mangement and issues during anesthesia, Set expectations for post-procedure period and Allowed opportunity for questions and acknowledgement of understanding      Vitals: (Last set prior to Anesthesia Care Transfer)    CRNA VITALS  1/16/2018 0924 - 1/16/2018 0957      1/16/2018             Ht Rate: 63                Electronically Signed By: VALERIE Young CRNA  January 16, 2018  9:57 AM

## 2018-01-16 NOTE — IP AVS SNAPSHOT
MRN:[id number]                      After Visit Summary   1/16/2018    Rey Tristan    MRN: [id number]           Thank you!     Thank you for choosing [facility] for your care. Our goal is always to provide you with excellent care. Hearing back from our patients is one way we can continue to improve our services. Please take a few minutes to complete the written survey that you may receive in the mail after you visit with us. Thank you!        Patient Information     Date Of Birth          1940        About your hospital stay     You were admitted on:  January 16, 2018 You last received care in the:  [facility] Preop/Phase II    You were discharged on:  January 16, 2018        Reason for your hospital stay       Bone Marrow Biopsy and Aspirate                  Who to Call     For medical emergencies, please call 911.  For non-urgent questions about your medical care, please call your primary care provider or clinic, [phone]  For questions related to your surgery, please call your surgery clinic        Attending Provider     Provider Specialty    Nuno Castro MD Pathology       Primary Care Provider Office Phone # Fax #    Gianna Richard [phone]      After Care Instructions     Activity       Keep the wound clean and dry.  Refrain from excessive physical activity for the remainder of the day.  Resume all normal activities including bathing tomorrow.            Wound care and dressings       Keep wound clean and dry.  Tomorrow you may remove bandage and resume all activities including bathing.  If the wound is still bleeding slightly you may use a bandaid or a self applied bandage.                  Your next 10 appointments already scheduled     Jan 23, 2018 10:30 AM CST   (Arrive by 10:15 AM)   Return Visit with Tio Alfaro MD   [facility] ([facility] [place] ([facility] )    [address] [place] [address]   [phone]               Further instructions from your care team           DISCHARGE INSTRUCTIONS  Bone Marrow Biopsy      IMPORTANT: As you prepare for discharge, the following information will help you return to your best level of health.       This Information Is About Your Follow Up Care   Call your doctor if you do not get better. Call sooner if you feel worse. You can reach your doctor by calling their clinic phone number.                                    This Information Is About Procedures    BONE MARROW BIOPSY.  Your bone marrow was taken out through the hip bone. The lab will check for abnormal cells. We will examine it and your doctor will know the results in 2-3 days. Your biopsy site may be tender.    Do the following:    Rest today and slowly increase your activity.    Avoid heavy exercise and activity for 6 weeks.    Take pain medicines exactly as prescribed.    Call your doctor if you have:    Increased bleeding from the site of the biopsy.    Increased drainage, redness, or swelling from the wound.    Red streaks from the wound.    Foul odor or pus from the dressing or wound.    Chills or fever over 101 degrees (by mouth).  Any new problems or concerns.                      Post-Anesthesia Patient Instructions    IMMEDIATELY FOLLOWING SURGERY:  Do not drive or operate machinery for the first twenty four hours after surgery.  Do not make any important decisions for twenty four hours after surgery or while taking narcotic pain medications or sedatives.  If you develop intractable nausea and vomiting or a severe headache please notify your doctor immediately.    FOLLOW-UP:  Please make an appointment with your surgeon as instructed. You do not need to follow up with anesthesia unless specifically instructed to do so.    WOUND CARE INSTRUCTIONS (if applicable):  Keep a dry clean dressing on the anesthesia/puncture wound site if there is drainage.  Once the wound has quit draining you may leave it open to air.  Generally  "you should leave the bandage intact for twenty four hours unless there is drainage.  If the epidural site drains for more than 36-48 hours please call the anesthesia department.    QUESTIONS?:  Please feel free to call your physician or the hospital  if you have any questions, and they will be happy to assist you.       Pending Results     No orders found from 2018 to 2018.            Admission Information     Date & Time Provider Department Dept. Phone    2018 Nuno Castro MD HI Preop/Phase [phone]      Your Vitals Were     Blood Pressure Temperature Respirations Height Weight Pulse Oximetry    120/78 97.1  F (36.2  C) (Oral) 16 1.651 m (5' 5\") 76.2 kg (168 lb) 95%    BMI (Body Mass Index)                   27.96 kg/m2           [employer] Information     [email or web address] lets you send messages to your doctor, view your test results, renew your prescriptions, schedule appointments and more. To sign up, go to [email or web address] . Click on \"Log in\" on the left side of the screen, which will take you to the Welcome page. Then click on \"Sign up Now\" on the right side of the page.     You will be asked to enter the access code listed below, as well as some personal information. Please follow the directions to create your username and password.     Your access code is: [email or web address]  Expires: 3/11/2018 10:04 AM     Your access code will  in 90 days. If you need help or a new code, please call your [facility] clinic or [phone].        Care EveryWhere ID     This is your Care EveryWhere ID. This could be used by other organizations to access your [facility] medical records  [id number]        Equal Access to Services     [facility] CenterJESSICA : Hadii yajaira Garrido, wasandhya diallo, qayblion kaalmada howie, pb raymundo. So [facility] [phone].    ATENCIÓN: Si habla español, tiene a dodson disposición servicios gratuitos de asistencia lingüística. Llame al " [phone].    We comply with applicable federal civil rights laws and [place] laws. We do not discriminate on the basis of race, color, national origin, age, disability, sex, sexual orientation, or gender identity.               Review of your medicines      UNREVIEWED medicines. Ask your doctor about these medicines        Dose / Directions    atorvastatin 10 MG tablet   Commonly known as:  LIPITOR   Used for:  Hyperlipidemia, unspecified hyperlipidemia type        Dose:  10 mg   Take 1 tablet (10 mg) by mouth daily   Quantity:  90 tablet   Refills:  3       levothyroxine 50 MCG tablet   Commonly known as:  SYNTHROID/LEVOTHROID   Used for:  Autoimmune thyroiditis        Dose:  50 mcg   Take 1 tablet (50 mcg) by mouth daily   Quantity:  30 tablet   Refills:  1       Lycopene 10 MG Caps        Refills:  0       MULTIVITAMIN ADULT PO        Dose:  1 tablet   Take 1 tablet by mouth   Refills:  0       omeprazole 40 MG capsule   Commonly known as:  priLOSEC   Used for:  Acute upper gastrointestinal bleeding, 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             Medication List: This is a list of all your medications and when to take them. Check marks below indicate your daily home schedule. Keep this list as a reference.      Medications           Morning Afternoon Evening Bedtime As Needed    atorvastatin 10 MG tablet   Commonly known as:  LIPITOR   Take 1 tablet (10 mg) by mouth daily                                levothyroxine 50 MCG tablet   Commonly known as:  SYNTHROID/LEVOTHROID   Take 1 tablet (50 mcg) by mouth daily                                Lycopene 10 MG Caps                                 MULTIVITAMIN ADULT PO   Take 1 tablet by mouth                                omeprazole 40 MG capsule   Commonly known as:  priLOSEC   Take 1 capsule (40 mg) by mouth daily                                vitamin D 400 UNITS tablet   Take 1,000 Units by mouth daily

## 2018-01-17 LAB — COPATH REPORT: NORMAL

## 2018-01-23 ENCOUNTER — ONCOLOGY VISIT (OUTPATIENT)
Dept: ONCOLOGY | Facility: OTHER | Age: 78
End: 2018-01-23
Attending: INTERNAL MEDICINE
Payer: MEDICARE

## 2018-01-23 ENCOUNTER — ANTICOAGULATION THERAPY VISIT (OUTPATIENT)
Dept: ANTICOAGULATION | Facility: OTHER | Age: 78
End: 2018-01-23
Payer: COMMERCIAL

## 2018-01-23 VITALS
WEIGHT: 176.8 LBS | OXYGEN SATURATION: 95 % | HEART RATE: 72 BPM | TEMPERATURE: 97.3 F | DIASTOLIC BLOOD PRESSURE: 68 MMHG | HEIGHT: 65 IN | BODY MASS INDEX: 29.46 KG/M2 | SYSTOLIC BLOOD PRESSURE: 122 MMHG | RESPIRATION RATE: 18 BRPM

## 2018-01-23 DIAGNOSIS — K92.2 ACUTE UPPER GASTROINTESTINAL BLEEDING: ICD-10-CM

## 2018-01-23 DIAGNOSIS — D47.2 MONOCLONAL PARAPROTEINEMIA: Primary | ICD-10-CM

## 2018-01-23 DIAGNOSIS — Z86.711 HX PULMONARY EMBOLISM: ICD-10-CM

## 2018-01-23 DIAGNOSIS — Z86.718 HISTORY OF DVT OF LOWER EXTREMITY: ICD-10-CM

## 2018-01-23 DIAGNOSIS — D50.9 IRON DEFICIENCY ANEMIA, UNSPECIFIED IRON DEFICIENCY ANEMIA TYPE: ICD-10-CM

## 2018-01-23 DIAGNOSIS — D68.59 PRIMARY HYPERCOAGULABLE STATE (H): ICD-10-CM

## 2018-01-23 DIAGNOSIS — Z79.01 LONG-TERM (CURRENT) USE OF ANTICOAGULANTS: ICD-10-CM

## 2018-01-23 LAB
ALBUMIN SERPL-MCNC: 3.3 G/DL (ref 3.4–5)
ALP SERPL-CCNC: 173 U/L (ref 40–150)
ALT SERPL W P-5'-P-CCNC: 26 U/L (ref 0–70)
ANION GAP SERPL CALCULATED.3IONS-SCNC: 8 MMOL/L (ref 3–14)
AST SERPL W P-5'-P-CCNC: 27 U/L (ref 0–45)
BASOPHILS # BLD AUTO: 0.1 10E9/L (ref 0–0.2)
BASOPHILS NFR BLD AUTO: 1.2 %
BILIRUB SERPL-MCNC: 0.3 MG/DL (ref 0.2–1.3)
BUN SERPL-MCNC: 12 MG/DL (ref 7–30)
CALCIUM SERPL-MCNC: 8.7 MG/DL (ref 8.5–10.1)
CHLORIDE SERPL-SCNC: 105 MMOL/L (ref 94–109)
CO2 SERPL-SCNC: 26 MMOL/L (ref 20–32)
COPATH REPORT: NORMAL
CREAT SERPL-MCNC: 0.69 MG/DL (ref 0.66–1.25)
DIFFERENTIAL METHOD BLD: ABNORMAL
EOSINOPHIL # BLD AUTO: 0.1 10E9/L (ref 0–0.7)
EOSINOPHIL NFR BLD AUTO: 2.7 %
ERYTHROCYTE [DISTWIDTH] IN BLOOD BY AUTOMATED COUNT: 16.1 % (ref 10–15)
FERRITIN SERPL-MCNC: 10 NG/ML (ref 26–388)
GFR SERPL CREATININE-BSD FRML MDRD: >90 ML/MIN/1.7M2
GLUCOSE SERPL-MCNC: 96 MG/DL (ref 70–99)
HCT VFR BLD AUTO: 31.5 % (ref 40–53)
HGB BLD-MCNC: 9.8 G/DL (ref 13.3–17.7)
IMM GRANULOCYTES # BLD: 0 10E9/L (ref 0–0.4)
IMM GRANULOCYTES NFR BLD: 0.2 %
IRON SATN MFR SERPL: 5 % (ref 15–46)
IRON SERPL-MCNC: 22 UG/DL (ref 35–180)
LDH SERPL L TO P-CCNC: 174 U/L (ref 85–227)
LYMPHOCYTES # BLD AUTO: 0.9 10E9/L (ref 0.8–5.3)
LYMPHOCYTES NFR BLD AUTO: 16.5 %
MCH RBC QN AUTO: 24.9 PG (ref 26.5–33)
MCHC RBC AUTO-ENTMCNC: 31.1 G/DL (ref 31.5–36.5)
MCV RBC AUTO: 80 FL (ref 78–100)
MONOCYTES # BLD AUTO: 0.5 10E9/L (ref 0–1.3)
MONOCYTES NFR BLD AUTO: 9.6 %
NEUTROPHILS # BLD AUTO: 3.6 10E9/L (ref 1.6–8.3)
NEUTROPHILS NFR BLD AUTO: 69.8 %
NRBC # BLD AUTO: 0 10*3/UL
NRBC BLD AUTO-RTO: 0 /100
PLATELET # BLD AUTO: 216 10E9/L (ref 150–450)
POTASSIUM SERPL-SCNC: 4 MMOL/L (ref 3.4–5.3)
PROT SERPL-MCNC: 8.3 G/DL (ref 6.8–8.8)
RBC # BLD AUTO: 3.93 10E12/L (ref 4.4–5.9)
SODIUM SERPL-SCNC: 139 MMOL/L (ref 133–144)
TIBC SERPL-MCNC: 435 UG/DL (ref 240–430)
WBC # BLD AUTO: 5.2 10E9/L (ref 4–11)

## 2018-01-23 PROCEDURE — G0463 HOSPITAL OUTPT CLINIC VISIT: HCPCS

## 2018-01-23 PROCEDURE — 85025 COMPLETE CBC W/AUTO DIFF WBC: CPT | Mod: ZL | Performed by: INTERNAL MEDICINE

## 2018-01-23 PROCEDURE — 82784 ASSAY IGA/IGD/IGG/IGM EACH: CPT | Mod: ZL | Performed by: INTERNAL MEDICINE

## 2018-01-23 PROCEDURE — 82746 ASSAY OF FOLIC ACID SERUM: CPT | Mod: ZL | Performed by: INTERNAL MEDICINE

## 2018-01-23 PROCEDURE — 82607 VITAMIN B-12: CPT | Mod: ZL | Performed by: INTERNAL MEDICINE

## 2018-01-23 PROCEDURE — 83883 ASSAY NEPHELOMETRY NOT SPEC: CPT | Mod: ZL | Performed by: INTERNAL MEDICINE

## 2018-01-23 PROCEDURE — 36415 COLL VENOUS BLD VENIPUNCTURE: CPT | Mod: ZL | Performed by: INTERNAL MEDICINE

## 2018-01-23 PROCEDURE — 85810 BLOOD VISCOSITY EXAMINATION: CPT | Mod: ZL | Performed by: INTERNAL MEDICINE

## 2018-01-23 PROCEDURE — 84165 PROTEIN E-PHORESIS SERUM: CPT | Mod: ZL | Performed by: INTERNAL MEDICINE

## 2018-01-23 PROCEDURE — 00000402 ZZHCL STATISTIC TOTAL PROTEIN: Mod: ZL | Performed by: INTERNAL MEDICINE

## 2018-01-23 PROCEDURE — 86334 IMMUNOFIX E-PHORESIS SERUM: CPT | Mod: ZL | Performed by: INTERNAL MEDICINE

## 2018-01-23 PROCEDURE — 99214 OFFICE O/P EST MOD 30 MIN: CPT | Performed by: INTERNAL MEDICINE

## 2018-01-23 PROCEDURE — 83615 LACTATE (LD) (LDH) ENZYME: CPT | Mod: ZL | Performed by: INTERNAL MEDICINE

## 2018-01-23 PROCEDURE — 83540 ASSAY OF IRON: CPT | Mod: ZL | Performed by: INTERNAL MEDICINE

## 2018-01-23 PROCEDURE — 80053 COMPREHEN METABOLIC PANEL: CPT | Mod: ZL | Performed by: INTERNAL MEDICINE

## 2018-01-23 PROCEDURE — 83550 IRON BINDING TEST: CPT | Mod: ZL | Performed by: INTERNAL MEDICINE

## 2018-01-23 PROCEDURE — 82728 ASSAY OF FERRITIN: CPT | Mod: ZL | Performed by: INTERNAL MEDICINE

## 2018-01-23 PROCEDURE — 99000 SPECIMEN HANDLING OFFICE-LAB: CPT | Performed by: INTERNAL MEDICINE

## 2018-01-23 PROCEDURE — 82232 ASSAY OF BETA-2 PROTEIN: CPT | Mod: ZL | Performed by: INTERNAL MEDICINE

## 2018-01-23 PROCEDURE — 86335 IMMUNFIX E-PHORSIS/URINE/CSF: CPT | Mod: ZL | Performed by: INTERNAL MEDICINE

## 2018-01-23 RX ORDER — FERROUS SULFATE 325(65) MG
325 TABLET ORAL
Qty: 90 TABLET | Refills: 11 | Status: SHIPPED | OUTPATIENT
Start: 2018-01-23 | End: 2018-12-28

## 2018-01-23 RX ORDER — WARFARIN SODIUM 4 MG/1
TABLET ORAL
Qty: 30 TABLET | COMMUNITY
Start: 2018-01-23 | End: 2018-09-27 | Stop reason: DRUGHIGH

## 2018-01-23 RX ORDER — LANOLIN ALCOHOL/MO/W.PET/CERES
50 CREAM (GRAM) TOPICAL DAILY
Qty: 90 TABLET | Refills: 3 | Status: SHIPPED | OUTPATIENT
Start: 2018-01-23

## 2018-01-23 RX ORDER — FOLIC ACID 1 MG/1
1 TABLET ORAL DAILY
Qty: 90 TABLET | Refills: 3 | Status: SHIPPED | OUTPATIENT
Start: 2018-01-23 | End: 2018-08-15

## 2018-01-23 ASSESSMENT — PATIENT HEALTH QUESTIONNAIRE - PHQ9: SUM OF ALL RESPONSES TO PHQ QUESTIONS 1-9: 0

## 2018-01-23 NOTE — PROGRESS NOTES
Visit Date:   01/23/2018      HISTORY OF PRESENT ILLNESS:  Mr. Tristan returns for followup of anemia.  We had seen him in consultation at the request of the Gianna Richard on 01/02/2018.  At that time, he was a 77-year-old white male with previous history of DVT, pulmonary embolus, chronic anemia, status post GI bleed.  Apparently he was recently admitted to the hospital after he developed emesis associated with blood to the point where he was vomiting pure blood.  This occurred on 11/30/2007.  He was admitted to the hospital with an upper GI bleed.  It was noted he was on chronic warfarin therapy for DVT and history of PE.  It was also noted that he had dark stools.  Approximately a year ago he says he had been noted to be anemic, had undergone a colonoscopy which was negative during this admission.  His hemoglobin was noted to be 6.5, he received 4 units of packed red cells and 2 units of FFP.  His INR was 2.89.  EGD was performed and he was found to have duodenitis as well as peptic ulcer disease and Holt's esophagus.  He was told to stop aspirin and Coumadin and he had been off Coumadin since 11/2017.  He was discharged on Prilosec 40 mg daily.  The patient did not have a hypercoagulability workup.  So as part of the workup, we proceeded with a hypercoagulability workup.  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  Protein C was negative, protein S was negative, antiphospholipid profile was negative.  Sedimentation rate was elevated at 82.  Ferritin came back low at 11.  He also had a serum protein electrophoresis which revealed a monoclonal peak of 1.1.  Serum immunofixation revealed a monoclonal IgM immunoglobulin kappa light chain type, IgM was 1530.  We went ahead and ordered a bone marrow aspiration biopsy, the final results are still pending, but there were absent iron stores,  according to Dr. Castro, the pathologist.  Otherwise, the patient now returns for followup.  He denies any shortness of breath, chest pain, abdominal pain, fevers, night sweats, weight loss.  He does note some fatigue.      PHYSICAL EXAMINATION:   GENERAL:  He is an elderly white male in no acute distress.   VITAL SIGNS:  Reveal blood pressure 132/68, pulse 72, respirations 18, temperature 97.2.   HEENT:  Atraumatic, normocephalic.  Oropharynx is nonerythematous.   NECK:  Supple.   LUNGS:  Clear to auscultation and percussion.     HEART:  Regular rhythm, S1, S2 normal.   ABDOMEN:  Soft, normoactive bowel sounds.  No mass, nontender.   LYMPHATICS:  No cervical, supraclavicular or axillary lymph nodes.   EXTREMITIES:  No edema.   NEUROLOGIC:  Nonfocal.      LABORATORY DATA:  Reveals CBC with white count 4.4, H and H 9.8 and 31.3, MCV 81, platelet count was 199, retic count is 31.7.      IMPRESSION:   1.  Iron deficiency anemia secondary to acute gastrointestinal bleed requiring packed red cell transfusions, felt to be secondary to duodenitis, peptic ulcer disease confirmed by bone marrow aspiration biopsy.  The plan is to start oral iron 325 mg p.o. t.i.d.   2.  Monoclonal gammopathy.  Bone marrow results are still pending.  We would like to proceed with myeloma profile including quantitative immunoglobulin, serum kappa lambda light chains, beta-2 microglobulin, serum viscosity, urine for immunofixation.   3.  Hypercoagulable state.  The patient is heterozygous for the C677T mutation which puts him at risk for hyperhomocystinemia.  Therefore, we would recommend going back on anticoagulation but try to keep the INR around 2 as opposed to 2.5.  We will refer the patient to the Coumadin clinic, otherwise, we will start the patient on B12, folate and pyridoxine supplementation.  We will treat with vitamin B6 50 mg daily, vitamin B12 1000 mcg daily, folic acid 1 mg p.o. daily.  Most likely obtain skeletal bone survey.   Otherwise, will await the results of bone marrow aspiration biopsy to absolutely rule out myeloma or Waldenstrom's macroglobulinemia.  Otherwise, we will see the patient in approximately 2-3 weeks.  Obtain CBC, CMP, LDH.      Forty minutes were spent with patient, greater than half the time spent in counseling.  The rest of the time spent ordering skeletal bone survey, lab work and iron and B12 supplementation.         DUY KEYES MD             D: 2018   T: 2018   MT: KASI      Name:     RADHA VALVERDE   MRN:      [id number]        Account:      [id number]   :      1940           Visit Date:   2018      Document: [id number]       cc: Gianna Richard MD

## 2018-01-23 NOTE — MR AVS SNAPSHOT
After Visit Summary   1/23/2018    Rey Tristan    MRN: [id number]           Patient Information     Date Of Birth          1940        Visit Information        Provider Department      1/23/2018 10:30 AM Tio Alfaro MD [facility]        Today's Diagnoses     Monoclonal paraproteinemia    -  1    Anemia, iron deficiency        Primary hypercoagulable state (H)           Follow-ups after your visit        Additional Services     INR CLINIC REFERRAL       Your provider has referred you to INR Services. Plan to start Coumadin. Maintain INR between 1.5-2.0    Please be aware that coverage of these services is subject to the terms and limitations of your health insurance plan.  Call member services at your health plan with any benefit or coverage questions.    Indication for Anticoagulation:   If nonstandard INR is desired, indicate goal range and explanation: 1.5-2.0  Expected Duration of Therapy: Indefinite                  Your next 10 appointments already scheduled     Jan 26, 2018 11:30 AM CST   Anticoagulation Visit with  ANTI COAGULATION   [facility] )    [address] [address]   [phone]            Feb 06, 2018 11:00 AM CST   XR BONE SURVEY LIMITED with HIXR1   [facility] ([facility] )    [address] [place] [phone] [phone]           Please bring a list of your current medicines to your exam. (Include vitamins, minerals and over-thecounter medicines.) Leave your valuables at home.  Tell your doctor if there is a chance you may be pregnant.  You do not need to do anything special for this exam.            Feb 13, 2018 10:00 AM CST   LAB with HC LAB   [facility] ([facility] )    [address] [facility] [address]   [phone]            Feb 13, 2018 10:30 AM CST   (Arrive by 10:15 AM)   Return Visit with Tio Alfaro  "MD   [place] [place] ([facility] )    [address] [place] [address]   [phone]              Future tests that were ordered for you today     Open Future Orders        Priority Expected Expires Ordered    X-ray Metastatic bone survey Routine 2018    INR Routine  2019            Who to contact     If you have questions or need follow up information about today's clinic visit or your schedule please contact [facility] [place] directly at [phone].  Normal or non-critical lab and imaging results will be communicated to you by [employer], letter or phone within 4 business days after the clinic has received the results. If you do not hear from us within 7 days, please contact the clinic through [employer] or phone. If you have a critical or abnormal lab result, we will notify you by phone as soon as possible.  Submit refill requests through [employer] or call your pharmacy and they will forward the refill request to us. Please allow 3 business days for your refill to be completed.          Additional Information About Your Visit        MyChart Information     [employer] lets you send messages to your doctor, view your test results, renew your prescriptions, schedule appointments and more. To sign up, go to [email or web address] . Click on \"Log in\" on the left side of the screen, which will take you to the Welcome page. Then click on \"Sign up Now\" on the right side of the page.     You will be asked to enter the access code listed below, as well as some personal information. Please follow the directions to create your username and password.     Your access code is: [email or web address]  Expires: 3/11/2018 10:04 AM     Your access code will  in 90 days. If you need help or a new code, please call your [facility] or [phone].        Care EveryWhere ID     This is your Care EveryWhere ID. This could be used by other organizations to access " "your [facility] medical records  [id number]        Your Vitals Were     Pulse Temperature Respirations Height Pulse Oximetry BMI (Body Mass Index)    72 97.3  F (36.3  C) (Oral) 18 1.651 m (5' 5\") 95% 29.42 kg/m2       Blood Pressure from Last 3 Encounters:   01/23/18 122/68   01/16/18 129/78   01/12/18 115/68    Weight from Last 3 Encounters:   01/23/18 80.2 kg (176 lb 12.8 oz)   01/16/18 76.2 kg (168 lb)   01/12/18 74.8 kg (165 lb)              We Performed the Following     Beta 2 microglobulin     CBC with platelets differential     Comprehensive metabolic panel     ELP reflex to IELP     Ferritin     Folate     INR CLINIC REFERRAL     Iron and iron binding capacity     Kappa and lambda light chain     Lactate Dehydrogenase     Macroglobulins     Protein Immunofixation Serum     Protein immunofixation urine     Vitamin B12          Today's Medication Changes          These changes are accurate as of: 1/23/18 11:55 AM.  If you have any questions, ask your nurse or doctor.               Start taking these medicines.        Dose/Directions    cyanocobalamin 1000 MCG Tabs   Commonly known as:  [employer] vitamin  B12   Used for:  Primary hypercoagulable state (H)   Started by:  Tio Alfaro MD        Dose:  1000 mcg   Take 1,000 mcg by mouth daily   Quantity:  90 tablet   Refills:  3       ferrous sulfate 325 (65 FE) MG tablet   Commonly known as:  IRON   Used for:  Anemia, iron deficiency   Started by:  Tio Alfaro MD        Dose:  325 mg   Take 1 tablet (325 mg) by mouth 3 times daily (with meals)   Quantity:  90 tablet   Refills:  11       folic acid 1 MG tablet   Commonly known as:  FOLVITE   Used for:  Anemia, iron deficiency, Primary hypercoagulable state (H)   Started by:  Tio Alfaro MD        Dose:  1 mg   Take 1 tablet (1 mg) by mouth daily   Quantity:  90 tablet   Refills:  3       pyridoxine 50 MG Tabs   Commonly known as:  VITAMIN B-6   Used for:  Primary hypercoagulable state (H) "   Started by:  Tio Alfaro MD        Dose:  50 mg   Take 1 tablet (50 mg) by mouth daily   Quantity:  90 tablet   Refills:  3            Where to get your medicines      These medications were sent to [id number] - [place] - [address] AT [facility] [address], [place] [address]     Phone:  [phone]     cyanocobalamin 1000 MCG Tabs    ferrous sulfate 325 (65 FE) MG tablet    folic acid 1 MG tablet    pyridoxine 50 MG Tabs                Primary Care Provider Office Phone # Fax #    Gianna Richard [phone]       [facility] [address]  [place] [address]        Equal Access to Services     [employer] AH: Hadii yajaira delgado hadasho Soomaali, waaxda luqadaha, qaybta kaalmada adeegyada, pb orellana . So [facility] [phone].    ATENCIÓN: Si habla español, tiene a dodson disposición servicios gratuitos de asistencia lingüística. [facility] [phone].    We comply with applicable federal civil rights laws and [place] laws. We do not discriminate on the basis of race, color, national origin, age, disability, sex, sexual orientation, or gender identity.            Thank you!     Thank you for choosing [facility]  for your care. Our goal is always to provide you with excellent care. Hearing back from our patients is one way we can continue to improve our services. Please take a few minutes to complete the written survey that you may receive in the mail after your visit with us. Thank you!             Your Updated Medication List - Protect others around you: Learn how to safely use, store and throw away your medicines at www.disposemymeds.org.          This list is accurate as of: 1/23/18 11:55 AM.  Always use your most recent med list.                   Brand Name Dispense Instructions for use Diagnosis    atorvastatin 10 MG tablet    LIPITOR    90 tablet    Take 1 tablet (10 mg) by mouth daily     Hyperlipidemia, unspecified hyperlipidemia type       cyanocobalamin 1000 MCG Tabs    CVS vitamin  B12    90 tablet    Take 1,000 mcg by mouth daily    Primary hypercoagulable state (H)       ferrous sulfate 325 (65 FE) MG tablet    IRON    90 tablet    Take 1 tablet (325 mg) by mouth 3 times daily (with meals)    Anemia, iron deficiency       folic acid 1 MG tablet    FOLVITE    90 tablet    Take 1 tablet (1 mg) by mouth daily    Anemia, iron deficiency, Primary hypercoagulable state (H)       levothyroxine 50 MCG tablet    SYNTHROID/LEVOTHROID    30 tablet    Take 1 tablet (50 mcg) by mouth daily    Autoimmune thyroiditis       Lycopene 10 MG Caps           MULTIVITAMIN ADULT PO      Take 1 tablet by mouth        omeprazole 40 MG capsule    priLOSEC    30 capsule    Take 1 capsule (40 mg) by mouth daily    Acute upper gastrointestinal bleeding, 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

## 2018-01-23 NOTE — NURSING NOTE
"Chief Complaint   Patient presents with     RECHECK     anemia       Initial /68  Pulse 72  Temp 97.3  F (36.3  C) (Oral)  Resp 18  Ht 1.651 m (5' 5\")  Wt 80.2 kg (176 lb 12.8 oz)  SpO2 95%  BMI 29.42 kg/m2 Estimated body mass index is 29.42 kg/(m^2) as calculated from the following:    Height as of this encounter: 1.651 m (5' 5\").    Weight as of this encounter: 80.2 kg (176 lb 12.8 oz).  Medication Reconciliation: complete    "

## 2018-01-23 NOTE — MR AVS SNAPSHOT
After Visit Summary   1/23/2018    Rey Tristan    MRN: [id number]           Patient Information     Date Of Birth          1940        Visit Information        Provider Department      1/23/2018 4:50 PM Gianna Richard MD [facility]        Today's Diagnoses     Long-term (current) use of anticoagulants        Hx pulmonary embolism        History of DVT of lower extremity        Acute upper gastrointestinal bleeding           Follow-ups after your visit        Additional Services     INR CLINIC REFERRAL       Your provider has referred you to INR Services.    Please be aware that coverage of these services is subject to the terms and limitations of your health insurance plan.  Call member services at your health plan with any benefit or coverage questions.    Indication for Anticoagulation: Long Term Use of Anticoagulants; Pulmonary Embolism; HX Lower Extremity; Upper GI Bleed  If nonstandard INR is desired, indicate goal range and explanation: 1.5-2.0  Expected Duration of Therapy: Indefinite                  Your next 10 appointments already scheduled     Jan 26, 2018 11:30 AM CST   Anticoagulation Visit with HC ANTI COAGULATION   [facility] )    [address]  [facility] [address]   [phone]            Feb 06, 2018 11:00 AM CST   XR BONE SURVEY LIMITED with HIXR1   [facility] ([facility] )    [address] [place] [address] [id number] [phone]           Please bring a list of your current medicines to your exam. (Include vitamins, minerals and over-thecounter medicines.) Leave your valuables at home.  Tell your doctor if there is a chance you may be pregnant.  You do not need to do anything special for this exam.            Feb 13, 2018 10:00 AM CST   LAB with HC LAB   [facility] ([facility] )    [address]  [facility] [address]   [phone]            Feb  "13, 2018 10:30 AM CST   (Arrive by 10:15 AM)   Return Visit with Tio Alfaro MD   [facility] ([facility] )    [place] [address]   [phone]              Future tests that were ordered for you today     Open Standing Orders        Priority Remaining Interval Expires Ordered    INR CLINIC REFERRAL Routine 1/1 1/24/2019 1/23/2018          Open Future Orders        Priority Expected Expires Ordered    Iron and iron binding capacity Routine 1/26/2018 1/23/2019 1/23/2018    Ferritin Routine 1/26/2018 1/23/2019 1/23/2018    CBC with platelets differential Routine 1/26/2018 1/23/2019 1/23/2018    Comprehensive metabolic panel Routine 1/26/2018 1/23/2019 1/23/2018    Lactate Dehydrogenase Routine 1/26/2018 1/23/2019 1/23/2018    X-ray Metastatic bone survey Routine 1/23/2018 1/23/2019 1/23/2018    INR Routine  1/23/2019 1/23/2018            Who to contact     If you have questions or need follow up information about today's clinic visit or your schedule please contact [facility] directly at [phone].  Normal or non-critical lab and imaging results will be communicated to you by MyChart, letter or phone within 4 business days after the clinic has received the results. If you do not hear from us within 7 days, please contact the clinic through MyChart or phone. If you have a critical or abnormal lab result, we will notify you by phone as soon as possible.  Submit refill requests through [employer] or call your pharmacy and they will forward the refill request to us. Please allow 3 business days for your refill to be completed.          Additional Information About Your Visit        MyChart Information     [employer] lets you send messages to your doctor, view your test results, renew your prescriptions, schedule appointments and more. To sign up, go to [email or web address] . Click on \"Log in\" on the left side of the screen, which will take you to " "the Welcome page. Then click on \"Sign up Now\" on the right side of the page.     You will be asked to enter the access code listed below, as well as some personal information. Please follow the directions to create your username and password.     Your access code is: [email or web address]  Expires: 3/11/2018 10:04 AM     Your access code will  in 90 days. If you need help or a new code, please call your [place] clinic or [phone].        Care EveryWhere ID     This is your Care EveryWhere ID. This could be used by other organizations to access your [place] medical records  [id number]         Blood Pressure from Last 3 Encounters:   18 122/68   18 129/78   18 115/68    Weight from Last 3 Encounters:   18 176 lb 12.8 oz (80.2 kg)   18 168 lb (76.2 kg)   18 165 lb (74.8 kg)                 Today's Medication Changes          These changes are accurate as of: 18  5:11 PM.  If you have any questions, ask your nurse or doctor.               Start taking these medicines.        Dose/Directions    cyanocobalamin 1000 MCG Tabs   Commonly known as:  [employer] vitamin  B12   Used for:  Primary hypercoagulable state (H), Monoclonal paraproteinemia, Anemia, iron deficiency   Started by:  Tio Alfaro MD        Dose:  1000 mcg   Take 1,000 mcg by mouth daily   Quantity:  90 tablet   Refills:  3       ferrous sulfate 325 (65 FE) MG tablet   Commonly known as:  IRON   Used for:  Anemia, iron deficiency, Monoclonal paraproteinemia, Primary hypercoagulable state (H)   Started by:  Tio Alfaro MD        Dose:  325 mg   Take 1 tablet (325 mg) by mouth 3 times daily (with meals)   Quantity:  90 tablet   Refills:  11       folic acid 1 MG tablet   Commonly known as:  FOLVITE   Used for:  Anemia, iron deficiency, Primary hypercoagulable state (H), Monoclonal paraproteinemia   Started by:  Tio Alfaro MD        Dose:  1 mg   Take 1 tablet (1 mg) by mouth daily   Quantity:  90 " tablet   Refills:  3       pyridoxine 50 MG Tabs   Commonly known as:  VITAMIN B-6   Used for:  Primary hypercoagulable state (H), Monoclonal paraproteinemia, Anemia, iron deficiency   Started by:  Tio Alfaro MD        Dose:  50 mg   Take 1 tablet (50 mg) by mouth daily   Quantity:  90 tablet   Refills:  3            Where to get your medicines      These medications were sent to [id number] - [place], [place] - [address] AT [facility] [address], [facility] [place] [address]     Phone:  [phone]     cyanocobalamin 1000 MCG Tabs    ferrous sulfate 325 (65 FE) MG tablet    folic acid 1 MG tablet    pyridoxine 50 MG Tabs                Primary Care Provider Office Phone # Fax #    Gianna Richard [phone]       [facility] [address]  [facility] [address]        Equal Access to Services     NIK GRIDER AH: Hadii yajaira ku hadasho Soomaali, waaxda luqadaha, qaybta kaalmada adeegyada, waxay elin haycliffn maggie orellana . So [facility] [phone].    ATENCIÓN: Si svetlana randall, tiene a dodson disposición servicios gratuitos de asistencia lingüística. Llame al [phone].    We comply with applicable federal civil rights laws and [place] laws. We do not discriminate on the basis of race, color, national origin, age, disability, sex, sexual orientation, or gender identity.            Thank you!     Thank you for choosing [facility]  for your care. Our goal is always to provide you with excellent care. Hearing back from our patients is one way we can continue to improve our services. Please take a few minutes to complete the written survey that you may receive in the mail after your visit with us. Thank you!             Your Updated Medication List - Protect others around you: Learn how to safely use, store and throw away your medicines at www.disposemymeds.org.          This list is accurate as of: 1/23/18  5:11 PM.  Always use your most  recent med list.                   Brand Name Dispense Instructions for use Diagnosis    atorvastatin 10 MG tablet    LIPITOR    90 tablet    Take 1 tablet (10 mg) by mouth daily    Hyperlipidemia, unspecified hyperlipidemia type       cyanocobalamin 1000 MCG Tabs    CVS vitamin  B12    90 tablet    Take 1,000 mcg by mouth daily    Primary hypercoagulable state (H), Monoclonal paraproteinemia, Anemia, iron deficiency       ferrous sulfate 325 (65 FE) MG tablet    IRON    90 tablet    Take 1 tablet (325 mg) by mouth 3 times daily (with meals)    Anemia, iron deficiency, Monoclonal paraproteinemia, Primary hypercoagulable state (H)       folic acid 1 MG tablet    FOLVITE    90 tablet    Take 1 tablet (1 mg) by mouth daily    Anemia, iron deficiency, Primary hypercoagulable state (H), Monoclonal paraproteinemia       levothyroxine 50 MCG tablet    SYNTHROID/LEVOTHROID    30 tablet    Take 1 tablet (50 mcg) by mouth daily    Autoimmune thyroiditis       Lycopene 10 MG Caps           MULTIVITAMIN ADULT PO      Take 1 tablet by mouth        omeprazole 40 MG capsule    priLOSEC    30 capsule    Take 1 capsule (40 mg) by mouth daily    Acute upper gastrointestinal bleeding, 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

## 2018-01-24 LAB
FOLATE SERPL-MCNC: 40.3 NG/ML
PROT ELPH PNL UR ELPH: NORMAL
VIT B12 SERPL-MCNC: 699 PG/ML (ref 193–986)

## 2018-01-25 LAB
ALBUMIN SERPL ELPH-MCNC: 3.5 G/DL (ref 3.7–5.1)
ALPHA1 GLOB SERPL ELPH-MCNC: 0.3 G/DL (ref 0.2–0.4)
ALPHA2 GLOB SERPL ELPH-MCNC: 0.9 G/DL (ref 0.5–0.9)
B-GLOBULIN SERPL ELPH-MCNC: 0.9 G/DL (ref 0.6–1)
B2 MICROGLOB SERPL-MCNC: 2.4 MG/L
GAMMA GLOB SERPL ELPH-MCNC: 2 G/DL (ref 0.7–1.6)
IGA SERPL-MCNC: 159 MG/DL (ref 70–380)
IGG SERPL-MCNC: 891 MG/DL (ref 695–1620)
IGM SERPL-MCNC: 1630 MG/DL (ref 60–265)
KAPPA LC UR-MCNC: 2.53 MG/DL (ref 0.33–1.94)
KAPPA LC/LAMBDA SER: 0.85 {RATIO} (ref 0.26–1.65)
LAMBDA LC SERPL-MCNC: 2.99 MG/DL (ref 0.57–2.63)
M PROTEIN SERPL ELPH-MCNC: 1.1 G/DL
PROT PATTERN SERPL ELPH-IMP: ABNORMAL
PROT PATTERN SERPL IFE-IMP: ABNORMAL
VISC SER: 1.8 CP (ref 1.4–1.8)

## 2018-01-26 ENCOUNTER — TELEPHONE (OUTPATIENT)
Dept: FAMILY MEDICINE | Facility: OTHER | Age: 78
End: 2018-01-26

## 2018-01-26 ENCOUNTER — ANTICOAGULATION THERAPY VISIT (OUTPATIENT)
Dept: ANTICOAGULATION | Facility: OTHER | Age: 78
End: 2018-01-26
Attending: FAMILY MEDICINE
Payer: MEDICARE

## 2018-01-26 DIAGNOSIS — Z86.718 HISTORY OF DVT OF LOWER EXTREMITY: ICD-10-CM

## 2018-01-26 DIAGNOSIS — Z79.01 LONG-TERM (CURRENT) USE OF ANTICOAGULANTS: ICD-10-CM

## 2018-01-26 DIAGNOSIS — K92.2 ACUTE UPPER GASTROINTESTINAL BLEEDING: ICD-10-CM

## 2018-01-26 DIAGNOSIS — K22.70 BARRETT'S ESOPHAGUS WITHOUT DYSPLASIA: ICD-10-CM

## 2018-01-26 DIAGNOSIS — Z86.711 HX PULMONARY EMBOLISM: ICD-10-CM

## 2018-01-26 LAB — INR POINT OF CARE: 1.1 (ref 0.86–1.14)

## 2018-01-26 PROCEDURE — 85610 PROTHROMBIN TIME: CPT | Mod: QW,ZL

## 2018-01-26 RX ORDER — OMEPRAZOLE 40 MG/1
40 CAPSULE, DELAYED RELEASE ORAL DAILY
Qty: 30 CAPSULE | Refills: 5 | Status: SHIPPED | OUTPATIENT
Start: 2018-01-26 | End: 2018-06-26

## 2018-01-26 NOTE — MR AVS SNAPSHOT
Rey Tristan   1/26/2018 11:30 AM   Anticoagulation Therapy Visit    Description:  77 year old male   Provider:   ANTI COAGULATION   Department:  Hc Anti Coagulation           INR as of 1/26/2018     Today's INR 1.1!      Anticoagulation Summary as of 1/26/2018     INR goal 1.5-2.0   Today's INR 1.1!   Full instructions 1/26: 4 mg; 1/27: 4 mg; 1/28: 4 mg; 1/29: 2 mg   Next INR check 1/30/2018    Indications   Long-term (current) use of anticoagulants [Z79.01] [Z79.01]  Hx pulmonary embolism [Z86.711]  History of DVT of lower extremity [Z86.718]  Acute upper gastrointestinal bleeding [K92.2]         Your next Anticoagulation Clinic appointment(s)     Jan 30, 2018 11:15 AM CST   Anticoagulation Visit with  ANTI COAGULATION   [facility] [place] ([facility] )    [address] [facility] [address]   [phone]              January 2018 Details    Sun Mon Tue Wed Thu Fri Sat      1               2               3               4               5               6                 7               8               9               10               11               12               13                 14               15               16               17               18               19               20                 21               22               23               24               25               26      4 mg   See details      27      4 mg           28      4 mg         29      2 mg         30            31                   Date Details   01/26 This INR check       Date of next INR:  1/30/2018         How to take your warfarin dose     To take:  2 mg Take 0.5 of a 4 mg tablet.    To take:  4 mg Take 1 of the 4 mg tablets.

## 2018-01-26 NOTE — PROGRESS NOTES
ANTICOAGULATION FOLLOW-UP CLINIC VISIT    Patient Name:  Rey Tristan  Date:  1/26/2018  Contact Type:  Face to Face    SUBJECTIVE:     Patient Findings     Comments States was hospitalized with bleeding. States he has been off warfarin since Dec.            OBJECTIVE    INR Protime   Date Value Ref Range Status   01/26/2018 1.1 0.86 - 1.14 Final       ASSESSMENT / PLAN  INR assessment SUB    Recheck INR In: 4 DAYS    INR Location Clinic      Anticoagulation Summary as of 1/26/2018     INR goal 1.5-2.0   Today's INR 1.1!   Maintenance plan No maintenance plan   Full instructions 1/26: 4 mg; 1/27: 4 mg; 1/28: 4 mg; 1/29: 2 mg   Plan last modified Lala Tracy RN (1/23/2018)   Next INR check 1/30/2018   Target end date Indefinite    Indications   Long-term (current) use of anticoagulants [Z79.01] [Z79.01]  Hx pulmonary embolism [Z86.711]  History of DVT of lower extremity [Z86.718]  Acute upper gastrointestinal bleeding [K92.2]         Anticoagulation Episode Summary     INR check location     Preferred lab     Send INR reminders to  ANTICOAG POOL    Comments       Anticoagulation Care Providers     Provider Role Specialty Phone number    Gianna Richard MD [facility] [phone]            See the Encounter Report to view Anticoagulation Flowsheet and Dosing Calendar (Go to Encounters tab in chart review, and find the Anticoagulation Therapy Visit)        Lala Boss RN

## 2018-01-30 ENCOUNTER — ANTICOAGULATION THERAPY VISIT (OUTPATIENT)
Dept: ANTICOAGULATION | Facility: OTHER | Age: 78
End: 2018-01-30
Attending: FAMILY MEDICINE
Payer: MEDICARE

## 2018-01-30 DIAGNOSIS — Z79.01 LONG-TERM (CURRENT) USE OF ANTICOAGULANTS: ICD-10-CM

## 2018-01-30 DIAGNOSIS — Z86.711 HX PULMONARY EMBOLISM: ICD-10-CM

## 2018-01-30 DIAGNOSIS — K92.2 ACUTE UPPER GASTROINTESTINAL BLEEDING: ICD-10-CM

## 2018-01-30 DIAGNOSIS — Z86.718 HISTORY OF DVT OF LOWER EXTREMITY: ICD-10-CM

## 2018-01-30 LAB — INR POINT OF CARE: 1.3 (ref 0.86–1.14)

## 2018-01-30 PROCEDURE — 36416 COLLJ CAPILLARY BLOOD SPEC: CPT | Mod: ZL

## 2018-01-30 NOTE — MR AVS SNAPSHOT
Rey Tristan   1/30/2018 11:15 AM   Anticoagulation Therapy Visit    Description:  77 year old male   Provider:   ANTI COAGULATION   Department:  Hc Anti Coagulation           INR as of 1/30/2018     Today's INR 1.3!      Anticoagulation Summary as of 1/30/2018     INR goal 1.5-2.0   Today's INR 1.3!   Full instructions 1/30: 4 mg; 1/31: 2 mg; 2/1: 4 mg; 2/2: 2 mg; 2/3: 4 mg; 2/4: 4 mg; 2/5: 2 mg   Next INR check 2/6/2018    Indications   Long-term (current) use of anticoagulants [Z79.01] [Z79.01]  Hx pulmonary embolism [Z86.711]  History of DVT of lower extremity [Z86.718]  Acute upper gastrointestinal bleeding [K92.2]         Your next Anticoagulation Clinic appointment(s)     Feb 06, 2018 10:15 AM CST   Anticoagulation Visit with HC ANTI COAGULATION   [facility] [place] ([facility] )    [address]  [place] [address]   [phone]              January 2018 Details    Sun Mon Tue Wed Thu Fri Sat      1               2               3               4               5               6                 7               8               9               10               11               12               13                 14               15               16               17               18               19               20                 21               22               23               24               25               26               27                 28               29               30      4 mg   See details      31      2 mg             Date Details   01/30 This INR check               How to take your warfarin dose     To take:  2 mg Take 0.5 of a 4 mg tablet.    To take:  4 mg Take 1 of the 4 mg tablets.           February 2018 Details    Sun Mon Tue Wed Thu Fri Sat         1      4 mg         2      2 mg         3      4 mg           4      4 mg         5      2 mg         6            7               8               9               10                 11                12               13               14               15               16               17                 18               19               20               21               22               23               24                 25               26               27               28                   Date Details   No additional details    Date of next INR:  2/6/2018         How to take your warfarin dose     To take:  2 mg Take 0.5 of a 4 mg tablet.    To take:  4 mg Take 1 of the 4 mg tablets.

## 2018-01-30 NOTE — PROGRESS NOTES
ANTICOAGULATION FOLLOW-UP CLINIC VISIT    Patient Name:  Rey Tristan  Date:  1/30/2018  Contact Type:  Face to Face    SUBJECTIVE:     Patient Findings     Positives No Problem Findings           OBJECTIVE    INR Protime   Date Value Ref Range Status   01/30/2018 1.3 (A) 0.86 - 1.14 Final       ASSESSMENT / PLAN  INR assessment SUB    Recheck INR In: 1 WEEK    INR Location Clinic      Anticoagulation Summary as of 1/30/2018     INR goal 1.5-2.0   Today's INR 1.3!   Maintenance plan No maintenance plan   Full instructions 1/30: 4 mg; 1/31: 2 mg; 2/1: 4 mg; 2/2: 2 mg; 2/3: 4 mg; 2/4: 4 mg; 2/5: 2 mg   Plan last modified Lala Tracy RN (1/23/2018)   Next INR check 2/6/2018   Target end date Indefinite    Indications   Long-term (current) use of anticoagulants [Z79.01] [Z79.01]  Hx pulmonary embolism [Z86.711]  History of DVT of lower extremity [Z86.718]  Acute upper gastrointestinal bleeding [K92.2]         Anticoagulation Episode Summary     INR check location     Preferred lab     Send INR reminders to  ANTICOAG POOL    Comments       Anticoagulation Care Providers     Provider Role Specialty Phone number    Gianna Richard MD [facility] [phone]            See the Encounter Report to view Anticoagulation Flowsheet and Dosing Calendar (Go to Encounters tab in chart review, and find the Anticoagulation Therapy Visit)        Lala Boss RN

## 2018-02-06 ENCOUNTER — ANTICOAGULATION THERAPY VISIT (OUTPATIENT)
Dept: ANTICOAGULATION | Facility: OTHER | Age: 78
End: 2018-02-06
Attending: FAMILY MEDICINE
Payer: MEDICARE

## 2018-02-06 ENCOUNTER — HOSPITAL ENCOUNTER (OUTPATIENT)
Dept: GENERAL RADIOLOGY | Facility: HOSPITAL | Age: 78
Discharge: HOME | End: 2018-02-06
Attending: INTERNAL MEDICINE | Admitting: INTERNAL MEDICINE
Payer: MEDICARE

## 2018-02-06 DIAGNOSIS — Z86.711 HX PULMONARY EMBOLISM: ICD-10-CM

## 2018-02-06 DIAGNOSIS — Z79.01 LONG-TERM (CURRENT) USE OF ANTICOAGULANTS: ICD-10-CM

## 2018-02-06 DIAGNOSIS — K92.2 ACUTE UPPER GASTROINTESTINAL BLEEDING: ICD-10-CM

## 2018-02-06 DIAGNOSIS — Z86.718 HISTORY OF DVT OF LOWER EXTREMITY: ICD-10-CM

## 2018-02-06 DIAGNOSIS — D50.9 IRON DEFICIENCY ANEMIA, UNSPECIFIED IRON DEFICIENCY ANEMIA TYPE: ICD-10-CM

## 2018-02-06 DIAGNOSIS — D47.2 MONOCLONAL PARAPROTEINEMIA: ICD-10-CM

## 2018-02-06 DIAGNOSIS — D68.59 PRIMARY HYPERCOAGULABLE STATE (H): ICD-10-CM

## 2018-02-06 LAB — INR POINT OF CARE: 1.3 (ref 0.86–1.14)

## 2018-02-06 PROCEDURE — 36416 COLLJ CAPILLARY BLOOD SPEC: CPT | Mod: ZL

## 2018-02-06 PROCEDURE — 77075 RADEX OSSEOUS SURVEY COMPL: CPT | Mod: TC

## 2018-02-06 NOTE — MR AVS SNAPSHOT
Rey Tristan   2/6/2018 10:15 AM   Anticoagulation Therapy Visit    Description:  77 year old male   Provider:   ANTI COAGULATION   Department:  Hc Anti Coagulation           INR as of 2/6/2018     Today's INR 1.3!      Anticoagulation Summary as of 2/6/2018     INR goal 1.5-2.0   Today's INR 1.3!   Full instructions 2/6: 6 mg; Otherwise 2 mg on Mon, Wed, Fri; 4 mg all other days   Next INR check 2/13/2018    Indications   Long-term (current) use of anticoagulants [Z79.01] [Z79.01]  Hx pulmonary embolism [Z86.711]  History of DVT of lower extremity [Z86.718]  Acute upper gastrointestinal bleeding [K92.2]         Your next Anticoagulation Clinic appointment(s)     Feb 13, 2018  9:15 AM CST   Anticoagulation Visit with  ANTI COAGULATION   [facility] [place] ([facility] [place] )    [address] [facility] [address]   [phone]              February 2018 Details    Sun Mon Tue Wed Thu Fri Sat         1               2               3                 4               5               6      6 mg   See details      7      2 mg         8      4 mg         9      2 mg         10      4 mg           11      4 mg         12      2 mg         13            14               15               16               17                 18               19               20               21               22               23               24                 25               26               27               28                   Date Details   02/06 This INR check       Date of next INR:  2/13/2018         How to take your warfarin dose     To take:  2 mg Take 0.5 of a 4 mg tablet.    To take:  4 mg Take 1 of the 4 mg tablets.    To take:  6 mg Take 1.5 of the 4 mg tablets.

## 2018-02-06 NOTE — PROGRESS NOTES
ANTICOAGULATION FOLLOW-UP CLINIC VISIT    Patient Name:  Rey Tristan  Date:  2/6/2018  Contact Type:  Face to Face    SUBJECTIVE:     Patient Findings     Positives No Problem Findings           OBJECTIVE    INR Protime   Date Value Ref Range Status   02/06/2018 1.3 (A) 0.86 - 1.14 Final       ASSESSMENT / PLAN  INR assessment SUB    Recheck INR In: 1 WEEK    INR Location Clinic      Anticoagulation Summary as of 2/6/2018     INR goal 1.5-2.0   Today's INR 1.3!   Maintenance plan 2 mg (4 mg x 0.5) on Mon, Wed, Fri; 4 mg (4 mg x 1) all other days   Full instructions 2/6: 6 mg; Otherwise 2 mg on Mon, Wed, Fri; 4 mg all other days   Weekly total 22 mg   Plan last modified Lala Boss RN (2/6/2018)   Next INR check 2/13/2018   Target end date Indefinite    Indications   Long-term (current) use of anticoagulants [Z79.01] [Z79.01]  Hx pulmonary embolism [Z86.711]  History of DVT of lower extremity [Z86.718]  Acute upper gastrointestinal bleeding [K92.2]         Anticoagulation Episode Summary     INR check location     Preferred lab     Send INR reminders to [facility] POOL    Comments       Anticoagulation Care Providers     Provider Role Specialty Phone number    Gianna Richard MD [facility] [phone]            See the Encounter Report to view Anticoagulation Flowsheet and Dosing Calendar (Go to Encounters tab in chart review, and find the Anticoagulation Therapy Visit)        Lala Boss, RN

## 2018-02-13 ENCOUNTER — ONCOLOGY VISIT (OUTPATIENT)
Dept: ONCOLOGY | Facility: OTHER | Age: 78
End: 2018-02-13
Attending: INTERNAL MEDICINE
Payer: MEDICARE

## 2018-02-13 ENCOUNTER — ANTICOAGULATION THERAPY VISIT (OUTPATIENT)
Dept: ANTICOAGULATION | Facility: OTHER | Age: 78
End: 2018-02-13
Attending: FAMILY MEDICINE
Payer: MEDICARE

## 2018-02-13 VITALS
WEIGHT: 175.1 LBS | SYSTOLIC BLOOD PRESSURE: 105 MMHG | TEMPERATURE: 98 F | BODY MASS INDEX: 29.17 KG/M2 | OXYGEN SATURATION: 97 % | RESPIRATION RATE: 20 BRPM | DIASTOLIC BLOOD PRESSURE: 67 MMHG | HEIGHT: 65 IN | HEART RATE: 76 BPM

## 2018-02-13 DIAGNOSIS — D50.9 IRON DEFICIENCY ANEMIA, UNSPECIFIED IRON DEFICIENCY ANEMIA TYPE: ICD-10-CM

## 2018-02-13 DIAGNOSIS — D50.0 IRON DEFICIENCY ANEMIA DUE TO CHRONIC BLOOD LOSS: ICD-10-CM

## 2018-02-13 DIAGNOSIS — Z86.711 HX PULMONARY EMBOLISM: ICD-10-CM

## 2018-02-13 DIAGNOSIS — E06.3 AUTOIMMUNE THYROIDITIS: ICD-10-CM

## 2018-02-13 DIAGNOSIS — D47.2 MONOCLONAL PARAPROTEINEMIA: ICD-10-CM

## 2018-02-13 DIAGNOSIS — Z79.01 LONG-TERM (CURRENT) USE OF ANTICOAGULANTS: ICD-10-CM

## 2018-02-13 DIAGNOSIS — D68.59 PRIMARY HYPERCOAGULABLE STATE (H): ICD-10-CM

## 2018-02-13 DIAGNOSIS — D62 ANEMIA DUE TO BLOOD LOSS, ACUTE: ICD-10-CM

## 2018-02-13 DIAGNOSIS — K92.2 ACUTE UPPER GASTROINTESTINAL BLEEDING: ICD-10-CM

## 2018-02-13 DIAGNOSIS — Z86.718 HISTORY OF DVT OF LOWER EXTREMITY: ICD-10-CM

## 2018-02-13 DIAGNOSIS — D47.2 MGUS (MONOCLONAL GAMMOPATHY OF UNKNOWN SIGNIFICANCE): Primary | ICD-10-CM

## 2018-02-13 LAB
ALBUMIN SERPL-MCNC: 3.4 G/DL (ref 3.4–5)
ALP SERPL-CCNC: 167 U/L (ref 40–150)
ALT SERPL W P-5'-P-CCNC: 32 U/L (ref 0–70)
ANION GAP SERPL CALCULATED.3IONS-SCNC: 6 MMOL/L (ref 3–14)
AST SERPL W P-5'-P-CCNC: 32 U/L (ref 0–45)
BASOPHILS # BLD AUTO: 0.1 10E9/L (ref 0–0.2)
BASOPHILS NFR BLD AUTO: 1 %
BILIRUB SERPL-MCNC: 0.3 MG/DL (ref 0.2–1.3)
BUN SERPL-MCNC: 13 MG/DL (ref 7–30)
CALCIUM SERPL-MCNC: 8.5 MG/DL (ref 8.5–10.1)
CHLORIDE SERPL-SCNC: 105 MMOL/L (ref 94–109)
CO2 SERPL-SCNC: 29 MMOL/L (ref 20–32)
CREAT SERPL-MCNC: 0.81 MG/DL (ref 0.66–1.25)
DIFFERENTIAL METHOD BLD: ABNORMAL
EOSINOPHIL # BLD AUTO: 0.2 10E9/L (ref 0–0.7)
EOSINOPHIL NFR BLD AUTO: 4.6 %
ERYTHROCYTE [DISTWIDTH] IN BLOOD BY AUTOMATED COUNT: 20.1 % (ref 10–15)
FERRITIN SERPL-MCNC: 26 NG/ML (ref 26–388)
GFR SERPL CREATININE-BSD FRML MDRD: >90 ML/MIN/1.7M2
GLUCOSE SERPL-MCNC: 71 MG/DL (ref 70–99)
HCT VFR BLD AUTO: 36.4 % (ref 40–53)
HGB BLD-MCNC: 11.3 G/DL (ref 13.3–17.7)
IMM GRANULOCYTES # BLD: 0 10E9/L (ref 0–0.4)
IMM GRANULOCYTES NFR BLD: 0.2 %
INR POINT OF CARE: 1.3 (ref 0.86–1.14)
IRON SATN MFR SERPL: 19 % (ref 15–46)
IRON SERPL-MCNC: 79 UG/DL (ref 35–180)
LDH SERPL L TO P-CCNC: 170 U/L (ref 85–227)
LYMPHOCYTES # BLD AUTO: 0.7 10E9/L (ref 0.8–5.3)
LYMPHOCYTES NFR BLD AUTO: 15.4 %
MCH RBC QN AUTO: 25.8 PG (ref 26.5–33)
MCHC RBC AUTO-ENTMCNC: 31 G/DL (ref 31.5–36.5)
MCV RBC AUTO: 83 FL (ref 78–100)
MONOCYTES # BLD AUTO: 0.5 10E9/L (ref 0–1.3)
MONOCYTES NFR BLD AUTO: 9.6 %
NEUTROPHILS # BLD AUTO: 3.3 10E9/L (ref 1.6–8.3)
NEUTROPHILS NFR BLD AUTO: 69.2 %
NRBC # BLD AUTO: 0 10*3/UL
NRBC BLD AUTO-RTO: 0 /100
PLATELET # BLD AUTO: 202 10E9/L (ref 150–450)
POTASSIUM SERPL-SCNC: 4.1 MMOL/L (ref 3.4–5.3)
PROT SERPL-MCNC: 8.3 G/DL (ref 6.8–8.8)
RBC # BLD AUTO: 4.38 10E12/L (ref 4.4–5.9)
SODIUM SERPL-SCNC: 140 MMOL/L (ref 133–144)
T4 FREE SERPL-MCNC: 0.84 NG/DL (ref 0.76–1.46)
TIBC SERPL-MCNC: 411 UG/DL (ref 240–430)
TSH SERPL DL<=0.005 MIU/L-ACNC: 6.65 MU/L (ref 0.4–4)
WBC # BLD AUTO: 4.8 10E9/L (ref 4–11)

## 2018-02-13 PROCEDURE — 83540 ASSAY OF IRON: CPT | Mod: ZL | Performed by: INTERNAL MEDICINE

## 2018-02-13 PROCEDURE — 83615 LACTATE (LD) (LDH) ENZYME: CPT | Mod: ZL | Performed by: INTERNAL MEDICINE

## 2018-02-13 PROCEDURE — 99215 OFFICE O/P EST HI 40 MIN: CPT | Performed by: INTERNAL MEDICINE

## 2018-02-13 PROCEDURE — 83550 IRON BINDING TEST: CPT | Mod: ZL | Performed by: INTERNAL MEDICINE

## 2018-02-13 PROCEDURE — 80053 COMPREHEN METABOLIC PANEL: CPT | Mod: ZL | Performed by: INTERNAL MEDICINE

## 2018-02-13 PROCEDURE — G0463 HOSPITAL OUTPT CLINIC VISIT: HCPCS

## 2018-02-13 PROCEDURE — 84439 ASSAY OF FREE THYROXINE: CPT | Mod: ZL | Performed by: INTERNAL MEDICINE

## 2018-02-13 PROCEDURE — 82728 ASSAY OF FERRITIN: CPT | Mod: ZL | Performed by: INTERNAL MEDICINE

## 2018-02-13 PROCEDURE — 84443 ASSAY THYROID STIM HORMONE: CPT | Mod: ZL | Performed by: INTERNAL MEDICINE

## 2018-02-13 PROCEDURE — 85025 COMPLETE CBC W/AUTO DIFF WBC: CPT | Mod: ZL | Performed by: INTERNAL MEDICINE

## 2018-02-13 PROCEDURE — 85610 PROTHROMBIN TIME: CPT | Mod: QW,ZL

## 2018-02-13 ASSESSMENT — PAIN SCALES - GENERAL: PAINLEVEL: NO PAIN (0)

## 2018-02-13 NOTE — PROGRESS NOTES
ANTICOAGULATION FOLLOW-UP CLINIC VISIT    Patient Name:  Rey Tristan  Date:  2/13/2018  Contact Type:  Face to Face    SUBJECTIVE:        OBJECTIVE    INR Protime   Date Value Ref Range Status   02/13/2018 1.3 (A) 0.86 - 1.14 Final       ASSESSMENT / PLAN  INR assessment SUB    Recheck INR In: 2 WEEKS    INR Location Clinic      Anticoagulation Summary as of 2/13/2018     INR goal 1.5-2.0   Today's INR 1.3!   Maintenance plan 2 mg (4 mg x 0.5) on Mon, Fri; 4 mg (4 mg x 1) all other days   Full instructions 2/13: 6 mg; Otherwise 2 mg on Mon, Fri; 4 mg all other days   Weekly total 24 mg   Plan last modified Lala Boss RN (2/13/2018)   Next INR check 2/27/2018   Target end date Indefinite    Indications   Long-term (current) use of anticoagulants [Z79.01] [Z79.01]  Hx pulmonary embolism [Z86.711]  History of DVT of lower extremity [Z86.718]  Acute upper gastrointestinal bleeding [K92.2]         Anticoagulation Episode Summary     INR check location     Preferred lab     Send INR reminders to [facility] POOL    Comments       Anticoagulation Care Providers     Provider Role Specialty Phone number    Gianna Richard MD [facility] [phone]            See the Encounter Report to view Anticoagulation Flowsheet and Dosing Calendar (Go to Encounters tab in chart review, and find the Anticoagulation Therapy Visit)        Lala Boss, RN

## 2018-02-13 NOTE — PROGRESS NOTES
Visit Date:   02/13/2018      DATE OF VISIT:  02/13/2018      HISTORY OF PRESENT ILLNESS:  Mr. Tristan returns for followup of anemia as well as new diagnosis of monoclonal gammopathy, hypercoagulable state.  We had seen the patient at the request of Dr. Gianna Richard on 01/02/2018. At that time he was a 77-year-old white male with a previous history of DVT, pulmonary embolus, chronic anemia, status post GI bleed. Apparently he was recently admitted to the hospital after he developed emesis associated with blood to the point where he was vomiting pure blood. This occurred on 11/30/2017.  He was admitted to the hospital with an upper GI bleed. 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 It was noted that he had dark stools approximately a year ago. He says he had been noted to be anemic and undergone a colonoscopy which was negative during this admission. His hemoglobin was noted to be 6.5.  He received 4 units of packed red cells and 2 units of FNP, and his INR was 2.89. EGD was performed and he was found to have duodenitis as well as peptic ulcer disease and Holt esophagus. He was told to stop aspirin and Coumadin, and he had been off Coumadin since11/2017.  He was discharged on Prilosec 40 mg daily.  The patient did not have a hypercoagulability workup, so as part of the workup we proceeded with a hypercoagulability workup. 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 Protein C was negative, protein S was negative.  Antiphospholipid profile was negative.  Sed rate was elevated at 82. Ferritin came back low at 11.  He also had a serum protein electrophoresis which revealed a monoclonal peak of 1.1.  Serum immunofixation revealed monoclonal IgM immunoglobulin kappa light chain type IgM was 1530.  We went ahead and ordered a bone marrow aspiration biopsy and  the final result was that the patient had iron deficiency, slightly increased plasma cells approximately 2% kappa. There were toxic neutrophils and reactive lymphocytes, increased rouleaux formation. Unremarkable flow cytometry immunophenotyping was noted.  The patient also had a skeletal bone survey done on February 6th. The findings were that there was a stable T12 compression fracture, but otherwise no evidence of lytic lesions to suggest myeloma. The patient otherwise is asymptomatic.  When we saw the patient last, we elected to increase his oral iron to 325 mg p.o. t.i.d. He has been on this for approximately 2 weeks.  Otherwise, denies any bright red blood per rectum or hematemesis, change in bowel habits, melena, fevers, night sweats, weight loss, or shortness of breath.      PHYSICAL EXAMINATION:   GENERAL:  He is an elderly white male in no acute distress.   VITAL SIGNS:  Reveal blood pressure 105/67, pulse rate 76, respirations 20, temperature 98.   HEENT:  Atraumatic, normocephalic. Pharynx erythematous.   NECK:  Supple.   LUNGS:  Clear to auscultation and percussion.   HEART:  Regular rhythm, S1, S2 normal.   ABDOMEN:  Soft, normoactive bowel sounds.  No mass, nontender.   LYMPHATICS:  No cervical, supraclavicular, axillary or inguinal nodes.   EXTREMITIES:  Without edema.   NEUROLOGIC:  Nonfocal.      LABORATORY DATA:  Reveal CBC:  White count 4.8, H and H 11.3 and 36.4, MCV 83, platelet count is 202,000.  BUN is 13, creatinine 0.81.  LFTs are normal except for alk phos of 167.  LDH is 170.      IMPRESSION:   1.  Monoclonal gammopathy of undetermined significance, IgM type kappa type. The patient has no evidence of end-organ damage, no hypercalcemia, no renal failure, no significant anemia, no bony lytic lesions. 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     PLAN:  The plan is to continue surveillance.  We will see the patient in 3 months, obtain CBC, CMP,  LDH, quantitative immunoprotein electrophoresis, serum kappa lambda light chains, urine for immunofixation, beta-2 microglobulin.    2.  Iron deficiency anemia, improved on oral iron.     PLAN:  The plan is to continue oral iron, likely due to gastrointestinal bleed. Continue oral iron 325 mg p.o. t.i.d. We will see the patient otherwise in 3 months. Obtain CBC, CMP, LDH, iron studies, B12, folate.      Forty minutes was spent with the patient, over half the time was spent on counseling, discussed the fact that he needed to be on oral iron as he was iron deficient likely due to a previous GI bleed.  His hemoglobin had improved and we discussed the lab results, discussed the bone survey results, we discussed the bone marrow results and spent time ordering lab work and followup care.         DUY KEYES MD             D: 2018   T: 2018   MT: SHAHNAZ      Name:     RADHA VALVERDE   MRN:      [id number]        Account:      [id number]   :      1940           Visit Date:   2018      Document: [id number]       cc: Gianna Richard MD

## 2018-02-13 NOTE — MR AVS SNAPSHOT
After Visit Summary   2/13/2018    Rey Tristan    MRN: [id number]           Patient Information     Date Of Birth          1940        Visit Information        Provider Department      2/13/2018 10:30 AM Tio Alfaro MD [facility] [place]        Today's Diagnoses     MGUS (monoclonal gammopathy of unknown significance)    -  1    Anemia, iron deficiency           Follow-ups after your visit        Your next 10 appointments already scheduled     Feb 27, 2018 10:45 AM CST   Anticoagulation Visit with HC ANTI COAGULATION   [facility] [place] ([facility] [place] )    [address]  [place] [address]   [phone]            May 08, 2018 11:00 AM CDT   LAB with HC LAB   [facility] ([facility] [place] )    [address]  [place] [address]   [phone]            May 15, 2018 11:00 AM CDT   (Arrive by 10:45 AM)   Return Visit with Britney Mahoney NP   [facility] [place] ([facility] [place] )    [address]  [place] [address]   [phone]              Future tests that were ordered for you today     Open Future Orders        Priority Expected Expires Ordered    Kappa and lambda light chain Routine 4/15/2018 2/4/2019 2/13/2018    Lactate Dehydrogenase Routine 4/15/2018 2/4/2019 2/13/2018    Macroglobulins Routine 4/15/2018 2/4/2019 2/13/2018    Protein Immunofixation Serum Routine 4/15/2018 2/4/2019 2/13/2018    Protein immunofixation urine Routine 4/15/2018 2/4/2019 2/13/2018    Vitamin B12 Routine 4/15/2018 2/4/2019 2/13/2018    Beta 2 microglobulin Routine 4/15/2018 2/4/2019 2/13/2018    CBC with platelets differential Routine 4/15/2018 2/4/2019 2/13/2018    Comprehensive metabolic panel Routine 4/15/2018 2/4/2019 2/13/2018    ELP reflex to IELP Routine 4/15/2018 2/4/2019 2/13/2018    Ferritin Routine 4/15/2018 2/4/2019 2/13/2018    Folate Routine 4/15/2018 2/4/2019 2/13/2018    Immunoglobulins  "A G and M Routine 4/15/2018 2019 2018    Iron and iron binding capacity Routine 4/15/2018 2019 2018            Who to contact     If you have questions or need follow up information about today's clinic visit or your schedule please contact [facility] directly at [phone].  Normal or non-critical lab and imaging results will be communicated to you by MyChart, letter or phone within 4 business days after the clinic has received the results. If you do not hear from us within 7 days, please contact the clinic through [email or web address] or phone. If you have a critical or abnormal lab result, we will notify you by phone as soon as possible.  Submit refill requests through Vello App or call your pharmacy and they will forward the refill request to us. Please allow 3 business days for your refill to be completed.          Additional Information About Your Visit        Vello App Information     Vello App lets you send messages to your doctor, view your test results, renew your prescriptions, schedule appointments and more. To sign up, go to [email or web address] . Click on \"Log in\" on the left side of the screen, which will take you to the Welcome page. Then click on \"Sign up Now\" on the right side of the page.     You will be asked to enter the access code listed below, as well as some personal information. Please follow the directions to create your username and password.     Your access code is: [email or web address]  Expires: 3/11/2018 10:04 AM     Your access code will  in 90 days. If you need help or a new code, please call your [facility] clinic or [phone].        Care EveryWhere ID     This is your Care EveryWhere ID. This could be used by other organizations to access your [facility] medical records  [id number]        Your Vitals Were     Pulse Temperature Respirations Height Pulse Oximetry BMI (Body Mass Index)    76 98  F (36.7  C) (Tympanic) 20 1.638 m (5' 4.5\") 97% 29.59 kg/m2       Blood " Pressure from Last 3 Encounters:   02/13/18 105/67   01/23/18 122/68   01/16/18 129/78    Weight from Last 3 Encounters:   02/13/18 79.4 kg (175 lb 1.6 oz)   01/23/18 80.2 kg (176 lb 12.8 oz)   01/16/18 76.2 kg (168 lb)               Primary Care Provider Office Phone # Fax #    Giannaflower Richard [phone]       [facility] [address]  [place] [address]        Equal Access to Services     [facility]: Hadii yajaira ku hadasho Soomaali, waaxda luqadaha, qaybta kaalmada adejackyaangel, pb orellana . So [facility] [phone].    ATENCIÓN: Si habla español, tiene a dodson disposición servicios gratuitos de asistencia lingüística. Llame al [phone].    We comply with applicable federal civil rights laws and [place] laws. We do not discriminate on the basis of race, color, national origin, age, disability, sex, sexual orientation, or gender identity.            Thank you!     Thank you for choosing [facility]  for your care. Our goal is always to provide you with excellent care. Hearing back from our patients is one way we can continue to improve our services. Please take a few minutes to complete the written survey that you may receive in the mail after your visit with us. Thank you!             Your Updated Medication List - Protect others around you: Learn how to safely use, store and throw away your medicines at www.disposemymeds.org.          This list is accurate as of 2/13/18 11:42 AM.  Always use your most recent med list.                   Brand Name Dispense Instructions for use Diagnosis    atorvastatin 10 MG tablet    LIPITOR    90 tablet    Take 1 tablet (10 mg) by mouth daily    Hyperlipidemia, unspecified hyperlipidemia type       cyanocobalamin 1000 MCG Tabs    [employer] vitamin  B12    90 tablet    Take 1,000 mcg by mouth daily    Primary hypercoagulable state (H), Monoclonal paraproteinemia, Iron deficiency anemia, unspecified iron  deficiency anemia type       ferrous sulfate 325 (65 FE) MG tablet    IRON    90 tablet    Take 1 tablet (325 mg) by mouth 3 times daily (with meals)    Iron deficiency anemia, unspecified iron deficiency anemia type, Monoclonal paraproteinemia, Primary hypercoagulable state (H)       folic acid 1 MG tablet    FOLVITE    90 tablet    Take 1 tablet (1 mg) by mouth daily    Iron deficiency anemia, unspecified iron deficiency anemia type, Primary hypercoagulable state (H), Monoclonal paraproteinemia       levothyroxine 50 MCG tablet    SYNTHROID/LEVOTHROID    30 tablet    Take 1 tablet (50 mcg) by mouth daily    Autoimmune thyroiditis       Lycopene 10 MG Caps           MULTIVITAMIN ADULT PO      Take 1 tablet by mouth        omeprazole 40 MG capsule    priLOSEC    30 capsule    Take 1 capsule (40 mg) by mouth daily    Acute upper gastrointestinal bleeding, 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       pyridoxine 50 MG Tabs    VITAMIN B-6    90 tablet    Take 1 tablet (50 mg) by mouth daily    Primary hypercoagulable state (H), Monoclonal paraproteinemia, Iron deficiency anemia, unspecified iron deficiency anemia type       vitamin D 400 UNITS tablet      Take 1,000 Units by mouth daily        warfarin 4 MG tablet    COUMADIN    30 tablet    Take 4mg 1/23; 2mg 1/24, 1/25 or as directed by [facility]    Long-term (current) use of anticoagulants, Hx pulmonary embolism, History of DVT of lower extremity, Acute upper gastrointestinal bleeding

## 2018-02-13 NOTE — MR AVS SNAPSHOT
Rey Tristan   2/13/2018 9:15 AM   Anticoagulation Therapy Visit    Description:  77 year old male   Provider:   ANTI COAGULATION   Department:   Anti Coagulation           INR as of 2/13/2018     Today's INR 1.3!      Anticoagulation Summary as of 2/13/2018     INR goal 1.5-2.0   Today's INR 1.3!   Full instructions 2/13: 6 mg; Otherwise 2 mg on Mon, Fri; 4 mg all other days   Next INR check 2/27/2018    Indications   Long-term (current) use of anticoagulants [Z79.01] [Z79.01]  Hx pulmonary embolism [Z86.711]  History of DVT of lower extremity [Z86.718]  Acute upper gastrointestinal bleeding [K92.2]         Your next Anticoagulation Clinic appointment(s)     Feb 13, 2018  9:15 AM CST   Anticoagulation Visit with  ANTI COAGULATION   [facility] ([facility] )    [address]  [place] [address]   [phone]            Feb 27, 2018 10:45 AM CST   Anticoagulation Visit with  ANTI COAGULATION   [facility] ([facility]    [address]  [place] [address]   [phone]              February 2018 Details    Sun Mon Tue Wed Thu Fri Sat         1               2               3                 4               5               6               7               8               9               10                 11               12               13      6 mg   See details      14      4 mg         15      4 mg         16      2 mg         17      4 mg           18      4 mg         19      2 mg         20      4 mg         21      4 mg         22      4 mg         23      2 mg         24      4 mg           25      4 mg         26      2 mg         27            28                   Date Details   02/13 This INR check       Date of next INR:  2/27/2018         How to take your warfarin dose     To take:  2 mg Take 0.5 of a 4 mg tablet.    To take:  4 mg Take 1 of the 4 mg tablets.    To take:  6 mg Take 1.5 of the 4 mg tablets.

## 2018-02-13 NOTE — NURSING NOTE
"Chief Complaint   Patient presents with     RECHECK     Follow up bone marrow biopsy and bone scam and labs for anemia       Initial /67  Pulse 76  Temp 98  F (36.7  C) (Tympanic)  Resp 20  Ht 1.638 m (5' 4.5\")  Wt 79.4 kg (175 lb 1.6 oz)  SpO2 97%  BMI 29.59 kg/m2 Estimated body mass index is 29.59 kg/(m^2) as calculated from the following:    Height as of this encounter: 1.638 m (5' 4.5\").    Weight as of this encounter: 79.4 kg (175 lb 1.6 oz).  Medication Reconciliation: complete   Immunizations reviewed and due for pcv 13 patient will discuss with primary physician, Has healthcare directives will bring in, pain = 0, PHQ9 = 0.  Patient was assessed using the NCCN psychosocial distress thermometer. Patient rated the score as a 0. Patient rated current stressors as none. Stressors will be brought to the attention of provider or Oncology RN Care Coordinator for a score of 6 or greater or per nurses discretion.   Jayleen Fleming LPN                "

## 2018-02-13 NOTE — PATIENT INSTRUCTIONS
We would like to see you back in 3 months. Please come 1week(s) prior for lab work. When you are in need of a refill of your medications, please call your pharmacy and they will send us the request. If you have any questions please call [phone]

## 2018-02-14 ASSESSMENT — PATIENT HEALTH QUESTIONNAIRE - PHQ9: SUM OF ALL RESPONSES TO PHQ QUESTIONS 1-9: 0

## 2018-02-27 ENCOUNTER — ANTICOAGULATION THERAPY VISIT (OUTPATIENT)
Dept: ANTICOAGULATION | Facility: OTHER | Age: 78
End: 2018-02-27
Attending: FAMILY MEDICINE
Payer: MEDICARE

## 2018-02-27 DIAGNOSIS — K92.2 ACUTE UPPER GASTROINTESTINAL BLEEDING: ICD-10-CM

## 2018-02-27 DIAGNOSIS — Z86.718 HISTORY OF DVT OF LOWER EXTREMITY: ICD-10-CM

## 2018-02-27 DIAGNOSIS — Z79.01 LONG-TERM (CURRENT) USE OF ANTICOAGULANTS: ICD-10-CM

## 2018-02-27 DIAGNOSIS — Z86.711 HX PULMONARY EMBOLISM: ICD-10-CM

## 2018-02-27 LAB — INR POINT OF CARE: 1.4 (ref 0.86–1.14)

## 2018-02-27 PROCEDURE — 36416 COLLJ CAPILLARY BLOOD SPEC: CPT | Mod: ZL

## 2018-02-27 NOTE — MR AVS SNAPSHOT
Rey Tristan   2/27/2018 10:45 AM   Anticoagulation Therapy Visit    Description:  77 year old male   Provider:   ANTI COAGULATION   Department:  Hc Anti Coagulation           INR as of 2/27/2018     Today's INR 1.4!      Anticoagulation Summary as of 2/27/2018     INR goal 1.5-2.0   Today's INR 1.4!   Full instructions 2/27: 6 mg; Otherwise 2 mg on Mon; 4 mg all other days   Next INR check 3/13/2018    Indications   Long-term (current) use of anticoagulants [Z79.01] [Z79.01]  Hx pulmonary embolism [Z86.711]  History of DVT of lower extremity [Z86.718]  Acute upper gastrointestinal bleeding [K92.2]         Your next Anticoagulation Clinic appointment(s)     Mar 13, 2018 10:45 AM CDT   Anticoagulation Visit with  ANTI COAGULATION   [facility] ([facility] )    [address] [place] [address]   [phone]              February 2018 Details    Sun Mon Tue Wed Thu Fri Sat         1               2               3                 4               5               6               7               8               9               10                 11               12               13               14               15               16               17                 18               19               20               21               22               23               24                 25               26               27      6 mg   See details      28      4 mg             Date Details   02/27 This INR check               How to take your warfarin dose     To take:  4 mg Take 1 of the 4 mg tablets.    To take:  6 mg Take 1.5 of the 4 mg tablets.           March 2018 Details    Sun Mon Tue Wed Thu Fri Sat         1      4 mg         2      4 mg         3      4 mg           4      4 mg         5      2 mg         6      4 mg         7      4 mg         8      4 mg         9      4 mg         10      4 mg           11      4 mg         12      2 mg         13            14                15               16               17                 18               19               20               21               22               23               24                 25               26               27               28               29               30               31                Date Details   No additional details    Date of next INR:  3/13/2018         How to take your warfarin dose     To take:  2 mg Take 0.5 of a 4 mg tablet.    To take:  4 mg Take 1 of the 4 mg tablets.

## 2018-02-27 NOTE — PROGRESS NOTES
ANTICOAGULATION FOLLOW-UP CLINIC VISIT    Patient Name:  Rey Tristan  Date:  2/27/2018  Contact Type:  Face to Face    SUBJECTIVE:     Patient Findings     Positives No Problem Findings           OBJECTIVE    INR Protime   Date Value Ref Range Status   02/27/2018 1.4 (A) 0.86 - 1.14 Final       ASSESSMENT / PLAN  INR assessment THER    Recheck INR In: 2 WEEKS    INR Location Clinic      Anticoagulation Summary as of 2/27/2018     INR goal 1.5-2.0   Today's INR 1.4!   Maintenance plan 2 mg (4 mg x 0.5) on Mon; 4 mg (4 mg x 1) all other days   Full instructions 2/27: 6 mg; Otherwise 2 mg on Mon; 4 mg all other days   Weekly total 26 mg   Plan last modified Lala Boss RN (2/27/2018)   Next INR check 3/13/2018   Target end date Indefinite    Indications   Long-term (current) use of anticoagulants [Z79.01] [Z79.01]  Hx pulmonary embolism [Z86.711]  History of DVT of lower extremity [Z86.718]  Acute upper gastrointestinal bleeding [K92.2]         Anticoagulation Episode Summary     INR check location     Preferred lab     Send INR reminders to  ANTICOAG POOL    Comments       Anticoagulation Care Providers     Provider Role Specialty Phone number    Gianna Richard MD [facility] [phone]            See the Encounter Report to view Anticoagulation Flowsheet and Dosing Calendar (Go to Encounters tab in chart review, and find the Anticoagulation Therapy Visit)        Lala Boss, RN

## 2018-03-13 ENCOUNTER — ANTICOAGULATION THERAPY VISIT (OUTPATIENT)
Dept: ANTICOAGULATION | Facility: OTHER | Age: 78
End: 2018-03-13
Attending: FAMILY MEDICINE
Payer: MEDICARE

## 2018-03-13 DIAGNOSIS — E06.3 AUTOIMMUNE THYROIDITIS: ICD-10-CM

## 2018-03-13 DIAGNOSIS — D62 ANEMIA DUE TO BLOOD LOSS, ACUTE: ICD-10-CM

## 2018-03-13 LAB
HGB BLD-MCNC: 12.3 G/DL (ref 13.3–17.7)
INR POINT OF CARE: 1.3 (ref 0.86–1.14)
T4 FREE SERPL-MCNC: 0.77 NG/DL (ref 0.76–1.46)
TSH SERPL DL<=0.005 MIU/L-ACNC: 5.9 MU/L (ref 0.4–4)

## 2018-03-13 PROCEDURE — 84439 ASSAY OF FREE THYROXINE: CPT | Mod: ZL | Performed by: FAMILY MEDICINE

## 2018-03-13 PROCEDURE — 85018 HEMOGLOBIN: CPT | Mod: ZL | Performed by: FAMILY MEDICINE

## 2018-03-13 PROCEDURE — 85610 PROTHROMBIN TIME: CPT | Mod: QW,ZL

## 2018-03-13 PROCEDURE — 84443 ASSAY THYROID STIM HORMONE: CPT | Mod: ZL | Performed by: FAMILY MEDICINE

## 2018-03-13 NOTE — PROGRESS NOTES
ANTICOAGULATION FOLLOW-UP CLINIC VISIT    Patient Name:  Rey Tristan  Date:  3/13/2018  Contact Type:  Face to Face    SUBJECTIVE:        OBJECTIVE    INR Protime   Date Value Ref Range Status   03/13/2018 1.3 (A) 0.86 - 1.14 Final       ASSESSMENT / PLAN  INR assessment SUB    Recheck INR In: 3 WEEKS    INR Location Clinic      Anticoagulation Summary as of 3/13/2018     INR goal 1.5-2.0   Today's INR 1.3!   Maintenance plan 4 mg (4 mg x 1) every day   Full instructions 3/13: 8 mg; Otherwise 4 mg every day   Weekly total 28 mg   Plan last modified Lala Boss, RN (3/13/2018)   Next INR check 4/3/2018   Target end date Indefinite    Indications   Long-term (current) use of anticoagulants [Z79.01] [Z79.01]  Hx pulmonary embolism [Z86.711]  History of DVT of lower extremity [Z86.718]  Acute upper gastrointestinal bleeding [K92.2]         Anticoagulation Episode Summary     INR check location     Preferred lab     Send INR reminders to [facility] POOL    Comments       Anticoagulation Care Providers     Provider Role Specialty Phone number    Gianna Richard MD [facility] [phone]            See the Encounter Report to view Anticoagulation Flowsheet and Dosing Calendar (Go to Encounters tab in chart review, and find the Anticoagulation Therapy Visit)        Lala Boss, RN

## 2018-03-13 NOTE — MR AVS SNAPSHOT
Rey Tristan   3/13/2018 10:45 AM   Anticoagulation Therapy Visit    Description:  77 year old male   Provider:   ANTI COAGULATION   Department:  Hc Anti Coagulation           INR as of 3/13/2018     Today's INR 1.3!      Anticoagulation Summary as of 3/13/2018     INR goal 1.5-2.0   Today's INR 1.3!   Full instructions 3/13: 8 mg; Otherwise 4 mg every day   Next INR check 4/3/2018    Indications   Long-term (current) use of anticoagulants [Z79.01] [Z79.01]  Hx pulmonary embolism [Z86.711]  History of DVT of lower extremity [Z86.718]  Acute upper gastrointestinal bleeding [K92.2]         Your next Anticoagulation Clinic appointment(s)     Apr 03, 2018 11:00 AM CDT   Anticoagulation Visit with  ANTI COAGULATION   [facility] ([facility] )    [address] [place] [address]   [phone]              March 2018 Details    Sun Mon Tue Wed Thu Fri Sat         1               2               3                 4               5               6               7               8               9               10                 11               12               13      8 mg   See details      14      4 mg         15      4 mg         16      4 mg         17      4 mg           18      4 mg         19      4 mg         20      4 mg         21      4 mg         22      4 mg         23      4 mg         24      4 mg           25      4 mg         26      4 mg         27      4 mg         28      4 mg         29      4 mg         30      4 mg         31      4 mg          Date Details   03/13 This INR check               How to take your warfarin dose     To take:  4 mg Take 1 of the 4 mg tablets.    To take:  8 mg Take 2 of the 4 mg tablets.           April 2018 Details    Sun Mon Tue Wed Thu Fri Sat     1      4 mg         2      4 mg         3            4               5               6               7                 8               9               10               11                12               13               14                 15               16               17               18               19               20               21                 22               23               24               25               26               27               28                 29               30                     Date Details   No additional details    Date of next INR:  4/3/2018         How to take your warfarin dose     To take:  4 mg Take 1 of the 4 mg tablets.

## 2018-03-14 ENCOUNTER — TELEPHONE (OUTPATIENT)
Dept: FAMILY MEDICINE | Facility: OTHER | Age: 78
End: 2018-03-14

## 2018-03-14 DIAGNOSIS — D64.9 ANEMIA, UNSPECIFIED TYPE: ICD-10-CM

## 2018-03-14 DIAGNOSIS — E03.8 SUBCLINICAL HYPOTHYROIDISM: Primary | ICD-10-CM

## 2018-03-14 NOTE — TELEPHONE ENCOUNTER
I think doing them quarterly at this point is just fine.  I reordered standing labs.  Recheck 3 months.

## 2018-03-14 NOTE — TELEPHONE ENCOUNTER
Dr. Richard,  Labs were just done on patient (TSH; T4-free, Hemoglobin).  Patient is wondering if you still want him to do these labs monthly or not.  I looked and no future labs from you but there are future labs from Dr. Alfaro.  Please advise.  Thank you.

## 2018-03-14 NOTE — TELEPHONE ENCOUNTER
Notified patient via phone.  Patient stated he will be in and out due to coumadin checks.  No further questions nor concerns at this time.

## 2018-04-03 ENCOUNTER — ANTICOAGULATION THERAPY VISIT (OUTPATIENT)
Dept: ANTICOAGULATION | Facility: OTHER | Age: 78
End: 2018-04-03
Attending: FAMILY MEDICINE
Payer: MEDICARE

## 2018-04-03 DIAGNOSIS — Z86.711 HX PULMONARY EMBOLISM: ICD-10-CM

## 2018-04-03 DIAGNOSIS — K92.2 ACUTE UPPER GASTROINTESTINAL BLEEDING: ICD-10-CM

## 2018-04-03 DIAGNOSIS — Z86.718 HISTORY OF DVT OF LOWER EXTREMITY: ICD-10-CM

## 2018-04-03 DIAGNOSIS — Z79.01 LONG-TERM (CURRENT) USE OF ANTICOAGULANTS: ICD-10-CM

## 2018-04-03 LAB — INR POINT OF CARE: 1.8 (ref 0.86–1.14)

## 2018-04-03 PROCEDURE — 36416 COLLJ CAPILLARY BLOOD SPEC: CPT | Mod: ZL

## 2018-04-03 NOTE — PROGRESS NOTES
ANTICOAGULATION FOLLOW-UP CLINIC VISIT    Patient Name:  Rey Tristan  Date:  4/3/2018  Contact Type:  Face to Face    SUBJECTIVE:     Patient Findings     Positives Nose bleeds    Comments States he has occassional nosebleeds but stop easily.           OBJECTIVE    INR Protime   Date Value Ref Range Status   04/03/2018 1.8 (A) 0.86 - 1.14 Final       ASSESSMENT / PLAN  INR assessment THER    Recheck INR In: 4 WEEKS    INR Location Clinic      Anticoagulation Summary as of 4/3/2018     INR goal 1.5-2.0   Today's INR 1.8   Maintenance plan 4 mg (4 mg x 1) every day   Full instructions 4 mg every day   Weekly total 28 mg   No change documented Lala Boss RN   Plan last modified Lala Boss RN (3/13/2018)   Next INR check 5/1/2018   Target end date Indefinite    Indications   Long-term (current) use of anticoagulants [Z79.01] [Z79.01]  Hx pulmonary embolism [Z86.711]  History of DVT of lower extremity [Z86.718]  Acute upper gastrointestinal bleeding [K92.2]         Anticoagulation Episode Summary     INR check location     Preferred lab     Send INR reminders to  ANTICOAG POOL    Comments       Anticoagulation Care Providers     Provider Role Specialty Phone number    Gianna Richard MD [facility] [phone]            See the Encounter Report to view Anticoagulation Flowsheet and Dosing Calendar (Go to Encounters tab in chart review, and find the Anticoagulation Therapy Visit)        Lala Boss RN

## 2018-04-03 NOTE — MR AVS SNAPSHOT
Rey Tristan   4/3/2018 11:00 AM   Anticoagulation Therapy Visit    Description:  77 year old male   Provider:   ANTI COAGULATION   Department:  [facility] Anti Coagulation           INR as of 4/3/2018     Today's INR 1.8      Anticoagulation Summary as of 4/3/2018     INR goal 1.5-2.0   Today's INR 1.8   Full instructions 4 mg every day   Next INR check 5/1/2018    Indications   Long-term (current) use of anticoagulants [Z79.01] [Z79.01]  Hx pulmonary embolism [Z86.711]  History of DVT of lower extremity [Z86.718]  Acute upper gastrointestinal bleeding [K92.2]         Your next Anticoagulation Clinic appointment(s)     May 01, 2018 11:00 AM CDT   Anticoagulation Visit with  ANTI COAGULATION   [facility] ([facility] )    [address] [place] [address]   [phone]              April 2018 Details    Sun Mon Tue Wed Thu Fri Sat     1               2               3      4 mg   See details      4      4 mg         5      4 mg         6      4 mg         7      4 mg           8      4 mg         9      4 mg         10      4 mg         11      4 mg         12      4 mg         13      4 mg         14      4 mg           15      4 mg         16      4 mg         17      4 mg         18      4 mg         19      4 mg         20      4 mg         21      4 mg           22      4 mg         23      4 mg         24      4 mg         25      4 mg         26      4 mg         27      4 mg         28      4 mg           29      4 mg         30      4 mg               Date Details   04/03 This INR check               How to take your warfarin dose     To take:  4 mg Take 1 of the 4 mg tablets.           May 2018 Details    Sun Mon Tue Wed Thu Fri Sat       1            2               3               4               5                 6               7               8               9               10               11               12                 13               14                15               16               17               18               19                 20               21               22               23               24               25               26                 27               28               29               30               31                  Date Details   No additional details    Date of next INR:  5/1/2018         How to take your warfarin dose     To take:  4 mg Take 1 of the 4 mg tablets.

## 2018-05-01 ENCOUNTER — ANTICOAGULATION THERAPY VISIT (OUTPATIENT)
Dept: ANTICOAGULATION | Facility: OTHER | Age: 78
End: 2018-05-01
Attending: FAMILY MEDICINE
Payer: MEDICARE

## 2018-05-01 DIAGNOSIS — K92.2 ACUTE UPPER GASTROINTESTINAL BLEEDING: ICD-10-CM

## 2018-05-01 DIAGNOSIS — Z79.01 LONG-TERM (CURRENT) USE OF ANTICOAGULANTS: ICD-10-CM

## 2018-05-01 DIAGNOSIS — Z86.718 HISTORY OF DVT OF LOWER EXTREMITY: ICD-10-CM

## 2018-05-01 DIAGNOSIS — Z86.711 HX PULMONARY EMBOLISM: ICD-10-CM

## 2018-05-01 LAB — INR POINT OF CARE: 1.4 (ref 0.86–1.14)

## 2018-05-01 PROCEDURE — 85610 PROTHROMBIN TIME: CPT | Mod: QW,ZL

## 2018-05-01 NOTE — MR AVS SNAPSHOT
Rey Tristan   5/1/2018 11:00 AM   Anticoagulation Therapy Visit    Description:  78 year old male   Provider:   ANTI COAGULATION   Department:   Anti Coagulation           INR as of 5/1/2018     Today's INR 1.4!      Anticoagulation Summary as of 5/1/2018     INR goal 1.5-2.0   Today's INR 1.4!   Full instructions 5/1: 6 mg; Otherwise 4 mg every day   Next INR check 5/24/2018    Indications   Long-term (current) use of anticoagulants [Z79.01] [Z79.01]  Hx pulmonary embolism [Z86.711]  History of DVT of lower extremity [Z86.718]  Acute upper gastrointestinal bleeding [K92.2]         Your next Anticoagulation Clinic appointment(s)     May 01, 2018 11:00 AM CDT   Anticoagulation Visit with  ANTI COAGULATION   [facility] ([facility] )    [address]  [place] [address]   [phone]            May 24, 2018 10:15 AM CDT   Anticoagulation Visit with  ANTI COAGULATION   [facility] ([facility]    [address] [address]   [phone]              May 2018 Details    Sun Mon Tue Wed Thu Fri Sat       1      6 mg   See details      2      4 mg         3      4 mg         4      4 mg         5      4 mg           6      4 mg         7      4 mg         8      4 mg         9      4 mg         10      4 mg         11      4 mg         12      4 mg           13      4 mg         14      4 mg         15      4 mg         16      4 mg         17      4 mg         18      4 mg         19      4 mg           20      4 mg         21      4 mg         22      4 mg         23      4 mg         24            25               26                 27               28               29               30               31                  Date Details   05/01 This INR check       Date of next INR:  5/24/2018         How to take your warfarin dose     To take:  4 mg Take 1 of the 4 mg tablets.    To take:  6 mg Take 1.5 of the 4 mg tablets.

## 2018-05-01 NOTE — PROGRESS NOTES
ANTICOAGULATION FOLLOW-UP CLINIC VISIT    Patient Name:  Rey Tristan  Date:  5/1/2018  Contact Type:  Face to Face    SUBJECTIVE:     Patient Findings     Comments Increased activity           OBJECTIVE    INR Protime   Date Value Ref Range Status   05/01/2018 1.4 (A) 0.86 - 1.14 Final       ASSESSMENT / PLAN  INR assessment THER    Recheck INR In: 3 WEEKS    INR Location Clinic      Anticoagulation Summary as of 5/1/2018     INR goal 1.5-2.0   Today's INR 1.4!   Maintenance plan 4 mg (4 mg x 1) every day   Full instructions 5/1: 6 mg; Otherwise 4 mg every day   Weekly total 28 mg   Plan last modified Lala Boss, RN (3/13/2018)   Next INR check 5/24/2018   Target end date Indefinite    Indications   Long-term (current) use of anticoagulants [Z79.01] [Z79.01]  Hx pulmonary embolism [Z86.711]  History of DVT of lower extremity [Z86.718]  Acute upper gastrointestinal bleeding [K92.2]         Anticoagulation Episode Summary     INR check location     Preferred lab     Send INR reminders to  ANTICOAG POOL    Comments       Anticoagulation Care Providers     Provider Role Specialty Phone number    Gianna Richard MD [facility] [phone]            See the Encounter Report to view Anticoagulation Flowsheet and Dosing Calendar (Go to Encounters tab in chart review, and find the Anticoagulation Therapy Visit)        Lala Boss, RN

## 2018-05-08 DIAGNOSIS — D47.2 MGUS (MONOCLONAL GAMMOPATHY OF UNKNOWN SIGNIFICANCE): ICD-10-CM

## 2018-05-08 DIAGNOSIS — D50.0 IRON DEFICIENCY ANEMIA DUE TO CHRONIC BLOOD LOSS: ICD-10-CM

## 2018-05-08 LAB
ALBUMIN SERPL-MCNC: 3.3 G/DL (ref 3.4–5)
ALP SERPL-CCNC: 157 U/L (ref 40–150)
ALT SERPL W P-5'-P-CCNC: 27 U/L (ref 0–70)
ANION GAP SERPL CALCULATED.3IONS-SCNC: 6 MMOL/L (ref 3–14)
AST SERPL W P-5'-P-CCNC: 34 U/L (ref 0–45)
BASOPHILS # BLD AUTO: 0.1 10E9/L (ref 0–0.2)
BASOPHILS NFR BLD AUTO: 1 %
BILIRUB SERPL-MCNC: 0.5 MG/DL (ref 0.2–1.3)
BUN SERPL-MCNC: 15 MG/DL (ref 7–30)
CALCIUM SERPL-MCNC: 8.7 MG/DL (ref 8.5–10.1)
CHLORIDE SERPL-SCNC: 105 MMOL/L (ref 94–109)
CO2 SERPL-SCNC: 27 MMOL/L (ref 20–32)
CREAT SERPL-MCNC: 0.81 MG/DL (ref 0.66–1.25)
DIFFERENTIAL METHOD BLD: ABNORMAL
EOSINOPHIL # BLD AUTO: 0.2 10E9/L (ref 0–0.7)
EOSINOPHIL NFR BLD AUTO: 3.9 %
ERYTHROCYTE [DISTWIDTH] IN BLOOD BY AUTOMATED COUNT: 18.8 % (ref 10–15)
FERRITIN SERPL-MCNC: 32 NG/ML (ref 26–388)
FOLATE SERPL-MCNC: >100 NG/ML
GFR SERPL CREATININE-BSD FRML MDRD: >90 ML/MIN/1.7M2
GLUCOSE SERPL-MCNC: 93 MG/DL (ref 70–99)
HCT VFR BLD AUTO: 39.2 % (ref 40–53)
HGB BLD-MCNC: 13 G/DL (ref 13.3–17.7)
IMM GRANULOCYTES # BLD: 0 10E9/L (ref 0–0.4)
IMM GRANULOCYTES NFR BLD: 0 %
IRON SATN MFR SERPL: 24 % (ref 15–46)
IRON SERPL-MCNC: 76 UG/DL (ref 35–180)
LDH SERPL L TO P-CCNC: 209 U/L (ref 85–227)
LYMPHOCYTES # BLD AUTO: 1 10E9/L (ref 0.8–5.3)
LYMPHOCYTES NFR BLD AUTO: 20.2 %
MCH RBC QN AUTO: 28.3 PG (ref 26.5–33)
MCHC RBC AUTO-ENTMCNC: 33.2 G/DL (ref 31.5–36.5)
MCV RBC AUTO: 85 FL (ref 78–100)
MONOCYTES # BLD AUTO: 0.6 10E9/L (ref 0–1.3)
MONOCYTES NFR BLD AUTO: 11.4 %
NEUTROPHILS # BLD AUTO: 3.3 10E9/L (ref 1.6–8.3)
NEUTROPHILS NFR BLD AUTO: 63.5 %
NRBC # BLD AUTO: 0 10*3/UL
NRBC BLD AUTO-RTO: 0 /100
PLATELET # BLD AUTO: 167 10E9/L (ref 150–450)
POTASSIUM SERPL-SCNC: 4.1 MMOL/L (ref 3.4–5.3)
PROT SERPL-MCNC: 8.2 G/DL (ref 6.8–8.8)
RBC # BLD AUTO: 4.59 10E12/L (ref 4.4–5.9)
SODIUM SERPL-SCNC: 138 MMOL/L (ref 133–144)
TIBC SERPL-MCNC: 321 UG/DL (ref 240–430)
VIT B12 SERPL-MCNC: 1381 PG/ML (ref 193–986)
WBC # BLD AUTO: 5.1 10E9/L (ref 4–11)

## 2018-05-08 PROCEDURE — 84165 PROTEIN E-PHORESIS SERUM: CPT | Mod: ZL | Performed by: INTERNAL MEDICINE

## 2018-05-08 PROCEDURE — 83883 ASSAY NEPHELOMETRY NOT SPEC: CPT | Mod: ZL | Performed by: INTERNAL MEDICINE

## 2018-05-08 PROCEDURE — 80053 COMPREHEN METABOLIC PANEL: CPT | Mod: ZL | Performed by: INTERNAL MEDICINE

## 2018-05-08 PROCEDURE — 83540 ASSAY OF IRON: CPT | Mod: ZL | Performed by: INTERNAL MEDICINE

## 2018-05-08 PROCEDURE — 82607 VITAMIN B-12: CPT | Mod: ZL | Performed by: INTERNAL MEDICINE

## 2018-05-08 PROCEDURE — 82728 ASSAY OF FERRITIN: CPT | Mod: ZL | Performed by: INTERNAL MEDICINE

## 2018-05-08 PROCEDURE — 83550 IRON BINDING TEST: CPT | Mod: ZL | Performed by: INTERNAL MEDICINE

## 2018-05-08 PROCEDURE — 85810 BLOOD VISCOSITY EXAMINATION: CPT | Mod: ZL | Performed by: INTERNAL MEDICINE

## 2018-05-08 PROCEDURE — 86334 IMMUNOFIX E-PHORESIS SERUM: CPT | Mod: ZL | Performed by: INTERNAL MEDICINE

## 2018-05-08 PROCEDURE — 85025 COMPLETE CBC W/AUTO DIFF WBC: CPT | Mod: ZL | Performed by: INTERNAL MEDICINE

## 2018-05-08 PROCEDURE — 83615 LACTATE (LD) (LDH) ENZYME: CPT | Mod: ZL | Performed by: INTERNAL MEDICINE

## 2018-05-08 PROCEDURE — 36415 COLL VENOUS BLD VENIPUNCTURE: CPT | Mod: ZL | Performed by: INTERNAL MEDICINE

## 2018-05-08 PROCEDURE — 99000 SPECIMEN HANDLING OFFICE-LAB: CPT | Performed by: INTERNAL MEDICINE

## 2018-05-08 PROCEDURE — 82784 ASSAY IGA/IGD/IGG/IGM EACH: CPT | Mod: ZL | Performed by: INTERNAL MEDICINE

## 2018-05-08 PROCEDURE — 82746 ASSAY OF FOLIC ACID SERUM: CPT | Mod: ZL | Performed by: INTERNAL MEDICINE

## 2018-05-08 PROCEDURE — 00000402 ZZHCL STATISTIC TOTAL PROTEIN: Mod: ZL | Performed by: INTERNAL MEDICINE

## 2018-05-08 PROCEDURE — 82232 ASSAY OF BETA-2 PROTEIN: CPT | Mod: ZL | Performed by: INTERNAL MEDICINE

## 2018-05-08 PROCEDURE — 86335 IMMUNFIX E-PHORSIS/URINE/CSF: CPT | Mod: ZL | Performed by: INTERNAL MEDICINE

## 2018-05-09 LAB
ALBUMIN SERPL ELPH-MCNC: 3.8 G/DL (ref 3.7–5.1)
ALPHA1 GLOB SERPL ELPH-MCNC: 0.3 G/DL (ref 0.2–0.4)
ALPHA2 GLOB SERPL ELPH-MCNC: 0.9 G/DL (ref 0.5–0.9)
B-GLOBULIN SERPL ELPH-MCNC: 1 G/DL (ref 0.6–1)
B2 MICROGLOB SERPL-MCNC: 2.3 MG/L
GAMMA GLOB SERPL ELPH-MCNC: 1.8 G/DL (ref 0.7–1.6)
IGA SERPL-MCNC: 126 MG/DL (ref 70–380)
IGG SERPL-MCNC: 996 MG/DL (ref 695–1620)
IGM SERPL-MCNC: 1670 MG/DL (ref 60–265)
KAPPA LC UR-MCNC: 2.28 MG/DL (ref 0.33–1.94)
KAPPA LC/LAMBDA SER: 1.04 {RATIO} (ref 0.26–1.65)
LAMBDA LC SERPL-MCNC: 2.19 MG/DL (ref 0.57–2.63)
M PROTEIN SERPL ELPH-MCNC: 1 G/DL
PROT ELPH PNL UR ELPH: NORMAL
PROT PATTERN SERPL ELPH-IMP: ABNORMAL
PROT PATTERN SERPL IFE-IMP: ABNORMAL
VISC SER: 1.9 CP (ref 1.4–1.8)

## 2018-05-14 NOTE — PROGRESS NOTES
Hematology Follow-up Visit:  May 14, 2018    Reason for Visit:  Patient presents with:  RECHECK: Follow up MGUS (monoclonal gammopathy of unknown significance)      Nursing Notes and Documentation reviewed:  yes    HPI:  This is a 78-year-old male patient who presents to the oncology/hematology clinic today in followup of monoclonal gammopathy of undetermined significance diagnosed 2/2018 during workup for hypercoag state, along with previous diagnosis of anemia related to GI bleed.  Patient does have a history of previous DVT and PE and has been on long-term anticoagulation with Coumadin.  Coumadin was held after GI bleed was diagnosed and has been resumed.    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  Protein C and protein S along with antiphospholipid profile were negative.  SPEP was completed showing an M-spike of 1.1 with serum immunofixation showing monoclonal IgM immunoglobulin, kappa light chain type with IgM of 1530.  Bone marrow aspiration biopsy showed iron deficiency, slightly increased plasma cell proximally 2% kappa and toxic neutrophils with reactive lymphocytes and increased rouleaux formation; flow cytometry and immunophenotyping was unremarkable.  Skeletal bone survey was completed on 2/6/2018 showing a stable T12 compression fracture but no evidence of lytic lesions.    He was placed on oral iron with improvement in his hemoglobin and iron studies.    He presents to the clinic today accompanied by his wife with no new complaints.  He does have some complaints of generalized bone stiffness and this is essentially unchanged.  Does have some shortness of breath that is not new and he relates this to COPD and deconditioning.  He developed a rash to his arms back and chest and this was evaluated.  He continues on 3 iron tablets daily along with recommended B6, B12 and folic acid.  He does questioned  today when he is due to have another thyroid completed.    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 Simeon; silent; transient hyperthyroid, now normal; monitor TSH monthly for hypothyroidism     Chronic anticoagulation 1/1/2012     Elevated serum alkaline phosphatase level 5/4/2016    normal GGT, normal bone skeletal survery     Gastric ulcer     endoscopy 6/2016, repeat 6 months; PPI Carafate     Hyperlipidemia      Normocytic anemia 5/4/2016     Pulmonary nodule     CT 5/2014, right; consider repeat 1 year if high risk     Upper GI bleed 11/30/2017     Vitamin D deficiency 1/1/2012       Social History     Social History     Marital status:      Spouse name: N/A     Number of children: N/A     Years of education: N/A     Occupational History      [employer]     Retired      Social History Main Topics     Smoking status: Former Smoker     Types: Cigarettes     Start date: 5/15/1958     Quit date: 10/28/2009     Smokeless tobacco: Never Used      Comment: Quit 2009     Alcohol use No     Drug use: No     Sexual activity: Not on file     Other Topics Concern      Service Yes     [employer]     Blood Transfusions Yes     Permits if needed     Caffeine Concern Yes     Tea     Occupational Exposure No     Hobby Hazards No     Sleep Concern No     Stress Concern No     Weight Concern No     Special Diet No     Back Care No     Exercise No     Seat Belt Yes     Self-Exams Yes     Parent/Sibling W/ Cabg, Mi Or Angioplasty Before 65f 55m? No     Social History Narrative       Past Surgical History:   Procedure Laterality Date     ANGIOGRAM  6/23/2014     BIOPSY  2018    bone marrow biopsy     BONE MARROW BIOPSY, BONE SPECIMEN, NEEDLE/TROCAR N/A 1/16/2018    Procedure: BIOPSY BONE MARROW;  BONE MARROW BIOPSY;  Surgeon: Nuno Castro MD;  Location: [facility]     C REGULAR ECHO (FL)  6/23/2014    Dr. Dirk Gimenez with intussuseption        cataract extraction       colonoscopy       COLONOSCOPY  6/17     ENDOSCOPY UPPER, COLONOSCOPY, COMBINED N/A 6/17/2016    Procedure: COMBINED ENDOSCOPY UPPER, COLONOSCOPY;  Surgeon: Andrea Stearns DO;  Location: [facility] OR     ESOPHAGOSCOPY, GASTROSCOPY, DUODENOSCOPY (EGD), COMBINED N/A 11/30/2017    Procedure: COMBINED ESOPHAGOSCOPY, GASTROSCOPY, DUODENOSCOPY (EGD);  UPPER ENDOSCOPY;  Surgeon: Narinder Torres MD;  Location: [facility] OR     left knee meniscal tear       Left lower extremity DVT       lens implant       nasal polypectomy       Pulmonary Embolism         Family History   Problem Relation Age of Onset     CANCER Mother      Ovarian     Other Cancer Mother      lung/breast/ cervical ?     Respiratory Father      emphysemia     Lung Cancer Brother      DIABETES No family hx of      Hypertension No family hx of      Hyperlipidemia No family hx of      Thyroid Disease No family hx of      Asthma No family hx of      Colon Cancer No family hx of      Prostate Cancer No family hx of      Anesthesia Reaction No family hx of      Genetic Disorder No family hx of      CEREBROVASCULAR DISEASE No family hx of      Coronary Artery Disease No family hx of      Breast Cancer No family hx of        Allergies:  Allergies as of 05/15/2018     (No Known Allergies)       Current Medications:  Current Outpatient Prescriptions   Medication Sig Dispense Refill     atorvastatin (LIPITOR) 10 MG tablet Take 1 tablet (10 mg) by mouth daily 90 tablet 3     Cholecalciferol (VITAMIN D) 400 UNITS tablet Take 1,000 Units by mouth daily        cyanocobalamin ([employer] VITAMIN  B12) 1000 MCG TABS Take 1,000 mcg by mouth daily 90 tablet 3     ferrous sulfate (IRON) 325 (65 FE) MG tablet Take 1 tablet (325 mg) by mouth 3 times daily (with meals) 90 tablet 11     folic acid (FOLVITE) 1 MG tablet Take 1 tablet (1 mg) by mouth daily 90 tablet 3     levothyroxine (SYNTHROID/LEVOTHROID) 50 MCG tablet Take 1 tablet (50 mcg) by mouth daily 30  "tablet 1     Lycopene 10 MG CAPS        Multiple Vitamins-Minerals (MULTIVITAMIN ADULT PO) Take 1 tablet by mouth       omeprazole (PRILOSEC) 40 MG capsule Take 1 capsule (40 mg) by mouth daily 30 capsule 5     pyridoxine (VITAMIN B-6) 50 MG TABS Take 1 tablet (50 mg) by mouth daily 90 tablet 3     warfarin (COUMADIN) 4 MG tablet Take 4mg 1/23; 2mg 1/24, 1/25 or as directed by [facility] 30 tablet           Review Of Systems:  Constitutional: denies fever, weight changes  Eyes: denies blurred or double vision; Has some difficulty with small print  Ears/Nose/Throat: denies ear pain, nose problems, difficulty swallowing  Respiratory: denies shortness of breath, cough  Skin: denies rash, lesions  Cardiovascular: denies chest pain, palpitations, edema  Gastrointestinal: denies abdominal pain, bloating, nausea, vomiting, early satiety  Genitourinary: denies difficulty with urination, blood in urine  Musculoskeletal: denies new muscle pain, bone pain  Neurologic: denies lightheadedness, numbness orTingling, has occasional headaches  Psychiatric: denies anxiety, depression  Hematologic/Lymphatic/Immunologic: denies easy bruising, easy bleeding, lumps or bumps noted  Endocrine: Denies increased thirst, night sweats; long standing dry mouth      Physical Exam:  /70  Pulse 63  Temp 97.6  F (36.4  C) (Tympanic)  Resp 29  Ht 1.638 m (5' 4.5\")  Wt 80.4 kg (177 lb 3.2 oz)  SpO2 93%  BMI 29.95 kg/m2  GENERAL APPEARANCE: Healthy, alert and in no acute distress.  HEENT: Normocephalic, Sclerae anicteric. Oropharynx without ulcers, lesions, or thrush.  NECK: No asymmetry or masses, no thyromegaly.  LYMPHATICS: No palpable cervical, supraclavicular, axillary, or inguinal nodes   RESP: Lungs clear to auscultation bilaterally and dim to bases, respirations regular and easy  CARDIOVASCULAR: Regular rate and rhythm. Normal S1, S2  ABDOMEN: Soft, nontender. Bowel sounds auscultated all 4 quadrants. No palpable " organomegaly or masses.  MUSCULOSKELETAL: Extremities without gross deformities noted.  NEURO: Alert and oriented x 3.  Gait steady.  PSYCHIATRIC: Mentation and affect appear normal.  Mood appropriate.    Laboratory/Imaging Studies:  Results for orders placed or performed in visit on 05/08/18   Beta 2 microglobulin   Result Value Ref Range    Beta-2-Microglobulin 2.3 (H) <2.3 mg/L   CBC with platelets differential   Result Value Ref Range    WBC 5.1 4.0 - 11.0 10e9/L    RBC Count 4.59 4.4 - 5.9 10e12/L    Hemoglobin 13.0 (L) 13.3 - 17.7 g/dL    Hematocrit 39.2 (L) 40.0 - 53.0 %    MCV 85 78 - 100 fl    MCH 28.3 26.5 - 33.0 pg    MCHC 33.2 31.5 - 36.5 g/dL    RDW 18.8 (H) 10.0 - 15.0 %    Platelet Count 167 150 - 450 10e9/L    Diff Method Automated Method     % Neutrophils 63.5 %    % Lymphocytes 20.2 %    % Monocytes 11.4 %    % Eosinophils 3.9 %    % Basophils 1.0 %    % Immature Granulocytes 0.0 %    Nucleated RBCs 0 0 /100    Absolute Neutrophil 3.3 1.6 - 8.3 10e9/L    Absolute Lymphocytes 1.0 0.8 - 5.3 10e9/L    Absolute Monocytes 0.6 0.0 - 1.3 10e9/L    Absolute Eosinophils 0.2 0.0 - 0.7 10e9/L    Absolute Basophils 0.1 0.0 - 0.2 10e9/L    Abs Immature Granulocytes 0.0 0 - 0.4 10e9/L    Absolute Nucleated RBC 0.0    Comprehensive metabolic panel   Result Value Ref Range    Sodium 138 133 - 144 mmol/L    Potassium 4.1 3.4 - 5.3 mmol/L    Chloride 105 94 - 109 mmol/L    Carbon Dioxide 27 20 - 32 mmol/L    Anion Gap 6 3 - 14 mmol/L    Glucose 93 70 - 99 mg/dL    Urea Nitrogen 15 7 - 30 mg/dL    Creatinine 0.81 0.66 - 1.25 mg/dL    GFR Estimate >90 >60 mL/min/1.7m2    GFR Estimate If Black >90 >60 mL/min/1.7m2    Calcium 8.7 8.5 - 10.1 mg/dL    Bilirubin Total 0.5 0.2 - 1.3 mg/dL    Albumin 3.3 (L) 3.4 - 5.0 g/dL    Protein Total 8.2 6.8 - 8.8 g/dL    Alkaline Phosphatase 157 (H) 40 - 150 U/L    ALT 27 0 - 70 U/L    AST 34 0 - 45 U/L   ELP reflex to IELP   Result Value Ref Range    Albumin Fraction 3.8 3.7 - 5.1  g/dL    Alpha 1 Fraction 0.3 0.2 - 0.4 g/dL    Alpha 2 Fraction 0.9 0.5 - 0.9 g/dL    Beta Fraction 1.0 0.6 - 1.0 g/dL    Gamma Fraction 1.8 (H) 0.7 - 1.6 g/dL    Monoclonal Peak 1.0 (H) 0.0 g/dL    ELP Interpretation:       Monoclonal protein (1.0 g/dL) seen in the beta gamma region. See immunofixation report on   same specimen. Pathologic significance requires clinical correlation. Blaire Kelly M.D.,   Ph.D.      Ferritin   Result Value Ref Range    Ferritin 32 26 - 388 ng/mL   Folate   Result Value Ref Range    Folate >100.0 >5.4 ng/mL   Iron and iron binding capacity   Result Value Ref Range    Iron 76 35 - 180 ug/dL    Iron Binding Cap 321 240 - 430 ug/dL    Iron Saturation Index 24 15 - 46 %   Kappa and lambda light chain   Result Value Ref Range    Kappa Free Lt Chain 2.28 (H) 0.33 - 1.94 mg/dL    Lambda Free Lt Chain 2.19 0.57 - 2.63 mg/dL    Kappa Lambda Ratio 1.04 0.26 - 1.65   Lactate Dehydrogenase   Result Value Ref Range    Lactate Dehydrogenase 209 85 - 227 U/L   Macroglobulins   Result Value Ref Range    Viscosity Index 1.9 (H) 1.4 - 1.8   Protein Immunofixation Serum   Result Value Ref Range    Immunofixation ELP (Note)      695 - 1620 mg/dL     70 - 380 mg/dL    IGM 1670 (H) 60 - 265 mg/dL   Protein immunofixation urine   Result Value Ref Range    Immunofix ELP Urine (Note)    Vitamin B12   Result Value Ref Range    Vitamin B12 1381 (H) 193 - 986 pg/mL          ASSESSMENT/PLAN:    #1 MGUS: Monoclonal gammopathy of undetermined significance diagnosed January 2018.  M spike along with other lab work is essentially stable.  We'll see him back in 3 months with the same lab work.    #2 anemia: Improvement on oral iron and he will continue with this at this point.    #3  Heterozygous MTHFR:  elevated folic acid: I recommended discontinuing the folic acid.  This will be rechecked in 3 months.  He continues on the coumadin.    #4  Elevated TSH: Per Dr. Richard's note I did review this with him  and she would like another TSH completed in June.    I encouraged patient to call with any questions or concerns.    Britney Mahoney NP

## 2018-05-15 ENCOUNTER — ONCOLOGY VISIT (OUTPATIENT)
Dept: ONCOLOGY | Facility: OTHER | Age: 78
End: 2018-05-15
Attending: NURSE PRACTITIONER
Payer: COMMERCIAL

## 2018-05-15 VITALS
BODY MASS INDEX: 29.52 KG/M2 | TEMPERATURE: 97.6 F | OXYGEN SATURATION: 93 % | RESPIRATION RATE: 29 BRPM | HEIGHT: 65 IN | DIASTOLIC BLOOD PRESSURE: 70 MMHG | HEART RATE: 63 BPM | WEIGHT: 177.2 LBS | SYSTOLIC BLOOD PRESSURE: 108 MMHG

## 2018-05-15 DIAGNOSIS — Z86.718 HISTORY OF DVT OF LOWER EXTREMITY: ICD-10-CM

## 2018-05-15 DIAGNOSIS — Z15.89 HETEROZYGOUS MTHFR MUTATION C677T: ICD-10-CM

## 2018-05-15 DIAGNOSIS — R79.89 ELEVATED TSH: ICD-10-CM

## 2018-05-15 DIAGNOSIS — D47.2 MONOCLONAL GAMMOPATHY OF UNKNOWN SIGNIFICANCE (MGUS): Primary | ICD-10-CM

## 2018-05-15 DIAGNOSIS — D50.9 IRON DEFICIENCY ANEMIA, UNSPECIFIED IRON DEFICIENCY ANEMIA TYPE: ICD-10-CM

## 2018-05-15 PROCEDURE — 99214 OFFICE O/P EST MOD 30 MIN: CPT | Performed by: NURSE PRACTITIONER

## 2018-05-15 PROCEDURE — G0463 HOSPITAL OUTPT CLINIC VISIT: HCPCS

## 2018-05-15 ASSESSMENT — PAIN SCALES - GENERAL: PAINLEVEL: NO PAIN (0)

## 2018-05-15 NOTE — NURSING NOTE
"Chief Complaint   Patient presents with     RECHECK     Follow up MGUS (monoclonal gammopathy of unknown significance)       Initial /70  Pulse 63  Temp 97.6  F (36.4  C) (Tympanic)  Resp 29  Ht 1.638 m (5' 4.5\")  Wt 80.4 kg (177 lb 3.2 oz)  SpO2 93%  BMI 29.95 kg/m2 Estimated body mass index is 29.95 kg/(m^2) as calculated from the following:    Height as of this encounter: 1.638 m (5' 4.5\").    Weight as of this encounter: 80.4 kg (177 lb 3.2 oz).  Medication Reconciliation: complete   Immunizations reviewed, will bring in advanced directives, pain = 0, PHQ9 = 0.    Jayleen Fleming LPN    "

## 2018-05-15 NOTE — PATIENT INSTRUCTIONS
We would like to see you back in 3 months. Please come 1 week prior for lab work.  If you have any questions please call [phone]  Other instructions:  Stop the Folic acid; have lab drawn for Dr. Richard in [place]

## 2018-05-15 NOTE — MR AVS SNAPSHOT
After Visit Summary   5/15/2018    Rey Tristan    MRN: [id number]           Patient Information     Date Of Birth          1940        Visit Information        Provider Department      5/15/2018 11:00 AM Britney Mahoney NP [facility] [place]        Today's Diagnoses     Monoclonal gammopathy of unknown significance (MGUS)    -  1    History of DVT of lower extremity        Iron deficiency anemia, unspecified iron deficiency anemia type        Heterozygous MTHFR mutation C677T (H)          Care Instructions    We would like to see you back in 3 months. Please come 1 week prior for lab work.  If you have any questions please call [phone]  Other instructions:  Stop the Folic acid; have lab drawn for Dr. Richard in June          Follow-ups after your visit        Your next 10 appointments already scheduled     May 24, 2018 10:15 AM CDT   Anticoagulation Visit with HC ANTI COAGULATION   [facility] ([facility] [place] )    [address]  [place] [address]   [phone]            Aug 08, 2018 10:30 AM CDT   LAB with HC LAB   [facility] ([facility] [place] )    [address]  [place] [address]   [phone]            Aug 15, 2018 10:30 AM CDT   (Arrive by 10:15 AM)   Return Visit with Britney Mahoney NP   [facility] ([facility] [place] )    [address]  [place] [address]   [phone]              Future tests that were ordered for you today     Open Future Orders        Priority Expected Expires Ordered    Beta 2 microglobulin Routine 8/15/2018 9/15/2018 5/15/2018    CBC with platelets differential Routine 8/15/2018 9/15/2018 5/15/2018    Comprehensive metabolic panel Routine 8/15/2018 9/15/2018 5/15/2018    ELP reflex to IELP Routine 8/15/2018 9/15/2018 5/15/2018    Ferritin Routine 8/15/2018 9/15/2018 5/15/2018    Folate Routine 8/15/2018 9/15/2018 5/15/2018     "Immunoglobulins A G and M Routine 8/15/2018 9/15/2018 5/15/2018    Iron and iron binding capacity Routine 8/15/2018 9/15/2018 5/15/2018    Kappa and lambda light chain Routine 8/15/2018 9/15/2018 5/15/2018    Lactate Dehydrogenase Routine 8/15/2018 9/15/2018 5/15/2018    Macroglobulins Routine 8/15/2018 9/15/2018 5/15/2018    Vitamin B12 Routine 8/15/2018 9/15/2018 5/15/2018    Protein immunofixation urine Routine 8/15/2018 9/15/2018 5/15/2018    Protein Immunofixation Serum Routine 8/15/2018 9/15/2018 5/15/2018            Who to contact     If you have questions or need follow up information about today's clinic visit or your schedule please contact [facility] at [phone].  Normal or non-critical lab and imaging results will be communicated to you by MyChart, letter or phone within 4 business days after the clinic has received the results. If you do not hear from us within 7 days, please contact the clinic through saperatechart or phone. If you have a critical or abnormal lab result, we will notify you by phone as soon as possible.  Submit refill requests through [employer] or call your pharmacy and they will forward the refill request to us. Please allow 3 business days for your refill to be completed.          Additional Information About Your Visit        [employer] Information     [employer] lets you send messages to your doctor, view your test results, renew your prescriptions, schedule appointments and more. To sign up, go to [email or web address] . Click on \"Log in\" on the left side of the screen, which will take you to the Welcome page. Then click on \"Sign up Now\" on the right side of the page.     You will be asked to enter the access code listed below, as well as some personal information. Please follow the directions to create your username and password.     Your access code is: [email or web address]  Expires: 2018 11:52 AM     Your access code will  in 90 days. If you need help or a new " "code, please call your [facility] clinic or [phone].        Care EveryWhere ID     This is your Care EveryWhere ID. This could be used by other organizations to access your [facility] medical records  [id number]        Your Vitals Were     Pulse Temperature Respirations Height Pulse Oximetry BMI (Body Mass Index)    63 97.6  F (36.4  C) (Tympanic) 29 1.638 m (5' 4.5\") 93% 29.95 kg/m2       Blood Pressure from Last 3 Encounters:   05/15/18 108/70   02/13/18 105/67   01/23/18 122/68    Weight from Last 3 Encounters:   05/15/18 80.4 kg (177 lb 3.2 oz)   02/13/18 79.4 kg (175 lb 1.6 oz)   01/23/18 80.2 kg (176 lb 12.8 oz)               Primary Care Provider Office Phone # Fax #    Gianna Richard [phone] [address]  [facility] [address]        Equal Access to Services     [facility]: Hadii aad ku hadasho Soomaali, waaxda luqadaha, qaybta kaalmada adeegyada, waxjamar jimenez haydeonna orellana . So [facility] [phone].    ATENCIÓN: Si habla español, tiene a dodson disposición servicios gratuitos de asistencia lingüística. Llame al [phone].    We comply with applicable federal civil rights laws and [place] laws. We do not discriminate on the basis of race, color, national origin, age, disability, sex, sexual orientation, or gender identity.            Thank you!     Thank you for choosing [facility]  for your care. Our goal is always to provide you with excellent care. Hearing back from our patients is one way we can continue to improve our services. Please take a few minutes to complete the written survey that you may receive in the mail after your visit with us. Thank you!             Your Updated Medication List - Protect others around you: Learn how to safely use, store and throw away your medicines at www.disposemymeds.org.          This list is accurate as of 5/15/18 11:55 AM.  Always use your most recent med list.                   Brand Name Dispense " Instructions for use Diagnosis    atorvastatin 10 MG tablet    LIPITOR    90 tablet    Take 1 tablet (10 mg) by mouth daily    Hyperlipidemia, unspecified hyperlipidemia type       cyanocobalamin 1000 MCG Tabs    [employer] vitamin  B12    90 tablet    Take 1,000 mcg by mouth daily    Primary hypercoagulable state (H), Monoclonal paraproteinemia, Iron deficiency anemia, unspecified iron deficiency anemia type       ferrous sulfate 325 (65 Fe) MG tablet    IRON    90 tablet    Take 1 tablet (325 mg) by mouth 3 times daily (with meals)    Iron deficiency anemia, unspecified iron deficiency anemia type, Monoclonal paraproteinemia, Primary hypercoagulable state (H)       folic acid 1 MG tablet    FOLVITE    90 tablet    Take 1 tablet (1 mg) by mouth daily    Iron deficiency anemia, unspecified iron deficiency anemia type, Primary hypercoagulable state (H), Monoclonal paraproteinemia       levothyroxine 50 MCG tablet    SYNTHROID/LEVOTHROID    30 tablet    Take 1 tablet (50 mcg) by mouth daily    Autoimmune thyroiditis       Lycopene 10 MG Caps           MULTIVITAMIN ADULT PO      Take 1 tablet by mouth        omeprazole 40 MG capsule    priLOSEC    30 capsule    Take 1 capsule (40 mg) by mouth daily    Acute upper gastrointestinal bleeding, 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

## 2018-05-16 ASSESSMENT — PATIENT HEALTH QUESTIONNAIRE - PHQ9: SUM OF ALL RESPONSES TO PHQ QUESTIONS 1-9: 0

## 2018-05-24 ENCOUNTER — ANTICOAGULATION THERAPY VISIT (OUTPATIENT)
Dept: ANTICOAGULATION | Facility: OTHER | Age: 78
End: 2018-05-24
Attending: FAMILY MEDICINE
Payer: MEDICARE

## 2018-05-24 DIAGNOSIS — K92.2 ACUTE UPPER GASTROINTESTINAL BLEEDING: ICD-10-CM

## 2018-05-24 DIAGNOSIS — Z79.01 LONG-TERM (CURRENT) USE OF ANTICOAGULANTS: ICD-10-CM

## 2018-05-24 DIAGNOSIS — Z86.711 HX PULMONARY EMBOLISM: ICD-10-CM

## 2018-05-24 DIAGNOSIS — Z86.718 HISTORY OF DVT OF LOWER EXTREMITY: ICD-10-CM

## 2018-05-24 LAB — INR POINT OF CARE: 1.4 (ref 0.86–1.14)

## 2018-05-24 PROCEDURE — 36416 COLLJ CAPILLARY BLOOD SPEC: CPT | Mod: ZL

## 2018-05-24 NOTE — MR AVS SNAPSHOT
Rey Tristan   5/24/2018 10:15 AM   Anticoagulation Therapy Visit    Description:  78 year old male   Provider:   ANTI COAGULATION   Department:  Hc Anti Coagulation           INR as of 5/24/2018     Today's INR 1.4!      Anticoagulation Summary as of 5/24/2018     INR goal 1.5-2.0   Today's INR 1.4!   Full warfarin instructions 5/24: 8 mg; Otherwise 4 mg every day   Next INR check 6/21/2018    Indications   Long-term (current) use of anticoagulants [Z79.01] [Z79.01]  Hx pulmonary embolism [Z86.711]  History of DVT of lower extremity [Z86.718]  Acute upper gastrointestinal bleeding [K92.2]         Your next Anticoagulation Clinic appointment(s)     Jun 21, 2018 10:45 AM CDT   Anticoagulation Visit with  ANTI COAGULATION   [facility] ([facility] )    [address] [place] [address]   [phone]              May 2018 Details    Sun Mon Tue Wed Thu Fri Sat       1               2               3               4               5                 6               7               8               9               10               11               12                 13               14               15               16               17               18               19                 20               21               22               23               24      8 mg   See details      25      4 mg         26      4 mg           27      4 mg         28      4 mg         29      4 mg         30      4 mg         31      4 mg            Date Details   05/24 This INR check               How to take your warfarin dose     To take:  4 mg Take 1 of the 4 mg tablets.    To take:  8 mg Take 2 of the 4 mg tablets.           June 2018 Details    Sun Mon Tue Wed Thu Fri Sat          1      4 mg         2      4 mg           3      4 mg         4      4 mg         5      4 mg         6      4 mg         7      4 mg         8      4 mg         9      4 mg           10      4 mg         11       4 mg         12      4 mg         13      4 mg         14      4 mg         15      4 mg         16      4 mg           17      4 mg         18      4 mg         19      4 mg         20      4 mg         21            22               23                 24               25               26               27               28               29               30                Date Details   No additional details    Date of next INR:  6/21/2018         How to take your warfarin dose     To take:  4 mg Take 1 of the 4 mg tablets.

## 2018-05-24 NOTE — PROGRESS NOTES
ANTICOAGULATION FOLLOW-UP CLINIC VISIT    Patient Name:  Rey Tristan  Date:  5/24/2018  Contact Type:  Face to Face    SUBJECTIVE:     Patient Findings     Positives No Problem Findings           OBJECTIVE    INR Protime   Date Value Ref Range Status   05/24/2018 1.4 (A) 0.86 - 1.14 Final       ASSESSMENT / PLAN  INR assessment THER    Recheck INR In: 4 WEEKS    INR Location Clinic      Anticoagulation Summary as of 5/24/2018     INR goal 1.5-2.0   Today's INR 1.4!   Warfarin maintenance plan 4 mg (4 mg x 1) every day   Full warfarin instructions 5/24: 8 mg; Otherwise 4 mg every day   Weekly warfarin total 28 mg   Plan last modified Lala Boss RN (5/24/2018)   Next INR check 6/21/2018   Target end date Indefinite    Indications   Long-term (current) use of anticoagulants [Z79.01] [Z79.01]  Hx pulmonary embolism [Z86.711]  History of DVT of lower extremity [Z86.718]  Acute upper gastrointestinal bleeding [K92.2]         Anticoagulation Episode Summary     INR check location     Preferred lab     Send INR reminders to  BE[facility] POOL    Comments       Anticoagulation Care Providers     Provider Role Specialty Phone number    Gianna Richard MD [facility] [phone]            See the Encounter Report to view Anticoagulation Flowsheet and Dosing Calendar (Go to Encounters tab in chart review, and find the Anticoagulation Therapy Visit)        Lala Boss, RN

## 2018-06-21 ENCOUNTER — ANTICOAGULATION THERAPY VISIT (OUTPATIENT)
Dept: ANTICOAGULATION | Facility: OTHER | Age: 78
End: 2018-06-21
Attending: FAMILY MEDICINE
Payer: MEDICARE

## 2018-06-21 DIAGNOSIS — E03.8 SUBCLINICAL HYPOTHYROIDISM: ICD-10-CM

## 2018-06-21 DIAGNOSIS — K92.2 ACUTE UPPER GASTROINTESTINAL BLEEDING: ICD-10-CM

## 2018-06-21 DIAGNOSIS — Z79.01 LONG-TERM (CURRENT) USE OF ANTICOAGULANTS: ICD-10-CM

## 2018-06-21 DIAGNOSIS — D64.9 ANEMIA, UNSPECIFIED TYPE: ICD-10-CM

## 2018-06-21 DIAGNOSIS — Z86.718 HISTORY OF DVT OF LOWER EXTREMITY: ICD-10-CM

## 2018-06-21 DIAGNOSIS — Z86.711 HX PULMONARY EMBOLISM: ICD-10-CM

## 2018-06-21 LAB
BASOPHILS # BLD AUTO: 0.1 10E9/L (ref 0–0.2)
BASOPHILS NFR BLD AUTO: 1 %
DIFFERENTIAL METHOD BLD: ABNORMAL
EOSINOPHIL # BLD AUTO: 0.2 10E9/L (ref 0–0.7)
EOSINOPHIL NFR BLD AUTO: 4.1 %
ERYTHROCYTE [DISTWIDTH] IN BLOOD BY AUTOMATED COUNT: 14.1 % (ref 10–15)
HCT VFR BLD AUTO: 38.5 % (ref 40–53)
HGB BLD-MCNC: 13 G/DL (ref 13.3–17.7)
IMM GRANULOCYTES # BLD: 0 10E9/L (ref 0–0.4)
IMM GRANULOCYTES NFR BLD: 0.2 %
INR POINT OF CARE: 1.3 (ref 0.86–1.14)
LYMPHOCYTES # BLD AUTO: 1 10E9/L (ref 0.8–5.3)
LYMPHOCYTES NFR BLD AUTO: 19.4 %
MCH RBC QN AUTO: 30.4 PG (ref 26.5–33)
MCHC RBC AUTO-ENTMCNC: 33.8 G/DL (ref 31.5–36.5)
MCV RBC AUTO: 90 FL (ref 78–100)
MONOCYTES # BLD AUTO: 0.6 10E9/L (ref 0–1.3)
MONOCYTES NFR BLD AUTO: 11 %
NEUTROPHILS # BLD AUTO: 3.3 10E9/L (ref 1.6–8.3)
NEUTROPHILS NFR BLD AUTO: 64.3 %
NRBC # BLD AUTO: 0 10*3/UL
NRBC BLD AUTO-RTO: 0 /100
PLATELET # BLD AUTO: 179 10E9/L (ref 150–450)
RBC # BLD AUTO: 4.28 10E12/L (ref 4.4–5.9)
TSH SERPL DL<=0.005 MIU/L-ACNC: 3.47 MU/L (ref 0.4–4)
WBC # BLD AUTO: 5.1 10E9/L (ref 4–11)

## 2018-06-21 PROCEDURE — 84443 ASSAY THYROID STIM HORMONE: CPT | Mod: ZL | Performed by: FAMILY MEDICINE

## 2018-06-21 PROCEDURE — 85610 PROTHROMBIN TIME: CPT | Mod: QW,ZL

## 2018-06-21 PROCEDURE — 85025 COMPLETE CBC W/AUTO DIFF WBC: CPT | Mod: ZL | Performed by: FAMILY MEDICINE

## 2018-06-21 NOTE — MR AVS SNAPSHOT
Rey Tristan   6/21/2018 10:45 AM   Anticoagulation Therapy Visit    Description:  78 year old male   Provider:   ANTI COAGULATION   Department:   Anti Coagulation           INR as of 6/21/2018     Today's INR 1.3!      Anticoagulation Summary as of 6/21/2018     INR goal 1.5-2.0   Today's INR 1.3!   Full warfarin instructions 6/21: 6 mg; Otherwise 6 mg on Mon, Fri; 4 mg all other days   Next INR check 7/12/2018    Indications   Long-term (current) use of anticoagulants [Z79.01] [Z79.01]  Hx pulmonary embolism [Z86.711]  History of DVT of lower extremity [Z86.718]  Acute upper gastrointestinal bleeding [K92.2]         Your next Anticoagulation Clinic appointment(s)     Jun 21, 2018 10:45 AM CDT   Anticoagulation Visit with  ANTI COAGULATION   [facility] ([facility] )    [address]  [place] [address]   [phone]            Jul 12, 2018  1:00 PM CDT   Anticoagulation Visit with  ANTI COAGULATION   [facility] )    [address] [facility] [address]   [phone]              June 2018 Details    Sun Mon Tue Wed Thu Fri Sat          1               2                 3               4               5               6               7               8               9                 10               11               12               13               14               15               16                 17               18               19               20               21      6 mg   See details      22      6 mg         23      4 mg           24      4 mg         25      6 mg         26      4 mg         27      4 mg         28      4 mg         29      6 mg         30      4 mg          Date Details   06/21 This INR check               How to take your warfarin dose     To take:  4 mg Take 1 of the 4 mg tablets.    To take:  6 mg Take 1.5 of the 4 mg tablets.           July 2018 Details    Sun Mon Tue Wed Thu Fri  Sat     1      4 mg         2      6 mg         3      4 mg         4      4 mg         5      4 mg         6      6 mg         7      4 mg           8      4 mg         9      6 mg         10      4 mg         11      4 mg         12            13               14                 15               16               17               18               19               20               21                 22               23               24               25               26               27               28                 29               30               31                    Date Details   No additional details    Date of next INR:  7/12/2018         How to take your warfarin dose     To take:  4 mg Take 1 of the 4 mg tablets.    To take:  6 mg Take 1.5 of the 4 mg tablets.

## 2018-06-21 NOTE — PROGRESS NOTES
ANTICOAGULATION FOLLOW-UP CLINIC VISIT    Patient Name:  Rey Tristan  Date:  6/21/2018  Contact Type:  Face to Face    SUBJECTIVE:     Patient Findings     Positives Activity level change    Comments Increased activity.           OBJECTIVE    INR Protime   Date Value Ref Range Status   06/21/2018 1.3 (A) 0.86 - 1.14 Final       ASSESSMENT / PLAN  INR assessment SUB increased activity   Recheck INR In: 3 WEEKS    INR Location Clinic      Anticoagulation Summary as of 6/21/2018     INR goal 1.5-2.0   Today's INR 1.3!   Warfarin maintenance plan 6 mg (4 mg x 1.5) on Mon, Fri; 4 mg (4 mg x 1) all other days   Full warfarin instructions 6/21: 6 mg; Otherwise 6 mg on Mon, Fri; 4 mg all other days   Weekly warfarin total 32 mg   Plan last modified Lala Boss RN (6/21/2018)   Next INR check 7/12/2018   Target end date Indefinite    Indications   Long-term (current) use of anticoagulants [Z79.01] [Z79.01]  Hx pulmonary embolism [Z86.711]  History of DVT of lower extremity [Z86.718]  Acute upper gastrointestinal bleeding [K92.2]         Anticoagulation Episode Summary     INR check location     Preferred lab     Send INR reminders to [facility] POOL    Comments       Anticoagulation Care Providers     Provider Role Specialty Phone number    Gianna Richard MD [facility] [phone]            See the Encounter Report to view Anticoagulation Flowsheet and Dosing Calendar (Go to Encounters tab in chart review, and find the Anticoagulation Therapy Visit)        Lala Boss, RN

## 2018-06-26 DIAGNOSIS — K22.70 BARRETT'S ESOPHAGUS WITHOUT DYSPLASIA: ICD-10-CM

## 2018-06-26 DIAGNOSIS — K92.2 ACUTE UPPER GASTROINTESTINAL BLEEDING: ICD-10-CM

## 2018-06-26 RX ORDER — OMEPRAZOLE 40 MG/1
CAPSULE, DELAYED RELEASE ORAL
Qty: 90 CAPSULE | Refills: 1 | Status: SHIPPED | OUTPATIENT
Start: 2018-06-26 | End: 2019-01-16

## 2018-07-12 ENCOUNTER — ANTICOAGULATION THERAPY VISIT (OUTPATIENT)
Dept: ANTICOAGULATION | Facility: OTHER | Age: 78
End: 2018-07-12
Attending: FAMILY MEDICINE
Payer: MEDICARE

## 2018-07-12 DIAGNOSIS — Z86.718 HISTORY OF DVT OF LOWER EXTREMITY: ICD-10-CM

## 2018-07-12 DIAGNOSIS — Z86.711 HX PULMONARY EMBOLISM: ICD-10-CM

## 2018-07-12 DIAGNOSIS — K92.2 ACUTE UPPER GASTROINTESTINAL BLEEDING: ICD-10-CM

## 2018-07-12 DIAGNOSIS — Z79.01 LONG-TERM (CURRENT) USE OF ANTICOAGULANTS: ICD-10-CM

## 2018-07-12 LAB — INR POINT OF CARE: 1.5 (ref 0.86–1.14)

## 2018-07-12 PROCEDURE — 36416 COLLJ CAPILLARY BLOOD SPEC: CPT | Mod: ZL

## 2018-07-12 NOTE — MR AVS SNAPSHOT
Rey Tristan   7/12/2018 1:00 PM   Anticoagulation Therapy Visit    Description:  78 year old male   Provider:   ANTI COAGULATION   Department:  Hc Anti Coagulation           INR as of 7/12/2018     Today's INR 1.5      Anticoagulation Summary as of 7/12/2018     INR goal 1.5-2.0   Today's INR 1.5   Full warfarin instructions 6 mg on Mon, Fri; 4 mg all other days   Next INR check 8/16/2018    Indications   Long-term (current) use of anticoagulants [Z79.01] [Z79.01]  Hx pulmonary embolism [Z86.711]  History of DVT of lower extremity [Z86.718]  Acute upper gastrointestinal bleeding [K92.2]         Your next Anticoagulation Clinic appointment(s)     Aug 16, 2018 10:30 AM CDT   Anticoagulation Visit with  ANTI COAGULATION   [facility] ([facility] )    [address] [place] [address]   [phone]              July 2018 Details    Sun Mon Tue Wed Thu Fri Sat     1               2               3               4               5               6               7                 8               9               10               11               12      4 mg   See details      13      6 mg         14      4 mg           15      4 mg         16      6 mg         17      4 mg         18      4 mg         19      4 mg         20      6 mg         21      4 mg           22      4 mg         23      6 mg         24      4 mg         25      4 mg         26      4 mg         27      6 mg         28      4 mg           29      4 mg         30      6 mg         31      4 mg              Date Details   07/12 This INR check               How to take your warfarin dose     To take:  4 mg Take 1 of the 4 mg tablets.    To take:  6 mg Take 1.5 of the 4 mg tablets.           August 2018 Details    Sun Mon Tue Wed Thu Fri Sat        1      4 mg         2      4 mg         3      6 mg         4      4 mg           5      4 mg         6      6 mg         7      4 mg         8      4 mg          9      4 mg         10      6 mg         11      4 mg           12      4 mg         13      6 mg         14      4 mg         15      4 mg         16            17               18                 19               20               21               22               23               24               25                 26               27               28               29               30               31                 Date Details   No additional details    Date of next INR:  8/16/2018         How to take your warfarin dose     To take:  4 mg Take 1 of the 4 mg tablets.    To take:  6 mg Take 1.5 of the 4 mg tablets.

## 2018-07-12 NOTE — PROGRESS NOTES
ANTICOAGULATION FOLLOW-UP CLINIC VISIT    Patient Name:  Rey Tristan  Date:  7/12/2018  Contact Type:  Face to Face    SUBJECTIVE:     Patient Findings     Positives Missed doses    Comments Missed a dose           OBJECTIVE    INR Protime   Date Value Ref Range Status   07/12/2018 1.5 (A) 0.86 - 1.14 Final       ASSESSMENT / PLAN  INR assessment THER    Recheck INR In: 5 WEEKS    INR Location Clinic      Anticoagulation Summary as of 7/12/2018     INR goal 1.5-2.0   Today's INR 1.5   Warfarin maintenance plan 6 mg (4 mg x 1.5) on Mon, Fri; 4 mg (4 mg x 1) all other days   Full warfarin instructions 6 mg on Mon, Fri; 4 mg all other days   Weekly warfarin total 32 mg   No change documented Lala Boss RN   Plan last modified Lala Boss RN (6/21/2018)   Next INR check 8/16/2018   Target end date Indefinite    Indications   Long-term (current) use of anticoagulants [Z79.01] [Z79.01]  Hx pulmonary embolism [Z86.711]  History of DVT of lower extremity [Z86.718]  Acute upper gastrointestinal bleeding [K92.2]         Anticoagulation Episode Summary     INR check location     Preferred lab     Send INR reminders to  ANTICOAG POOL    Comments       Anticoagulation Care Providers     Provider Role Specialty Phone number    Gianna Richard MD [facility] [phone]            See the Encounter Report to view Anticoagulation Flowsheet and Dosing Calendar (Go to Encounters tab in chart review, and find the Anticoagulation Therapy Visit)        Lala Boss RN

## 2018-08-08 DIAGNOSIS — D47.2 MONOCLONAL GAMMOPATHY OF UNKNOWN SIGNIFICANCE (MGUS): ICD-10-CM

## 2018-08-08 DIAGNOSIS — Z86.718 HISTORY OF DVT OF LOWER EXTREMITY: ICD-10-CM

## 2018-08-08 DIAGNOSIS — D50.9 IRON DEFICIENCY ANEMIA, UNSPECIFIED IRON DEFICIENCY ANEMIA TYPE: ICD-10-CM

## 2018-08-08 DIAGNOSIS — Z15.89 HETEROZYGOUS MTHFR MUTATION C677T: ICD-10-CM

## 2018-08-08 LAB
ALBUMIN SERPL-MCNC: 3.4 G/DL (ref 3.4–5)
ALP SERPL-CCNC: 185 U/L (ref 40–150)
ALT SERPL W P-5'-P-CCNC: 35 U/L (ref 0–70)
ANION GAP SERPL CALCULATED.3IONS-SCNC: 6 MMOL/L (ref 3–14)
AST SERPL W P-5'-P-CCNC: 34 U/L (ref 0–45)
BASOPHILS # BLD AUTO: 0 10E9/L (ref 0–0.2)
BASOPHILS NFR BLD AUTO: 0.9 %
BILIRUB SERPL-MCNC: 0.4 MG/DL (ref 0.2–1.3)
BUN SERPL-MCNC: 15 MG/DL (ref 7–30)
CALCIUM SERPL-MCNC: 8.4 MG/DL (ref 8.5–10.1)
CHLORIDE SERPL-SCNC: 107 MMOL/L (ref 94–109)
CO2 SERPL-SCNC: 27 MMOL/L (ref 20–32)
CREAT SERPL-MCNC: 0.78 MG/DL (ref 0.66–1.25)
DIFFERENTIAL METHOD BLD: ABNORMAL
EOSINOPHIL # BLD AUTO: 0.2 10E9/L (ref 0–0.7)
EOSINOPHIL NFR BLD AUTO: 4.7 %
ERYTHROCYTE [DISTWIDTH] IN BLOOD BY AUTOMATED COUNT: 13.4 % (ref 10–15)
FERRITIN SERPL-MCNC: 47 NG/ML (ref 26–388)
FOLATE SERPL-MCNC: 75.4 NG/ML
GFR SERPL CREATININE-BSD FRML MDRD: >90 ML/MIN/1.7M2
GLUCOSE SERPL-MCNC: 108 MG/DL (ref 70–99)
HCT VFR BLD AUTO: 40.3 % (ref 40–53)
HGB BLD-MCNC: 13.5 G/DL (ref 13.3–17.7)
IMM GRANULOCYTES # BLD: 0 10E9/L (ref 0–0.4)
IMM GRANULOCYTES NFR BLD: 0 %
IRON SATN MFR SERPL: 29 % (ref 15–46)
IRON SERPL-MCNC: 90 UG/DL (ref 35–180)
LDH SERPL L TO P-CCNC: 184 U/L (ref 85–227)
LYMPHOCYTES # BLD AUTO: 0.8 10E9/L (ref 0.8–5.3)
LYMPHOCYTES NFR BLD AUTO: 17.3 %
MCH RBC QN AUTO: 30.8 PG (ref 26.5–33)
MCHC RBC AUTO-ENTMCNC: 33.5 G/DL (ref 31.5–36.5)
MCV RBC AUTO: 92 FL (ref 78–100)
MONOCYTES # BLD AUTO: 0.4 10E9/L (ref 0–1.3)
MONOCYTES NFR BLD AUTO: 9.6 %
NEUTROPHILS # BLD AUTO: 3 10E9/L (ref 1.6–8.3)
NEUTROPHILS NFR BLD AUTO: 67.5 %
NRBC # BLD AUTO: 0 10*3/UL
NRBC BLD AUTO-RTO: 0 /100
PLATELET # BLD AUTO: 170 10E9/L (ref 150–450)
POTASSIUM SERPL-SCNC: 4.2 MMOL/L (ref 3.4–5.3)
PROT SERPL-MCNC: 8.2 G/DL (ref 6.8–8.8)
RBC # BLD AUTO: 4.39 10E12/L (ref 4.4–5.9)
SODIUM SERPL-SCNC: 140 MMOL/L (ref 133–144)
TIBC SERPL-MCNC: 307 UG/DL (ref 240–430)
VIT B12 SERPL-MCNC: 1110 PG/ML (ref 193–986)
WBC # BLD AUTO: 4.5 10E9/L (ref 4–11)

## 2018-08-08 PROCEDURE — 85810 BLOOD VISCOSITY EXAMINATION: CPT | Mod: ZL | Performed by: NURSE PRACTITIONER

## 2018-08-08 PROCEDURE — 00000402 ZZHCL STATISTIC TOTAL PROTEIN: Mod: ZL | Performed by: NURSE PRACTITIONER

## 2018-08-08 PROCEDURE — 83540 ASSAY OF IRON: CPT | Mod: ZL | Performed by: NURSE PRACTITIONER

## 2018-08-08 PROCEDURE — 99000 SPECIMEN HANDLING OFFICE-LAB: CPT | Performed by: NURSE PRACTITIONER

## 2018-08-08 PROCEDURE — 82746 ASSAY OF FOLIC ACID SERUM: CPT | Mod: ZL | Performed by: NURSE PRACTITIONER

## 2018-08-08 PROCEDURE — 80053 COMPREHEN METABOLIC PANEL: CPT | Mod: ZL | Performed by: NURSE PRACTITIONER

## 2018-08-08 PROCEDURE — 82232 ASSAY OF BETA-2 PROTEIN: CPT | Mod: ZL | Performed by: NURSE PRACTITIONER

## 2018-08-08 PROCEDURE — 82784 ASSAY IGA/IGD/IGG/IGM EACH: CPT | Mod: ZL | Performed by: NURSE PRACTITIONER

## 2018-08-08 PROCEDURE — 86334 IMMUNOFIX E-PHORESIS SERUM: CPT | Mod: ZL | Performed by: NURSE PRACTITIONER

## 2018-08-08 PROCEDURE — 83550 IRON BINDING TEST: CPT | Mod: ZL | Performed by: NURSE PRACTITIONER

## 2018-08-08 PROCEDURE — 82728 ASSAY OF FERRITIN: CPT | Mod: ZL | Performed by: NURSE PRACTITIONER

## 2018-08-08 PROCEDURE — 36415 COLL VENOUS BLD VENIPUNCTURE: CPT | Mod: ZL | Performed by: NURSE PRACTITIONER

## 2018-08-08 PROCEDURE — 82607 VITAMIN B-12: CPT | Mod: ZL | Performed by: NURSE PRACTITIONER

## 2018-08-08 PROCEDURE — 83883 ASSAY NEPHELOMETRY NOT SPEC: CPT | Mod: ZL | Performed by: NURSE PRACTITIONER

## 2018-08-08 PROCEDURE — 86335 IMMUNFIX E-PHORSIS/URINE/CSF: CPT | Mod: ZL | Performed by: NURSE PRACTITIONER

## 2018-08-08 PROCEDURE — 84165 PROTEIN E-PHORESIS SERUM: CPT | Mod: ZL | Performed by: NURSE PRACTITIONER

## 2018-08-08 PROCEDURE — 85025 COMPLETE CBC W/AUTO DIFF WBC: CPT | Mod: ZL | Performed by: NURSE PRACTITIONER

## 2018-08-08 PROCEDURE — 83615 LACTATE (LD) (LDH) ENZYME: CPT | Mod: ZL | Performed by: NURSE PRACTITIONER

## 2018-08-09 LAB
ALBUMIN SERPL ELPH-MCNC: 3.8 G/DL (ref 3.7–5.1)
ALPHA1 GLOB SERPL ELPH-MCNC: 0.3 G/DL (ref 0.2–0.4)
ALPHA2 GLOB SERPL ELPH-MCNC: 0.9 G/DL (ref 0.5–0.9)
B-GLOBULIN SERPL ELPH-MCNC: 1.9 G/DL (ref 0.6–1)
B2 MICROGLOB SERPL-MCNC: 2.5 MG/L
GAMMA GLOB SERPL ELPH-MCNC: 0.8 G/DL (ref 0.7–1.6)
IGA SERPL-MCNC: 128 MG/DL (ref 70–380)
IGG SERPL-MCNC: 952 MG/DL (ref 695–1620)
IGM SERPL-MCNC: 1640 MG/DL (ref 60–265)
KAPPA LC UR-MCNC: 3.23 MG/DL (ref 0.33–1.94)
KAPPA LC/LAMBDA SER: 1.89 {RATIO} (ref 0.26–1.65)
LAMBDA LC SERPL-MCNC: 1.71 MG/DL (ref 0.57–2.63)
M PROTEIN SERPL ELPH-MCNC: 1.1 G/DL
PROT PATTERN SERPL ELPH-IMP: ABNORMAL
PROT PATTERN SERPL IFE-IMP: ABNORMAL
VISC SER: 1.8 CP (ref 1.4–1.8)

## 2018-08-10 LAB — PROT ELPH PNL UR ELPH: NORMAL

## 2018-08-15 ENCOUNTER — ONCOLOGY VISIT (OUTPATIENT)
Dept: ONCOLOGY | Facility: OTHER | Age: 78
End: 2018-08-15
Attending: NURSE PRACTITIONER
Payer: COMMERCIAL

## 2018-08-15 VITALS
BODY MASS INDEX: 28.46 KG/M2 | WEIGHT: 170.8 LBS | OXYGEN SATURATION: 95 % | HEIGHT: 65 IN | TEMPERATURE: 97.1 F | SYSTOLIC BLOOD PRESSURE: 98 MMHG | DIASTOLIC BLOOD PRESSURE: 60 MMHG | HEART RATE: 73 BPM | RESPIRATION RATE: 18 BRPM

## 2018-08-15 DIAGNOSIS — Z79.01 LONG-TERM (CURRENT) USE OF ANTICOAGULANTS: ICD-10-CM

## 2018-08-15 DIAGNOSIS — D50.9 IRON DEFICIENCY ANEMIA, UNSPECIFIED IRON DEFICIENCY ANEMIA TYPE: ICD-10-CM

## 2018-08-15 DIAGNOSIS — D47.2 IGM MONOCLONAL GAMMOPATHY OF UNCERTAIN SIGNIFICANCE: Primary | ICD-10-CM

## 2018-08-15 PROCEDURE — G0463 HOSPITAL OUTPT CLINIC VISIT: HCPCS

## 2018-08-15 PROCEDURE — 99214 OFFICE O/P EST MOD 30 MIN: CPT | Performed by: NURSE PRACTITIONER

## 2018-08-15 ASSESSMENT — PAIN SCALES - GENERAL: PAINLEVEL: NO PAIN (0)

## 2018-08-15 NOTE — NURSING NOTE
"Chief Complaint   Patient presents with     RECHECK     Follow up IgM monoclonal gammopathy of uncertain significance and anemia       Initial BP 98/60  Pulse 73  Temp 97.1  F (36.2  C) (Tympanic)  Resp 18  Ht 1.638 m (5' 4.5\")  Wt 77.5 kg (170 lb 12.8 oz)  SpO2 95%  BMI 28.86 kg/m2 Estimated body mass index is 28.86 kg/(m^2) as calculated from the following:    Height as of this encounter: 1.638 m (5' 4.5\").    Weight as of this encounter: 77.5 kg (170 lb 12.8 oz).  Medication Reconciliation: complete   Immunizations reviewed, advanced directives on file, pain = 0, PHQ9 = 0.    Jayleen Fleming LPN    "

## 2018-08-15 NOTE — PATIENT INSTRUCTIONS
We would like to see you back in 3 months. Please come 1 week prior for lab work. If you have any questions please call [phone]  Other instructions:  none

## 2018-08-15 NOTE — MR AVS SNAPSHOT
After Visit Summary   8/15/2018    Rey Tristan    MRN: [id number]           Patient Information     Date Of Birth          1940        Visit Information        Provider Department      8/15/2018 10:30 AM Britney Mahoney NP [facility] [place]        Today's Diagnoses     IgM monoclonal gammopathy of uncertain significance    -  1    Iron deficiency anemia, unspecified iron deficiency anemia type          Care Instructions    We would like to see you back in 3 months. Please come 1 week prior for lab work. If you have any questions please call [phone]  Other instructions:  none          Follow-ups after your visit        Your next 10 appointments already scheduled     Aug 16, 2018 10:30 AM CDT   Anticoagulation Visit with HC ANTI COAGULATION   [facility] [place] ([facility] [place] )    [address]  [place] [address]   [phone]            Nov 09, 2018 11:00 AM CST   LAB with HC LAB   [facility] [place] ([facility] [place] )    [address]  [place] [address]   [phone]            Nov 16, 2018 11:00 AM CST   (Arrive by 10:45 AM)   Return Visit with Britney Mahoney NP   [facility] [place] ([facility] [place] )    [address]  [place] [address]   [phone]              Future tests that were ordered for you today     Open Future Orders        Priority Expected Expires Ordered    Beta 2 microglobulin Routine 11/15/2018 12/15/2018 8/15/2018    CBC with platelets differential Routine 11/15/2018 12/15/2018 8/15/2018    Comprehensive metabolic panel Routine 11/15/2018 12/15/2018 8/15/2018    ELP reflex to IELP Routine 11/15/2018 12/15/2018 8/15/2018    Immunoglobulins A G and M Routine 11/15/2018 12/15/2018 8/15/2018    Kappa and lambda light chain Routine 11/15/2018 12/15/2018 8/15/2018    Lactate Dehydrogenase Routine 11/15/2018 12/15/2018 8/15/2018    Macroglobulins Routine 11/15/2018  "12/15/2018 8/15/2018    Folate Routine 11/15/2018 12/15/2018 8/15/2018    Ferritin Routine 11/15/2018 12/15/2018 8/15/2018    Iron and iron binding capacity Routine 11/15/2018 12/15/2018 8/15/2018    Vitamin B12 Routine 11/15/2018 12/15/2018 8/15/2018            Who to contact     If you have questions or need follow up information about today's clinic visit or your schedule please contact [facility] directly at [phone].  Normal or non-critical lab and imaging results will be communicated to you by MyChart, letter or phone within 4 business days after the clinic has received the results. If you do not hear from us within 7 days, please contact the clinic through MyChart or phone. If you have a critical or abnormal lab result, we will notify you by phone as soon as possible.  Submit refill requests through [employer] or call your pharmacy and they will forward the refill request to us. Please allow 3 business days for your refill to be completed.          Additional Information About Your Visit        Care EveryWhere ID     This is your Care EveryWhere ID. This could be used by other organizations to access your [place] medical records  [id number]        Your Vitals Were     Pulse Temperature Respirations Height Pulse Oximetry BMI (Body Mass Index)    73 97.1  F (36.2  C) (Tympanic) 18 1.638 m (5' 4.5\") 95% 28.86 kg/m2       Blood Pressure from Last 3 Encounters:   08/15/18 98/60   05/15/18 108/70   02/13/18 105/67    Weight from Last 3 Encounters:   08/15/18 77.5 kg (170 lb 12.8 oz)   05/15/18 80.4 kg (177 lb 3.2 oz)   02/13/18 79.4 kg (175 lb 1.6 oz)                 Today's Medication Changes          These changes are accurate as of 8/15/18 11:19 AM.  If you have any questions, ask your nurse or doctor.               Stop taking these medicines if you haven't already. Please contact your care team if you have questions.     folic acid 1 MG tablet   Commonly known as:  FOLVITE   Stopped by:  " Britney Mahoney, NP                    Primary Care Provider Office Phone # Fax #    Gianna Richard [phone] [address]  [facility] [address]        Equal Access to Services     NIK GRIDER : Hadii yajaira delgado nandoo Soomaali, waaxda luqadaha, qaybta kaalmada adeegyada, pb elin hayaan lenorejack page laRooseveltdeonna raymundo. So [facility] [phone].    ATENCIÓN: Si habla español, tiene a dodson disposición servicios gratuitos de asistencia lingüística. Llame al [phone].    We comply with applicable federal civil rights laws and [place] laws. We do not discriminate on the basis of race, color, national origin, age, disability, sex, sexual orientation, or gender identity.            Thank you!     Thank you for choosing [facility]  for your care. Our goal is always to provide you with excellent care. Hearing back from our patients is one way we can continue to improve our services. Please take a few minutes to complete the written survey that you may receive in the mail after your visit with us. Thank you!             Your Updated Medication List - Protect others around you: Learn how to safely use, store and throw away your medicines at www.disposemymeds.org.          This list is accurate as of 8/15/18 11:19 AM.  Always use your most recent med list.                   Brand Name Dispense Instructions for use Diagnosis    atorvastatin 10 MG tablet    LIPITOR    90 tablet    Take 1 tablet (10 mg) by mouth daily    Hyperlipidemia, unspecified hyperlipidemia type       cyanocobalamin 1000 MCG Tabs    [employer] vitamin  B12    90 tablet    Take 1,000 mcg by mouth daily    Primary hypercoagulable state (H), Monoclonal paraproteinemia, Iron deficiency anemia, unspecified iron deficiency anemia type       ferrous sulfate 325 (65 Fe) MG tablet    IRON    90 tablet    Take 1 tablet (325 mg) by mouth 3 times daily (with meals)    Iron deficiency anemia, unspecified iron deficiency anemia type,  Monoclonal paraproteinemia, Primary hypercoagulable state (H)       levothyroxine 50 MCG tablet    SYNTHROID/LEVOTHROID    30 tablet    Take 1 tablet (50 mcg) by mouth daily    Autoimmune thyroiditis       Lycopene 10 MG Caps           MULTIVITAMIN ADULT PO      Take 1 tablet by mouth        omeprazole 40 MG capsule    priLOSEC    90 capsule    TAKE 1 CAPSULE(40 MG) BY MOUTH DAILY    Acute upper gastrointestinal bleeding, 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

## 2018-08-15 NOTE — PROGRESS NOTES
Hematology Follow-up Visit:  August 15, 2018    Reason for Visit:  Patient presents with:  RECHECK: Follow up IgM monoclonal gammopathy of uncertain significance and anemia      Nursing Notes and Documentation reviewed:  yes    HPI:  This is a 78-year-old male patient who presents to the oncology/hematology clinic today in followup of monoclonal gammopathy of undetermined significance diagnosed 2/2018 during workup for hypercoag state, along with previous diagnosis of anemia related to GI bleed.  Patient does have a history of previous DVT and PE and has been on long-term anticoagulation with Coumadin.  Coumadin was held after GI bleed was diagnosed and has been resumed.     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   Protein C and protein S along with antiphospholipid profile were negative.  SPEP was completed showing an M-spike of 1.1 with serum immunofixation showing monoclonal IgM immunoglobulin, kappa light chain type with IgM of 1530.  Bone marrow aspiration biopsy showed iron deficiency, slightly increased plasma cell proximally 2% kappa and toxic neutrophils with reactive lymphocytes and increased rouleaux formation; flow cytometry and immunophenotyping was unremarkable. Skeletal bone survey was completed on 2/6/2018 showing a stable T12 compression fracture but no evidence of lytic lesions.     He was placed on oral iron with improvement in his hemoglobin and iron studies.     He presents to the clinic today accompanied by his wife.  He states he remains on oral iron 3 times a day and has no difficulty with abdominal pain or problems with his stools.  States he has been feeling good and remains very active.  He has no new complaints today.    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 Simeon; silent; transient hyperthyroid, now normal; monitor TSH monthly for hypothyroidism     Chronic anticoagulation  1/1/2012     Elevated serum alkaline phosphatase level 5/4/2016    normal GGT, normal bone skeletal survery     Gastric ulcer     endoscopy 6/2016, repeat 6 months; PPI Carafate     Hyperlipidemia      Normocytic anemia 5/4/2016     Pulmonary nodule     CT 5/2014, right; consider repeat 1 year if high risk     Upper GI bleed 11/30/2017     Vitamin D deficiency 1/1/2012       Social History     Social History     Marital status:      Spouse name: N/A     Number of children: N/A     Years of education: N/A     Occupational History      [employer]     Retired      Social History Main Topics     Smoking status: Former Smoker     Types: Cigarettes     Start date: 5/15/1958     Quit date: 10/28/2009     Smokeless tobacco: Never Used      Comment: Quit 2009     Alcohol use No     Drug use: No     Sexual activity: Not on file     Other Topics Concern      Service Yes     [employer]     Blood Transfusions Yes     Permits if needed     Caffeine Concern Yes     Tea     Occupational Exposure No     Hobby Hazards No     Sleep Concern No     Stress Concern No     Weight Concern No     Special Diet No     Back Care No     Exercise No     Seat Belt Yes     Self-Exams Yes     Parent/Sibling W/ Cabg, Mi Or Angioplasty Before 65f 55m? No     Social History Narrative       Past Surgical History:   Procedure Laterality Date     ANGIOGRAM  6/23/2014     BIOPSY  2018    bone marrow biopsy     BONE MARROW BIOPSY, BONE SPECIMEN, NEEDLE/TROCAR N/A 1/16/2018    Procedure: BIOPSY BONE MARROW;  BONE MARROW BIOPSY;  Surgeon: Nuno Castro MD;  Location: [facility] OR     C REGULAR ECHO (FL)  6/23/2014    Dr. Cavazos     C. Difficile with intussuseption       cataract extraction       colonoscopy       COLONOSCOPY  6/17     ENDOSCOPY UPPER, COLONOSCOPY, COMBINED N/A 6/17/2016    Procedure: COMBINED ENDOSCOPY UPPER, COLONOSCOPY;  Surgeon: Andrea Stearns DO;  Location: [facility] OR     ESOPHAGOSCOPY, GASTROSCOPY,  DUODENOSCOPY (EGD), COMBINED N/A 11/30/2017    Procedure: COMBINED ESOPHAGOSCOPY, GASTROSCOPY, DUODENOSCOPY (EGD);  UPPER ENDOSCOPY;  Surgeon: Narinder Torres MD;  Location: [facility] OR     left knee meniscal tear       Left lower extremity DVT       lens implant       nasal polypectomy       Pulmonary Embolism         Family History   Problem Relation Age of Onset     Cancer Mother      Ovarian     Other Cancer Mother      lung/breast/ cervical ?     Respiratory Father      emphysemia     Lung Cancer Brother      Diabetes No family hx of      Hypertension No family hx of      Hyperlipidemia No family hx of      Thyroid Disease No family hx of      Asthma No family hx of      Colon Cancer No family hx of      Prostate Cancer No family hx of      Anesthesia Reaction No family hx of      Genetic Disorder No family hx of      Cerebrovascular Disease No family hx of      Coronary Artery Disease No family hx of      Breast Cancer No family hx of        Allergies:  Allergies as of 08/15/2018     (No Known Allergies)       Current Medications:  Current Outpatient Prescriptions   Medication Sig Dispense Refill     atorvastatin (LIPITOR) 10 MG tablet Take 1 tablet (10 mg) by mouth daily 90 tablet 3     Cholecalciferol (VITAMIN D) 400 UNITS tablet Take 1,000 Units by mouth daily        cyanocobalamin ([employer] VITAMIN  B12) 1000 MCG TABS Take 1,000 mcg by mouth daily 90 tablet 3     ferrous sulfate (IRON) 325 (65 FE) MG tablet Take 1 tablet (325 mg) by mouth 3 times daily (with meals) 90 tablet 11     levothyroxine (SYNTHROID/LEVOTHROID) 50 MCG tablet Take 1 tablet (50 mcg) by mouth daily 30 tablet 1     Lycopene 10 MG CAPS        Multiple Vitamins-Minerals (MULTIVITAMIN ADULT PO) Take 1 tablet by mouth       omeprazole (PRILOSEC) 40 MG capsule TAKE 1 CAPSULE(40 MG) BY MOUTH DAILY 90 capsule 1     pyridoxine (VITAMIN B-6) 50 MG TABS Take 1 tablet (50 mg) by mouth daily 90 tablet 3     warfarin (COUMADIN) 4 MG tablet Take 4mg 1/23; 2mg  "1/24, 1/25 or as directed by [facility] 30 tablet           Review Of Systems:  Constitutional: denies fever, weight changes  Eyes: has some cloudiness and will see his eye dr  Ears/Nose/Throat: denies ear pain, nose problems, difficulty swallowing  Respiratory: denies shortness of breath, cough  Cardiovascular: denies chest pain, palpitations, edema  Gastrointestinal: denies abdominal pain, bloating, nausea  Genitourinary: denies difficulty with urination, blood in urine  Musculoskeletal: denies new muscle pain, bone pain  Neurologic: denies lightheadedness, numbness or Tingling, has occasional headaches-not new  Hematologic/Lymphatic/Immunologic: denies easy bruising, easy bleeding, lumps or bumps noted  Endocrine: Denies increased thirst, night sweats      Physical Exam:  BP 98/60  Pulse 73  Temp 97.1  F (36.2  C) (Tympanic)  Resp 18  Ht 1.638 m (5' 4.5\")  Wt 77.5 kg (170 lb 12.8 oz)  SpO2 95%  BMI 28.86 kg/m2  GENERAL APPEARANCE: Healthy elderly male, alert and in no acute distress.  HEENT: Normocephalic, Sclerae anicteric. Oropharynx without ulcers, lesions, or thrush.  NECK:  No asymmetry or masses, no thyromegaly.  LYMPHATICS: No palpable cervical, supraclavicular, axillary, or inguinal nodes   RESP: Lungs clear to auscultation bilaterally, respirations regular and easy  CARDIOVASCULAR: Regular rate and rhythm. Normal S1, S2  ABDOMEN: Soft, nontender. Bowel sounds auscultated all 4 quadrants. No palpable organomegaly or masses.  MUSCULOSKELETAL: Extremities without gross deformities noted. No edema of bilateral lower extremities.  NEURO: Alert and oriented x 3.  Gait steady.  PSYCHIATRIC: Mentation and affect appear normal.  Mood appropriate.    Laboratory/Imaging Studies:  Results for orders placed or performed in visit on 08/08/18   Beta 2 microglobulin   Result Value Ref Range    Beta-2-Microglobulin 2.5 (H) <2.3 mg/L   CBC with platelets differential   Result Value Ref Range    WBC 4.5 4.0 " - 11.0 10e9/L    RBC Count 4.39 (L) 4.4 - 5.9 10e12/L    Hemoglobin 13.5 13.3 - 17.7 g/dL    Hematocrit 40.3 40.0 - 53.0 %    MCV 92 78 - 100 fl    MCH 30.8 26.5 - 33.0 pg    MCHC 33.5 31.5 - 36.5 g/dL    RDW 13.4 10.0 - 15.0 %    Platelet Count 170 150 - 450 10e9/L    Diff Method Automated Method     % Neutrophils 67.5 %    % Lymphocytes 17.3 %    % Monocytes 9.6 %    % Eosinophils 4.7 %    % Basophils 0.9 %    % Immature Granulocytes 0.0 %    Nucleated RBCs 0 0 /100    Absolute Neutrophil 3.0 1.6 - 8.3 10e9/L    Absolute Lymphocytes 0.8 0.8 - 5.3 10e9/L    Absolute Monocytes 0.4 0.0 - 1.3 10e9/L    Absolute Eosinophils 0.2 0.0 - 0.7 10e9/L    Absolute Basophils 0.0 0.0 - 0.2 10e9/L    Abs Immature Granulocytes 0.0 0 - 0.4 10e9/L    Absolute Nucleated RBC 0.0    Comprehensive metabolic panel   Result Value Ref Range    Sodium 140 133 - 144 mmol/L    Potassium 4.2 3.4 - 5.3 mmol/L    Chloride 107 94 - 109 mmol/L    Carbon Dioxide 27 20 - 32 mmol/L    Anion Gap 6 3 - 14 mmol/L    Glucose 108 (H) 70 - 99 mg/dL    Urea Nitrogen 15 7 - 30 mg/dL    Creatinine 0.78 0.66 - 1.25 mg/dL    GFR Estimate >90 >60 mL/min/1.7m2    GFR Estimate If Black >90 >60 mL/min/1.7m2    Calcium 8.4 (L) 8.5 - 10.1 mg/dL    Bilirubin Total 0.4 0.2 - 1.3 mg/dL    Albumin 3.4 3.4 - 5.0 g/dL    Protein Total 8.2 6.8 - 8.8 g/dL    Alkaline Phosphatase 185 (H) 40 - 150 U/L    ALT 35 0 - 70 U/L    AST 34 0 - 45 U/L   ELP reflex to IELP   Result Value Ref Range    Albumin Fraction 3.8 3.7 - 5.1 g/dL    Alpha 1 Fraction 0.3 0.2 - 0.4 g/dL    Alpha 2 Fraction 0.9 0.5 - 0.9 g/dL    Beta Fraction 1.9 (H) 0.6 - 1.0 g/dL    Gamma Fraction 0.8 0.7 - 1.6 g/dL    Monoclonal Peak 1.1 (H) 0.0 g/dL    ELP Interpretation:       Monoclonal protein (about 1.1 g/dL) seen in the beta gamma junction nearly comigrating   with C3 complement. See immunofixation report on same specimen. Pathologic significance   requires clinical correlation. EBEN Mclean M.D., Ph.D.,  Pathologist ().      Ferritin   Result Value Ref Range    Ferritin 47 26 - 388 ng/mL   Folate   Result Value Ref Range    Folate 75.4 >5.4 ng/mL   Iron and iron binding capacity   Result Value Ref Range    Iron 90 35 - 180 ug/dL    Iron Binding Cap 307 240 - 430 ug/dL    Iron Saturation Index 29 15 - 46 %   Kappa and lambda light chain   Result Value Ref Range    Kappa Free Lt Chain 3.23 (H) 0.33 - 1.94 mg/dL    Lambda Free Lt Chain 1.71 0.57 - 2.63 mg/dL    Kappa Lambda Ratio 1.89 (H) 0.26 - 1.65   Lactate Dehydrogenase   Result Value Ref Range    Lactate Dehydrogenase 184 85 - 227 U/L   Macroglobulins   Result Value Ref Range    Viscosity Index 1.8 1.4 - 1.8   Vitamin B12   Result Value Ref Range    Vitamin B12 1110 (H) 193 - 986 pg/mL   Protein immunofixation urine   Result Value Ref Range    Immunofix ELP Urine (Note)    Protein Immunofixation Serum   Result Value Ref Range    Immunofixation ELP (Note)      695 - 1620 mg/dL     70 - 380 mg/dL    IGM 1640 (H) 60 - 265 mg/dL          ASSESSMENT/PLAN:    #1 MGUS: IgM Monoclonal gammopathy of undetermined significance diagnosed January 2018.  M spike along with other lab work is essentially stable.  We'll see him back in 3 months with the same lab work.    #2  Long-term use of anticoagulant: History of DVT and PE along with an MTHFR heterozygote mutation.  He will continue on the Coumadin therapy managed through the Coumadin clinic.    #3 iron deficiency: Iron studies and hemoglobin are excellent today.  He will continue on the iron 3 times daily.    I encouraged patient to call with any questions or concerns.       Britney Mahoney NP

## 2018-08-16 ENCOUNTER — ANTICOAGULATION THERAPY VISIT (OUTPATIENT)
Dept: ANTICOAGULATION | Facility: OTHER | Age: 78
End: 2018-08-16
Attending: FAMILY MEDICINE
Payer: MEDICARE

## 2018-08-16 DIAGNOSIS — Z79.01 LONG-TERM (CURRENT) USE OF ANTICOAGULANTS: ICD-10-CM

## 2018-08-16 DIAGNOSIS — Z86.718 HISTORY OF DVT OF LOWER EXTREMITY: ICD-10-CM

## 2018-08-16 DIAGNOSIS — Z86.711 HX PULMONARY EMBOLISM: ICD-10-CM

## 2018-08-16 DIAGNOSIS — K92.2 ACUTE UPPER GASTROINTESTINAL BLEEDING: ICD-10-CM

## 2018-08-16 LAB — INR POINT OF CARE: 1.6 (ref 0.86–1.14)

## 2018-08-16 PROCEDURE — 36416 COLLJ CAPILLARY BLOOD SPEC: CPT | Mod: ZL

## 2018-08-16 ASSESSMENT — PATIENT HEALTH QUESTIONNAIRE - PHQ9: SUM OF ALL RESPONSES TO PHQ QUESTIONS 1-9: 0

## 2018-08-16 NOTE — PROGRESS NOTES
ANTICOAGULATION FOLLOW-UP CLINIC VISIT    Patient Name:  Rey Tristan  Date:  8/16/2018  Contact Type:  Face to Face    SUBJECTIVE:     Patient Findings     Positives No Problem Findings           OBJECTIVE    INR Protime   Date Value Ref Range Status   08/16/2018 1.6 (A) 0.86 - 1.14 Final       ASSESSMENT / PLAN  INR assessment THER    Recheck INR In: 6 WEEKS    INR Location Clinic      Anticoagulation Summary as of 8/16/2018     INR goal 1.5-2.0   Today's INR 1.6   Warfarin maintenance plan 6 mg (4 mg x 1.5) on Mon, Fri; 4 mg (4 mg x 1) all other days   Full warfarin instructions 6 mg on Mon, Fri; 4 mg all other days   Weekly warfarin total 32 mg   No change documented Lala Boss RN   Plan last modified Lala Boss RN (6/21/2018)   Next INR check 9/27/2018   Target end date Indefinite    Indications   Long-term (current) use of anticoagulants [Z79.01] [Z79.01]  Hx pulmonary embolism [Z86.711]  History of DVT of lower extremity [Z86.718]  Acute upper gastrointestinal bleeding [K92.2]         Anticoagulation Episode Summary     INR check location     Preferred lab     Send INR reminders to [facility] POOL    Comments       Anticoagulation Care Providers     Provider Role Specialty Phone number    Gianna Richard MD [facility] [phone]            See the Encounter Report to view Anticoagulation Flowsheet and Dosing Calendar (Go to Encounters tab in chart review, and find the Anticoagulation Therapy Visit)        Lala Boss RN

## 2018-08-16 NOTE — MR AVS SNAPSHOT
Rey Tristan   8/16/2018 10:30 AM   Anticoagulation Therapy Visit    Description:  78 year old male   Provider:   ANTI COAGULATION   Department:  [facility] Anti Coagulation           INR as of 8/16/2018     Today's INR 1.6      Anticoagulation Summary as of 8/16/2018     INR goal 1.5-2.0   Today's INR 1.6   Full warfarin instructions 6 mg on Mon, Fri; 4 mg all other days   Next INR check 9/27/2018    Indications   Long-term (current) use of anticoagulants [Z79.01] [Z79.01]  Hx pulmonary embolism [Z86.711]  History of DVT of lower extremity [Z86.718]  Acute upper gastrointestinal bleeding [K92.2]         Your next Anticoagulation Clinic appointment(s)     Sep 27, 2018 10:30 AM CDT   Anticoagulation Visit with  ANTI COAGULATION   [facility] ([facility]    [address] [place] [address]   [phone]              August 2018 Details    Sun Mon Tue Wed Thu Fri Sat        1               2               3               4                 5               6               7               8               9               10               11                 12               13               14               15               16      4 mg   See details      17      6 mg         18      4 mg           19      4 mg         20      6 mg         21      4 mg         22      4 mg         23      4 mg         24      6 mg         25      4 mg           26      4 mg         27      6 mg         28      4 mg         29      4 mg         30      4 mg         31      6 mg           Date Details   08/16 This INR check               How to take your warfarin dose     To take:  4 mg Take 1 of the 4 mg tablets.    To take:  6 mg Take 1.5 of the 4 mg tablets.           September 2018 Details    Sun Mon Tue Wed Thu Fri Sat           1      4 mg           2      4 mg         3      6 mg         4      4 mg         5      4 mg         6      4 mg         7      6 mg         8      4 mg           9       4 mg         10      6 mg         11      4 mg         12      4 mg         13      4 mg         14      6 mg         15      4 mg           16      4 mg         17      6 mg         18      4 mg         19      4 mg         20      4 mg         21      6 mg         22      4 mg           23      4 mg         24      6 mg         25      4 mg         26      4 mg         27            28               29                 30                      Date Details   No additional details    Date of next INR:  9/27/2018         How to take your warfarin dose     To take:  4 mg Take 1 of the 4 mg tablets.    To take:  6 mg Take 1.5 of the 4 mg tablets.

## 2018-09-25 ENCOUNTER — TELEPHONE (OUTPATIENT)
Dept: FAMILY MEDICINE | Facility: OTHER | Age: 78
End: 2018-09-25

## 2018-09-25 DIAGNOSIS — Z79.01 LONG-TERM (CURRENT) USE OF ANTICOAGULANTS: ICD-10-CM

## 2018-09-25 DIAGNOSIS — Z86.718 HISTORY OF DVT OF LOWER EXTREMITY: ICD-10-CM

## 2018-09-25 DIAGNOSIS — K92.2 ACUTE UPPER GASTROINTESTINAL BLEEDING: ICD-10-CM

## 2018-09-25 DIAGNOSIS — Z86.711 HX PULMONARY EMBOLISM: ICD-10-CM

## 2018-09-25 DIAGNOSIS — E78.5 HYPERLIPIDEMIA, UNSPECIFIED HYPERLIPIDEMIA TYPE: ICD-10-CM

## 2018-09-25 RX ORDER — WARFARIN SODIUM 4 MG/1
TABLET ORAL
Qty: 30 TABLET | Status: CANCELLED | OUTPATIENT
Start: 2018-09-25

## 2018-09-25 RX ORDER — ATORVASTATIN CALCIUM 10 MG/1
10 TABLET, FILM COATED ORAL DAILY
Qty: 90 TABLET | Refills: 1 | Status: SHIPPED | OUTPATIENT
Start: 2018-09-25 | End: 2018-12-28

## 2018-09-25 NOTE — TELEPHONE ENCOUNTER
Received call from patient that he is concerned if he should be continuing his Lipitor.  He states that he has read studies and long term studies show it could cause cataracts.  He wants to know from PCP if she wants him to continue this medication.    He is aware that PCP is out thi week and is fine waiting to hear from her since this is not an emergency.

## 2018-09-25 NOTE — TELEPHONE ENCOUNTER
Patient states taking warfin  4mg daily Sunday, Tuesday, wednesday, friday, saturday . And takes warfin 6 mg on mondays and fridays and is requesting refill. Please advise on refill.  Aspen Schofield, CMA

## 2018-09-27 ENCOUNTER — ANTICOAGULATION THERAPY VISIT (OUTPATIENT)
Dept: ANTICOAGULATION | Facility: OTHER | Age: 78
End: 2018-09-27
Attending: FAMILY MEDICINE
Payer: MEDICARE

## 2018-09-27 DIAGNOSIS — Z86.718 HISTORY OF DVT OF LOWER EXTREMITY: ICD-10-CM

## 2018-09-27 DIAGNOSIS — K92.2 ACUTE UPPER GASTROINTESTINAL BLEEDING: ICD-10-CM

## 2018-09-27 DIAGNOSIS — Z86.711 HX PULMONARY EMBOLISM: ICD-10-CM

## 2018-09-27 DIAGNOSIS — Z79.01 LONG-TERM (CURRENT) USE OF ANTICOAGULANTS: ICD-10-CM

## 2018-09-27 LAB — INR POINT OF CARE: 1.4 (ref 0.86–1.14)

## 2018-09-27 PROCEDURE — 85610 PROTHROMBIN TIME: CPT | Mod: QW,ZL

## 2018-09-27 RX ORDER — WARFARIN SODIUM 4 MG/1
TABLET ORAL
Qty: 40 TABLET | Refills: 11 | Status: SHIPPED | OUTPATIENT
Start: 2018-09-27 | End: 2018-09-27

## 2018-09-27 NOTE — MR AVS SNAPSHOT
Rey Tristan   9/27/2018 10:30 AM   Anticoagulation Therapy Visit    Description:  78 year old male   Provider:   ANTI COAGULATION   Department:  [facility] Anti Coagulation           INR as of 9/27/2018     Today's INR 1.4!      Anticoagulation Summary as of 9/27/2018     INR goal 1.5-2.0   Today's INR 1.4!   Full warfarin instructions 9/27: 6 mg; Otherwise 6 mg on Mon, Fri; 4 mg all other days   Next INR check 10/25/2018    Indications   Long-term (current) use of anticoagulants [Z79.01] [Z79.01]  Hx pulmonary embolism [Z86.711]  History of DVT of lower extremity [Z86.718]  Acute upper gastrointestinal bleeding [K92.2]         Your next Anticoagulation Clinic appointment(s)     Oct 25, 2018 10:30 AM CDT   Anticoagulation Visit with  ANTI COAGULATION   [facility] ([facility] )    [address]  [place] [address]   [phone]              September 2018 Details    Sun Mon Tue Wed Thu Fri Sat           1                 2               3               4               5               6               7               8                 9               10               11               12               13               14               15                 16               17               18               19               20               21               22                 23               24               25               26               27      6 mg   See details      28      6 mg         29      4 mg           30      4 mg                Date Details   09/27 This INR check               How to take your warfarin dose     To take:  4 mg Take 1 of the 4 mg tablets.    To take:  6 mg Take 1.5 of the 4 mg tablets.           October 2018 Details    Sun Mon Tue Wed Thu Fri Sat      1      6 mg         2      4 mg         3      4 mg         4      4 mg         5      6 mg         6      4 mg           7      4 mg         8      6 mg         9      4 mg         10       4 mg         11      4 mg         12      6 mg         13      4 mg           14      4 mg         15      6 mg         16      4 mg         17      4 mg         18      4 mg         19      6 mg         20      4 mg           21      4 mg         22      6 mg         23      4 mg         24      4 mg         25            26               27                 28               29               30               31                   Date Details   No additional details    Date of next INR:  10/25/2018         How to take your warfarin dose     To take:  4 mg Take 1 of the 4 mg tablets.    To take:  6 mg Take 1.5 of the 4 mg tablets.

## 2018-09-27 NOTE — PROGRESS NOTES
ANTICOAGULATION FOLLOW-UP CLINIC VISIT    Patient Name:  Rey Tristan  Date:  9/27/2018  Contact Type:  Face to Face    SUBJECTIVE:     Patient Findings     Positives Activity level change           OBJECTIVE    INR Protime   Date Value Ref Range Status   09/27/2018 1.4 (A) 0.86 - 1.14 Final       ASSESSMENT / PLAN  INR assessment SUB    Recheck INR In: 4 WEEKS    INR Location Clinic      Anticoagulation Summary as of 9/27/2018     INR goal 1.5-2.0   Today's INR 1.4!   Warfarin maintenance plan 6 mg (4 mg x 1.5) on Mon, Fri; 4 mg (4 mg x 1) all other days   Full warfarin instructions 9/27: 6 mg; Otherwise 6 mg on Mon, Fri; 4 mg all other days   Weekly warfarin total 32 mg   Plan last modified Lala Boss RN (6/21/2018)   Next INR check 10/25/2018   Target end date Indefinite    Indications   Long-term (current) use of anticoagulants [Z79.01] [Z79.01]  Hx pulmonary embolism [Z86.711]  History of DVT of lower extremity [Z86.718]  Acute upper gastrointestinal bleeding [K92.2]         Anticoagulation Episode Summary     INR check location     Preferred lab     Send INR reminders to [facility] POOL    Comments       Anticoagulation Care Providers     Provider Role Specialty Phone number    Gianna Richard MD [facility] [phone]            See the Encounter Report to view Anticoagulation Flowsheet and Dosing Calendar (Go to Encounters tab in chart review, and find the Anticoagulation Therapy Visit)        Lala Boss, RN

## 2018-09-28 ENCOUNTER — APPOINTMENT (OUTPATIENT)
Dept: GENERAL RADIOLOGY | Facility: HOSPITAL | Age: 78
End: 2018-09-28
Attending: FAMILY MEDICINE
Payer: MEDICARE

## 2018-09-28 ENCOUNTER — HOSPITAL ENCOUNTER (EMERGENCY)
Facility: HOSPITAL | Age: 78
Discharge: HOME | End: 2018-09-28
Attending: FAMILY MEDICINE | Admitting: FAMILY MEDICINE
Payer: MEDICARE

## 2018-09-28 VITALS
OXYGEN SATURATION: 99 % | SYSTOLIC BLOOD PRESSURE: 121 MMHG | TEMPERATURE: 98 F | HEART RATE: 70 BPM | WEIGHT: 160 LBS | DIASTOLIC BLOOD PRESSURE: 81 MMHG | RESPIRATION RATE: 16 BRPM | BODY MASS INDEX: 27.04 KG/M2

## 2018-09-28 DIAGNOSIS — T14.8XXA AVULSION INJURY: ICD-10-CM

## 2018-09-28 LAB
BASOPHILS # BLD AUTO: 0.1 10E9/L (ref 0–0.2)
BASOPHILS NFR BLD AUTO: 1.3 %
DIFFERENTIAL METHOD BLD: ABNORMAL
EOSINOPHIL # BLD AUTO: 0.2 10E9/L (ref 0–0.7)
EOSINOPHIL NFR BLD AUTO: 3.8 %
ERYTHROCYTE [DISTWIDTH] IN BLOOD BY AUTOMATED COUNT: 13 % (ref 10–15)
HCT VFR BLD AUTO: 39.7 % (ref 40–53)
HGB BLD-MCNC: 13.3 G/DL (ref 13.3–17.7)
IMM GRANULOCYTES # BLD: 0 10E9/L (ref 0–0.4)
IMM GRANULOCYTES NFR BLD: 0.4 %
INR PPP: 1.58 (ref 0.8–1.2)
LYMPHOCYTES # BLD AUTO: 0.9 10E9/L (ref 0.8–5.3)
LYMPHOCYTES NFR BLD AUTO: 16 %
MCH RBC QN AUTO: 30.6 PG (ref 26.5–33)
MCHC RBC AUTO-ENTMCNC: 33.5 G/DL (ref 31.5–36.5)
MCV RBC AUTO: 92 FL (ref 78–100)
MONOCYTES # BLD AUTO: 0.5 10E9/L (ref 0–1.3)
MONOCYTES NFR BLD AUTO: 9.8 %
NEUTROPHILS # BLD AUTO: 3.8 10E9/L (ref 1.6–8.3)
NEUTROPHILS NFR BLD AUTO: 68.7 %
NRBC # BLD AUTO: 0 10*3/UL
NRBC BLD AUTO-RTO: 0 /100
PLATELET # BLD AUTO: 183 10E9/L (ref 150–450)
RBC # BLD AUTO: 4.34 10E12/L (ref 4.4–5.9)
WBC # BLD AUTO: 5.5 10E9/L (ref 4–11)

## 2018-09-28 PROCEDURE — 85025 COMPLETE CBC W/AUTO DIFF WBC: CPT | Performed by: FAMILY MEDICINE

## 2018-09-28 PROCEDURE — 99284 EMERGENCY DEPT VISIT MOD MDM: CPT | Mod: 25

## 2018-09-28 PROCEDURE — 25000128 H RX IP 250 OP 636: Performed by: FAMILY MEDICINE

## 2018-09-28 PROCEDURE — 36415 COLL VENOUS BLD VENIPUNCTURE: CPT | Performed by: FAMILY MEDICINE

## 2018-09-28 PROCEDURE — 99283 EMERGENCY DEPT VISIT LOW MDM: CPT | Mod: Z6 | Performed by: FAMILY MEDICINE

## 2018-09-28 PROCEDURE — 85610 PROTHROMBIN TIME: CPT | Performed by: FAMILY MEDICINE

## 2018-09-28 PROCEDURE — 90715 TDAP VACCINE 7 YRS/> IM: CPT | Performed by: FAMILY MEDICINE

## 2018-09-28 PROCEDURE — 73130 X-RAY EXAM OF HAND: CPT | Mod: TC,LT

## 2018-09-28 PROCEDURE — 96374 THER/PROPH/DIAG INJ IV PUSH: CPT

## 2018-09-28 PROCEDURE — 90471 IMMUNIZATION ADMIN: CPT

## 2018-09-28 RX ORDER — TRANEXAMIC ACID 100 MG/ML
500 INJECTION, SOLUTION INTRAVENOUS ONCE
Status: DISCONTINUED | OUTPATIENT
Start: 2018-09-28 | End: 2018-09-28

## 2018-09-28 RX ORDER — CEFAZOLIN SODIUM 2 G/100ML
2 INJECTION, SOLUTION INTRAVENOUS ONCE
Status: COMPLETED | OUTPATIENT
Start: 2018-09-28 | End: 2018-09-28

## 2018-09-28 RX ADMIN — CEFAZOLIN SODIUM 2 G: 2 INJECTION, SOLUTION INTRAVENOUS at 16:53

## 2018-09-28 RX ADMIN — CLOSTRIDIUM TETANI TOXOID ANTIGEN (FORMALDEHYDE INACTIVATED), CORYNEBACTERIUM DIPHTHERIAE TOXOID ANTIGEN (FORMALDEHYDE INACTIVATED), BORDETELLA PERTUSSIS TOXOID ANTIGEN (GLUTARALDEHYDE INACTIVATED), BORDETELLA PERTUSSIS FILAMENTOUS HEMAGGLUTININ ANTIGEN (FORMALDEHYDE INACTIVATED), BORDETELLA PERTUSSIS PERTACTIN ANTIGEN, AND BORDETELLA PERTUSSIS FIMBRIAE 2/3 ANTIGEN 0.5 ML: 5; 2; 2.5; 5; 3; 5 INJECTION, SUSPENSION INTRAMUSCULAR at 17:35

## 2018-09-28 NOTE — ED NOTES
Pt reached hand into moving blade of lawn mower.  Pt hand wrapped in triage, bleeding controlled.  On coumadin currently.

## 2018-09-28 NOTE — ED AVS SNAPSHOT
[facility]    [address]    [facility] [address]    Phone:  [phone]                                       Rey Tristan   MRN: [id number]    Department:  [facility]   Date of Visit:  9/28/2018           Patient Information     Date Of Birth          1940        Your diagnoses for this visit were:     Avulsion injury        You were seen by Britney Jones MD.      Follow-up Information     Call Chuck Steven MD.    Specialty:  Orthopedics    Why:  Call Monday morning. Dont take coumadin or eat breakfast until you contact the orthopedic clinic     Contact information:    [facility]  [address] [facility] [address]  [facility] [address]  [phone]        Your next 10 appointments already scheduled     Oct 25, 2018 10:30 AM CDT   Anticoagulation Visit with HC ANTI COAGULATION   [facility] ([facility] )    [address]  [facility] [address]   [phone]            Nov 09, 2018 11:00 AM CST   LAB with HC LAB   [facility]    [address] [facility] [address]   [phone]            Nov 16, 2018 11:00 AM CST   (Arrive by 10:45 AM)   Return Visit with Britney Mahoney NP   [facility]    [address]  [facility] [address]   [phone]                 Review of your medicines      Our records show that you are taking the medicines listed below. If these are incorrect, please call your family doctor or clinic.        Dose / Directions Last dose taken    atorvastatin 10 MG tablet   Commonly known as:  LIPITOR   Dose:  10 mg   Quantity:  90 tablet        Take 1 tablet (10 mg) by mouth daily   Refills:  1        cyanocobalamin 1000 MCG Tabs   Commonly known as:  [employer] vitamin  B12   Dose:  1000 mcg   Quantity:  90 tablet        Take 1,000 mcg by mouth daily   Refills:  3        ferrous sulfate  325 (65 Fe) MG tablet   Commonly known as:  IRON   Dose:  325 mg   Quantity:  90 tablet        Take 1 tablet (325 mg) by mouth 3 times daily (with meals)   Refills:  11        levothyroxine 50 MCG tablet   Commonly known as:  SYNTHROID/LEVOTHROID   Dose:  50 mcg   Quantity:  30 tablet        Take 1 tablet (50 mcg) by mouth daily   Refills:  1        Lycopene 10 MG Caps        Refills:  0        MULTIVITAMIN ADULT PO   Dose:  1 tablet        Take 1 tablet by mouth   Refills:  0        omeprazole 40 MG capsule   Commonly known as:  priLOSEC   Quantity:  90 capsule        TAKE 1 CAPSULE(40 MG) BY MOUTH DAILY   Refills:  1        pyridoxine 50 MG Tabs   Commonly known as:  VITAMIN B-6   Dose:  50 mg   Quantity:  90 tablet        Take 1 tablet (50 mg) by mouth daily   Refills:  3        vitamin D 400 units tablet   Dose:  1000 Units        Take 1,000 Units by mouth daily   Refills:  0        warfarin 4 MG tablet   Commonly known as:  COUMADIN   Quantity:  120 tablet        TAKE 1& 1/2 TABLETS BY MOUTH ON MONDAY& FRIDAY. TAKE 1 TABLET ON ALL OTHER DAYS   Refills:  3                Procedures and tests performed during your visit     CBC with platelets differential    INR    XR Hand Left G/E 3 Views      Orders Needing Specimen Collection     None      Pending Results     No orders found from 9/26/2018 to 9/29/2018.            Pending Culture Results     No orders found from 9/26/2018 to 9/29/2018.            Thank you for choosing [facility]       Thank you for choosing [facility] for your care. Our goal is always to provide you with excellent care. Hearing back from our patients is one way we can continue to improve our services. Please take a few minutes to complete the written survey that you may receive in the mail after you visit with us. Thank you!        Care EveryWhere ID     This is your Care EveryWhere ID. This could be used by other organizations to access your [facility] medical records  [id number]        Equal  Access to Services     KOBE [facility] SystemJESSICA : Cyndy Garrido, kasey diallo, qapb lazaro. So [facility] [phone].    ATENCIÓN: Si habla español, tiene a dodson disposición servicios gratuitos de asistencia lingüística. Llame al [phone].    We comply with applicable federal civil rights laws and [place] laws. We do not discriminate on the basis of race, color, national origin, age, disability, sex, sexual orientation, or gender identity.            After Visit Summary       This is your record. Keep this with you and show to your community pharmacist(s) and doctor(s) at your next visit.

## 2018-09-28 NOTE — ED TRIAGE NOTES
Pt on warfin, wound actively bleeding. Pieces of fingers missing on the tips. States unable to find the pieces. Wounds actively bleeding.

## 2018-09-28 NOTE — ED AVS SNAPSHOT
[facility]    [address]    [place] [address]    Phone:  [phone]                                       Rey Tristan   MRN: [id number]    Department:  [facility]   Date of Visit:  9/28/2018           After Visit Summary Signature Page     I have received my discharge instructions, and my questions have been answered. I have discussed any challenges I see with this plan with the nurse or doctor.    ..........................................................................................................................................  Patient/Patient Representative Signature      ..........................................................................................................................................  Patient Representative Print Name and Relationship to Patient    ..................................................               ................................................  Date                                   Time    ..........................................................................................................................................  Reviewed by Signature/Title    ...................................................              ..............................................  Date                                               Time          22EPIC Rev 08/18

## 2018-09-30 ASSESSMENT — ENCOUNTER SYMPTOMS
RESPIRATORY NEGATIVE: 1
CONSTITUTIONAL NEGATIVE: 1

## 2018-09-30 NOTE — TELEPHONE ENCOUNTER
His Lipitor was started by cardiologist, Dr. Cavazos, a few years back after abnormal stress test.  Therefore, would advise he continue it.  However, if he is unable to tolerate it or does not want to take it, that is certainly his decision, and I respect that.

## 2018-09-30 NOTE — ED PROVIDER NOTES
History     Chief Complaint   Patient presents with     Laceration     Lawnmower left hand, 3rd and 4th finger.     HPI  Rey Tristan is a 78 year old male who presents with injury to third and fourth fingers after reaching under his lawnmower and cutting it on the lawnmower blade. Patient presents with bleeding controlled. Patient is on coumadin for history of PE . He is able to move the digit distal to injury with out incident .     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 08/23/2016     Priority: Medium     PUD (peptic ulcer disease) 08/23/2016     Priority: Medium     Long-term (current) use of anticoagulants [Z79.01] 07/27/2016     Priority: Medium     Elevated serum alkaline phosphatase level 05/04/2016     Priority: Medium     Normocytic anemia 05/04/2016     Priority: Medium     Colonoscopy refused 04/28/2016     Priority: Medium     Patient refused colonoscopy given occult stool kit         ACP (advance care planning) 04/28/2016     Priority: Medium     Advance Care Planning 4/28/2016: ACP Review of Chart / Resources Provided:  Reviewed chart for advance care plan.  Rey Tristan has been provided information and resources to begin or update their advance care plan.  Added by Jayleen Fleming             Hyperlipidemia      Priority: Medium     Hx pulmonary embolism 05/03/2013     Priority: Medium     History of DVT of lower extremity 04/19/2013     Priority: Medium     Advanced care  planning/counseling discussion 11/27/2012     Priority: Medium     Colitis due to Clostridium difficile 05/19/2011     Priority: Medium     History of tobacco use 05/17/2011     Priority: Medium     Hyponatremia 05/17/2011     Priority: Medium     Deep vein thrombosis (DVT) of lower extremity (H) 05/17/2011     Priority: Medium     Vitamin D deficiency 05/17/2011     Priority: Medium        Past Medical History:    Past Medical History:   Diagnosis Date     Autoimmune thyroiditis 09/2017     Chronic anticoagulation 1/1/2012     Elevated serum alkaline phosphatase level 5/4/2016     Gastric ulcer      Hyperlipidemia      Normocytic anemia 5/4/2016     Pulmonary nodule      Upper GI bleed 11/30/2017     Vitamin D deficiency 1/1/2012       Past Surgical History:    Past Surgical History:   Procedure Laterality Date     ANGIOGRAM  6/23/2014     BIOPSY  2018    bone marrow biopsy     BONE MARROW BIOPSY, BONE SPECIMEN, NEEDLE/TROCAR N/A 1/16/2018    Procedure: BIOPSY BONE MARROW;  BONE MARROW BIOPSY;  Surgeon: Nuno Castro MD;  Location: [facility] OR     C REGULAR ECHO (FL)  6/23/2014    Dr. Cavazos     C. Difficile with intussuseption       cataract extraction       colonoscopy       COLONOSCOPY  6/17     ENDOSCOPY UPPER, COLONOSCOPY, COMBINED N/A 6/17/2016    Procedure: COMBINED ENDOSCOPY UPPER, COLONOSCOPY;  Surgeon: Andrea Stearns DO;  Location: [facility] OR     ESOPHAGOSCOPY, GASTROSCOPY, DUODENOSCOPY (EGD), COMBINED N/A 11/30/2017    Procedure: COMBINED ESOPHAGOSCOPY, GASTROSCOPY, DUODENOSCOPY (EGD);  UPPER ENDOSCOPY;  Surgeon: Narinder Torres MD;  Location: [facility] OR     left knee meniscal tear       Left lower extremity DVT       lens implant       nasal polypectomy       Pulmonary Embolism         Family History:    Family History   Problem Relation Age of Onset     Cancer Mother      Ovarian     Other Cancer Mother      lung/breast/ cervical ?     Respiratory Father      emphysemia     Lung Cancer Brother       Diabetes No family hx of      Hypertension No family hx of      Hyperlipidemia No family hx of      Thyroid Disease No family hx of      Asthma No family hx of      Colon Cancer No family hx of      Prostate Cancer No family hx of      Anesthesia Reaction No family hx of      Genetic Disorder No family hx of      Cerebrovascular Disease No family hx of      Coronary Artery Disease No family hx of      Breast Cancer No family hx of        Social History:  Marital Status:   [2]  Social History   Substance Use Topics     Smoking status: Former Smoker     Types: Cigarettes     Start date: 5/15/1958     Quit date: 10/28/2009     Smokeless tobacco: Never Used      Comment: Quit 2009     Alcohol use No        Medications:      warfarin (COUMADIN) 4 MG tablet   atorvastatin (LIPITOR) 10 MG tablet   Cholecalciferol (VITAMIN D) 400 UNITS tablet   cyanocobalamin ([employer] VITAMIN  B12) 1000 MCG TABS   ferrous sulfate (IRON) 325 (65 FE) MG tablet   levothyroxine (SYNTHROID/LEVOTHROID) 50 MCG tablet   Lycopene 10 MG CAPS   Multiple Vitamins-Minerals (MULTIVITAMIN ADULT PO)   omeprazole (PRILOSEC) 40 MG capsule   pyridoxine (VITAMIN B-6) 50 MG TABS         Review of Systems   Constitutional: Negative.    HENT: Negative.    Respiratory: Negative.    Genitourinary: Negative.    All other systems reviewed and are negative.      Physical Exam   BP: 123/87  Pulse: 70  Heart Rate: 72  Temp: 98.5  F (36.9  C)  Resp: 16  Weight: 72.6 kg (160 lb)  SpO2: 92 %      Physical Exam   Constitutional: He appears well-developed and well-nourished.   HENT:   Head: Normocephalic.   Eyes: Pupils are equal, round, and reactive to light.   Neck: Normal range of motion. Neck supple.   Cardiovascular: Normal rate and regular rhythm.    Pulmonary/Chest: Effort normal and breath sounds normal.   Skin:   Avulsion of tuft of third and slight amount of fourth digit   Nursing note and vitals reviewed.      ED Course     ED Course     Procedures           Patient presents to ER after having an avulsion injury from a lawnmover. Patient triaged to exam room. Vital signs reviewed. Bleeding controlled. Patient to xray . NO fracture. No active bleeding.labs drawn. Discussed patient with DR Christopher garzon. Wound cleansed. Dressed in non adherent dressing , Tetanus updated . 2 grams ancef given. Patient will follow up in orthopedic clinic. Patient may continue his coumadin until follow up  Or hold just single dose evening before his appointment     Results for orders placed or performed during the hospital encounter of 09/28/18   XR Hand Left G/E 3 Views    Narrative    Exam: XR HAND LT G/E 3 VW     History:Male, age 78 years, laceration;     Comparison:  None    Technique: Three views are submitted.    Findings: Bones mildly osteopenic. No evidence of acute or subacute  fracture.  No evidence of dislocation.  Soft tissue laceration  involving the distal tuft of the ring finger. Mild degenerative  changes of the wrist and hand.           Impression    Impression:  1.  No evidence of acute or subacute bony abnormality.     2.  Soft tissue defect involving the distal tuft of the ring finger  and possibly middle finger.    3.  Generalized osteopenia and mild degenerative changes.    GREG EVERETT MD   CBC with platelets differential   Result Value Ref Range    WBC 5.5 4.0 - 11.0 10e9/L    RBC Count 4.34 (L) 4.4 - 5.9 10e12/L    Hemoglobin 13.3 13.3 - 17.7 g/dL    Hematocrit 39.7 (L) 40.0 - 53.0 %    MCV 92 78 - 100 fl    MCH 30.6 26.5 - 33.0 pg    MCHC 33.5 31.5 - 36.5 g/dL    RDW 13.0 10.0 - 15.0 %    Platelet Count 183 150 - 450 10e9/L    Diff Method Automated Method     % Neutrophils 68.7 %    % Lymphocytes 16.0 %    % Monocytes 9.8 %    % Eosinophils 3.8 %    % Basophils 1.3 %    % Immature Granulocytes 0.4 %    Nucleated RBCs 0 0 /100    Absolute Neutrophil 3.8 1.6 - 8.3 10e9/L    Absolute Lymphocytes 0.9 0.8 - 5.3 10e9/L    Absolute Monocytes 0.5 0.0 - 1.3 10e9/L     Absolute Eosinophils 0.2 0.0 - 0.7 10e9/L    Absolute Basophils 0.1 0.0 - 0.2 10e9/L    Abs Immature Granulocytes 0.0 0 - 0.4 10e9/L    Absolute Nucleated RBC 0.0    INR   Result Value Ref Range    INR 1.58 (H) 0.80 - 1.20             No results found for this or any previous visit (from the past 24 hour(s)).    Medications   ceFAZolin (ANCEF) intermittent infusion 2 g in 100 mL dextrose PRE-MIX (2 g Intravenous Given 9/28/18 8131)   Tdap (tetanus-diphtheria-acell pertussis) (ADACEL) injection 0.5 mL (0.5 mLs Intramuscular Given 9/28/18 0911)       Assessments & Plan (with Medical Decision Making)     I have reviewed the nursing notes.    I have reviewed the findings, diagnosis, plan and need for follow up with the patient.      Discharge Medication List as of 9/28/2018  5:45 PM          Final diagnoses:   Avulsion injury       9/28/2018   [facility]     Britney Jones MD  09/30/18 2700

## 2018-10-01 ENCOUNTER — TRANSFERRED RECORDS (OUTPATIENT)
Dept: HEALTH INFORMATION MANAGEMENT | Facility: CLINIC | Age: 78
End: 2018-10-01

## 2018-10-01 ENCOUNTER — TELEPHONE (OUTPATIENT)
Dept: FAMILY MEDICINE | Facility: OTHER | Age: 78
End: 2018-10-01

## 2018-10-01 NOTE — TELEPHONE ENCOUNTER
Advised patient of PCP's recommendation.  He verbalized understanding.  He is still concerned about whether or not he needs this medication.  Advised patient we could schedule future appointment with PCP to speak with her more about this.  Scheduled future appointment.        Next 5 appointments (look out 90 days)     Oct 12, 2018  9:45 AM CDT   (Arrive by 9:30 AM)   [facility]na [facility]   [facility]    [address] [place] [address]   [phone]            Nov 16, 2018 11:00 AM CST   (Arrive by 10:45 AM)   Return Visit with Britney Mahoney NP   [facility] ([facility] )    [address] [place] [address]   [phone]

## 2018-10-01 NOTE — TELEPHONE ENCOUNTER
9:10 AM    Reason for Call: Phone Call    Description: Patient called and has a few questions for Dr Richard about if he needs to see a plastic surgeon or Ortho surgeon. Would like a call back    Was an appointment offered for this call? No  If yes : Appointment type              Date    Preferred method for responding to this message: Telephone Call  What is your phone number ? [phone]    If we cannot reach you directly, may we leave a detailed response at the number you provided? No    Can this message wait until your PCP/provider returns, if available today? Not applicable, Provider not available    Kelly Shoen

## 2018-10-01 NOTE — TELEPHONE ENCOUNTER
Pt was upset and stated he needs to know in the next 10 minutes the answer, as he does not want to waste a trip down there if he does not need to see this doctor. Pt stated it is urgent you call him back at [phone] or [phone]

## 2018-10-01 NOTE — TELEPHONE ENCOUNTER
Dr. Richard,  Patient injured his finger with a lawnmower last week.  He had xrays and nothing is wrong with the bones in the finger it is just the flesh part that he may need a skin graft.  He made an appointment with OA-Dr. Kent but is thinking that he should see a plastic surgeon instead.  He stated that he is driving 120 miles to see Ortho and just wants to be sure that he is going to the right place.  Would you say it is ortho or should he see plastic surgery for a skin graft?  Please advise et I will call the patient back.  Thank you.

## 2018-10-01 NOTE — TELEPHONE ENCOUNTER
Spoke to Dr. Richard, she said for patient to keep his ortho appointment and take it from there.  Notified patient via phone who stated he was on his way there now as we spoke.  Patient apologized for being rude to scheduling staff.  No further questions at this time.

## 2018-10-04 NOTE — PROGRESS NOTES
SUBJECTIVE:   Rey Tristan is a 78 year old male who presents to clinic today for the following health issues:      Hyperlipidemia Follow-Up      Rate your low fat/cholesterol diet?: not monitoring fat    Taking statin?  Yes, no muscle aches from statin and is concerned about a study in [place] that stated it can cause cataracts.      Other lipid medications/supplements?:  none    Amount of exercise or physical activity: 4-5 days/week for an average of 45-60 minutes    Problems taking medications regularly: No    Medication side effects: see above    Diet: regular (no restrictions)    Patient has read studies of kidney issues and cataracts    [facility] this [facility] - noted cataracts returning bilaterally; cataract surgery 2009    Was started on Lipitor by cardiology, Dr. Cavazos        Problem list and histories reviewed & adjusted, as indicated.  Additional history: as documented    Current Outpatient Prescriptions   Medication     atorvastatin (LIPITOR) 10 MG tablet     Cholecalciferol (VITAMIN D) 400 UNITS tablet     cyanocobalamin (CVS VITAMIN  B12) 1000 MCG TABS     ferrous sulfate (IRON) 325 (65 FE) MG tablet     Lycopene 10 MG CAPS     Multiple Vitamins-Minerals (MULTIVITAMIN ADULT PO)     omeprazole (PRILOSEC) 40 MG capsule     pyridoxine (VITAMIN B-6) 50 MG TABS     warfarin (COUMADIN) 4 MG tablet     levothyroxine (SYNTHROID/LEVOTHROID) 50 MCG tablet     No current facility-administered medications for this visit.        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 Cavazos     C. Difficile with intussuseption       cataract extraction       colonoscopy       COLONOSCOPY  6/17     ENDOSCOPY UPPER, COLONOSCOPY, COMBINED N/A 6/17/2016    Procedure: COMBINED ENDOSCOPY UPPER, COLONOSCOPY;  Surgeon: Andrea Stearns DO;  Location: [facility]     ESOPHAGOSCOPY, GASTROSCOPY, DUODENOSCOPY (EGD), COMBINED N/A 11/30/2017    Procedure: COMBINED ESOPHAGOSCOPY, GASTROSCOPY, DUODENOSCOPY (EGD);  UPPER ENDOSCOPY;  Surgeon: Narinder Torres MD;  Location: [facility]     left knee meniscal tear       Left lower extremity DVT       lens implant       nasal polypectomy       Pulmonary Embolism         Social History   Substance Use Topics     Smoking status: Former Smoker     Types: Cigarettes     Start date: 5/15/1958     Quit date: 10/28/2009     Smokeless tobacco: Never Used      Comment: Quit 2009     Alcohol use No     Family History   Problem Relation Age of Onset     Cancer Mother      Ovarian     Other Cancer Mother      lung/breast/ cervical ?     Respiratory Father      emphysemia     Lung Cancer Brother      Diabetes No family hx of      Hypertension No family hx of      Hyperlipidemia No family hx of      Thyroid Disease No family hx of      Asthma No family hx of      Colon Cancer No family hx of      Prostate Cancer No family hx of      Anesthesia Reaction No family hx of   "    Genetic Disorder No family hx of      Cerebrovascular Disease No family hx of      Coronary Artery Disease No family hx of      Breast Cancer No family hx of            Reviewed and updated as needed this visit by clinical staff       Reviewed and updated as needed this visit by Provider         ROS:  CONSTITUTIONAL:NEGATIVE for fever, chills, change in weight  INTEGUMENTARY/SKIN: NEGATIVE for worrisome rashes  EYES: POSITIVE for cataracts  RESP:NEGATIVE for significant cough or SOB  CV: NEGATIVE for chest pain, palpitations or peripheral edema  PSYCHIATRIC: NEGATIVE for changes in mood or affect    OBJECTIVE:     /56 (BP Location: Right arm, Patient Position: Sitting, Cuff Size: Adult Large)  Pulse 72  Temp 97.6  F (36.4  C) (Tympanic)  Resp 16  Ht 5' 4.5\" (1.638 m)  Wt 160 lb (72.6 kg)  SpO2 98%  BMI 27.04 kg/m2  Body mass index is 27.04 kg/(m^2).  GENERAL: healthy, alert and no distress  PSYCH: mentation appears normal, affect normal/bright, though mildly anxious    Diagnostic Test Results:  none     ASSESSMENT/PLAN:     (E78.5) Hyperlipidemia, unspecified hyperlipidemia type  (primary encounter diagnosis)  Comment: due for labs, but not fasting today  Plan: Lipid Profile (Chol, Trig, HDL, LDL calc)          From studies I see, it is higher dose statins more associated with cataracts, but can't give patient definitive answer or association in his case.  Ophthalmologist did not have any recommendation either.  May just be that the cataracts would have returned regardless of statin use (has been 10 years).    If tolerable, would advise statin use through target age 81 for primary prevention.      Patient Instructions   Return for fasting labs with Coumadin clinic appointment later this month.  If deciding to stop statin, please let us know.      Gianna Mckeon MD  [facility] [place]  "

## 2018-10-06 ENCOUNTER — TRANSFERRED RECORDS (OUTPATIENT)
Dept: HEALTH INFORMATION MANAGEMENT | Facility: CLINIC | Age: 78
End: 2018-10-06

## 2018-10-12 ENCOUNTER — TRANSFERRED RECORDS (OUTPATIENT)
Dept: HEALTH INFORMATION MANAGEMENT | Facility: CLINIC | Age: 78
End: 2018-10-12

## 2018-10-12 ENCOUNTER — OFFICE VISIT (OUTPATIENT)
Dept: FAMILY MEDICINE | Facility: OTHER | Age: 78
End: 2018-10-12
Attending: FAMILY MEDICINE
Payer: COMMERCIAL

## 2018-10-12 VITALS
WEIGHT: 160 LBS | BODY MASS INDEX: 26.66 KG/M2 | OXYGEN SATURATION: 98 % | HEIGHT: 65 IN | SYSTOLIC BLOOD PRESSURE: 102 MMHG | RESPIRATION RATE: 16 BRPM | DIASTOLIC BLOOD PRESSURE: 56 MMHG | TEMPERATURE: 97.6 F | HEART RATE: 72 BPM

## 2018-10-12 DIAGNOSIS — E78.5 HYPERLIPIDEMIA, UNSPECIFIED HYPERLIPIDEMIA TYPE: Primary | ICD-10-CM

## 2018-10-12 PROCEDURE — 99213 OFFICE O/P EST LOW 20 MIN: CPT | Performed by: FAMILY MEDICINE

## 2018-10-12 PROCEDURE — G0463 HOSPITAL OUTPT CLINIC VISIT: HCPCS

## 2018-10-12 ASSESSMENT — ANXIETY QUESTIONNAIRES
7. FEELING AFRAID AS IF SOMETHING AWFUL MIGHT HAPPEN: NOT AT ALL
GAD7 TOTAL SCORE: 0
1. FEELING NERVOUS, ANXIOUS, OR ON EDGE: NOT AT ALL
IF YOU CHECKED OFF ANY PROBLEMS ON THIS QUESTIONNAIRE, HOW DIFFICULT HAVE THESE PROBLEMS MADE IT FOR YOU TO DO YOUR WORK, TAKE CARE OF THINGS AT HOME, OR GET ALONG WITH OTHER PEOPLE: NOT DIFFICULT AT ALL
3. WORRYING TOO MUCH ABOUT DIFFERENT THINGS: NOT AT ALL
4. TROUBLE RELAXING: NOT AT ALL
6. BECOMING EASILY ANNOYED OR IRRITABLE: NOT AT ALL
5. BEING SO RESTLESS THAT IT IS HARD TO SIT STILL: NOT AT ALL
2. NOT BEING ABLE TO STOP OR CONTROL WORRYING: NOT AT ALL

## 2018-10-12 ASSESSMENT — PAIN SCALES - GENERAL: PAINLEVEL: NO PAIN (0)

## 2018-10-12 NOTE — MR AVS SNAPSHOT
After Visit Summary   10/12/2018    Rey Tristan    MRN: [id number]           Patient Information     Date Of Birth          1940        Visit Information        Provider Department      10/12/2018 9:45 AM Gianna Richard MD [facility]        Today's Diagnoses     Hyperlipidemia, unspecified hyperlipidemia type    -  1      Care Instructions    Return for fasting labs with Coumadin clinic appointment later this month.  If deciding to stop statin, please let us know.            Follow-ups after your visit        Your next 10 appointments already scheduled     Oct 25, 2018 10:30 AM CDT   [facility]    [address]  [facility] [address]   [phone]            Nov 09, 2018 11:00 AM CST   LAB with [facility]   [facility]    [address]  [facility] [address]   [phone]            Nov 16, 2018 11:00 AM CST   (Arrive by 10:45 AM)   [facility]oney [facility]   [facility]    [address]  [facility] [address]   [phone]              Future tests that were ordered for you today     Open Future Orders        Priority Expected Expires Ordered    Lipid Profile (Chol, Trig, HDL, LDL calc) Routine  10/12/2019 10/12/2018            Who to contact     If you have questions or need follow up information about today's clinic visit or your schedule please contact [facility] directly at [phone].  Normal or non-critical lab and imaging results will be communicated to you by MyChart, letter or phone within 4 business days after the clinic has received the results. If you do not hear from us within 7 days, please contact the clinic through MyChart or phone. If you have a critical or abnormal lab result, we will  "notify you by phone as soon as possible.  Submit refill requests through [employer] or call your pharmacy and they will forward the refill request to us. Please allow 3 business days for your refill to be completed.          Additional Information About Your Visit        Care EveryWhere ID     This is your Care EveryWhere ID. This could be used by other organizations to access your [place] medical records  [id number]        Your Vitals Were     Pulse Temperature Respirations Height Pulse Oximetry BMI (Body Mass Index)    72 97.6  F (36.4  C) (Tympanic) 16 5' 4.5\" (1.638 m) 98% 27.04 kg/m2       Blood Pressure from Last 3 Encounters:   10/12/18 102/56   09/28/18 121/81   08/15/18 98/60    Weight from Last 3 Encounters:   10/12/18 160 lb (72.6 kg)   09/28/18 160 lb (72.6 kg)   08/15/18 170 lb 12.8 oz (77.5 kg)               Primary Care Provider Office Phone # Fax #    Gianna Richard [phone] [address] [facility] [address]        Equal Access to Services     [facility] AH: Hadii aad ku hadasho Soomaali, waaxda luqadaha, qaybta kaalmada adeegyada, pb jimenez haycliffn adejack orellana . So [facility] [phone].    ATENCIÓN: Si habla español, tiene a dodson disposición servicios gratuitos de asistencia lingüística. [facility] [phone].    We comply with applicable federal civil rights laws and [place] laws. We do not discriminate on the basis of race, color, national origin, age, disability, sex, sexual orientation, or gender identity.            Thank you!     Thank you for choosing [facility]  for your care. Our goal is always to provide you with excellent care. Hearing back from our patients is one way we can continue to improve our services. Please take a few minutes to complete the written survey that you may receive in the mail after your visit with us. Thank you!             Your Updated Medication List - Protect others around you: Learn how to safely " use, store and throw away your medicines at www.disposemymeds.org.          This list is accurate as of 10/12/18 10:29 AM.  Always use your most recent med list.                   Brand Name Dispense Instructions for use Diagnosis    atorvastatin 10 MG tablet    LIPITOR    90 tablet    Take 1 tablet (10 mg) by mouth daily    Hyperlipidemia, unspecified hyperlipidemia type       cyanocobalamin 1000 MCG Tabs    CVS vitamin  B12    90 tablet    Take 1,000 mcg by mouth daily    Primary hypercoagulable state (H), Monoclonal paraproteinemia, Iron deficiency anemia, unspecified iron deficiency anemia type       ferrous sulfate 325 (65 Fe) MG tablet    IRON    90 tablet    Take 1 tablet (325 mg) by mouth 3 times daily (with meals)    Iron deficiency anemia, unspecified iron deficiency anemia type, Monoclonal paraproteinemia, Primary hypercoagulable state (H)       levothyroxine 50 MCG tablet    SYNTHROID/LEVOTHROID    30 tablet    Take 1 tablet (50 mcg) by mouth daily    Autoimmune thyroiditis       Lycopene 10 MG Caps           MULTIVITAMIN ADULT PO      Take 1 tablet by mouth        omeprazole 40 MG capsule    priLOSEC    90 capsule    TAKE 1 CAPSULE(40 MG) BY MOUTH DAILY    Acute upper gastrointestinal bleeding, 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 TAKE 1 TABLET ON ALL OTHER DAYS    Long-term (current) use of anticoagulants, Hx pulmonary embolism, History of DVT of lower extremity

## 2018-10-12 NOTE — NURSING NOTE
"Chief Complaint   Patient presents with     Lipids       Initial /56 (BP Location: Right arm, Patient Position: Sitting, Cuff Size: Adult Large)  Pulse 72  Temp 97.6  F (36.4  C) (Tympanic)  Resp 16  Ht 5' 4.5\" (1.638 m)  Wt 160 lb (72.6 kg)  SpO2 98%  BMI 27.04 kg/m2 Estimated body mass index is 27.04 kg/(m^2) as calculated from the following:    Height as of this encounter: 5' 4.5\" (1.638 m).    Weight as of this encounter: 160 lb (72.6 kg).  Medication Reconciliation: complete    Ritika Sosa LPN    "

## 2018-10-12 NOTE — PATIENT INSTRUCTIONS
Return for fasting labs with Coumadin clinic appointment later this month.  If deciding to stop statin, please let us know.

## 2018-10-13 ASSESSMENT — ANXIETY QUESTIONNAIRES: GAD7 TOTAL SCORE: 0

## 2018-10-13 ASSESSMENT — PATIENT HEALTH QUESTIONNAIRE - PHQ9: SUM OF ALL RESPONSES TO PHQ QUESTIONS 1-9: 0

## 2018-10-25 ENCOUNTER — ANTICOAGULATION THERAPY VISIT (OUTPATIENT)
Dept: ANTICOAGULATION | Facility: OTHER | Age: 78
End: 2018-10-25
Attending: FAMILY MEDICINE
Payer: MEDICARE

## 2018-10-25 DIAGNOSIS — E78.5 HYPERLIPIDEMIA, UNSPECIFIED HYPERLIPIDEMIA TYPE: ICD-10-CM

## 2018-10-25 DIAGNOSIS — Z86.711 HX PULMONARY EMBOLISM: ICD-10-CM

## 2018-10-25 DIAGNOSIS — K92.2 ACUTE UPPER GASTROINTESTINAL BLEEDING: ICD-10-CM

## 2018-10-25 DIAGNOSIS — Z86.718 HISTORY OF DVT OF LOWER EXTREMITY: ICD-10-CM

## 2018-10-25 LAB
CHOLEST SERPL-MCNC: 114 MG/DL
HDLC SERPL-MCNC: 44 MG/DL
INR POINT OF CARE: 1.5 (ref 0.86–1.14)
LDLC SERPL CALC-MCNC: 54 MG/DL
NONHDLC SERPL-MCNC: 70 MG/DL
TRIGL SERPL-MCNC: 78 MG/DL

## 2018-10-25 PROCEDURE — 80061 LIPID PANEL: CPT | Mod: ZL | Performed by: FAMILY MEDICINE

## 2018-10-25 PROCEDURE — 85610 PROTHROMBIN TIME: CPT | Mod: QW,ZL

## 2018-10-25 NOTE — PROGRESS NOTES
ANTICOAGULATION FOLLOW-UP CLINIC VISIT    Patient Name:  Rey Tristan  Date:  10/25/2018  Contact Type:  Face to Face    SUBJECTIVE:     Patient Findings     Positives No Problem Findings           OBJECTIVE    INR Protime   Date Value Ref Range Status   10/25/2018 1.5 (A) 0.86 - 1.14 Final       ASSESSMENT / PLAN  INR assessment THER    Recheck INR In: 6 WEEKS    INR Location Clinic      Anticoagulation Summary as of 10/25/2018     INR goal 1.5-2.0   Today's INR 1.5   Warfarin maintenance plan 6 mg (4 mg x 1.5) on Mon, Fri; 4 mg (4 mg x 1) all other days   Full warfarin instructions 6 mg on Mon, Fri; 4 mg all other days   Weekly warfarin total 32 mg   No change documented Lala Boss RN   Plan last modified Lala Boss RN (6/21/2018)   Next INR check 12/7/2018   Target end date Indefinite    Indications   Long-term (current) use of anticoagulants [Z79.01] [Z79.01]  Hx pulmonary embolism [Z86.711]  History of DVT of lower extremity [Z86.718]  Acute upper gastrointestinal bleeding [K92.2]         Anticoagulation Episode Summary     INR check location     Preferred lab     Send INR reminders to [facility] POOL    Comments       Anticoagulation Care Providers     Provider Role Specialty Phone number    Gianna Richard MD [facility] [phone]            See the Encounter Report to view Anticoagulation Flowsheet and Dosing Calendar (Go to Encounters tab in chart review, and find the Anticoagulation Therapy Visit)        Lala Boss RN

## 2018-10-25 NOTE — MR AVS SNAPSHOT
Rey Tristan   10/25/2018 10:30 AM   Anticoagulation Therapy Visit    Description:  78 year old male   Provider:   ANTI COAGULATION   Department:  [facility] Anti Coagulation           INR as of 10/25/2018     Today's INR 1.5      Anticoagulation Summary as of 10/25/2018     INR goal 1.5-2.0   Today's INR 1.5   Full warfarin instructions 6 mg on Mon, Fri; 4 mg all other days   Next INR check 12/7/2018    Indications   Long-term (current) use of anticoagulants [Z79.01] [Z79.01]  Hx pulmonary embolism [Z86.711]  History of DVT of lower extremity [Z86.718]  Acute upper gastrointestinal bleeding [K92.2]         Your next Anticoagulation Clinic appointment(s)     Oct 25, 2018 10:30 AM CDT   Anticoagulation [facility] with  ANT[facility] CO[facility]ULATION   [facility]    [address]  [place] [address]   [phone]            Dec 07, 2018 10:30 AM CST   Anticoagulation [facility] with  [facility]    [address]  [place] [address]   [phone]              October 2018 Details    Sun Mon Tue Wed Thu Fri Sat      1               2               3               4               5               6                 7               8               9               10               11               12               13                 14               15               16               17               18               19               20                 21               22               23               24               25      4 mg   See details      26      6 mg         27      4 mg           28      4 mg         29      6 mg         30      4 mg         31      4 mg             Date Details   10/25 This INR check               How to take your warfarin dose     To take:  4 mg Take 1 of the 4 mg tablets.    To take:  6 mg Take 1.5 of the 4 mg tablets.           November 2018 Details    Sun Mon Tue Wed  Thu Fri Sat         1      4 mg         2      6 mg         3      4 mg           4      4 mg         5      6 mg         6      4 mg         7      4 mg         8      4 mg         9      6 mg         10      4 mg           11      4 mg         12      6 mg         13      4 mg         14      4 mg         15      4 mg         16      6 mg         17      4 mg           18      4 mg         19      6 mg         20      4 mg         21      4 mg         22      4 mg         23      6 mg         24      4 mg           25      4 mg         26      6 mg         27      4 mg         28      4 mg         29      4 mg         30      6 mg           Date Details   No additional details            How to take your warfarin dose     To take:  4 mg Take 1 of the 4 mg tablets.    To take:  6 mg Take 1.5 of the 4 mg tablets.           December 2018 Details    Sun Mon Tue Wed Thu Fri Sat           1      4 mg           2      4 mg         3      6 mg         4      4 mg         5      4 mg         6      4 mg         7            8                 9               10               11               12               13               14               15                 16               17               18               19               20               21               22                 23               24               25               26               27               28               29                 30               31                     Date Details   No additional details    Date of next INR:  12/7/2018         How to take your warfarin dose     To take:  4 mg Take 1 of the 4 mg tablets.    To take:  6 mg Take 1.5 of the 4 mg tablets.

## 2018-10-30 ENCOUNTER — HEALTH MAINTENANCE LETTER (OUTPATIENT)
Age: 78
End: 2018-10-30

## 2018-11-09 DIAGNOSIS — D50.9 IRON DEFICIENCY ANEMIA, UNSPECIFIED IRON DEFICIENCY ANEMIA TYPE: ICD-10-CM

## 2018-11-09 DIAGNOSIS — D47.2 IGM MONOCLONAL GAMMOPATHY OF UNCERTAIN SIGNIFICANCE: ICD-10-CM

## 2018-11-09 LAB
ALBUMIN SERPL-MCNC: 3.4 G/DL (ref 3.4–5)
ALP SERPL-CCNC: 176 U/L (ref 40–150)
ALT SERPL W P-5'-P-CCNC: 33 U/L (ref 0–70)
ANION GAP SERPL CALCULATED.3IONS-SCNC: 6 MMOL/L (ref 3–14)
AST SERPL W P-5'-P-CCNC: 28 U/L (ref 0–45)
BASOPHILS # BLD AUTO: 0.1 10E9/L (ref 0–0.2)
BASOPHILS NFR BLD AUTO: 0.8 %
BILIRUB SERPL-MCNC: 0.5 MG/DL (ref 0.2–1.3)
BUN SERPL-MCNC: 14 MG/DL (ref 7–30)
CALCIUM SERPL-MCNC: 8.5 MG/DL (ref 8.5–10.1)
CHLORIDE SERPL-SCNC: 105 MMOL/L (ref 94–109)
CO2 SERPL-SCNC: 27 MMOL/L (ref 20–32)
CREAT SERPL-MCNC: 0.77 MG/DL (ref 0.66–1.25)
DIFFERENTIAL METHOD BLD: NORMAL
EOSINOPHIL # BLD AUTO: 0.2 10E9/L (ref 0–0.7)
EOSINOPHIL NFR BLD AUTO: 2.8 %
ERYTHROCYTE [DISTWIDTH] IN BLOOD BY AUTOMATED COUNT: 13.4 % (ref 10–15)
FERRITIN SERPL-MCNC: 54 NG/ML (ref 26–388)
FOLATE SERPL-MCNC: 27.6 NG/ML
GFR SERPL CREATININE-BSD FRML MDRD: >90 ML/MIN/1.7M2
GLUCOSE SERPL-MCNC: 105 MG/DL (ref 70–99)
HCT VFR BLD AUTO: 40.8 % (ref 40–53)
HGB BLD-MCNC: 13.6 G/DL (ref 13.3–17.7)
IMM GRANULOCYTES # BLD: 0 10E9/L (ref 0–0.4)
IMM GRANULOCYTES NFR BLD: 0.3 %
IRON SATN MFR SERPL: 24 % (ref 15–46)
IRON SERPL-MCNC: 84 UG/DL (ref 35–180)
LDH SERPL L TO P-CCNC: 184 U/L (ref 85–227)
LYMPHOCYTES # BLD AUTO: 0.9 10E9/L (ref 0.8–5.3)
LYMPHOCYTES NFR BLD AUTO: 13.2 %
MCH RBC QN AUTO: 30.2 PG (ref 26.5–33)
MCHC RBC AUTO-ENTMCNC: 33.3 G/DL (ref 31.5–36.5)
MCV RBC AUTO: 91 FL (ref 78–100)
MONOCYTES # BLD AUTO: 0.5 10E9/L (ref 0–1.3)
MONOCYTES NFR BLD AUTO: 7.8 %
NEUTROPHILS # BLD AUTO: 4.9 10E9/L (ref 1.6–8.3)
NEUTROPHILS NFR BLD AUTO: 75.1 %
NRBC # BLD AUTO: 0 10*3/UL
NRBC BLD AUTO-RTO: 0 /100
PLATELET # BLD AUTO: 184 10E9/L (ref 150–450)
POTASSIUM SERPL-SCNC: 4 MMOL/L (ref 3.4–5.3)
PROT SERPL-MCNC: 8.3 G/DL (ref 6.8–8.8)
RBC # BLD AUTO: 4.51 10E12/L (ref 4.4–5.9)
SODIUM SERPL-SCNC: 138 MMOL/L (ref 133–144)
TIBC SERPL-MCNC: 354 UG/DL (ref 240–430)
VIT B12 SERPL-MCNC: 1414 PG/ML (ref 193–986)
WBC # BLD AUTO: 6.5 10E9/L (ref 4–11)

## 2018-11-09 PROCEDURE — 83550 IRON BINDING TEST: CPT | Mod: ZL | Performed by: NURSE PRACTITIONER

## 2018-11-09 PROCEDURE — 84165 PROTEIN E-PHORESIS SERUM: CPT | Mod: ZL | Performed by: NURSE PRACTITIONER

## 2018-11-09 PROCEDURE — 83883 ASSAY NEPHELOMETRY NOT SPEC: CPT | Mod: ZL | Performed by: NURSE PRACTITIONER

## 2018-11-09 PROCEDURE — 00000402 ZZHCL STATISTIC TOTAL PROTEIN: Mod: ZL | Performed by: NURSE PRACTITIONER

## 2018-11-09 PROCEDURE — 82784 ASSAY IGA/IGD/IGG/IGM EACH: CPT | Mod: ZL | Performed by: NURSE PRACTITIONER

## 2018-11-09 PROCEDURE — 82746 ASSAY OF FOLIC ACID SERUM: CPT | Mod: ZL | Performed by: NURSE PRACTITIONER

## 2018-11-09 PROCEDURE — 82607 VITAMIN B-12: CPT | Mod: ZL | Performed by: NURSE PRACTITIONER

## 2018-11-09 PROCEDURE — 86334 IMMUNOFIX E-PHORESIS SERUM: CPT | Mod: ZL | Performed by: NURSE PRACTITIONER

## 2018-11-09 PROCEDURE — 82232 ASSAY OF BETA-2 PROTEIN: CPT | Mod: ZL | Performed by: NURSE PRACTITIONER

## 2018-11-09 PROCEDURE — 85810 BLOOD VISCOSITY EXAMINATION: CPT | Mod: ZL | Performed by: NURSE PRACTITIONER

## 2018-11-09 PROCEDURE — 85025 COMPLETE CBC W/AUTO DIFF WBC: CPT | Mod: ZL | Performed by: NURSE PRACTITIONER

## 2018-11-09 PROCEDURE — 82728 ASSAY OF FERRITIN: CPT | Mod: ZL | Performed by: NURSE PRACTITIONER

## 2018-11-09 PROCEDURE — 83540 ASSAY OF IRON: CPT | Mod: ZL | Performed by: NURSE PRACTITIONER

## 2018-11-09 PROCEDURE — 99000 SPECIMEN HANDLING OFFICE-LAB: CPT | Performed by: NURSE PRACTITIONER

## 2018-11-09 PROCEDURE — 36415 COLL VENOUS BLD VENIPUNCTURE: CPT | Mod: ZL | Performed by: NURSE PRACTITIONER

## 2018-11-09 PROCEDURE — 80053 COMPREHEN METABOLIC PANEL: CPT | Mod: ZL | Performed by: NURSE PRACTITIONER

## 2018-11-09 PROCEDURE — 83615 LACTATE (LD) (LDH) ENZYME: CPT | Mod: ZL | Performed by: NURSE PRACTITIONER

## 2018-11-12 LAB
ALBUMIN SERPL ELPH-MCNC: 4 G/DL (ref 3.7–5.1)
ALPHA1 GLOB SERPL ELPH-MCNC: 0.3 G/DL (ref 0.2–0.4)
ALPHA2 GLOB SERPL ELPH-MCNC: 0.9 G/DL (ref 0.5–0.9)
B-GLOBULIN SERPL ELPH-MCNC: 0.8 G/DL (ref 0.6–1)
B2 MICROGLOB SERPL-MCNC: 2.8 MG/L
GAMMA GLOB SERPL ELPH-MCNC: 2 G/DL (ref 0.7–1.6)
IGA SERPL-MCNC: 127 MG/DL (ref 70–380)
IGG SERPL-MCNC: 947 MG/DL (ref 695–1620)
IGM SERPL-MCNC: 1730 MG/DL (ref 60–265)
KAPPA LC UR-MCNC: 2.83 MG/DL (ref 0.33–1.94)
KAPPA LC/LAMBDA SER: 1.8 {RATIO} (ref 0.26–1.65)
LAMBDA LC SERPL-MCNC: 1.57 MG/DL (ref 0.57–2.63)
M PROTEIN SERPL ELPH-MCNC: 1.2 G/DL
PROT PATTERN SERPL ELPH-IMP: ABNORMAL
PROT PATTERN SERPL IFE-IMP: NORMAL
VISC SER: 1.9 CP (ref 1.4–1.8)

## 2018-11-16 ENCOUNTER — TRANSFERRED RECORDS (OUTPATIENT)
Dept: HEALTH INFORMATION MANAGEMENT | Facility: CLINIC | Age: 78
End: 2018-11-16

## 2018-11-16 ENCOUNTER — ONCOLOGY VISIT (OUTPATIENT)
Dept: ONCOLOGY | Facility: OTHER | Age: 78
End: 2018-11-16
Attending: NURSE PRACTITIONER
Payer: COMMERCIAL

## 2018-11-16 VITALS
HEART RATE: 75 BPM | BODY MASS INDEX: 29.32 KG/M2 | SYSTOLIC BLOOD PRESSURE: 108 MMHG | DIASTOLIC BLOOD PRESSURE: 70 MMHG | HEIGHT: 65 IN | WEIGHT: 176 LBS | RESPIRATION RATE: 18 BRPM | TEMPERATURE: 97.6 F | OXYGEN SATURATION: 97 %

## 2018-11-16 DIAGNOSIS — D64.9 ANEMIA, UNSPECIFIED TYPE: ICD-10-CM

## 2018-11-16 DIAGNOSIS — R51.9 NONINTRACTABLE HEADACHE, UNSPECIFIED CHRONICITY PATTERN, UNSPECIFIED HEADACHE TYPE: ICD-10-CM

## 2018-11-16 DIAGNOSIS — D47.2 IGM MONOCLONAL GAMMOPATHY OF UNCERTAIN SIGNIFICANCE: Primary | ICD-10-CM

## 2018-11-16 PROCEDURE — 99214 OFFICE O/P EST MOD 30 MIN: CPT | Performed by: NURSE PRACTITIONER

## 2018-11-16 PROCEDURE — G0463 HOSPITAL OUTPT CLINIC VISIT: HCPCS

## 2018-11-16 ASSESSMENT — PAIN SCALES - GENERAL: PAINLEVEL: NO PAIN (0)

## 2018-11-16 ASSESSMENT — PATIENT HEALTH QUESTIONNAIRE - PHQ9: SUM OF ALL RESPONSES TO PHQ QUESTIONS 1-9: 0

## 2018-11-16 NOTE — PROGRESS NOTES
Hematology Follow-up Visit:  November 15, 2018    Reason for Visit:  Patient presents with:  RECHECK: Follow up IgM monoclonal gammopathy of uncertain significance      Nursing Notes and Documentation reviewed:  yes    HPI:   This is a 78-year-old male patient who presents to the oncology/hematology clinic today in followup of monoclonal gammopathy of undetermined significance diagnosed 2/2018 during workup for hypercoag state, along with previous diagnosis of anemia related to GI bleed.  Patient does have a history of previous DVT and PE and has been on long-term anticoagulation with Coumadin.  Coumadin was held after GI bleed was diagnosed and has been resumed.     He presents to the clinic today accompanied by his wife stating he is doing well.  He does get some shortness of breath with increased activity but this is not new. He did have an episode earlier this week where he had some discomfort to the right side of his head that was very brief and resolved.  He stated it did recur one other time that same day but again resolved and has had nothing further.  He has had  no difficulty with headaches or changes in his vision or any nausea or being off balance.  Tells me his INR does fluctuate quite a bit.  He is on 3 tablets of iron daily since his GI bleed.    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   Protein C and protein S along with antiphospholipid profile were negative.  SPEP was completed showing an M-spike of 1.1 with serum immunofixation showing monoclonal IgM immunoglobulin, kappa light chain type with IgM of 1530.  Bone marrow aspiration biopsy showed iron deficiency, slightly increased plasma cell proximally 2% kappa and toxic neutrophils with reactive lymphocytes and increased rouleaux formation; flow cytometry and immunophenotyping was unremarkable. Skeletal bone survey was completed on 2/6/2018 showing a stable  T12 compression fracture but no evidence of lytic lesions.      He was placed on oral iron with improvement in his hemoglobin and iron studies.       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 Simeon; silent; transient hyperthyroid, now normal; monitor TSH monthly for hypothyroidism     Chronic anticoagulation 1/1/2012     Elevated serum alkaline phosphatase level 5/4/2016    normal GGT, normal bone skeletal survery     Gastric ulcer     endoscopy 6/2016, repeat 6 months; PPI Carafate     Hyperlipidemia      Normocytic anemia 5/4/2016     Pulmonary nodule     CT 5/2014, right; consider repeat 1 year if high risk     Upper GI bleed 11/30/2017     Vitamin D deficiency 1/1/2012       Social History     Social History     Marital status:      Spouse name: N/A     Number of children: N/A     Years of education: N/A     Occupational History      [employer]     Retired      Social History Main Topics     Smoking status: Former Smoker     Types: Cigarettes     Start date: 5/15/1958     Quit date: 10/28/2009     Smokeless tobacco: Never Used      Comment: Quit 2009     Alcohol use No     Drug use: No     Sexual activity: Not on file     Other Topics Concern      Service Yes     [employer]     Blood Transfusions Yes     Permits if needed     Caffeine Concern Yes     Tea     Occupational Exposure No     Hobby Hazards No     Sleep Concern No     Stress Concern No     Weight Concern No     Special Diet No     Back Care No     Exercise No     Seat Belt Yes     Self-Exams Yes     Parent/Sibling W/ Cabg, Mi Or Angioplasty Before 65f 55m? No     Social History Narrative       Past Surgical History:   Procedure Laterality Date     ANGIOGRAM  6/23/2014     BIOPSY  2018    bone marrow biopsy     BONE MARROW BIOPSY, BONE SPECIMEN, NEEDLE/TROCAR N/A 1/16/2018    Procedure: BIOPSY BONE MARROW;  BONE MARROW BIOPSY;  Surgeon: Nuno Castro MD;  Location: [facility]      C REGULAR ECHO (FL)  6/23/2014    Dr. Cavazos     C. Difficile with intussuseption       cataract extraction       colonoscopy       COLONOSCOPY  6/17     ENDOSCOPY UPPER, COLONOSCOPY, COMBINED N/A 6/17/2016    Procedure: COMBINED ENDOSCOPY UPPER, COLONOSCOPY;  Surgeon: Andrea Stearns DO;  Location: [facility]     ESOPHAGOSCOPY, GASTROSCOPY, DUODENOSCOPY (EGD), COMBINED N/A 11/30/2017    Procedure: COMBINED ESOPHAGOSCOPY, GASTROSCOPY, DUODENOSCOPY (EGD);  UPPER ENDOSCOPY;  Surgeon: Narinder Torres MD;  Location: [facility]     left knee meniscal tear       Left lower extremity DVT       lens implant       nasal polypectomy       Pulmonary Embolism         Family History   Problem Relation Age of Onset     Cancer Mother      Ovarian     Other Cancer Mother      lung/breast/ cervical ?     Respiratory Father      emphysemia     Lung Cancer Brother      Diabetes No family hx of      Hypertension No family hx of      Hyperlipidemia No family hx of      Thyroid Disease No family hx of      Asthma No family hx of      Colon Cancer No family hx of      Prostate Cancer No family hx of      Anesthesia Reaction No family hx of      Genetic Disorder No family hx of      Cerebrovascular Disease No family hx of      Coronary Artery Disease No family hx of      Breast Cancer No family hx of        Allergies:  Allergies as of 11/16/2018     (No Known Allergies)       Current Medications:  Current Outpatient Prescriptions   Medication Sig Dispense Refill     atorvastatin (LIPITOR) 10 MG tablet Take 1 tablet (10 mg) by mouth daily 90 tablet 1     Cholecalciferol (VITAMIN D) 400 UNITS tablet Take 1,000 Units by mouth daily        cyanocobalamin ([employer]S VITAMIN  B12) 1000 MCG TABS Take 1,000 mcg by mouth daily 90 tablet 3     ferrous sulfate (IRON) 325 (65 FE) MG tablet Take 1 tablet (325 mg) by mouth 3 times daily (with meals) 90 tablet 11     Lycopene 10 MG CAPS        Multiple Vitamins-Minerals (MULTIVITAMIN ADULT PO) Take 1 tablet  "by mouth       omeprazole (PRILOSEC) 40 MG capsule TAKE 1 CAPSULE(40 MG) BY MOUTH DAILY 90 capsule 1     pyridoxine (VITAMIN B-6) 50 MG TABS Take 1 tablet (50 mg) by mouth daily 90 tablet 3     warfarin (COUMADIN) 4 MG tablet TAKE 1& 1/2 TABLETS BY MOUTH ON MONDAY& FRIDAY. TAKE 1 TABLET ON ALL OTHER DAYS 120 tablet 3          Review Of Systems:  Constitutional: denies fever, weight changes  Eyes: denies blurred or double vision  Ears/Nose/Throat: denies ear pain, nose problems, difficulty swallowing  Respiratory: see hPI  Cardiovascular: denies chest pain, palpitations, edema  Gastrointestinal: denies abdominal pain, bloating, nausea, vomiting, early satiety  Genitourinary: denies difficulty with urination, blood in urine  Musculoskeletal: denies new muscle pain, bone pain  Neurologic: denies lightheadedness, numbness or Tingling  Hematologic/Lymphatic/Immunologic: denies easy bleeding, lumps or bumps noted-on coumadin  Endocrine: Denies increased thirst, night sweats      Physical Exam:  /70  Pulse 75  Temp 97.6  F (36.4  C) (Tympanic)  Resp 18  Ht 1.638 m (5' 4.5\")  Wt 79.8 kg (176 lb)  SpO2 97%  BMI 29.74 kg/m2  GENERAL APPEARANCE: Healthy, alert and in no acute distress.  HEENT: Normocephalic, Sclerae anicteric. Oropharynx without ulcers, lesions, or thrush.  NECK: No asymmetry or masses, no thyromegaly.  LYMPHATICS: No palpable cervical, supraclavicular, axillary, or inguinal nodes   RESP: Faint inspiratory wheeze to the left upper lobe otherwise clear throughout but dim throughout; respirations regular and easy  CARDIOVASCULAR: Regular rate and rhythm. Normal S1, S2; no murmur, gallop, or rub.  ABDOMEN: Soft, nontender. Bowel sounds auscultated all 4 quadrants. No palpable organomegaly or masses.  MUSCULOSKELETAL: Extremities without gross deformities noted. No edema of bilateral lower extremities.  NEURO: Alert and oriented x 3.  Gait steady.  PSYCHIATRIC: Mentation and affect appear normal.  " Mood appropriate.    Laboratory/Imaging Studies:  Results for orders placed or performed in visit on 11/09/18   Beta 2 microglobulin   Result Value Ref Range    Beta-2-Microglobulin 2.8 (H) <2.3 mg/L   CBC with platelets differential   Result Value Ref Range    WBC 6.5 4.0 - 11.0 10e9/L    RBC Count 4.51 4.4 - 5.9 10e12/L    Hemoglobin 13.6 13.3 - 17.7 g/dL    Hematocrit 40.8 40.0 - 53.0 %    MCV 91 78 - 100 fl    MCH 30.2 26.5 - 33.0 pg    MCHC 33.3 31.5 - 36.5 g/dL    RDW 13.4 10.0 - 15.0 %    Platelet Count 184 150 - 450 10e9/L    Diff Method Automated Method     % Neutrophils 75.1 %    % Lymphocytes 13.2 %    % Monocytes 7.8 %    % Eosinophils 2.8 %    % Basophils 0.8 %    % Immature Granulocytes 0.3 %    Nucleated RBCs 0 0 /100    Absolute Neutrophil 4.9 1.6 - 8.3 10e9/L    Absolute Lymphocytes 0.9 0.8 - 5.3 10e9/L    Absolute Monocytes 0.5 0.0 - 1.3 10e9/L    Absolute Eosinophils 0.2 0.0 - 0.7 10e9/L    Absolute Basophils 0.1 0.0 - 0.2 10e9/L    Abs Immature Granulocytes 0.0 0 - 0.4 10e9/L    Absolute Nucleated RBC 0.0    Comprehensive metabolic panel   Result Value Ref Range    Sodium 138 133 - 144 mmol/L    Potassium 4.0 3.4 - 5.3 mmol/L    Chloride 105 94 - 109 mmol/L    Carbon Dioxide 27 20 - 32 mmol/L    Anion Gap 6 3 - 14 mmol/L    Glucose 105 (H) 70 - 99 mg/dL    Urea Nitrogen 14 7 - 30 mg/dL    Creatinine 0.77 0.66 - 1.25 mg/dL    GFR Estimate >90 >60 mL/min/1.7m2    GFR Estimate If Black >90 >60 mL/min/1.7m2    Calcium 8.5 8.5 - 10.1 mg/dL    Bilirubin Total 0.5 0.2 - 1.3 mg/dL    Albumin 3.4 3.4 - 5.0 g/dL    Protein Total 8.3 6.8 - 8.8 g/dL    Alkaline Phosphatase 176 (H) 40 - 150 U/L    ALT 33 0 - 70 U/L    AST 28 0 - 45 U/L   ELP reflex to IELP   Result Value Ref Range    Albumin Fraction 4.0 3.7 - 5.1 g/dL    Alpha 1 Fraction 0.3 0.2 - 0.4 g/dL    Alpha 2 Fraction 0.9 0.5 - 0.9 g/dL    Beta Fraction 0.8 0.6 - 1.0 g/dL    Gamma Fraction 2.0 (H) 0.7 - 1.6 g/dL    Monoclonal Peak 1.2 (H) 0.0 g/dL     ELP Interpretation:       Monoclonal protein (1.2 g/dL) seen in the gamma fraction. See immunofixation report on   same specimen. Pathologic significance requires clinical correlation.  FREDDIE Jones M.D.,   Ph.D., Pathologist.      Immunoglobulins A G and M   Result Value Ref Range     695 - 1620 mg/dL     70 - 380 mg/dL    IGM 1730 (H) 60 - 265 mg/dL   Kappa and lambda light chain   Result Value Ref Range    Kappa Free Lt Chain 2.83 (H) 0.33 - 1.94 mg/dL    Lambda Free Lt Chain 1.57 0.57 - 2.63 mg/dL    Kappa Lambda Ratio 1.80 (H) 0.26 - 1.65   Lactate Dehydrogenase   Result Value Ref Range    Lactate Dehydrogenase 184 85 - 227 U/L   Macroglobulins   Result Value Ref Range    Viscosity Index 1.9 (H) 1.4 - 1.8   Folate   Result Value Ref Range    Folate 27.6 >5.4 ng/mL   Ferritin   Result Value Ref Range    Ferritin 54 26 - 388 ng/mL   Iron and iron binding capacity   Result Value Ref Range    Iron 84 35 - 180 ug/dL    Iron Binding Cap 354 240 - 430 ug/dL    Iron Saturation Index 24 15 - 46 %   Vitamin B12   Result Value Ref Range    Vitamin B12 1414 (H) 193 - 986 pg/mL   Immunofixation ELP   Result Value Ref Range    Immunofixation ELP (Note)       ASSESSMENT/PLAN:    #1 MGUS: IgM Monoclonal gammopathy of undetermined significance diagnosed January 2018.  M spike along with other lab work is essentially stable.  We'll see him back in 3 months with the same lab work.    #2 Anemia: This is now resolved with normal iron studies.  I would like to attempt to wean him off of the iron therapy to see how his hemoglobin and iron stores do.  We will decrease his iron to 2 tablets daily and I will recheck this in 3 months.    #3 headache: I did recommend that if this recurs that he follow-up for medical evaluation immediately.  He verbalized understanding.    I encouraged patient to call with any questions or concerns.       Britney Mahoney NP

## 2018-11-16 NOTE — PATIENT INSTRUCTIONS
We would like to see you back in 3 months. Please come 1 week prior for lab work.     If you have any questions please call [phone]    Other instructions:  Decrease iron to 2 tablets daily.

## 2018-11-16 NOTE — MR AVS SNAPSHOT
After Visit Summary   11/16/2018    Rey Tristan    MRN: [id number]           Patient Information     Date Of Birth          1940        Visit Information        Provider Department      11/16/2018 11:00 AM Britney Mahoney NP [facility] [place]        Care Instructions    We would like to see you back in 3 months. Please come 1 week prior for lab work.     If you have any questions please call [phone]    Other instructions:  Decrease iron to 2 tablets daily.            Follow-ups after your visit        Your next 10 appointments already scheduled     Dec 07, 2018 10:30 AM CST   Anticoagulation Visit with HC ANTI COAGULATION   [facility] [place] ([facility] [place] )    [address]  [place] [address]   [phone]            Feb 19, 2019 10:00 AM CST   LAB with HC LAB   [facility] [place] ([facility] [place] )    [address]  [place] [address]   [phone]            Feb 26, 2019 10:10 AM CST   (Arrive by 9:55 AM)   Return Visit with Britney Mahoney NP   [facility] [place] ([facility] [place] )    [address]  [place] [address]   [phone]              Who to contact     If you have questions or need follow up information about today's clinic visit or your schedule please contact [facility] directly at [phone].  Normal or non-critical lab and imaging results will be communicated to you by MyChart, letter or phone within 4 business days after the clinic has received the results. If you do not hear from us within 7 days, please contact the clinic through MyChart or phone. If you have a critical or abnormal lab result, we will notify you by phone as soon as possible.  Submit refill requests through [facility] or call your pharmacy and they will forward the refill request to us. Please allow 3 business days for your refill to be  "completed.          Additional Information About Your Visit        Care EveryWhere ID     This is your Care EveryWhere ID. This could be used by other organizations to access your [facility] medical records  [id number]        Your Vitals Were     Pulse Temperature Respirations Height Pulse Oximetry BMI (Body Mass Index)    75 97.6  F (36.4  C) (Tympanic) 18 1.638 m (5' 4.5\") 97% 29.74 kg/m2       Blood Pressure from Last 3 Encounters:   11/16/18 108/70   10/12/18 102/56   09/28/18 121/81    Weight from Last 3 Encounters:   11/16/18 79.8 kg (176 lb)   10/12/18 72.6 kg (160 lb)   09/28/18 72.6 kg (160 lb)              Today, you had the following     No orders found for display       Primary Care Provider Office Phone # Fax #    Gianna Richard [phone]       [address]  [facility] [address]        Equal Access to Services     [facility]: Hadii aad ku hadasho Soomaali, waaxda luqadaha, qaybta kaalmada adeegyada, waxay idiin hayaan adeeg kharachasity la'cliffn . So [facility] [phone].    ATENCIÓN: Si habla español, tiene a dodson disposición servicios gratuitos de asistencia lingüística. Llame al [phone].    We comply with applicable federal civil rights laws and [place] laws. We do not discriminate on the basis of race, color, national origin, age, disability, sex, sexual orientation, or gender identity.            Thank you!     Thank you for choosing [facility]  for your care. Our goal is always to provide you with excellent care. Hearing back from our patients is one way we can continue to improve our services. Please take a few minutes to complete the written survey that you may receive in the mail after your visit with us. Thank you!             Your Updated Medication List - Protect others around you: Learn how to safely use, store and throw away your medicines at www.disposemymeds.org.          This list is accurate as of 11/16/18 11:56 AM.  Always use your most " recent med list.                   Brand Name Dispense Instructions for use Diagnosis    atorvastatin 10 MG tablet    LIPITOR    90 tablet    Take 1 tablet (10 mg) by mouth daily    Hyperlipidemia, unspecified hyperlipidemia type       cyanocobalamin 1000 MCG Tabs    [facility] vitamin  B12    90 tablet    Take 1,000 mcg by mouth daily    Primary hypercoagulable state (H), Monoclonal paraproteinemia, Iron deficiency anemia, unspecified iron deficiency anemia type       ferrous sulfate 325 (65 Fe) MG tablet    IRON    90 tablet    Take 1 tablet (325 mg) by mouth 3 times daily (with meals)    Iron deficiency anemia, unspecified iron deficiency anemia type, Monoclonal paraproteinemia, Primary hypercoagulable state (H)       Lycopene 10 MG Caps           MULTIVITAMIN ADULT PO      Take 1 tablet by mouth        omeprazole 40 MG capsule    priLOSEC    90 capsule    TAKE 1 CAPSULE(40 MG) BY MOUTH DAILY    Acute upper gastrointestinal bleeding, 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 TAKE 1 TABLET ON ALL OTHER DAYS    Long-term (current) use of anticoagulants, Hx pulmonary embolism, History of DVT of lower extremity

## 2018-11-16 NOTE — NURSING NOTE
"Chief Complaint   Patient presents with     RECHECK     Follow up IgM monoclonal gammopathy of uncertain significance       Initial /70  Pulse 75  Temp 97.6  F (36.4  C) (Tympanic)  Resp 18  Ht 1.638 m (5' 4.5\")  Wt 79.8 kg (176 lb)  SpO2 97%  BMI 29.74 kg/m2 Estimated body mass index is 29.74 kg/(m^2) as calculated from the following:    Height as of this encounter: 1.638 m (5' 4.5\").    Weight as of this encounter: 79.8 kg (176 lb).  Medication Reconciliation: complete   Immunizations reviewed, advanced directives on file, pain = 0, PHQ9 = 0.    Jayleen Fleming LPN    "

## 2018-12-07 ENCOUNTER — ANTICOAGULATION THERAPY VISIT (OUTPATIENT)
Dept: ANTICOAGULATION | Facility: OTHER | Age: 78
End: 2018-12-07
Attending: FAMILY MEDICINE
Payer: MEDICARE

## 2018-12-07 DIAGNOSIS — Z86.711 HX PULMONARY EMBOLISM: ICD-10-CM

## 2018-12-07 DIAGNOSIS — Z86.718 HISTORY OF DVT OF LOWER EXTREMITY: ICD-10-CM

## 2018-12-07 DIAGNOSIS — K92.2 ACUTE UPPER GASTROINTESTINAL BLEEDING: ICD-10-CM

## 2018-12-07 LAB — INR POINT OF CARE: 1.4 (ref 0.86–1.14)

## 2018-12-07 PROCEDURE — 85610 PROTHROMBIN TIME: CPT | Mod: QW,ZL

## 2018-12-14 ENCOUNTER — TRANSFERRED RECORDS (OUTPATIENT)
Dept: HEALTH INFORMATION MANAGEMENT | Facility: CLINIC | Age: 78
End: 2018-12-14

## 2018-12-23 DIAGNOSIS — E78.5 HYPERLIPIDEMIA, UNSPECIFIED HYPERLIPIDEMIA TYPE: ICD-10-CM

## 2018-12-26 RX ORDER — ATORVASTATIN CALCIUM 10 MG/1
TABLET, FILM COATED ORAL
Qty: 90 TABLET | Refills: 1 | Status: SHIPPED | OUTPATIENT
Start: 2018-12-26 | End: 2019-06-14

## 2018-12-28 ENCOUNTER — ANTICOAGULATION THERAPY VISIT (OUTPATIENT)
Dept: ANTICOAGULATION | Facility: OTHER | Age: 78
End: 2018-12-28
Attending: FAMILY MEDICINE
Payer: MEDICARE

## 2018-12-28 DIAGNOSIS — K92.2 ACUTE UPPER GASTROINTESTINAL BLEEDING: ICD-10-CM

## 2018-12-28 DIAGNOSIS — D47.2 MONOCLONAL PARAPROTEINEMIA: ICD-10-CM

## 2018-12-28 DIAGNOSIS — D50.9 IRON DEFICIENCY ANEMIA, UNSPECIFIED IRON DEFICIENCY ANEMIA TYPE: ICD-10-CM

## 2018-12-28 DIAGNOSIS — Z86.711 HX PULMONARY EMBOLISM: ICD-10-CM

## 2018-12-28 DIAGNOSIS — D68.59 PRIMARY HYPERCOAGULABLE STATE (H): ICD-10-CM

## 2018-12-28 DIAGNOSIS — Z86.718 HISTORY OF DVT OF LOWER EXTREMITY: ICD-10-CM

## 2018-12-28 LAB — INR POINT OF CARE: 1.6 (ref 0.86–1.14)

## 2018-12-28 PROCEDURE — 36416 COLLJ CAPILLARY BLOOD SPEC: CPT | Mod: ZL

## 2018-12-28 RX ORDER — FERROUS SULFATE 325(65) MG
325 TABLET ORAL 2 TIMES DAILY
Qty: 90 TABLET | Refills: 11 | COMMUNITY
Start: 2018-12-28 | End: 2019-10-21

## 2018-12-28 NOTE — PROGRESS NOTES
ANTICOAGULATION FOLLOW-UP CLINIC VISIT    Patient Name:  Rey Tristan  Date:  2018  Contact Type:  Face to Face    SUBJECTIVE:     Patient Findings     Positives:   No Problem Findings           OBJECTIVE    INR Protime   Date Value Ref Range Status   2018 1.6 (A) 0.86 - 1.14 Final       ASSESSMENT / PLAN  INR assessment THER    Recheck INR In: 6 WEEKS    INR Location Clinic      Anticoagulation Summary  As of 2018    INR goal:   1.5-2.0   TTR:   52.8 % (2.2 y)   INR used for dosin.6 (2018)   Warfarin maintenance plan:   6 mg (4 mg x 1.5) every Mon, Fri; 4 mg (4 mg x 1) all other days   Full warfarin instructions:   6 mg every Mon, Fri; 4 mg all other days   Weekly warfarin total:   32 mg   No change documented:   Lala Tracy RN   Plan last modified:   Lala Boss RN (2018)   Next INR check:   2019   Target end date:   Indefinite    Indications    Long-term (current) use of anticoagulants [Z79.01] [Z79.01]  Hx pulmonary embolism [Z86.711]  History of DVT of lower extremity [Z86.718]  Acute upper gastrointestinal bleeding [K92.2]             Anticoagulation Episode Summary     INR check location:       Preferred lab:       Send INR reminders to:   HC ANTICOAG POOL    Comments:         Anticoagulation Care Providers     Provider Role Specialty Phone number    Gianna Richard MD [facility] [phone]            See the Encounter Report to view Anticoagulation Flowsheet and Dosing Calendar (Go to Encounters tab in chart review, and find the Anticoagulation Therapy Visit)        Lala Tracy RN

## 2019-01-16 DIAGNOSIS — K22.70 BARRETT'S ESOPHAGUS WITHOUT DYSPLASIA: ICD-10-CM

## 2019-01-16 DIAGNOSIS — K92.2 ACUTE UPPER GASTROINTESTINAL BLEEDING: ICD-10-CM

## 2019-01-16 RX ORDER — OMEPRAZOLE 40 MG/1
CAPSULE, DELAYED RELEASE ORAL
Qty: 90 CAPSULE | Refills: 3 | Status: SHIPPED | OUTPATIENT
Start: 2019-01-16 | End: 2019-12-09

## 2019-02-08 ENCOUNTER — ANTICOAGULATION THERAPY VISIT (OUTPATIENT)
Dept: ANTICOAGULATION | Facility: OTHER | Age: 79
End: 2019-02-08
Attending: FAMILY MEDICINE
Payer: MEDICARE

## 2019-02-08 DIAGNOSIS — Z86.711 HX PULMONARY EMBOLISM: ICD-10-CM

## 2019-02-08 DIAGNOSIS — Z86.718 HISTORY OF DVT OF LOWER EXTREMITY: ICD-10-CM

## 2019-02-08 DIAGNOSIS — K92.2 ACUTE UPPER GASTROINTESTINAL BLEEDING: ICD-10-CM

## 2019-02-08 LAB — INR POINT OF CARE: 1.6 (ref 0.86–1.14)

## 2019-02-08 PROCEDURE — 85610 PROTHROMBIN TIME: CPT | Mod: QW,ZL

## 2019-02-08 NOTE — PROGRESS NOTES
ANTICOAGULATION FOLLOW-UP CLINIC VISIT    Patient Name:  Rey Tristan  Date:  2019  Contact Type:  Face to Face    SUBJECTIVE:     Patient Findings     Positives:   No Problem Findings           OBJECTIVE    INR Protime   Date Value Ref Range Status   2019 1.6 (A) 0.86 - 1.14 Final       ASSESSMENT / PLAN  INR assessment THER    Recheck INR In: 4 WEEKS    INR Location Clinic      Anticoagulation Summary  As of 2019    INR goal:   1.5-2.0   TTR:   55.1 % (2.4 y)   INR used for dosin.6 (2019)   Warfarin maintenance plan:   6 mg (4 mg x 1.5) every Mon, Fri; 4 mg (4 mg x 1) all other days   Full warfarin instructions:   6 mg every Mon, Fri; 4 mg all other days   Weekly warfarin total:   32 mg   No change documented:   Corina Acevedo RN   Plan last modified:   Lala Boss RN (2018)   Next INR check:   3/8/2019   Target end date:   Indefinite    Indications    Long-term (current) use of anticoagulants [Z79.01] [Z79.01]  Hx pulmonary embolism [Z86.711]  History of DVT of lower extremity [Z86.718]  Acute upper gastrointestinal bleeding [K92.2]             Anticoagulation Episode Summary     INR check location:       Preferred lab:       Send INR reminders to:   HC ANTICOAG POOL    Comments:         Anticoagulation Care Providers     Provider Role Specialty Phone number    Gianna Richard MD [facility] [phone]            See the Encounter Report to view Anticoagulation Flowsheet and Dosing Calendar (Go to Encounters tab in chart review, and find the Anticoagulation Therapy Visit)      Corina Acevedo RN

## 2019-02-19 DIAGNOSIS — R51.9 NONINTRACTABLE HEADACHE, UNSPECIFIED CHRONICITY PATTERN, UNSPECIFIED HEADACHE TYPE: ICD-10-CM

## 2019-02-19 DIAGNOSIS — D64.9 ANEMIA, UNSPECIFIED TYPE: ICD-10-CM

## 2019-02-19 DIAGNOSIS — D47.2 IGM MONOCLONAL GAMMOPATHY OF UNCERTAIN SIGNIFICANCE: ICD-10-CM

## 2019-02-19 LAB
ALBUMIN SERPL-MCNC: 3.5 G/DL (ref 3.4–5)
ALP SERPL-CCNC: 162 U/L (ref 40–150)
ALT SERPL W P-5'-P-CCNC: 39 U/L (ref 0–70)
ANION GAP SERPL CALCULATED.3IONS-SCNC: 7 MMOL/L (ref 3–14)
AST SERPL W P-5'-P-CCNC: 38 U/L (ref 0–45)
BASOPHILS # BLD AUTO: 0.1 10E9/L (ref 0–0.2)
BASOPHILS NFR BLD AUTO: 1.2 %
BILIRUB SERPL-MCNC: 0.5 MG/DL (ref 0.2–1.3)
BUN SERPL-MCNC: 12 MG/DL (ref 7–30)
CALCIUM SERPL-MCNC: 8.6 MG/DL (ref 8.5–10.1)
CHLORIDE SERPL-SCNC: 104 MMOL/L (ref 94–109)
CO2 SERPL-SCNC: 27 MMOL/L (ref 20–32)
CREAT SERPL-MCNC: 0.78 MG/DL (ref 0.66–1.25)
DIFFERENTIAL METHOD BLD: NORMAL
EOSINOPHIL # BLD AUTO: 0.3 10E9/L (ref 0–0.7)
EOSINOPHIL NFR BLD AUTO: 5.6 %
ERYTHROCYTE [DISTWIDTH] IN BLOOD BY AUTOMATED COUNT: 13.3 % (ref 10–15)
FERRITIN SERPL-MCNC: 56 NG/ML (ref 26–388)
GFR SERPL CREATININE-BSD FRML MDRD: 87 ML/MIN/{1.73_M2}
GLUCOSE SERPL-MCNC: 96 MG/DL (ref 70–99)
HCT VFR BLD AUTO: 41 % (ref 40–53)
HGB BLD-MCNC: 13.5 G/DL (ref 13.3–17.7)
IMM GRANULOCYTES # BLD: 0 10E9/L (ref 0–0.4)
IMM GRANULOCYTES NFR BLD: 0.2 %
IRON SATN MFR SERPL: 31 % (ref 15–46)
IRON SERPL-MCNC: 97 UG/DL (ref 35–180)
LYMPHOCYTES # BLD AUTO: 0.9 10E9/L (ref 0.8–5.3)
LYMPHOCYTES NFR BLD AUTO: 18.3 %
MCH RBC QN AUTO: 30.1 PG (ref 26.5–33)
MCHC RBC AUTO-ENTMCNC: 32.9 G/DL (ref 31.5–36.5)
MCV RBC AUTO: 91 FL (ref 78–100)
MONOCYTES # BLD AUTO: 0.5 10E9/L (ref 0–1.3)
MONOCYTES NFR BLD AUTO: 10.4 %
NEUTROPHILS # BLD AUTO: 3.1 10E9/L (ref 1.6–8.3)
NEUTROPHILS NFR BLD AUTO: 64.3 %
NRBC # BLD AUTO: 0 10*3/UL
NRBC BLD AUTO-RTO: 0 /100
PLATELET # BLD AUTO: 189 10E9/L (ref 150–450)
POTASSIUM SERPL-SCNC: 4.2 MMOL/L (ref 3.4–5.3)
PROT SERPL-MCNC: 8 G/DL (ref 6.8–8.8)
RBC # BLD AUTO: 4.49 10E12/L (ref 4.4–5.9)
SODIUM SERPL-SCNC: 138 MMOL/L (ref 133–144)
TIBC SERPL-MCNC: 311 UG/DL (ref 240–430)
WBC # BLD AUTO: 4.8 10E9/L (ref 4–11)

## 2019-02-19 PROCEDURE — 80053 COMPREHEN METABOLIC PANEL: CPT | Mod: ZL | Performed by: NURSE PRACTITIONER

## 2019-02-19 PROCEDURE — 99000 SPECIMEN HANDLING OFFICE-LAB: CPT | Performed by: NURSE PRACTITIONER

## 2019-02-19 PROCEDURE — 82784 ASSAY IGA/IGD/IGG/IGM EACH: CPT | Mod: ZL | Performed by: NURSE PRACTITIONER

## 2019-02-19 PROCEDURE — 83883 ASSAY NEPHELOMETRY NOT SPEC: CPT | Mod: ZL | Performed by: NURSE PRACTITIONER

## 2019-02-19 PROCEDURE — 85810 BLOOD VISCOSITY EXAMINATION: CPT | Mod: ZL | Performed by: NURSE PRACTITIONER

## 2019-02-19 PROCEDURE — 00000402 ZZHCL STATISTIC TOTAL PROTEIN: Mod: ZL | Performed by: NURSE PRACTITIONER

## 2019-02-19 PROCEDURE — 83550 IRON BINDING TEST: CPT | Mod: ZL | Performed by: NURSE PRACTITIONER

## 2019-02-19 PROCEDURE — 82232 ASSAY OF BETA-2 PROTEIN: CPT | Mod: ZL | Performed by: NURSE PRACTITIONER

## 2019-02-19 PROCEDURE — 36415 COLL VENOUS BLD VENIPUNCTURE: CPT | Mod: ZL | Performed by: NURSE PRACTITIONER

## 2019-02-19 PROCEDURE — 84165 PROTEIN E-PHORESIS SERUM: CPT | Mod: ZL | Performed by: NURSE PRACTITIONER

## 2019-02-19 PROCEDURE — 85025 COMPLETE CBC W/AUTO DIFF WBC: CPT | Mod: ZL | Performed by: NURSE PRACTITIONER

## 2019-02-19 PROCEDURE — 82728 ASSAY OF FERRITIN: CPT | Mod: ZL | Performed by: NURSE PRACTITIONER

## 2019-02-19 PROCEDURE — 83540 ASSAY OF IRON: CPT | Mod: ZL | Performed by: NURSE PRACTITIONER

## 2019-02-20 LAB
ALBUMIN SERPL ELPH-MCNC: 3.7 G/DL (ref 3.7–5.1)
ALPHA1 GLOB SERPL ELPH-MCNC: 0.3 G/DL (ref 0.2–0.4)
ALPHA2 GLOB SERPL ELPH-MCNC: 0.8 G/DL (ref 0.5–0.9)
B-GLOBULIN SERPL ELPH-MCNC: 2 G/DL (ref 0.6–1)
B2 MICROGLOB SERPL-MCNC: 2.4 MG/L
GAMMA GLOB SERPL ELPH-MCNC: 0.6 G/DL (ref 0.7–1.6)
IGA SERPL-MCNC: 125 MG/DL (ref 70–380)
IGG SERPL-MCNC: 998 MG/DL (ref 695–1620)
IGM SERPL-MCNC: 1630 MG/DL (ref 60–265)
KAPPA LC UR-MCNC: 3.16 MG/DL (ref 0.33–1.94)
KAPPA LC/LAMBDA SER: 1.63 {RATIO} (ref 0.26–1.65)
LAMBDA LC SERPL-MCNC: 1.94 MG/DL (ref 0.57–2.63)
M PROTEIN SERPL ELPH-MCNC: 1.3 G/DL
PROT PATTERN SERPL ELPH-IMP: ABNORMAL
VISC SER: 1.8 CP (ref 1.4–1.8)

## 2019-02-25 NOTE — PROGRESS NOTES
Hematology Follow-up Visit:  February 26, 2019    Reason for Visit:  Patient presents with:  RECHECK: Follow up IgM monoclonal gammopathy of uncertain significance      Nursing Notes and Documentation reviewed:  yes    HPI:   This is a 78-year-old male patient who presents to the oncology/hematology clinic today in followup of monoclonal gammopathy of undetermined significance diagnosed 2/2018 during workup for hypercoag state, along with previous diagnosis of anemia related to GI bleed.  Patient does have a history of previous DVT and PE and has been on long-term anticoagulation with Coumadin.  Coumadin was held after GI bleed was diagnosed and has been resumed.     He presents to the clinic today accompanied by his wife essentially with no new complaints.  He states he is doing well.  He is feeling quite inactive related to the cold and snowy weather.  He continues on 2 tablets of iron daily.    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   Protein C and protein S along with antiphospholipid profile were negative.  SPEP was completed showing an M-spike of 1.1 with serum immunofixation showing monoclonal IgM immunoglobulin, kappa light chain type with IgM of 1530.  Bone marrow aspiration biopsy showed iron deficiency, slightly increased plasma cell proximally 2% kappa and toxic neutrophils with reactive lymphocytes and increased rouleaux formation; flow cytometry and immunophenotyping was unremarkable. Skeletal bone survey was completed on 2/6/2018 showing a stable T12 compression fracture but no evidence of lytic lesions.      He was placed on oral iron with improvement in his hemoglobin and iron studies.      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 Simeon; silent; transient hyperthyroid, now normal; monitor TSH monthly for hypothyroidism     Chronic anticoagulation  2012     Elevated serum alkaline phosphatase level 2016    normal GGT, normal bone skeletal survery     Gastric ulcer     endoscopy 2016, repeat 6 months; PPI Carafate     Hyperlipidemia      Normocytic anemia 2016     Pulmonary nodule     CT 2014, right; consider repeat 1 year if high risk     Upper GI bleed 2017     Vitamin D deficiency 2012       Social History     Socioeconomic History     Marital status:      Spouse name: Not on file     Number of children: Not on file     Years of education: Not on file     Highest education level: Not on file   Occupational History     Employer: [employer]     Comment: Retired    Social Needs     Financial resource strain: Not on file     Food insecurity:     Worry: Not on file     Inability: Not on file     Transportation needs:     Medical: Not on file     Non-medical: Not on file   Tobacco Use     Smoking status: Former Smoker     Types: Cigarettes     Start date: 5/15/1958     Last attempt to quit: 10/28/2009     Years since quittin.3     Smokeless tobacco: Never Used     Tobacco comment: Quit    Substance and Sexual Activity     Alcohol use: No     Alcohol/week: 0.0 oz     Drug use: No     Sexual activity: Not on file   Lifestyle     Physical activity:     Days per week: Not on file     Minutes per session: Not on file     Stress: Not on file   Relationships     Social connections:     Talks on phone: Not on file     Gets together: Not on file     Attends Religion service: Not on file     Active member of club or organization: Not on file     Attends meetings of clubs or organizations: Not on file     Relationship status: Not on file     Intimate partner violence:     Fear of current or ex partner: Not on file     Emotionally abused: Not on file     Physically abused: Not on file     Forced sexual activity: Not on file   Other Topics Concern      Service Yes     Comment: [employer]     Blood  Transfusions Yes     Comment: Permits if needed     Caffeine Concern Yes     Comment: Tea     Occupational Exposure No     Hobby Hazards No     Sleep Concern No     Stress Concern No     Weight Concern No     Special Diet No     Back Care No     Exercise No     Bike Helmet Not Asked     Seat Belt Yes     Self-Exams Yes     Parent/sibling w/ CABG, MI or angioplasty before 65F 55M? No   Social History Narrative     Not on file       Past Surgical History:   Procedure Laterality Date     ANGIOGRAM  6/23/2014     BIOPSY  2018    bone marrow biopsy     BONE MARROW BIOPSY, BONE SPECIMEN, NEEDLE/TROCAR N/A 1/16/2018    Procedure: BIOPSY BONE MARROW;  BONE MARROW BIOPSY;  Surgeon: Nuno Castro MD;  Location: [facility]     C REGULAR ECHO (FL)  6/23/2014    Dr. Dirk EATON Difficile with intussuseption       cataract extraction       colonoscopy       COLONOSCOPY  6/17     ENDOSCOPY UPPER, COLONOSCOPY, COMBINED N/A 6/17/2016    Procedure: COMBINED ENDOSCOPY UPPER, COLONOSCOPY;  Surgeon: Andrea Stearns DO;  Location: [facility]     ESOPHAGOSCOPY, GASTROSCOPY, DUODENOSCOPY (EGD), COMBINED N/A 11/30/2017    Procedure: COMBINED ESOPHAGOSCOPY, GASTROSCOPY, DUODENOSCOPY (EGD);  UPPER ENDOSCOPY;  Surgeon: Narinder Torres MD;  Location: [facility]     left knee meniscal tear       Left lower extremity DVT       lens implant       nasal polypectomy       Pulmonary Embolism         Family History   Problem Relation Age of Onset     Cancer Mother         Ovarian     Other Cancer Mother         lung/breast/ cervical ?     Respiratory Father         emphysemia     Lung Cancer Brother      Diabetes No family hx of      Hypertension No family hx of      Hyperlipidemia No family hx of      Thyroid Disease No family hx of      Asthma No family hx of      Colon Cancer No family hx of      Prostate Cancer No family hx of      Anesthesia Reaction No family hx of      Genetic Disorder No family hx of      Cerebrovascular Disease No family hx of       Coronary Artery Disease No family hx of      Breast Cancer No family hx of        Allergies:  Allergies as of 02/26/2019     (No Known Allergies)       Current Medications:  Current Outpatient Medications   Medication Sig Dispense Refill     atorvastatin (LIPITOR) 10 MG tablet TAKE 1 TABLET(10 MG) BY MOUTH DAILY 90 tablet 1     Cholecalciferol (VITAMIN D) 400 UNITS tablet Take 1,000 Units by mouth daily        cyanocobalamin ([employer] VITAMIN  B12) 1000 MCG TABS Take 1,000 mcg by mouth daily 90 tablet 3     ferrous sulfate (FEROSUL) 325 (65 Fe) MG tablet Take 1 tablet (325 mg) by mouth 2 times daily 90 tablet 11     Lycopene 10 MG CAPS        Multiple Vitamins-Minerals (MULTIVITAMIN ADULT PO) Take 1 tablet by mouth       omeprazole (PRILOSEC) 40 MG DR capsule TAKE 1 CAPSULE(40 MG) BY MOUTH DAILY 90 capsule 3     pyridoxine (VITAMIN B-6) 50 MG TABS Take 1 tablet (50 mg) by mouth daily 90 tablet 3     warfarin (COUMADIN) 4 MG tablet TAKE 1& 1/2 TABLETS BY MOUTH ON MONDAY& FRIDAY. TAKE 1 TABLET ON ALL OTHER DAYS 120 tablet 3          Review Of Systems:  Constitutional: denies fever, weight changes, night sweats and chills  Eyes: denies blurred or double vision-cataracts are returning  Ears/Nose/Throat: denies ear pain, nose problems, difficulty swallowing  Respiratory: has COPD-SOB with increased activity  Skin: denies new rash, lesions  Cardiovascular: denies chest pain, palpitations, edema  Gastrointestinal: denies abdominal pain, bloating, nausea, early satiety; no change in Bowel habits or blood in your stool  Genitourinary: denies difficulty with urination, blood in urine  Musculoskeletal: denies new muscle pain, bone pain  Neurologic: denies lightheadedness, numbness orTingling, has occasional headaches  Psychiatric: denies anxiety, depression  Hematologic/Lymphatic/Immunologic: denies easy bleeding, lumps or bumps noted; Has easy bruising-on coumadin  Endocrine: Denies increased thirst, night  "sweats      Physical Exam:  /80   Pulse 70   Temp 97.8  F (36.6  C) (Tympanic)   Resp 18   Ht 1.638 m (5' 4.5\")   Wt 80.9 kg (178 lb 6.4 oz)   SpO2 96%   BMI 30.15 kg/m    GENERAL APPEARANCE: Healthy, alert and in no acute distress.  HEENT: Normocephalic, Sclerae anicteric. Oropharynx without ulcers, lesions, or thrush.  NECK:  No asymmetry or masses, no thyromegaly.  LYMPHATICS: No palpable cervical, supraclavicular, axillary, or inguinal nodes   RESP: Lungs clear to auscultation bilaterally, respirations regular and easy  CARDIOVASCULAR: Regular rate and rhythm. Normal S1, S2; no murmur, gallop, or rub.  ABDOMEN: Soft, nontender. Bowel sounds auscultated all 4 quadrants. No palpable organomegaly or masses.  MUSCULOSKELETAL: Extremities without gross deformities noted. No edema of bilateral lower extremities.  NEURO: Alert and oriented x 3.  Gait steady.  PSYCHIATRIC: Mentation and affect appear normal.  Mood appropriate.    Laboratory/Imaging Studies:  Results for orders placed or performed in visit on 02/19/19   Macroglobulins   Result Value Ref Range    Viscosity Index 1.8 1.4 - 1.8   Kappa and lambda light chain   Result Value Ref Range    Kappa Free Lt Chain 3.16 (H) 0.33 - 1.94 mg/dL    Lambda Free Lt Chain 1.94 0.57 - 2.63 mg/dL    Kappa Lambda Ratio 1.63 0.26 - 1.65   Immunoglobulins A G and M   Result Value Ref Range     695 - 1,620 mg/dL     70 - 380 mg/dL    IGM 1,630 (H) 60 - 265 mg/dL   ELP reflex to IELP   Result Value Ref Range    Albumin Fraction 3.7 3.7 - 5.1 g/dL    Alpha 1 Fraction 0.3 0.2 - 0.4 g/dL    Alpha 2 Fraction 0.8 0.5 - 0.9 g/dL    Beta Fraction 2.0 (H) 0.6 - 1.0 g/dL    Gamma Fraction 0.6 (L) 0.7 - 1.6 g/dL    Monoclonal Peak 1.3 (H) 0.0 g/dL    ELP Interpretation:       Monoclonal protein (about 1.3 g/dL) seen in the beta fraction comigrating with C3   complement. Previously characterized in our laboratory on 11/9/2018 as a monoclonal IgM   immunoglobulin of " kappa light chain type. Pathologic significance requires clinical   correlation. EBEN Mclean M.D., Ph.D., Pathologist ().      Comprehensive metabolic panel   Result Value Ref Range    Sodium 138 133 - 144 mmol/L    Potassium 4.2 3.4 - 5.3 mmol/L    Chloride 104 94 - 109 mmol/L    Carbon Dioxide 27 20 - 32 mmol/L    Anion Gap 7 3 - 14 mmol/L    Glucose 96 70 - 99 mg/dL    Urea Nitrogen 12 7 - 30 mg/dL    Creatinine 0.78 0.66 - 1.25 mg/dL    GFR Estimate 87 >60 mL/min/[1.73_m2]    GFR Estimate If Black >90 >60 mL/min/[1.73_m2]    Calcium 8.6 8.5 - 10.1 mg/dL    Bilirubin Total 0.5 0.2 - 1.3 mg/dL    Albumin 3.5 3.4 - 5.0 g/dL    Protein Total 8.0 6.8 - 8.8 g/dL    Alkaline Phosphatase 162 (H) 40 - 150 U/L    ALT 39 0 - 70 U/L    AST 38 0 - 45 U/L   CBC with platelets differential   Result Value Ref Range    WBC 4.8 4.0 - 11.0 10e9/L    RBC Count 4.49 4.4 - 5.9 10e12/L    Hemoglobin 13.5 13.3 - 17.7 g/dL    Hematocrit 41.0 40.0 - 53.0 %    MCV 91 78 - 100 fl    MCH 30.1 26.5 - 33.0 pg    MCHC 32.9 31.5 - 36.5 g/dL    RDW 13.3 10.0 - 15.0 %    Platelet Count 189 150 - 450 10e9/L    Diff Method Automated Method     % Neutrophils 64.3 %    % Lymphocytes 18.3 %    % Monocytes 10.4 %    % Eosinophils 5.6 %    % Basophils 1.2 %    % Immature Granulocytes 0.2 %    Nucleated RBCs 0 0 /100    Absolute Neutrophil 3.1 1.6 - 8.3 10e9/L    Absolute Lymphocytes 0.9 0.8 - 5.3 10e9/L    Absolute Monocytes 0.5 0.0 - 1.3 10e9/L    Absolute Eosinophils 0.3 0.0 - 0.7 10e9/L    Absolute Basophils 0.1 0.0 - 0.2 10e9/L    Abs Immature Granulocytes 0.0 0 - 0.4 10e9/L    Absolute Nucleated RBC 0.0    Beta 2 microglobulin   Result Value Ref Range    Beta-2-Microglobulin 2.4 (H) <2.3 mg/L   Iron and iron binding capacity   Result Value Ref Range    Iron 97 35 - 180 ug/dL    Iron Binding Cap 311 240 - 430 ug/dL    Iron Saturation Index 31 15 - 46 %   Ferritin   Result Value Ref Range    Ferritin 56 26 - 388 ng/mL           ASSESSMENT/PLAN:    #1 MGUS: IgM Monoclonal gammopathy of undetermined significance diagnosed January 2018.  M spike is up a tad with normal Kappa/Lambda ratio and normal calcium, hemoglobin and kidney functions.  We'll see him back in 4 months with the same lab work.    #2  Anemia:  Hemoglobin and iron studies are WNL with essentially no change with decreasing from 3 to 2 tablets daily.  Will decrease to 1 tablet daily and recheck his iron studies in 4 months.     I encouraged patient to call with any questions or concerns.        Britney PADILLA, FNP-BC, AOCNP

## 2019-02-26 ENCOUNTER — ONCOLOGY VISIT (OUTPATIENT)
Dept: ONCOLOGY | Facility: OTHER | Age: 79
End: 2019-02-26
Attending: NURSE PRACTITIONER
Payer: COMMERCIAL

## 2019-02-26 VITALS
SYSTOLIC BLOOD PRESSURE: 130 MMHG | BODY MASS INDEX: 29.72 KG/M2 | RESPIRATION RATE: 18 BRPM | HEIGHT: 65 IN | HEART RATE: 70 BPM | TEMPERATURE: 97.8 F | DIASTOLIC BLOOD PRESSURE: 80 MMHG | WEIGHT: 178.4 LBS | OXYGEN SATURATION: 96 %

## 2019-02-26 DIAGNOSIS — D64.9 NORMOCYTIC ANEMIA: ICD-10-CM

## 2019-02-26 DIAGNOSIS — D47.2 IGM MONOCLONAL GAMMOPATHY OF UNCERTAIN SIGNIFICANCE: Primary | ICD-10-CM

## 2019-02-26 PROBLEM — E06.9 THYROIDITIS: Status: ACTIVE | Noted: 2019-02-26

## 2019-02-26 PROCEDURE — G0463 HOSPITAL OUTPT CLINIC VISIT: HCPCS

## 2019-02-26 PROCEDURE — 99213 OFFICE O/P EST LOW 20 MIN: CPT | Performed by: NURSE PRACTITIONER

## 2019-02-26 ASSESSMENT — PAIN SCALES - GENERAL: PAINLEVEL: NO PAIN (0)

## 2019-02-26 ASSESSMENT — PATIENT HEALTH QUESTIONNAIRE - PHQ9: SUM OF ALL RESPONSES TO PHQ QUESTIONS 1-9: 0

## 2019-02-26 ASSESSMENT — MIFFLIN-ST. JEOR: SCORE: 1458.16

## 2019-02-26 NOTE — PATIENT INSTRUCTIONS
We would like to see you back in 4 months. Please come 1 week prior for lab work.     If you have any questions please call [phone]    Other instructions:      1.  Decrease your iron to one tablet daily

## 2019-02-26 NOTE — NURSING NOTE
"Chief Complaint   Patient presents with     RECHECK     Follow up IgM monoclonal gammopathy of uncertain significance       Initial /80   Pulse 70   Temp 97.8  F (36.6  C) (Tympanic)   Resp 18   Ht 1.638 m (5' 4.5\")   Wt 80.9 kg (178 lb 6.4 oz)   SpO2 96%   BMI 30.15 kg/m   Estimated body mass index is 30.15 kg/m  as calculated from the following:    Height as of this encounter: 1.638 m (5' 4.5\").    Weight as of this encounter: 80.9 kg (178 lb 6.4 oz).  Medication Reconciliation: complete.  Immunizations reviewed, advanced directives on file, pain = 0, PHQ9 = 0.    Jayleen Fleming LPN    "

## 2019-03-08 ENCOUNTER — ANTICOAGULATION THERAPY VISIT (OUTPATIENT)
Dept: ANTICOAGULATION | Facility: OTHER | Age: 79
End: 2019-03-08
Attending: FAMILY MEDICINE
Payer: MEDICARE

## 2019-03-08 DIAGNOSIS — Z86.718 HISTORY OF DVT OF LOWER EXTREMITY: ICD-10-CM

## 2019-03-08 DIAGNOSIS — K92.2 ACUTE UPPER GASTROINTESTINAL BLEEDING: ICD-10-CM

## 2019-03-08 DIAGNOSIS — Z86.711 HX PULMONARY EMBOLISM: ICD-10-CM

## 2019-03-08 LAB — INR POINT OF CARE: 2 (ref 0.86–1.14)

## 2019-03-08 PROCEDURE — 85610 PROTHROMBIN TIME: CPT | Mod: QW,ZL

## 2019-04-18 ENCOUNTER — ANTICOAGULATION THERAPY VISIT (OUTPATIENT)
Dept: ANTICOAGULATION | Facility: OTHER | Age: 79
End: 2019-04-18
Attending: FAMILY MEDICINE
Payer: MEDICARE

## 2019-04-18 DIAGNOSIS — Z86.711 HX PULMONARY EMBOLISM: ICD-10-CM

## 2019-04-18 DIAGNOSIS — K92.2 ACUTE UPPER GASTROINTESTINAL BLEEDING: ICD-10-CM

## 2019-04-18 DIAGNOSIS — Z86.718 HISTORY OF DVT OF LOWER EXTREMITY: ICD-10-CM

## 2019-04-18 LAB — INR POINT OF CARE: ABNORMAL (ref 0.86–1.14)

## 2019-04-18 PROCEDURE — 85610 PROTHROMBIN TIME: CPT | Mod: QW,ZL

## 2019-04-18 NOTE — PROGRESS NOTES
ANTICOAGULATION FOLLOW-UP CLINIC VISIT    Patient Name:  Rey Tristan  Date:  2019  Contact Type:  Face to Face    SUBJECTIVE:        OBJECTIVE    INR Protime   Date Value Ref Range Status   2019 1.9h (A) 0.86 - 1.14 Final       ASSESSMENT / PLAN  INR assessment THER    Recheck INR In: 6 WEEKS    INR Location Clinic      Anticoagulation Summary  As of 2019    INR goal:   1.5-2.0   TTR:   56.5 % (2.4 y)   INR used for dosin.9h (2019)   Warfarin maintenance plan:   6 mg (4 mg x 1.5) every Mon, Fri; 4 mg (4 mg x 1) all other days   Full warfarin instructions:   6 mg every Mon, Fri; 4 mg all other days   Weekly warfarin total:   32 mg   No change documented:   Lala Boss RN   Plan last modified:   Lala Boss RN (2018)   Next INR check:   2019   Target end date:   Indefinite    Indications    Long-term (current) use of anticoagulants [Z79.01] [Z79.01]  Hx pulmonary embolism [Z86.711]  History of DVT of lower extremity [Z86.718]  Acute upper gastrointestinal bleeding [K92.2]             Anticoagulation Episode Summary     INR check location:       Preferred lab:       Send INR reminders to:   [facility] ANTICOAG POOL    Comments:         Anticoagulation Care Providers     Provider Role Specialty Phone number    Gianna Richard MD [facility] [phone]            See the Encounter Report to view Anticoagulation Flowsheet and Dosing Calendar (Go to Encounters tab in chart review, and find the Anticoagulation Therapy Visit)        Lala Boss RN

## 2019-05-31 ENCOUNTER — ANTICOAGULATION THERAPY VISIT (OUTPATIENT)
Dept: ANTICOAGULATION | Facility: OTHER | Age: 79
End: 2019-05-31
Attending: FAMILY MEDICINE
Payer: MEDICARE

## 2019-05-31 DIAGNOSIS — Z86.718 HISTORY OF DVT OF LOWER EXTREMITY: ICD-10-CM

## 2019-05-31 DIAGNOSIS — Z86.711 HX PULMONARY EMBOLISM: ICD-10-CM

## 2019-05-31 DIAGNOSIS — K92.2 ACUTE UPPER GASTROINTESTINAL BLEEDING: ICD-10-CM

## 2019-05-31 LAB — INR POINT OF CARE: 1.4 (ref 0.86–1.14)

## 2019-05-31 PROCEDURE — 85610 PROTHROMBIN TIME: CPT | Mod: QW,ZL

## 2019-05-31 NOTE — PROGRESS NOTES
ANTICOAGULATION FOLLOW-UP CLINIC VISIT    Patient Name:  Rey Tristan  Date:  2019  Contact Type:  Face to Face    SUBJECTIVE:  Patient Findings     Positives:   Change in activity (activity increase)    Comments:   Activity increased        Clinical Outcomes     Negatives:   Major bleeding event, Thromboembolic event, Anticoagulation-related hospital admission, Anticoagulation-related ED visit, Anticoagulation-related fatality    Comments:   Activity increased           OBJECTIVE    INR Protime   Date Value Ref Range Status   2019 1.4 (A) 0.86 - 1.14 Final       ASSESSMENT / PLAN  INR assessment SUB    Recheck INR In: 3 WEEKS    INR Location Clinic      Anticoagulation Summary  As of 2019    INR goal:   1.5-2.0   TTR:   58.8 % (2.7 y)   INR used for dosin.4! (2019)   Warfarin maintenance plan:   6 mg (4 mg x 1.5) every Mon, Fri; 4 mg (4 mg x 1) all other days   Full warfarin instructions:   : 8 mg; Otherwise 6 mg every Mon, Fri; 4 mg all other days   Weekly warfarin total:   32 mg   Plan last modified:   Lala Boss RN (2018)   Next INR check:   2019   Target end date:   Indefinite    Indications    Long-term (current) use of anticoagulants [Z79.01] [Z79.01]  Hx pulmonary embolism [Z86.711]  History of DVT of lower extremity [Z86.718]  Acute upper gastrointestinal bleeding [K92.2]             Anticoagulation Episode Summary     INR check location:       Preferred lab:       Send INR reminders to:   [facility] ANTICOAG POOL    Comments:         Anticoagulation Care Providers     Provider Role Specialty Phone number    Gianna Richard MD [facility] [phone]            See the Encounter Report to view Anticoagulation Flowsheet and Dosing Calendar (Go to Encounters tab in chart review, and find the Anticoagulation Therapy Visit)        Lala Boss RN

## 2019-06-14 DIAGNOSIS — E78.5 HYPERLIPIDEMIA, UNSPECIFIED HYPERLIPIDEMIA TYPE: ICD-10-CM

## 2019-06-14 RX ORDER — ATORVASTATIN CALCIUM 10 MG/1
10 TABLET, FILM COATED ORAL DAILY
Qty: 90 TABLET | Refills: 1 | Status: SHIPPED | OUTPATIENT
Start: 2019-06-14 | End: 2019-12-05

## 2019-06-14 NOTE — TELEPHONE ENCOUNTER
patient called and is wondering if provider still would like him to take the atorvastatin (LIPITOR) 10 MG tablet medication. If provider would like patient to stay on this medication he will need a refill.  medication is pended.  Aspen Schofield, CMA

## 2019-06-21 ENCOUNTER — ANTICOAGULATION THERAPY VISIT (OUTPATIENT)
Dept: ANTICOAGULATION | Facility: OTHER | Age: 79
End: 2019-06-21
Attending: FAMILY MEDICINE
Payer: MEDICARE

## 2019-06-21 DIAGNOSIS — Z86.711 HX PULMONARY EMBOLISM: ICD-10-CM

## 2019-06-21 DIAGNOSIS — D47.2 IGM MONOCLONAL GAMMOPATHY OF UNCERTAIN SIGNIFICANCE: ICD-10-CM

## 2019-06-21 DIAGNOSIS — K92.2 ACUTE UPPER GASTROINTESTINAL BLEEDING: ICD-10-CM

## 2019-06-21 DIAGNOSIS — D64.9 NORMOCYTIC ANEMIA: ICD-10-CM

## 2019-06-21 DIAGNOSIS — Z86.718 HISTORY OF DVT OF LOWER EXTREMITY: ICD-10-CM

## 2019-06-21 DIAGNOSIS — Z79.01 LONG TERM CURRENT USE OF ANTICOAGULANT THERAPY: ICD-10-CM

## 2019-06-21 LAB
ALBUMIN SERPL-MCNC: 3.5 G/DL (ref 3.4–5)
ALP SERPL-CCNC: 176 U/L (ref 40–150)
ALT SERPL W P-5'-P-CCNC: 34 U/L (ref 0–70)
ANION GAP SERPL CALCULATED.3IONS-SCNC: 5 MMOL/L (ref 3–14)
AST SERPL W P-5'-P-CCNC: 36 U/L (ref 0–45)
BASOPHILS # BLD AUTO: 0 10E9/L (ref 0–0.2)
BASOPHILS NFR BLD AUTO: 0.8 %
BILIRUB SERPL-MCNC: 0.5 MG/DL (ref 0.2–1.3)
BUN SERPL-MCNC: 17 MG/DL (ref 7–30)
CALCIUM SERPL-MCNC: 8.5 MG/DL (ref 8.5–10.1)
CHLORIDE SERPL-SCNC: 108 MMOL/L (ref 94–109)
CO2 SERPL-SCNC: 26 MMOL/L (ref 20–32)
CREAT SERPL-MCNC: 0.78 MG/DL (ref 0.66–1.25)
DIFFERENTIAL METHOD BLD: ABNORMAL
EOSINOPHIL # BLD AUTO: 0.2 10E9/L (ref 0–0.7)
EOSINOPHIL NFR BLD AUTO: 3.7 %
ERYTHROCYTE [DISTWIDTH] IN BLOOD BY AUTOMATED COUNT: 13.4 % (ref 10–15)
FERRITIN SERPL-MCNC: 73 NG/ML (ref 26–388)
GFR SERPL CREATININE-BSD FRML MDRD: 87 ML/MIN/{1.73_M2}
GLUCOSE SERPL-MCNC: 90 MG/DL (ref 70–99)
HCT VFR BLD AUTO: 38.9 % (ref 40–53)
HGB BLD-MCNC: 13 G/DL (ref 13.3–17.7)
IMM GRANULOCYTES # BLD: 0 10E9/L (ref 0–0.4)
IMM GRANULOCYTES NFR BLD: 0.2 %
INR POINT OF CARE: 1.7 (ref 0.86–1.14)
IRON SATN MFR SERPL: 29 % (ref 15–46)
IRON SERPL-MCNC: 83 UG/DL (ref 35–180)
LDH SERPL L TO P-CCNC: 209 U/L (ref 85–227)
LYMPHOCYTES # BLD AUTO: 1 10E9/L (ref 0.8–5.3)
LYMPHOCYTES NFR BLD AUTO: 19.8 %
MCH RBC QN AUTO: 30.2 PG (ref 26.5–33)
MCHC RBC AUTO-ENTMCNC: 33.4 G/DL (ref 31.5–36.5)
MCV RBC AUTO: 91 FL (ref 78–100)
MONOCYTES # BLD AUTO: 0.6 10E9/L (ref 0–1.3)
MONOCYTES NFR BLD AUTO: 10.8 %
NEUTROPHILS # BLD AUTO: 3.4 10E9/L (ref 1.6–8.3)
NEUTROPHILS NFR BLD AUTO: 64.7 %
NRBC # BLD AUTO: 0 10*3/UL
NRBC BLD AUTO-RTO: 0 /100
PLATELET # BLD AUTO: 166 10E9/L (ref 150–450)
POTASSIUM SERPL-SCNC: 4.1 MMOL/L (ref 3.4–5.3)
PROT SERPL-MCNC: 8.1 G/DL (ref 6.8–8.8)
RBC # BLD AUTO: 4.3 10E12/L (ref 4.4–5.9)
SODIUM SERPL-SCNC: 139 MMOL/L (ref 133–144)
TIBC SERPL-MCNC: 284 UG/DL (ref 240–430)
WBC # BLD AUTO: 5.2 10E9/L (ref 4–11)

## 2019-06-21 PROCEDURE — 86334 IMMUNOFIX E-PHORESIS SERUM: CPT | Mod: ZL | Performed by: NURSE PRACTITIONER

## 2019-06-21 PROCEDURE — 80053 COMPREHEN METABOLIC PANEL: CPT | Mod: ZL | Performed by: NURSE PRACTITIONER

## 2019-06-21 PROCEDURE — 82232 ASSAY OF BETA-2 PROTEIN: CPT | Mod: ZL | Performed by: NURSE PRACTITIONER

## 2019-06-21 PROCEDURE — 82728 ASSAY OF FERRITIN: CPT | Mod: ZL | Performed by: NURSE PRACTITIONER

## 2019-06-21 PROCEDURE — 99000 SPECIMEN HANDLING OFFICE-LAB: CPT | Performed by: NURSE PRACTITIONER

## 2019-06-21 PROCEDURE — 85610 PROTHROMBIN TIME: CPT | Mod: QW,ZL

## 2019-06-21 PROCEDURE — 85025 COMPLETE CBC W/AUTO DIFF WBC: CPT | Mod: ZL | Performed by: NURSE PRACTITIONER

## 2019-06-21 PROCEDURE — 84999 UNLISTED CHEMISTRY PROCEDURE: CPT | Mod: ZL | Performed by: NURSE PRACTITIONER

## 2019-06-21 PROCEDURE — 83550 IRON BINDING TEST: CPT | Mod: ZL | Performed by: NURSE PRACTITIONER

## 2019-06-21 PROCEDURE — 00000402 ZZHCL STATISTIC TOTAL PROTEIN: Mod: ZL | Performed by: NURSE PRACTITIONER

## 2019-06-21 PROCEDURE — 83540 ASSAY OF IRON: CPT | Mod: ZL | Performed by: NURSE PRACTITIONER

## 2019-06-21 PROCEDURE — 82784 ASSAY IGA/IGD/IGG/IGM EACH: CPT | Mod: ZL | Performed by: NURSE PRACTITIONER

## 2019-06-21 PROCEDURE — 85810 BLOOD VISCOSITY EXAMINATION: CPT | Mod: ZL | Performed by: NURSE PRACTITIONER

## 2019-06-21 PROCEDURE — 84165 PROTEIN E-PHORESIS SERUM: CPT | Mod: ZL | Performed by: NURSE PRACTITIONER

## 2019-06-21 PROCEDURE — 83615 LACTATE (LD) (LDH) ENZYME: CPT | Mod: ZL | Performed by: NURSE PRACTITIONER

## 2019-06-21 PROCEDURE — 83883 ASSAY NEPHELOMETRY NOT SPEC: CPT | Mod: ZL | Performed by: NURSE PRACTITIONER

## 2019-06-21 NOTE — PROGRESS NOTES
ANTICOAGULATION FOLLOW-UP CLINIC VISIT    Patient Name:  Rey Tristan  Date:  2019  Contact Type:  Face to Face    SUBJECTIVE:  Patient Findings     Comments:   No bleeding/bruising, no new changes in diet/meds/activity.         Clinical Outcomes     Negatives:   Major bleeding event, Thromboembolic event, Anticoagulation-related hospital admission, Anticoagulation-related ED visit, Anticoagulation-related fatality    Comments:   No bleeding/bruising, no new changes in diet/meds/activity.            OBJECTIVE    INR Protime   Date Value Ref Range Status   2019 1.7 (A) 0.86 - 1.14 Final       ASSESSMENT / PLAN  INR assessment THER    Recheck INR In: 6 WEEKS    INR Location Clinic      Anticoagulation Summary  As of 2019    INR goal:   1.5-2.0   TTR:   59.0 % (2.7 y)   INR used for dosin.7 (2019)   Warfarin maintenance plan:   6 mg (4 mg x 1.5) every Mon, Fri; 4 mg (4 mg x 1) all other days   Full warfarin instructions:   6 mg every Mon, Fri; 4 mg all other days   Weekly warfarin total:   32 mg   No change documented:   Lala Boss RN   Plan last modified:   Lala Boss RN (2018)   Next INR check:   2019   Target end date:   Indefinite    Indications    Long-term (current) use of anticoagulants [Z79.01] [Z79.01]  Hx pulmonary embolism [Z86.711]  History of DVT of lower extremity [Z86.718]  Acute upper gastrointestinal bleeding [K92.2]             Anticoagulation Episode Summary     INR check location:       Preferred lab:       Send INR reminders to:   [facility] ANTICOAG POOL    Comments:         Anticoagulation Care Providers     Provider Role Specialty Phone number    Gianna Richard MD [facility] [phone]            See the Encounter Report to view Anticoagulation Flowsheet and Dosing Calendar (Go to Encounters tab in chart review, and find the Anticoagulation Therapy Visit)        Lala Boss RN

## 2019-06-24 LAB
ALBUMIN SERPL ELPH-MCNC: 4 G/DL (ref 3.7–5.1)
ALPHA1 GLOB SERPL ELPH-MCNC: 0.3 G/DL (ref 0.2–0.4)
ALPHA2 GLOB SERPL ELPH-MCNC: 0.9 G/DL (ref 0.5–0.9)
B-GLOBULIN SERPL ELPH-MCNC: 2.1 G/DL (ref 0.6–1)
GAMMA GLOB SERPL ELPH-MCNC: 0.7 G/DL (ref 0.7–1.6)
IGA SERPL-MCNC: 117 MG/DL (ref 70–380)
IGG SERPL-MCNC: 872 MG/DL (ref 695–1620)
IGM SERPL-MCNC: 1550 MG/DL (ref 60–265)
KAPPA LC UR-MCNC: NORMAL MG/DL (ref 0.33–1.94)
KAPPA LC/LAMBDA SER: NORMAL {RATIO} (ref 0.26–1.65)
LAMBDA LC SERPL-MCNC: NORMAL MG/DL (ref 0.57–2.63)
M PROTEIN SERPL ELPH-MCNC: 1.4 G/DL
PROT PATTERN SERPL ELPH-IMP: ABNORMAL
PROT PATTERN SERPL IFE-IMP: NORMAL
VISC SER: 1.8 CP (ref 1.4–1.8)

## 2019-06-25 LAB — B2 MICROGLOB SERPL-MCNC: 2.4 MG/L

## 2019-06-26 LAB
RESULT: ABNORMAL
SEND OUTS MISC TEST CODE: ABNORMAL
SEND OUTS MISC TEST SPECIMEN: ABNORMAL
TEST NAME: ABNORMAL

## 2019-06-28 ENCOUNTER — ONCOLOGY VISIT (OUTPATIENT)
Dept: ONCOLOGY | Facility: OTHER | Age: 79
End: 2019-06-28
Attending: NURSE PRACTITIONER
Payer: COMMERCIAL

## 2019-06-28 VITALS
HEART RATE: 70 BPM | BODY MASS INDEX: 29.96 KG/M2 | TEMPERATURE: 96.6 F | HEIGHT: 65 IN | SYSTOLIC BLOOD PRESSURE: 120 MMHG | DIASTOLIC BLOOD PRESSURE: 80 MMHG | OXYGEN SATURATION: 94 % | RESPIRATION RATE: 20 BRPM | WEIGHT: 179.8 LBS

## 2019-06-28 DIAGNOSIS — D64.9 ANEMIA, UNSPECIFIED TYPE: ICD-10-CM

## 2019-06-28 DIAGNOSIS — D47.2 IGM MONOCLONAL GAMMOPATHY OF UNCERTAIN SIGNIFICANCE: Primary | ICD-10-CM

## 2019-06-28 PROCEDURE — 99213 OFFICE O/P EST LOW 20 MIN: CPT | Performed by: NURSE PRACTITIONER

## 2019-06-28 PROCEDURE — G0463 HOSPITAL OUTPT CLINIC VISIT: HCPCS

## 2019-06-28 ASSESSMENT — PAIN SCALES - GENERAL: PAINLEVEL: NO PAIN (0)

## 2019-06-28 ASSESSMENT — PATIENT HEALTH QUESTIONNAIRE - PHQ9: SUM OF ALL RESPONSES TO PHQ QUESTIONS 1-9: 0

## 2019-06-28 ASSESSMENT — MIFFLIN-ST. JEOR: SCORE: 1459.51

## 2019-06-28 NOTE — NURSING NOTE
"Chief Complaint   Patient presents with     RECHECK     Follow up Monoclonal gammopathy of unknown significance (MGUS)       Initial /80   Pulse 70   Temp 96.6  F (35.9  C) (Tympanic)   Resp 20   Ht 1.638 m (5' 4.5\")   Wt 81.6 kg (179 lb 12.8 oz)   SpO2 94%   BMI 30.39 kg/m   Estimated body mass index is 30.39 kg/m  as calculated from the following:    Height as of this encounter: 1.638 m (5' 4.5\").    Weight as of this encounter: 81.6 kg (179 lb 12.8 oz).  Medication Reconciliation: complete.  Immunizations and advance directives status reviewed. Pain scale 0 , PHQ-9 = 0.        Jayleen Fleming LPN            "

## 2019-06-28 NOTE — PROGRESS NOTES
Hematology Follow-up Visit:  June 28, 2019    Reason for Visit:  Patient presents with:  RECHECK: Follow up Monoclonal gammopathy of unknown significance (MGUS)      Nursing Notes and Documentation reviewed:  yes    HPI: This is a 79-year-old male patient who presents to the oncology/hematology clinic today in followup of monoclonal gammopathy of undetermined significance diagnosed 2/2018 during workup for hypercoag state, along with previous diagnosis of anemia related to GI bleed.  Patient does have a history of previous DVT and PE and has been on long-term anticoagulation with Coumadin.  Coumadin was held after GI bleed was diagnosed and has been resumed.      He presents to the clinic today accompanied by his wife stating he is doing well.  He continues to get somewhat winded with exertion but has essentially no change in this.  He has had no evidence of any bleeding and continues on one iron tablet daily.  He states the iron does not upset his stomach and does not have any issues with constipation.  He actually has loose stools on occasion occurring maybe once a month and denies any blood in his stool.  Energy level has been good and he remains very active.    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   Protein C and protein S along with antiphospholipid profile were negative.  SPEP was completed showing an M-spike of 1.1 with serum immunofixation showing monoclonal IgM immunoglobulin, kappa light chain type with IgM of 1530.  Bone marrow aspiration biopsy showed iron deficiency, slightly increased plasma cell proximally 2% kappa and toxic neutrophils with reactive lymphocytes and increased rouleaux formation; flow cytometry and immunophenotyping was unremarkable. Skeletal bone survey was completed on 2/6/2018 showing a stable T12 compression fracture but no evidence of lytic lesions.      He was placed on oral iron with  improvement in his hemoglobin and iron studies.      Treatment: n/a    Past Medical History:   Diagnosis Date     Autoimmune thyroiditis 2017    Dr. Simeon; silent; transient hyperthyroid, now normal; monitor TSH monthly for hypothyroidism     Chronic anticoagulation 2012     Elevated serum alkaline phosphatase level 2016    normal GGT, normal bone skeletal survery     Gastric ulcer     endoscopy 2016, repeat 6 months; PPI Carafate     Hyperlipidemia      Normocytic anemia 2016     Pulmonary nodule     CT 2014, right; consider repeat 1 year if high risk     Upper GI bleed 2017     Vitamin D deficiency 2012       Social History     Socioeconomic History     Marital status:      Spouse name: Not on file     Number of children: Not on file     Years of education: Not on file     Highest education level: Not on file   Occupational History     Employer: [employer]     Comment: Retired    Social Needs     Financial resource strain: Not on file     Food insecurity:     Worry: Not on file     Inability: Not on file     Transportation needs:     Medical: Not on file     Non-medical: Not on file   Tobacco Use     Smoking status: Former Smoker     Types: Cigarettes     Start date: 5/15/1958     Last attempt to quit: 10/28/2009     Years since quittin.6     Smokeless tobacco: Never Used     Tobacco comment: Quit    Substance and Sexual Activity     Alcohol use: No     Alcohol/week: 0.0 oz     Drug use: No     Sexual activity: Not on file   Lifestyle     Physical activity:     Days per week: Not on file     Minutes per session: Not on file     Stress: Not on file   Relationships     Social connections:     Talks on phone: Not on file     Gets together: Not on file     Attends Sikh service: Not on file     Active member of club or organization: Not on file     Attends meetings of clubs or organizations: Not on file     Relationship status: Not on file      Intimate partner violence:     Fear of current or ex partner: Not on file     Emotionally abused: Not on file     Physically abused: Not on file     Forced sexual activity: Not on file   Other Topics Concern      Service Yes     Comment: [employer]     Blood Transfusions Yes     Comment: Permits if needed     Caffeine Concern Yes     Comment: Tea     Occupational Exposure No     Hobby Hazards No     Sleep Concern No     Stress Concern No     Weight Concern No     Special Diet No     Back Care No     Exercise No     Bike Helmet Not Asked     Seat Belt Yes     Self-Exams Yes     Parent/sibling w/ CABG, MI or angioplasty before 65F 55M? No   Social History Narrative     Not on file       Past Surgical History:   Procedure Laterality Date     ANGIOGRAM  6/23/2014     BIOPSY  2018    bone marrow biopsy     BONE MARROW BIOPSY, BONE SPECIMEN, NEEDLE/TROCAR N/A 1/16/2018    Procedure: BIOPSY BONE MARROW;  BONE MARROW BIOPSY;  Surgeon: Nuno Castro MD;  Location: [facility]     C REGULAR ECHO (FL)  6/23/2014    Dr. Cavazos     C. Difficile with intussuseption       cataract extraction       colonoscopy       COLONOSCOPY  6/17     ENDOSCOPY UPPER, COLONOSCOPY, COMBINED N/A 6/17/2016    Procedure: COMBINED ENDOSCOPY UPPER, COLONOSCOPY;  Surgeon: Andrea Stearns DO;  Location: [facility]     ESOPHAGOSCOPY, GASTROSCOPY, DUODENOSCOPY (EGD), COMBINED N/A 11/30/2017    Procedure: COMBINED ESOPHAGOSCOPY, GASTROSCOPY, DUODENOSCOPY (EGD);  UPPER ENDOSCOPY;  Surgeon: Narinder Torres MD;  Location: [facility]     left knee meniscal tear       Left lower extremity DVT       lens implant       nasal polypectomy       Pulmonary Embolism         Family History   Problem Relation Age of Onset     Cancer Mother         Ovarian     Other Cancer Mother         lung/breast/ cervical ?     Respiratory Father         emphysemia     Lung Cancer Brother      Diabetes No family hx of      Hypertension No family hx of      Hyperlipidemia No family  hx of      Thyroid Disease No family hx of      Asthma No family hx of      Colon Cancer No family hx of      Prostate Cancer No family hx of      Anesthesia Reaction No family hx of      Genetic Disorder No family hx of      Cerebrovascular Disease No family hx of      Coronary Artery Disease No family hx of      Breast Cancer No family hx of        Allergies:  Allergies as of 06/28/2019     (No Known Allergies)       Current Medications:  Current Outpatient Medications   Medication Sig Dispense Refill     atorvastatin (LIPITOR) 10 MG tablet Take 1 tablet (10 mg) by mouth daily 90 tablet 1     Cholecalciferol (VITAMIN D) 400 UNITS tablet Take 1,000 Units by mouth daily        cyanocobalamin ([facility] VITAMIN  B12) 1000 MCG TABS Take 1,000 mcg by mouth daily 90 tablet 3     ferrous sulfate (FEROSUL) 325 (65 Fe) MG tablet Take 1 tablet (325 mg) by mouth 2 times daily 90 tablet 11     Lycopene 10 MG CAPS        Multiple Vitamins-Minerals (MULTIVITAMIN ADULT PO) Take 1 tablet by mouth       omeprazole (PRILOSEC) 40 MG DR capsule TAKE 1 CAPSULE(40 MG) BY MOUTH DAILY 90 capsule 3     pyridoxine (VITAMIN B-6) 50 MG TABS Take 1 tablet (50 mg) by mouth daily 90 tablet 3     warfarin (COUMADIN) 4 MG tablet TAKE 1& 1/2 TABLETS BY MOUTH ON MONDAY& FRIDAY. TAKE 1 TABLET ON ALL OTHER DAYS 120 tablet 3          Review Of Systems:  Constitutional: denies fever, weight changes  Eyes: denies blurred or double vision  Ears/Nose/Throat: denies ear pain, nose problems, difficulty swallowing  Respiratory: denies shortness of breath, cough  Skin: denies new rash, lesions  Cardiovascular: denies chest pain, palpitations, has mild edema to the left lower extremity  Gastrointestinal: denies abdominal pain, bloating, nausea; no change in bowel habits-has had loose stools at times-maybe once a month-no blood in his stool  Genitourinary: denies difficulty with urination, blood in urine  Musculoskeletal: denies new muscle pain, bone  "pain  Neurologic: denies lightheadedness,  numbness orTingling, gets occasional headaches  Hematologic/Lymphatic/Immunologic: denies easy bleeding, lumps or bumps noted; has easy bruising  Endocrine: Denies increased thirst, night sweats      Physical Exam:  /80   Pulse 70   Temp 96.6  F (35.9  C) (Tympanic)   Resp 20   Ht 1.638 m (5' 4.5\")   Wt 81.6 kg (179 lb 12.8 oz)   SpO2 94%   BMI 30.39 kg/m    GENERAL APPEARANCE: Healthy, alert and in no acute distress.  HEENT: Normocephalic, Sclerae anicteric. Oropharynx without ulcers, lesions, or thrush.  NECK:  No asymmetry or masses, no thyromegaly.  LYMPHATICS: No palpable cervical, supraclavicular, axillary, or inguinal nodes   RESP: Lungs clear to auscultation bilaterally, respirations regular and easy  CARDIOVASCULAR: Regular rate and rhythm. Normal S1, S2; no murmur, gallop, or rub.  ABDOMEN: Soft, nontender. Bowel sounds auscultated all 4 quadrants. No palpable organomegaly or masses.  MUSCULOSKELETAL: Extremities without gross deformities noted. Mild edema of the left lower extremities.  NEURO: Alert and oriented x 3.  Gait steady.  PSYCHIATRIC: Mentation and affect appear normal.  Mood appropriate.    Laboratory/Imaging Studies:  Results for orders placed or performed in visit on 06/21/19   Macroglobulins   Result Value Ref Range    Viscosity Index 1.8 1.4 - 1.8   Lactate Dehydrogenase   Result Value Ref Range    Lactate Dehydrogenase 209 85 - 227 U/L   Kappa and lambda light chain   Result Value Ref Range    Kappa Free Lt Chain Specimen sent to reference laboratory for testing. 0.33 - 1.94 mg/dL    Lambda Free Lt Chain Specimen sent to reference laboratory for testing. 0.57 - 2.63 mg/dL    Kappa Lambda Ratio Specimen sent to reference laboratory for testing. 0.26 - 1.65   Iron and iron binding capacity   Result Value Ref Range    Iron 83 35 - 180 ug/dL    Iron Binding Cap 284 240 - 430 ug/dL    Iron Saturation Index 29 15 - 46 %   Immunoglobulins A " G and M   Result Value Ref Range     695 - 1,620 mg/dL     70 - 380 mg/dL    IGM 1,550 (H) 60 - 265 mg/dL   Ferritin   Result Value Ref Range    Ferritin 73 26 - 388 ng/mL   ELP reflex to IELP   Result Value Ref Range    Albumin Fraction 4.0 3.7 - 5.1 g/dL    Alpha 1 Fraction 0.3 0.2 - 0.4 g/dL    Alpha 2 Fraction 0.9 0.5 - 0.9 g/dL    Beta Fraction 2.1 (H) 0.6 - 1.0 g/dL    Gamma Fraction 0.7 0.7 - 1.6 g/dL    Monoclonal Peak 1.4 (H) 0.0 g/dL    ELP Interpretation:       Monoclonal protein (1.4 g/dL) seen in the beta fraction co migrating with C3 complement.   See immunofixation report on same specimen. Pathologic significance requires clinical   correlation.  Lauri Vincent M.D., Ph.D., Pathologist.      Comprehensive metabolic panel   Result Value Ref Range    Sodium 139 133 - 144 mmol/L    Potassium 4.1 3.4 - 5.3 mmol/L    Chloride 108 94 - 109 mmol/L    Carbon Dioxide 26 20 - 32 mmol/L    Anion Gap 5 3 - 14 mmol/L    Glucose 90 70 - 99 mg/dL    Urea Nitrogen 17 7 - 30 mg/dL    Creatinine 0.78 0.66 - 1.25 mg/dL    GFR Estimate 87 >60 mL/min/[1.73_m2]    GFR Estimate If Black >90 >60 mL/min/[1.73_m2]    Calcium 8.5 8.5 - 10.1 mg/dL    Bilirubin Total 0.5 0.2 - 1.3 mg/dL    Albumin 3.5 3.4 - 5.0 g/dL    Protein Total 8.1 6.8 - 8.8 g/dL    Alkaline Phosphatase 176 (H) 40 - 150 U/L    ALT 34 0 - 70 U/L    AST 36 0 - 45 U/L   CBC with platelets differential   Result Value Ref Range    WBC 5.2 4.0 - 11.0 10e9/L    RBC Count 4.30 (L) 4.4 - 5.9 10e12/L    Hemoglobin 13.0 (L) 13.3 - 17.7 g/dL    Hematocrit 38.9 (L) 40.0 - 53.0 %    MCV 91 78 - 100 fl    MCH 30.2 26.5 - 33.0 pg    MCHC 33.4 31.5 - 36.5 g/dL    RDW 13.4 10.0 - 15.0 %    Platelet Count 166 150 - 450 10e9/L    Diff Method Automated Method     % Neutrophils 64.7 %    % Lymphocytes 19.8 %    % Monocytes 10.8 %    % Eosinophils 3.7 %    % Basophils 0.8 %    % Immature Granulocytes 0.2 %    Nucleated RBCs 0 0 /100    Absolute Neutrophil 3.4  1.6 - 8.3 10e9/L    Absolute Lymphocytes 1.0 0.8 - 5.3 10e9/L    Absolute Monocytes 0.6 0.0 - 1.3 10e9/L    Absolute Eosinophils 0.2 0.0 - 0.7 10e9/L    Absolute Basophils 0.0 0.0 - 0.2 10e9/L    Abs Immature Granulocytes 0.0 0 - 0.4 10e9/L    Absolute Nucleated RBC 0.0    Beta 2 microglobulin   Result Value Ref Range    Beta-2-Microglobulin 2.4 (H) <2.3 mg/L   Immunofixation ELP   Result Value Ref Range    Immunofixation ELP (Note)    [facility] Miscellaneous Test   Result Value Ref Range    Result SEE NOTE (A)     Test Name       Kappa/Lambda Quantitative Free Light Chains with Ratio, Serum    Send Outs Misc Test Code 55,167     Send Outs Misc Test Specimen Serum       Kappa quantitative free light chains = 3.67  Lambda quantitative free light chain = 1.39  Kappa/lambda free light chain ratio = 2.64    Immunofixation ELP 06/21/2019  9:15 AM 51   (Note)    Comment:   Monoclonal IgM immunoglobulin of kappa light chain type.   Pathological significance requires clinical correlation.   Lauri Vincent M.D., Ph.D., Pathologist.        ASSESSMENT/PLAN:    #1 MGUS: IgM Monoclonal gammopathy of undetermined significance diagnosed January 2018.  M spike is up a tad with slight elevation of the Kappa/Lambda ratio; normal calcium, hemoglobin slightly decreased and kidney functions WNL.  MGUS considered essentially stable as he has had some fluctuation his his hemoglobin and ratio in the past.  No evidence of end organ damage.  We'll see him back in 4 months with the same lab work.     #2  Anemia:  Hemoglobin slightly dropped and iron studies are WNL.  Discussed the possible negative effects of the iron if it is not needed and that the drop in hgb likely not related to iron levels.  Will continue with 1 tablet daily and recheck his iron studies in 4 months.     I encouraged patient to call with any questions or concerns.      Britney PADILLA, FNP-BC, AOCNP

## 2019-06-28 NOTE — PATIENT INSTRUCTIONS
We would like to see you back in 4 [facility]. Please come 1 week prior for lab work.     If you have any questions please call [phone]    Other instructions:  Stay on 1 iron tablet daily

## 2019-08-02 ENCOUNTER — ANTICOAGULATION THERAPY VISIT (OUTPATIENT)
Dept: ANTICOAGULATION | Facility: OTHER | Age: 79
End: 2019-08-02
Attending: FAMILY MEDICINE
Payer: MEDICARE

## 2019-08-02 DIAGNOSIS — Z86.718 HISTORY OF DVT OF LOWER EXTREMITY: ICD-10-CM

## 2019-08-02 DIAGNOSIS — Z86.711 HX PULMONARY EMBOLISM: ICD-10-CM

## 2019-08-02 DIAGNOSIS — Z79.01 LONG TERM CURRENT USE OF ANTICOAGULANT THERAPY: ICD-10-CM

## 2019-08-02 DIAGNOSIS — K92.2 ACUTE UPPER GASTROINTESTINAL BLEEDING: ICD-10-CM

## 2019-08-02 LAB — INR POINT OF CARE: 1.7 (ref 0.86–1.14)

## 2019-08-02 PROCEDURE — 85610 PROTHROMBIN TIME: CPT | Mod: QW,ZL

## 2019-08-02 NOTE — PROGRESS NOTES
ANTICOAGULATION FOLLOW-UP CLINIC VISIT    Patient Name:  Rey Tristan  Date:  2019  Contact Type:  Face to Face    SUBJECTIVE:  Patient Findings     Comments:   No bleeding/bruising, no changes in diet/meds/activity        Clinical Outcomes     Negatives:   Major bleeding event, Thromboembolic event, Anticoagulation-related hospital admission, Anticoagulation-related ED visit, Anticoagulation-related fatality    Comments:   No bleeding/bruising, no changes in diet/meds/activity           OBJECTIVE    INR Protime   Date Value Ref Range Status   2019 1.7 (A) 0.86 - 1.14 Final       ASSESSMENT / PLAN  INR assessment THER    Recheck INR In: 6 WEEKS    INR Location Clinic      Anticoagulation Summary  As of 2019    INR goal:   1.5-2.0   TTR:   60.7 % (2.8 y)   INR used for dosin.7 (2019)   Warfarin maintenance plan:   6 mg (4 mg x 1.5) every Mon, Fri; 4 mg (4 mg x 1) all other days   Full warfarin instructions:   6 mg every Mon, Fri; 4 mg all other days   Weekly warfarin total:   32 mg   No change documented:   Lala Boss RN   Plan last modified:   Lala Boss RN (2018)   Next INR check:   2019   Target end date:   Indefinite    Indications    Long-term (current) use of anticoagulants [Z79.01] [Z79.01]  Hx pulmonary embolism [Z86.711]  History of DVT of lower extremity [Z86.718]  Acute upper gastrointestinal bleeding [K92.2]             Anticoagulation Episode Summary     INR check location:       Preferred lab:       Send INR reminders to:   [facility] ANTICOAG POOL    Comments:         Anticoagulation Care Providers     Provider Role Specialty Phone number    Gianna Richard MD [facility] [phone]            See the Encounter Report to view Anticoagulation Flowsheet and Dosing Calendar (Go to Encounters tab in chart review, and find the Anticoagulation Therapy Visit)        Lala Boss RN

## 2019-09-13 ENCOUNTER — ANTICOAGULATION THERAPY VISIT (OUTPATIENT)
Dept: ANTICOAGULATION | Facility: OTHER | Age: 79
End: 2019-09-13
Attending: FAMILY MEDICINE
Payer: MEDICARE

## 2019-09-13 DIAGNOSIS — Z79.01 LONG TERM CURRENT USE OF ANTICOAGULANT THERAPY: ICD-10-CM

## 2019-09-13 DIAGNOSIS — Z86.718 HISTORY OF DVT OF LOWER EXTREMITY: ICD-10-CM

## 2019-09-13 DIAGNOSIS — Z86.711 HX PULMONARY EMBOLISM: ICD-10-CM

## 2019-09-13 DIAGNOSIS — K92.2 ACUTE UPPER GASTROINTESTINAL BLEEDING: ICD-10-CM

## 2019-09-13 LAB — INR POINT OF CARE: 1.8 (ref 0.86–1.14)

## 2019-09-13 PROCEDURE — 85610 PROTHROMBIN TIME: CPT | Mod: QW,ZL

## 2019-09-13 NOTE — PROGRESS NOTES
ANTICOAGULATION FOLLOW-UP CLINIC VISIT    Patient Name:  Rey Tristan  Date:  2019  Contact Type:  Face to Face    SUBJECTIVE:  Patient Findings     Comments:   INR completed by Nurse.  No bleeding/bruising or changes to diet/meds/activity.  No upcoming procedures.  Went over INR, warfarin dosing, next INR recheck date.  Patient verbalized understanding.        Clinical Outcomes     Comments:   INR completed by Nurse.  No bleeding/bruising or changes to diet/meds/activity.  No upcoming procedures.  Went over INR, warfarin dosing, next INR recheck date.  Patient verbalized understanding.           OBJECTIVE    INR Protime   Date Value Ref Range Status   2019 1.8 (A) 0.86 - 1.14 Final       ASSESSMENT / PLAN  INR assessment THER    Recheck INR In: 6 WEEKS    INR Location Clinic      Anticoagulation Summary  As of 2019    INR goal:   1.5-2.0   TTR:   62.2 % (2.9 y)   INR used for dosin.8 (2019)   Warfarin maintenance plan:   6 mg (4 mg x 1.5) every Mon, Fri; 4 mg (4 mg x 1) all other days   Full warfarin instructions:   6 mg every Mon, Fri; 4 mg all other days   Weekly warfarin total:   32 mg   No change documented:   Corina Acevedo, RN   Plan last modified:   Lala Boss RN (2018)   Next INR check:   10/21/2019   Target end date:   Indefinite    Indications    Long-term (current) use of anticoagulants [Z79.01] [Z79.01]  Hx pulmonary embolism [Z86.711]  History of DVT of lower extremity [Z86.718]  Acute upper gastrointestinal bleeding [K92.2]             Anticoagulation Episode Summary     INR check location:       Preferred lab:       Send INR reminders to:   [facility] ANTICOAG POOL    Comments:         Anticoagulation Care Providers     Provider Role Specialty Phone number    Gianna Richard MD [facility] [phone]            See the Encounter Report to view Anticoagulation Flowsheet and Dosing Calendar (Go to Encounters tab in chart review, and find the  Anticoagulation Therapy Visit)      Corina Acevedo RN

## 2019-10-14 DIAGNOSIS — D64.9 ANEMIA, UNSPECIFIED TYPE: ICD-10-CM

## 2019-10-14 DIAGNOSIS — D47.2 IGM MONOCLONAL GAMMOPATHY OF UNCERTAIN SIGNIFICANCE: ICD-10-CM

## 2019-10-14 LAB
ALBUMIN SERPL-MCNC: 3.3 G/DL (ref 3.4–5)
ALP SERPL-CCNC: 172 U/L (ref 40–150)
ALT SERPL W P-5'-P-CCNC: 30 U/L (ref 0–70)
ANION GAP SERPL CALCULATED.3IONS-SCNC: 3 MMOL/L (ref 3–14)
AST SERPL W P-5'-P-CCNC: 28 U/L (ref 0–45)
BASOPHILS # BLD AUTO: 0.1 10E9/L (ref 0–0.2)
BASOPHILS NFR BLD AUTO: 1 %
BILIRUB SERPL-MCNC: 0.4 MG/DL (ref 0.2–1.3)
BUN SERPL-MCNC: 12 MG/DL (ref 7–30)
CALCIUM SERPL-MCNC: 8.6 MG/DL (ref 8.5–10.1)
CHLORIDE SERPL-SCNC: 105 MMOL/L (ref 94–109)
CO2 SERPL-SCNC: 29 MMOL/L (ref 20–32)
CREAT SERPL-MCNC: 0.73 MG/DL (ref 0.66–1.25)
DIFFERENTIAL METHOD BLD: ABNORMAL
EOSINOPHIL # BLD AUTO: 0.2 10E9/L (ref 0–0.7)
EOSINOPHIL NFR BLD AUTO: 3.3 %
ERYTHROCYTE [DISTWIDTH] IN BLOOD BY AUTOMATED COUNT: 13.3 % (ref 10–15)
FERRITIN SERPL-MCNC: 88 NG/ML (ref 26–388)
GFR SERPL CREATININE-BSD FRML MDRD: 89 ML/MIN/{1.73_M2}
GLUCOSE SERPL-MCNC: 93 MG/DL (ref 70–99)
HCT VFR BLD AUTO: 40.9 % (ref 40–53)
HGB BLD-MCNC: 13.1 G/DL (ref 13.3–17.7)
IMM GRANULOCYTES # BLD: 0 10E9/L (ref 0–0.4)
IMM GRANULOCYTES NFR BLD: 0.2 %
IRON SATN MFR SERPL: 27 % (ref 15–46)
IRON SERPL-MCNC: 82 UG/DL (ref 35–180)
LYMPHOCYTES # BLD AUTO: 0.9 10E9/L (ref 0.8–5.3)
LYMPHOCYTES NFR BLD AUTO: 16.4 %
MCH RBC QN AUTO: 29.4 PG (ref 26.5–33)
MCHC RBC AUTO-ENTMCNC: 32 G/DL (ref 31.5–36.5)
MCV RBC AUTO: 92 FL (ref 78–100)
MONOCYTES # BLD AUTO: 0.5 10E9/L (ref 0–1.3)
MONOCYTES NFR BLD AUTO: 9.4 %
NEUTROPHILS # BLD AUTO: 3.7 10E9/L (ref 1.6–8.3)
NEUTROPHILS NFR BLD AUTO: 69.7 %
NRBC # BLD AUTO: 0 10*3/UL
NRBC BLD AUTO-RTO: 0 /100
PLATELET # BLD AUTO: 177 10E9/L (ref 150–450)
POTASSIUM SERPL-SCNC: 4.1 MMOL/L (ref 3.4–5.3)
PROT SERPL-MCNC: 8.3 G/DL (ref 6.8–8.8)
RBC # BLD AUTO: 4.45 10E12/L (ref 4.4–5.9)
SODIUM SERPL-SCNC: 137 MMOL/L (ref 133–144)
TIBC SERPL-MCNC: 299 UG/DL (ref 240–430)
WBC # BLD AUTO: 5.2 10E9/L (ref 4–11)

## 2019-10-14 PROCEDURE — 83550 IRON BINDING TEST: CPT | Mod: ZL | Performed by: NURSE PRACTITIONER

## 2019-10-14 PROCEDURE — 80053 COMPREHEN METABOLIC PANEL: CPT | Mod: ZL | Performed by: NURSE PRACTITIONER

## 2019-10-14 PROCEDURE — 00000402 ZZHCL STATISTIC TOTAL PROTEIN: Mod: ZL | Performed by: NURSE PRACTITIONER

## 2019-10-14 PROCEDURE — 36415 COLL VENOUS BLD VENIPUNCTURE: CPT | Mod: ZL | Performed by: NURSE PRACTITIONER

## 2019-10-14 PROCEDURE — 83540 ASSAY OF IRON: CPT | Mod: ZL | Performed by: NURSE PRACTITIONER

## 2019-10-14 PROCEDURE — 83883 ASSAY NEPHELOMETRY NOT SPEC: CPT | Mod: ZL | Performed by: NURSE PRACTITIONER

## 2019-10-14 PROCEDURE — 86334 IMMUNOFIX E-PHORESIS SERUM: CPT | Mod: ZL | Performed by: NURSE PRACTITIONER

## 2019-10-14 PROCEDURE — 85025 COMPLETE CBC W/AUTO DIFF WBC: CPT | Mod: ZL | Performed by: NURSE PRACTITIONER

## 2019-10-14 PROCEDURE — 82728 ASSAY OF FERRITIN: CPT | Mod: ZL | Performed by: NURSE PRACTITIONER

## 2019-10-14 PROCEDURE — 82784 ASSAY IGA/IGD/IGG/IGM EACH: CPT | Mod: ZL | Performed by: NURSE PRACTITIONER

## 2019-10-14 PROCEDURE — 84165 PROTEIN E-PHORESIS SERUM: CPT | Mod: ZL | Performed by: NURSE PRACTITIONER

## 2019-10-15 LAB
ALBUMIN SERPL ELPH-MCNC: 3.9 G/DL (ref 3.7–5.1)
ALPHA1 GLOB SERPL ELPH-MCNC: 0.3 G/DL (ref 0.2–0.4)
ALPHA2 GLOB SERPL ELPH-MCNC: 0.9 G/DL (ref 0.5–0.9)
B-GLOBULIN SERPL ELPH-MCNC: 2.1 G/DL (ref 0.6–1)
GAMMA GLOB SERPL ELPH-MCNC: 0.7 G/DL (ref 0.7–1.6)
IGA SERPL-MCNC: 116 MG/DL (ref 70–380)
IGG SERPL-MCNC: 1010 MG/DL (ref 695–1620)
IGM SERPL-MCNC: 1730 MG/DL (ref 60–265)
KAPPA LC UR-MCNC: 4.35 MG/DL (ref 0.33–1.94)
KAPPA LC/LAMBDA SER: 2.72 {RATIO} (ref 0.26–1.65)
LAMBDA LC SERPL-MCNC: 1.6 MG/DL (ref 0.57–2.63)
M PROTEIN SERPL ELPH-MCNC: 1.3 G/DL
PROT PATTERN SERPL ELPH-IMP: ABNORMAL
PROT PATTERN SERPL IFE-IMP: NORMAL

## 2019-10-20 NOTE — PROGRESS NOTES
Hematology Follow-up Visit:  October 21, 2019    Reason for Visit:  Patient presents with:  RECHECK: Follow up Monoclonal gammopathy of unknown significance (MGUS)      Nursing Notes and Documentation reviewed:  yes    HPI: This is a 79-year-old male patient who presents to the clinic today in followup of monoclonal gammopathy of undetermined significance diagnosed 2/2018 during workup for hypercoag state, along with previous diagnosis of anemia related to GI bleed.  Patient does have a history of previous DVT and PE and has been on long-term anticoagulation with Coumadin.  Coumadin was held after GI bleed was diagnosed and has been resumed.     He presents to the clinic today stating he is doing well.  States he had an upper respiratory infection a couple weeks ago but has recovered from this.  He has a remaining slight cough.  Denies any fevers.  He continues on one iron tablet daily and thinks he may have some iron in his multivitamin also.    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   Protein C and protein S along with antiphospholipid profile were negative.  SPEP was completed showing an M-spike of 1.1 with serum immunofixation showing monoclonal IgM immunoglobulin, kappa light chain type with IgM of 1530.  Bone marrow aspiration biopsy showed iron deficiency, slightly increased plasma cell proximally 2% kappa and toxic neutrophils with reactive lymphocytes and increased rouleaux formation; flow cytometry and immunophenotyping was unremarkable. Skeletal bone survey was completed on 2/6/2018 showing a stable T12 compression fracture but no evidence of lytic lesions.      He was placed on oral iron with improvement in his hemoglobin and iron studies.      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 Simeon; silent; transient hyperthyroid, now normal; monitor TSH  monthly for hypothyroidism     Chronic anticoagulation 2012     Elevated serum alkaline phosphatase level 2016    normal GGT, normal bone skeletal survery     Gastric ulcer     endoscopy 2016, repeat 6 months; PPI Carafate     Hyperlipidemia      Normocytic anemia 2016     Pulmonary nodule     CT 2014, right; consider repeat 1 year if high risk     Upper GI bleed 2017     Vitamin D deficiency 2012       Social History     Socioeconomic History     Marital status:      Spouse name: Not on file     Number of children: Not on file     Years of education: Not on file     Highest education level: Not on file   Occupational History     Employer: [employer]     Comment: Retired    Social Needs     Financial resource strain: Not on file     Food insecurity:     Worry: Not on file     Inability: Not on file     Transportation needs:     Medical: Not on file     Non-medical: Not on file   Tobacco Use     Smoking status: Former Smoker     Types: Cigarettes     Start date: 5/15/1958     Last attempt to quit: 10/28/2009     Years since quittin.9     Smokeless tobacco: Never Used     Tobacco comment: Quit    Substance and Sexual Activity     Alcohol use: No     Alcohol/week: 0.0 standard drinks     Drug use: No     Sexual activity: Not on file   Lifestyle     Physical activity:     Days per week: Not on file     Minutes per session: Not on file     Stress: Not on file   Relationships     Social connections:     Talks on phone: Not on file     Gets together: Not on file     Attends Jehovah's witness service: Not on file     Active member of club or organization: Not on file     Attends meetings of clubs or organizations: Not on file     Relationship status: Not on file     Intimate partner violence:     Fear of current or ex partner: Not on file     Emotionally abused: Not on file     Physically abused: Not on file     Forced sexual activity: Not on file   Other Topics  Concern      Service Yes     Comment: [employer]     Blood Transfusions Yes     Comment: Permits if needed     Caffeine Concern Yes     Comment: Tea     Occupational Exposure No     Hobby Hazards No     Sleep Concern No     Stress Concern No     Weight Concern No     Special Diet No     Back Care No     Exercise No     Bike Helmet Not Asked     Seat Belt Yes     Self-Exams Yes     Parent/sibling w/ CABG, MI or angioplasty before 65F 55M? No   Social History Narrative     Not on file       Past Surgical History:   Procedure Laterality Date     ANGIOGRAM  6/23/2014     BIOPSY  2018    bone marrow biopsy     BONE MARROW BIOPSY, BONE SPECIMEN, NEEDLE/TROCAR N/A 1/16/2018    Procedure: BIOPSY BONE MARROW;  BONE MARROW BIOPSY;  Surgeon: Nuno Castro MD;  Location: [facility]     C REGULAR ECHO (FL)  6/23/2014    Dr. Dirk EATON Difficile with intussuseption       cataract extraction       colonoscopy       COLONOSCOPY  6/17     ENDOSCOPY UPPER, COLONOSCOPY, COMBINED N/A 6/17/2016    Procedure: COMBINED ENDOSCOPY UPPER, COLONOSCOPY;  Surgeon: Andrea Stearns DO;  Location: [facility]     ESOPHAGOSCOPY, GASTROSCOPY, DUODENOSCOPY (EGD), COMBINED N/A 11/30/2017    Procedure: COMBINED ESOPHAGOSCOPY, GASTROSCOPY, DUODENOSCOPY (EGD);  UPPER ENDOSCOPY;  Surgeon: Narinder Torres MD;  Location: [facility]     left knee meniscal tear       Left lower extremity DVT       lens implant       nasal polypectomy       Pulmonary Embolism         Family History   Problem Relation Age of Onset     Cancer Mother         Ovarian     Other Cancer Mother         lung/breast/ cervical ?     Respiratory Father         emphysemia     Lung Cancer Brother      Diabetes No family hx of      Hypertension No family hx of      Hyperlipidemia No family hx of      Thyroid Disease No family hx of      Asthma No family hx of      Colon Cancer No family hx of      Prostate Cancer No family hx of      Anesthesia Reaction No family hx of      Genetic  Disorder No family hx of      Cerebrovascular Disease No family hx of      Coronary Artery Disease No family hx of      Breast Cancer No family hx of        Allergies:  Allergies as of 10/21/2019     (No Known Allergies)       Current Medications:  Current Outpatient Medications   Medication Sig Dispense Refill     atorvastatin (LIPITOR) 10 MG tablet Take 1 tablet (10 mg) by mouth daily 90 tablet 1     Cholecalciferol (VITAMIN D) 400 UNITS tablet Take 1,000 Units by mouth daily        cyanocobalamin ([employer] VITAMIN  B12) 1000 MCG TABS Take 1,000 mcg by mouth daily 90 tablet 3     ferrous sulfate (FEROSUL) 325 (65 Fe) MG tablet Take 1 tablet (325 mg) by mouth daily (with breakfast) 90 tablet 11     Lycopene 10 MG CAPS        Multiple Vitamins-Minerals (MULTIVITAMIN ADULT PO) Take 1 tablet by mouth       omeprazole (PRILOSEC) 40 MG DR capsule TAKE 1 CAPSULE(40 MG) BY MOUTH DAILY 90 capsule 3     pyridoxine (VITAMIN B-6) 50 MG TABS Take 1 tablet (50 mg) by mouth daily 90 tablet 3     warfarin (COUMADIN) 4 MG tablet TAKE 1& 1/2 TABLETS BY MOUTH ON MONDAY& FRIDAY. TAKE 1 TABLET ON ALL OTHER DAYS 120 tablet 3          Review Of Systems:  Constitutional: denies fever, weight changes  Eyes: Right eye is difficult to focus up close related to cataract  Ears/Nose/Throat: denies ear pain, nose problems, difficulty swallowing  Respiratory: denies shortness of breath, cough  Skin: denies rash, lesions  Cardiovascular: denies chest pain, palpitations, edema  Gastrointestinal: denies abdominal pain, bloating, nausea, early satiety  Genitourinary: denies difficulty with urination, blood in urine  Musculoskeletal: denies new muscle pain, bone pain  Neurologic: denies lightheadedness,  numbness orTingling, does get occasional headaches but these have not changed  Hematologic/Lymphatic/Immunologic: denies edema, lumps or bumps noted; some easy bleeding and easy bruising related to chronic warfarin use  Endocrine: Denies increased thirst,  "night sweats      Physical Exam:  /78   Pulse 64   Temp 97.8  F (36.6  C) (Tympanic)   Resp 18   Ht 1.638 m (5' 4.5\")   Wt 80.9 kg (178 lb 6.4 oz)   SpO2 93%   BMI 30.15 kg/m    GENERAL APPEARANCE: Healthy, alert and in no acute distress.  HEENT: Normocephalic, Sclerae anicteric. Oropharynx without ulcers, lesions, or thrush.  NECK:  No asymmetry or masses, no thyromegaly.  LYMPHATICS: No palpable cervical, supraclavicular, axillary, or inguinal nodes   RESP: Lungs clear to auscultation bilaterally, respirations regular and easy  CARDIOVASCULAR: Regular rate and rhythm. Normal S1, S2; no murmur, gallop, or rub.  ABDOMEN: Soft, nontender. Bowel sounds auscultated all 4 quadrants. No palpable organomegaly or masses.  MUSCULOSKELETAL: Extremities without gross deformities noted. No edema of bilateral lower extremities.  SKIN: No suspicious lesions or rashes.  NEURO: Alert and oriented x 3.  Gait steady.  PSYCHIATRIC: Mentation and affect appear normal.  Mood appropriate.    Laboratory/Imaging Studies:    Results for orders placed or performed in visit on 10/21/19   INR point of care   Result Value Ref Range    INR Protime 1.9 (A) 0.86 - 1.14      Component Value Flag Ref Range Units Status Collected Lab   Iron 82   35 - 180 ug/dL Final 10/14/2019  9:45 AM [facility]   Iron Binding Cap 299   240 - 430 ug/dL Final 10/14/2019  9:45 AM [facility]   Iron Saturation Index 27   15 - 46 % Final 10/14/2019  9:45 AM [facility]     Component Value Flag Ref Range Units Status Collected Lab   Ferritin 88   26 - 388 ng/mL Final 10/14/2019  9:45 AM [facility]     Component Value Flag Ref Range Units Status Collected Lab   Sodium 137   133 - 144 mmol/L Final 10/14/2019  9:45 AM [facility]   Potassium 4.1   3.4 - 5.3 mmol/L Final 10/14/2019  9:45 AM [facility]   Chloride 105   94 - 109 mmol/L Final 10/14/2019  9:45 AM [facility]   Carbon Dioxide 29   20 - 32 mmol/L Final 10/14/2019  9:45 AM [facility]   Anion Gap 3   3 - 14 mmol/L Final 10/14/2019  9:45 AM [facility]   Glucose 93   70 - 99 " mg/dL Final 10/14/2019  9:45 AM [facility]   Urea Nitrogen 12   7 - 30 mg/dL Final 10/14/2019  9:45 AM [facility]   Creatinine 0.73   0.66 - 1.25 mg/dL Final 10/14/2019  9:45 AM [facility]   GFR Estimate 89   >60 mL/min/ Final 10/14/2019  9:45 AM [facility]   Comment:   Non  GFR Calc   Starting 12/18/2018, serum creatinine based estimated GFR (eGFR) will be   calculated using the Chronic Kidney Disease Epidemiology Collaboration   (CKD-EPI) equation.    GFR Estimate If Black >90   >60 mL/min/ Final 10/14/2019  9:45 AM [facility]   Comment:    GFR Calc   Starting 12/18/2018, serum creatinine based estimated GFR (eGFR) will be   calculated using the Chronic Kidney Disease Epidemiology Collaboration   (CKD-EPI) equation.    Calcium 8.6   8.5 - 10.1 mg/dL Final 10/14/2019  9:45 AM [facility]   Bilirubin Total 0.4   0.2 - 1.3 mg/dL Final 10/14/2019  9:45 AM [facility]   Albumin 3.3  Low   3.4 - 5.0 g/dL Final 10/14/2019  9:45 AM [facility]   Protein Total 8.3   6.8 - 8.8 g/dL Final 10/14/2019  9:45 AM [facility]   Alkaline Phosphatase 172  High   40 - 150 U/L Final 10/14/2019  9:45 AM [facility]   ALT 30   0 - 70 U/L Final 10/14/2019  9:45 AM [facility]   AST 28   0 - 45 U/L Final 10/14/2019  9:45 AM [facility]       Component Value Flag Ref Range Units Status Collected Lab   IGG 1,010   695 - 1,620 mg/dL Final 10/14/2019  9:45 AM 51      70 - 380 mg/dL Final 10/14/2019  9:45 AM 51   IGM 1,730  High   60 - 265 mg/dL Final 10/14/2019  9:45 AM 51     Component Value Flag Ref Range Units Status Collected Lab   Albumin Fraction 3.9   3.7 - 5.1 g/dL Final 10/14/2019  9:45 AM 51   Alpha 1 Fraction 0.3   0.2 - 0.4 g/dL Final 10/14/2019  9:45 AM 51   Alpha 2 Fraction 0.9   0.5 - 0.9 g/dL Final 10/14/2019  9:45 AM 51   Beta Fraction 2.1  High   0.6 - 1.0 g/dL Final 10/14/2019  9:45 AM 51   Gamma Fraction 0.7   0.7 - 1.6 g/dL Final 10/14/2019  9:45 AM 51   Monoclonal Peak 1.3  High   0.0 g/dL Final 10/14/2019  9:45 AM 51   ELP Interpretation:     Final 10/14/2019  9:45 AM 51    Monoclonal protein (1.3 g/dL) seen in the beta fraction comigrating with C3 complement.   See immunofixation report on same specimen. Pathologic significance requires clinical   correlation.  FREDDIE Jones M.D., Ph.D., Pathologist.      Component Value Flag Ref Range Units Status Collected Lab   WBC 5.2   4.0 - 11.0 10e9/L Final 10/14/2019  9:45 AM [facility]   RBC Count 4.45   4.4 - 5.9 10e12/L Final 10/14/2019  9:45 AM [facility]   Hemoglobin 13.1  Low   13.3 - 17.7 g/dL Final 10/14/2019  9:45 AM [facility]   Hematocrit 40.9   40.0 - 53.0 % Final 10/14/2019  9:45 AM [facility]   MCV 92   78 - 100 fl Final 10/14/2019  9:45 AM [facility]   MCH 29.4   26.5 - 33.0 pg Final 10/14/2019  9:45 AM [facility]   MCHC 32.0   31.5 - 36.5 g/dL Final 10/14/2019  9:45 AM [facility]   RDW 13.3   10.0 - 15.0 % Final 10/14/2019  9:45 AM [facility]   Platelet Count 177   150 - 450 10e9/L Final 10/14/2019  9:45 AM [facility]   Diff Method     Final 10/14/2019  9:45 AM [facility]   Automated Method    % Neutrophils 69.7    % Final 10/14/2019  9:45 AM [facility]   % Lymphocytes 16.4    % Final 10/14/2019  9:45 AM [facility]   % Monocytes 9.4    % Final 10/14/2019  9:45 AM [facility]   % Eosinophils 3.3    % Final 10/14/2019  9:45 AM [facility]   % Basophils 1.0    % Final 10/14/2019  9:45 AM [facility]   % Immature Granulocytes 0.2    % Final 10/14/2019  9:45 AM [facility]   Nucleated RBCs 0   0 /100 Final 10/14/2019  9:45 AM [facility]   Absolute Neutrophil 3.7   1.6 - 8.3 10e9/L Final 10/14/2019  9:45 AM [facility]   Absolute Lymphocytes 0.9   0.8 - 5.3 10e9/L Final 10/14/2019  9:45 AM [facility]   Absolute Monocytes 0.5   0.0 - 1.3 10e9/L Final 10/14/2019  9:45 AM [facility]   Absolute Eosinophils 0.2   0.0 - 0.7 10e9/L Final 10/14/2019  9:45 AM [facility]   Absolute Basophils 0.1   0.0 - 0.2 10e9/L Final 10/14/2019  9:45 AM [facility]   Abs Immature Granulocytes 0.0   0 - 0.4 10e9/L Final 10/14/2019  9:45 AM [facility]   Absolute Nucleated RBC 0.0     Final 10/14/2019  9:45 AM [facility]     Component Value Flag Ref Range Units Status Collected Lab   Kappa Free Lt Chain 4.35  High   0.33 - 1.94 mg/dL Final 10/14/2019   9:45 AM 51   Lambda Free Lt Chain 1.60   0.57 - 2.63 mg/dL Final 10/14/2019  9:45 AM 51   Kappa Lambda Ratio 2.72  High   0.26 - 1.65  Final 10/14/2019  9:45 AM 51     Component Collected Lab   Immunofixation ELP 10/14/2019  9:45 AM 51   (Note)    Comment:   Monoclonal IgM immunoglobulin of kappa light chain type.   Pathological significance requires clinical correlation.   FREDDIE Jones M.D., Ph.D.        ASSESSMENT/PLAN:    #1 MGUS: IgM Monoclonal gammopathy of undetermined significance diagnosed January 2018.  MGUS considered essentially stable.  No evidence of end organ damage.  We'll see him back in 4 months with the same lab work.     #2  Anemia: Hemoglobin stable with iron studies excellent with actually an increase in ferritin with decreasing of the iron tablets daily.  He will discontinue the iron completely and I will see him back in 4 months with repeat iron studies.    I encouraged patient to call with any questions or concerns.    Britney Mahoney, NP APRN, FNP-BC, AOCNP

## 2019-10-21 ENCOUNTER — ONCOLOGY VISIT (OUTPATIENT)
Dept: ONCOLOGY | Facility: OTHER | Age: 79
End: 2019-10-21
Attending: NURSE PRACTITIONER
Payer: MEDICARE

## 2019-10-21 ENCOUNTER — ANTICOAGULATION THERAPY VISIT (OUTPATIENT)
Dept: ANTICOAGULATION | Facility: OTHER | Age: 79
End: 2019-10-21
Attending: FAMILY MEDICINE
Payer: MEDICARE

## 2019-10-21 VITALS
HEIGHT: 65 IN | DIASTOLIC BLOOD PRESSURE: 78 MMHG | OXYGEN SATURATION: 93 % | TEMPERATURE: 97.8 F | SYSTOLIC BLOOD PRESSURE: 110 MMHG | HEART RATE: 64 BPM | BODY MASS INDEX: 29.72 KG/M2 | RESPIRATION RATE: 18 BRPM | WEIGHT: 178.4 LBS

## 2019-10-21 DIAGNOSIS — Z86.711 HX PULMONARY EMBOLISM: ICD-10-CM

## 2019-10-21 DIAGNOSIS — Z86.718 HISTORY OF DVT OF LOWER EXTREMITY: ICD-10-CM

## 2019-10-21 DIAGNOSIS — D68.59 PRIMARY HYPERCOAGULABLE STATE (H): ICD-10-CM

## 2019-10-21 DIAGNOSIS — D47.2 MONOCLONAL PARAPROTEINEMIA: ICD-10-CM

## 2019-10-21 DIAGNOSIS — Z79.01 LONG TERM CURRENT USE OF ANTICOAGULANT THERAPY: ICD-10-CM

## 2019-10-21 DIAGNOSIS — D47.2 IGM MONOCLONAL GAMMOPATHY OF UNCERTAIN SIGNIFICANCE: Primary | ICD-10-CM

## 2019-10-21 DIAGNOSIS — D50.9 IRON DEFICIENCY ANEMIA, UNSPECIFIED IRON DEFICIENCY ANEMIA TYPE: ICD-10-CM

## 2019-10-21 DIAGNOSIS — K92.2 ACUTE UPPER GASTROINTESTINAL BLEEDING: ICD-10-CM

## 2019-10-21 LAB — INR POINT OF CARE: 1.9 (ref 0.86–1.14)

## 2019-10-21 PROCEDURE — 85610 PROTHROMBIN TIME: CPT | Mod: QW,ZL

## 2019-10-21 PROCEDURE — G0463 HOSPITAL OUTPT CLINIC VISIT: HCPCS

## 2019-10-21 PROCEDURE — 99213 OFFICE O/P EST LOW 20 MIN: CPT | Performed by: NURSE PRACTITIONER

## 2019-10-21 RX ORDER — FERROUS SULFATE 325(65) MG
325 TABLET ORAL
Qty: 90 TABLET | Refills: 11 | COMMUNITY
Start: 2019-10-21 | End: 2020-04-03

## 2019-10-21 ASSESSMENT — MIFFLIN-ST. JEOR: SCORE: 1453.16

## 2019-10-21 ASSESSMENT — PAIN SCALES - GENERAL: PAINLEVEL: NO PAIN (0)

## 2019-10-21 ASSESSMENT — PATIENT HEALTH QUESTIONNAIRE - PHQ9: SUM OF ALL RESPONSES TO PHQ QUESTIONS 1-9: 0

## 2019-10-21 NOTE — PROGRESS NOTES
ANTICOAGULATION FOLLOW-UP CLINIC VISIT    Patient Name:  Rey Tristan  Date:  10/21/2019  Contact Type:  Face to Face    SUBJECTIVE:  Patient Findings     Comments:   No changes        Clinical Outcomes     Negatives:   Major bleeding event, Thromboembolic event, Anticoagulation-related hospital admission, Anticoagulation-related ED visit, Anticoagulation-related fatality    Comments:   No changes           OBJECTIVE    INR Protime   Date Value Ref Range Status   10/21/2019 1.9 (A) 0.86 - 1.14 Final       ASSESSMENT / PLAN  INR assessment THER    Recheck INR In: 6 WEEKS    INR Location Clinic      Anticoagulation Summary  As of 10/21/2019    INR goal:   1.5-2.0   TTR:   63.5 % (3.1 y)   INR used for dosin.9 (10/21/2019)   Warfarin maintenance plan:   6 mg (4 mg x 1.5) every Mon, Fri; 4 mg (4 mg x 1) all other days   Full warfarin instructions:   6 mg every Mon, Fri; 4 mg all other days   Weekly warfarin total:   32 mg   No change documented:   Lala Boss RN   Plan last modified:   Lala Boss RN (2018)   Next INR check:   2019   Target end date:   Indefinite    Indications    Long-term (current) use of anticoagulants [Z79.01] [Z79.01]  Hx pulmonary embolism [Z86.711]  History of DVT of lower extremity [Z86.718]  Acute upper gastrointestinal bleeding [K92.2]             Anticoagulation Episode Summary     INR check location:       Preferred lab:       Send INR reminders to:   [facility] ANTICOAG POOL    Comments:         Anticoagulation Care Providers     Provider Role Specialty Phone number    Gianna Richard MD [facility] [phone]            See the Encounter Report to view Anticoagulation Flowsheet and Dosing Calendar (Go to Encounters tab in chart review, and find the Anticoagulation Therapy Visit)        Lala Boss RN

## 2019-10-21 NOTE — PATIENT INSTRUCTIONS
We would like to see you back in 4 months. Please come 1 week prior for lab work.    If you have any questions please call [phone]    Other instructions:  Discontinue the iron tablet.

## 2019-10-21 NOTE — NURSING NOTE
"Chief Complaint   Patient presents with     RECHECK     Follow up Monoclonal gammopathy of unknown significance (MGUS)       Initial /78   Pulse 64   Temp 97.8  F (36.6  C) (Tympanic)   Resp 18   Ht 1.638 m (5' 4.5\")   Wt 80.9 kg (178 lb 6.4 oz)   SpO2 93%   BMI 30.15 kg/m   Estimated body mass index is 30.15 kg/m  as calculated from the following:    Height as of this encounter: 1.638 m (5' 4.5\").    Weight as of this encounter: 80.9 kg (178 lb 6.4 oz).  Medication Reconciliation: complete.  Immunizations and advance directives status reviewed. Pain scale =0 , PHQ-9 =0.            Jayleen Fleming LPN    "

## 2019-11-12 DIAGNOSIS — Z86.718 HISTORY OF DVT OF LOWER EXTREMITY: ICD-10-CM

## 2019-11-12 DIAGNOSIS — Z79.01 LONG TERM CURRENT USE OF ANTICOAGULANT THERAPY: ICD-10-CM

## 2019-11-12 DIAGNOSIS — Z86.711 HX PULMONARY EMBOLISM: ICD-10-CM

## 2019-11-12 RX ORDER — WARFARIN SODIUM 4 MG/1
TABLET ORAL
Qty: 120 TABLET | Refills: 3 | Status: SHIPPED | OUTPATIENT
Start: 2019-11-12 | End: 2020-12-01

## 2019-11-13 RX ORDER — WARFARIN SODIUM 4 MG/1
TABLET ORAL
Qty: 360 TABLET | Refills: 3 | OUTPATIENT
Start: 2019-11-13

## 2019-12-05 DIAGNOSIS — K22.70 BARRETT'S ESOPHAGUS WITHOUT DYSPLASIA: ICD-10-CM

## 2019-12-05 DIAGNOSIS — Z86.711 HX PULMONARY EMBOLISM: ICD-10-CM

## 2019-12-05 DIAGNOSIS — K92.2 ACUTE UPPER GASTROINTESTINAL BLEEDING: ICD-10-CM

## 2019-12-05 DIAGNOSIS — Z79.01 LONG TERM CURRENT USE OF ANTICOAGULANT THERAPY: ICD-10-CM

## 2019-12-05 DIAGNOSIS — E78.5 HYPERLIPIDEMIA, UNSPECIFIED HYPERLIPIDEMIA TYPE: ICD-10-CM

## 2019-12-05 DIAGNOSIS — Z86.718 HISTORY OF DVT OF LOWER EXTREMITY: ICD-10-CM

## 2019-12-05 RX ORDER — WARFARIN SODIUM 4 MG/1
TABLET ORAL
Qty: 120 TABLET | Refills: 0 | OUTPATIENT
Start: 2019-12-05

## 2019-12-06 ENCOUNTER — ANTICOAGULATION THERAPY VISIT (OUTPATIENT)
Dept: ANTICOAGULATION | Facility: OTHER | Age: 79
End: 2019-12-06
Attending: FAMILY MEDICINE
Payer: MEDICARE

## 2019-12-06 DIAGNOSIS — Z86.718 HISTORY OF DVT OF LOWER EXTREMITY: ICD-10-CM

## 2019-12-06 DIAGNOSIS — K92.2 ACUTE UPPER GASTROINTESTINAL BLEEDING: ICD-10-CM

## 2019-12-06 DIAGNOSIS — Z79.01 LONG TERM CURRENT USE OF ANTICOAGULANT THERAPY: ICD-10-CM

## 2019-12-06 DIAGNOSIS — Z86.711 HX PULMONARY EMBOLISM: ICD-10-CM

## 2019-12-06 LAB — INR POINT OF CARE: 1.9 (ref 0.86–1.14)

## 2019-12-06 PROCEDURE — 85610 PROTHROMBIN TIME: CPT | Mod: QW,ZL

## 2019-12-06 NOTE — PROGRESS NOTES
ANTICOAGULATION FOLLOW-UP CLINIC VISIT    Patient Name:  Rey Tristan  Date:  2019  Contact Type:  Face to Face    SUBJECTIVE:  Patient Findings     Comments:   INR done. Patient reports no bleeding/bruising, no changes in diet/meds/activity or questions. INR results dicussed and advised patient re: warfarin dosing/INR recheck date. Patient verbalized understanding.          Clinical Outcomes     Negatives:   Major bleeding event, Thromboembolic event, Anticoagulation-related hospital admission, Anticoagulation-related ED visit, Anticoagulation-related fatality    Comments:   INR done. Patient reports no bleeding/bruising, no changes in diet/meds/activity or questions. INR results dicussed and advised patient re: warfarin dosing/INR recheck date. Patient verbalized understanding.             OBJECTIVE    INR Protime   Date Value Ref Range Status   2019 1.9 (A) 0.86 - 1.14 Final       ASSESSMENT / PLAN  INR assessment THER    Recheck INR In: 6 WEEKS    INR Location Clinic      Anticoagulation Summary  As of 2019    INR goal:   1.5-2.0   TTR:   91.1 % (1 y)   INR used for dosin.9 (2019)   Warfarin maintenance plan:   6 mg (4 mg x 1.5) every Mon, Fri; 4 mg (4 mg x 1) all other days   Full warfarin instructions:   6 mg every Mon, Fri; 4 mg all other days   Weekly warfarin total:   32 mg   No change documented:   Jamia, Emi, RN   Plan last modified:   Lala Boss RN (2018)   Next INR check:   2020   Priority:   Maintenance   Target end date:   Indefinite    Indications    Long-term (current) use of anticoagulants [Z79.01] [Z79.01]  Hx pulmonary embolism [Z86.711]  History of DVT of lower extremity [Z86.718]  Acute upper gastrointestinal bleeding [K92.2]             Anticoagulation Episode Summary     INR check location:       Preferred lab:       Send INR reminders to:   [facility] ANTICOAG POOL    Comments:         Anticoagulation Care Providers     Provider Role Specialty Phone  number    Gianna Richard MD [facility] [phone]            See the Encounter Report to view Anticoagulation Flowsheet and Dosing Calendar (Go to Encounters tab in chart review, and find the Anticoagulation Therapy Visit)        Emi Blandon RN

## 2019-12-09 RX ORDER — OMEPRAZOLE 40 MG/1
CAPSULE, DELAYED RELEASE ORAL
Qty: 90 CAPSULE | Refills: 0 | OUTPATIENT
Start: 2019-12-09

## 2019-12-09 RX ORDER — OMEPRAZOLE 40 MG/1
CAPSULE, DELAYED RELEASE ORAL
Qty: 90 CAPSULE | Refills: 0 | Status: SHIPPED | OUTPATIENT
Start: 2019-12-09 | End: 2020-02-14

## 2019-12-09 RX ORDER — ATORVASTATIN CALCIUM 10 MG/1
TABLET, FILM COATED ORAL
Qty: 90 TABLET | Refills: 0 | Status: SHIPPED | OUTPATIENT
Start: 2019-12-09 | End: 2020-02-14

## 2020-01-17 ENCOUNTER — ANTICOAGULATION THERAPY VISIT (OUTPATIENT)
Dept: ANTICOAGULATION | Facility: OTHER | Age: 80
End: 2020-01-17
Attending: FAMILY MEDICINE
Payer: MEDICARE

## 2020-01-17 DIAGNOSIS — K92.2 ACUTE UPPER GASTROINTESTINAL BLEEDING: ICD-10-CM

## 2020-01-17 DIAGNOSIS — Z86.711 HX PULMONARY EMBOLISM: ICD-10-CM

## 2020-01-17 DIAGNOSIS — Z86.718 HISTORY OF DVT OF LOWER EXTREMITY: ICD-10-CM

## 2020-01-17 DIAGNOSIS — Z79.01 LONG TERM CURRENT USE OF ANTICOAGULANT THERAPY: ICD-10-CM

## 2020-01-17 LAB — INR POINT OF CARE: 1.6 (ref 0.86–1.14)

## 2020-01-17 PROCEDURE — 36416 COLLJ CAPILLARY BLOOD SPEC: CPT | Mod: ZL

## 2020-01-17 NOTE — PROGRESS NOTES
ANTICOAGULATION FOLLOW-UP CLINIC VISIT    Patient Name:  Rey Tristan  Date:  2020  Contact Type:  Face to Face    SUBJECTIVE:  Patient Findings     Comments:   No bleeding/bruising and no new change in diet/meds/activity        Clinical Outcomes     Negatives:   Major bleeding event, Thromboembolic event, Anticoagulation-related hospital admission, Anticoagulation-related ED visit, Anticoagulation-related fatality    Comments:   No bleeding/bruising and no new change in diet/meds/activity           OBJECTIVE    INR Protime   Date Value Ref Range Status   2020 1.6 (A) 0.86 - 1.14 Final       ASSESSMENT / PLAN  INR assessment THER    Recheck INR In: 6 WEEKS    INR Location Clinic      Anticoagulation Summary  As of 2020    INR goal:   1.5-2.0   TTR:   94.2 % (1 y)   INR used for dosin.6 (2020)   Warfarin maintenance plan:   6 mg (4 mg x 1.5) every Mon, Fri; 4 mg (4 mg x 1) all other days   Full warfarin instructions:   6 mg every Mon, Fri; 4 mg all other days   Weekly warfarin total:   32 mg   No change documented:   Lala Boss RN   Plan last modified:   Lala Boss RN (2018)   Next INR check:   2020   Priority:   Maintenance   Target end date:   Indefinite    Indications    Long-term (current) use of anticoagulants [Z79.01] [Z79.01]  Hx pulmonary embolism [Z86.711]  History of DVT of lower extremity [Z86.718]  Acute upper gastrointestinal bleeding [K92.2]             Anticoagulation Episode Summary     INR check location:       Preferred lab:       Send INR reminders to:   [facility] ANTICOAG POOL    Comments:         Anticoagulation Care Providers     Provider Role Specialty Phone number    Gianna Richard MD [facility] [phone]            See the Encounter Report to view Anticoagulation Flowsheet and Dosing Calendar (Go to Encounters tab in chart review, and find the Anticoagulation Therapy Visit)        Lala Boss RN

## 2020-02-13 NOTE — PROGRESS NOTES
Subjective     Rey Tristan is a 79 year old male who presents to clinic today for the following health issues:    HPI   Hyperlipidemia Follow-Up      Are you regularly taking any medication or supplement to lower your cholesterol?   Yes- Lipitor    Are you having muscle aches or other side effects that you think could be caused by your cholesterol lowering medication?  No    How many servings of fruits and vegetables do you eat daily?  2-3    On average, how many sweetened beverages do you drink each day (Examples: soda, juice, sweet tea, etc.  Do NOT count diet or artificially sweetened beverages)?   1    How many days per week do you exercise enough to make your heart beat faster? 3 or less    How many minutes a day do you exercise enough to make your heart beat faster? 9 or less    How many days per week do you miss taking your medication? 0    Due for fasting labs    Vein buldging/ DVT hX    Duration: few year    Description (location/character/radiation): left left ankle    Intensity:  moderate    Accompanying signs and symptoms: swelling    History (similar episodes/previous evaluation): blood clot in leg May 2011     Precipitating or alleviating factors: None    Therapies tried and outcome: None    Asymptomatic    Prior compression stockings     Hypothyroidism Follow-up      Since last visit, patient describes the following symptoms: Weight stable, no hair loss, no skin changes, no constipation, no loose stools    Prescribed 2017 - but not done - transient  Current Outpatient Medications   Medication     Ascorbic Acid (VITAMIN C) 500 MG CAPS     atorvastatin (LIPITOR) 10 MG tablet     cyanocobalamin ([employer] VITAMIN  B12) 1000 MCG TABS     Lycopene 10 MG CAPS     Multiple Vitamins-Minerals (MULTIVITAMIN ADULT PO)     omeprazole (PRILOSEC) 40 MG DR capsule     pyridoxine (VITAMIN B-6) 50 MG TABS     warfarin ANTICOAGULANT (COUMADIN) 4 MG tablet     Cholecalciferol (VITAMIN D) 400 UNITS tablet     ferrous  sulfate (FEROSUL) 325 (65 Fe) MG tablet     No current facility-administered medications for this visit.        Patient Active Problem List   Diagnosis     History of DVT of lower extremity     Hx pulmonary embolism     Advanced care planning/counseling discussion     Hyperlipidemia     Colonoscopy refused     ACP (advance care planning)     Elevated serum alkaline phosphatase level     Normocytic anemia     Colitis due to Clostridium difficile     History of tobacco use     Hyponatremia     Deep vein thrombosis (DVT) of lower extremity (H)     Vitamin D deficiency     Long-term (current) use of anticoagulants [Z79.01]     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     PUD (peptic ulcer disease)     Autoimmune thyroiditis     Syncope, unspecified syncope type     Bradycardia     Acute upper gastrointestinal bleeding     IgM monoclonal gammopathy of uncertain significance     Pulmonary embolism (H)     Thyroiditis     Monoclonal gammopathy of unknown significance (MGUS)     Past Surgical History:   Procedure Laterality Date     ANGIOGRAM  6/23/2014     BIOPSY  2018    bone marrow biopsy     BONE MARROW BIOPSY, BONE SPECIMEN, NEEDLE/TROCAR N/A 1/16/2018    Procedure: BIOPSY BONE MARROW;  BONE MARROW BIOPSY;  Surgeon: Nuno Castro MD;  Location: [facility]     C REGULAR ECHO ([place])  6/23/2014    Dr. Cavazos     C. Difficile with intussuseption       cataract extraction       colonoscopy       COLONOSCOPY  6/17     ENDOSCOPY UPPER, COLONOSCOPY, COMBINED N/A 6/17/2016    Procedure: COMBINED ENDOSCOPY UPPER, COLONOSCOPY;  Surgeon: Andrea Stearns DO;  Location: [facility]     ESOPHAGOSCOPY, GASTROSCOPY, DUODENOSCOPY (EGD), COMBINED N/A 11/30/2017    Procedure: COMBINED ESOPHAGOSCOPY, GASTROSCOPY, DUODENOSCOPY (EGD);  UPPER ENDOSCOPY;  Surgeon: Narinder Torres MD;  Location: [facility]     left knee meniscal tear       Left lower extremity DVT       lens implant       nasal polypectomy       Pulmonary Embolism         Social History  "    Tobacco Use     Smoking status: Former Smoker     Types: Cigarettes     Start date: 5/15/1958     Last attempt to quit: 10/28/2009     Years since quitting: 10.3     Smokeless tobacco: Never Used     Tobacco comment: Quit 2009   Substance Use Topics     Alcohol use: No     Alcohol/week: 0.0 standard drinks     Family History   Problem Relation Age of Onset     Cancer Mother         Ovarian     Other Cancer Mother         lung/breast/ cervical ?     Respiratory Father         emphysemia     Lung Cancer Brother      Diabetes No family hx of      Hypertension No family hx of      Hyperlipidemia No family hx of      Thyroid Disease No family hx of      Asthma No family hx of      Colon Cancer No family hx of      Prostate Cancer No family hx of      Anesthesia Reaction No family hx of      Genetic Disorder No family hx of      Cerebrovascular Disease No family hx of      Coronary Artery Disease No family hx of      Breast Cancer No family hx of            Reviewed and updated as needed this visit by Provider  Allergies  Meds         Review of Systems   ROS COMP: Constitutional, HEENT, cardiovascular, pulmonary, gi and gu systems are negative, except as otherwise noted.      Objective    /60 (BP Location: Left arm, Patient Position: Sitting, Cuff Size: Adult Regular)   Pulse 63   Temp 97.1  F (36.2  C) (Tympanic)   Ht 1.638 m (5' 4.5\")   Wt 77.1 kg (170 lb)   SpO2 92%   BMI 28.73 kg/m    Body mass index is 28.73 kg/m .  Physical Exam   GENERAL: healthy, alert and no distress  NECK: no adenopathy, no asymmetry, masses, or scars and thyroid normal to palpation  RESP: lungs clear to auscultation - no rales, rhonchi or wheezes  CV: regular rate and rhythm, normal S1 S2, no S3 or S4, no murmur, click or rub, no peripheral edema and peripheral pulses strong  ABDOMEN: soft, nontender, no hepatosplenomegaly, no masses and bowel sounds normal  MS: varicosities bilateral legs; left ankle lateral with small " "prominent varicosity - more firm, but non-tender  SKIN: arms with scattered patches few mm in size pink with slight scale; dry skin  NEURO: Normal strength and tone, mentation intact and speech normal  PSYCH: mentation appears normal, affect normal/bright    Diagnostic Test Results:  Labs reviewed in [facility]  Labs pending        Assessment & Plan       ICD-10-CM    1. Hyperlipidemia, unspecified hyperlipidemia type E78.5 Lipid Profile (Chol, Trig, HDL, LDL calc)     atorvastatin (LIPITOR) 10 MG tablet   2. Subclinical hypothyroidism E03.9 TSH with free T4 reflex   3. Acute upper gastrointestinal bleeding K92.2 omeprazole (PRILOSEC) 40 MG DR capsule   4. 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 K22.70 omeprazole (PRILOSEC) 40 MG DR capsule   5. IgM monoclonal gammopathy of uncertain significance D47.2 Kappa and lambda light chain     Lactate Dehydrogenase     Immunoglobulins A G and M     ELP reflex to IELP     Comprehensive metabolic panel     CBC with platelets differential   6. Iron deficiency anemia, unspecified iron deficiency anemia type D50.9 Iron and iron binding capacity     Ferritin        BMI:   Estimated body mass index is 28.73 kg/m  as calculated from the following:    Height as of this encounter: 1.638 m (5' 4.5\").    Weight as of this encounter: 77.1 kg (170 lb).   Weight management plan: Discussed healthy diet and exercise guidelines        Patient Instructions   Refills done.  Will call with lab results.        No follow-ups on file.    iGanna Mckeon MD  [facility] [place]    "

## 2020-02-14 ENCOUNTER — OFFICE VISIT (OUTPATIENT)
Dept: FAMILY MEDICINE | Facility: OTHER | Age: 80
End: 2020-02-14
Attending: FAMILY MEDICINE
Payer: MEDICARE

## 2020-02-14 VITALS
OXYGEN SATURATION: 92 % | HEART RATE: 63 BPM | SYSTOLIC BLOOD PRESSURE: 112 MMHG | WEIGHT: 170 LBS | BODY MASS INDEX: 28.32 KG/M2 | TEMPERATURE: 97.1 F | DIASTOLIC BLOOD PRESSURE: 60 MMHG | HEIGHT: 65 IN

## 2020-02-14 DIAGNOSIS — K22.70 BARRETT'S ESOPHAGUS WITHOUT DYSPLASIA: ICD-10-CM

## 2020-02-14 DIAGNOSIS — D50.9 IRON DEFICIENCY ANEMIA, UNSPECIFIED IRON DEFICIENCY ANEMIA TYPE: ICD-10-CM

## 2020-02-14 DIAGNOSIS — E03.8 SUBCLINICAL HYPOTHYROIDISM: ICD-10-CM

## 2020-02-14 DIAGNOSIS — D47.2 IGM MONOCLONAL GAMMOPATHY OF UNCERTAIN SIGNIFICANCE: ICD-10-CM

## 2020-02-14 DIAGNOSIS — E78.5 HYPERLIPIDEMIA, UNSPECIFIED HYPERLIPIDEMIA TYPE: Primary | ICD-10-CM

## 2020-02-14 DIAGNOSIS — K92.2 ACUTE UPPER GASTROINTESTINAL BLEEDING: ICD-10-CM

## 2020-02-14 LAB
ALBUMIN SERPL-MCNC: 3.5 G/DL (ref 3.4–5)
ALP SERPL-CCNC: 171 U/L (ref 40–150)
ALT SERPL W P-5'-P-CCNC: 40 U/L (ref 0–70)
ANION GAP SERPL CALCULATED.3IONS-SCNC: 6 MMOL/L (ref 3–14)
AST SERPL W P-5'-P-CCNC: 36 U/L (ref 0–45)
BASOPHILS # BLD AUTO: 0 10E9/L (ref 0–0.2)
BASOPHILS NFR BLD AUTO: 0.7 %
BILIRUB SERPL-MCNC: 0.5 MG/DL (ref 0.2–1.3)
BUN SERPL-MCNC: 13 MG/DL (ref 7–30)
CALCIUM SERPL-MCNC: 8.5 MG/DL (ref 8.5–10.1)
CHLORIDE SERPL-SCNC: 103 MMOL/L (ref 94–109)
CHOLEST SERPL-MCNC: 114 MG/DL
CO2 SERPL-SCNC: 29 MMOL/L (ref 20–32)
CREAT SERPL-MCNC: 0.73 MG/DL (ref 0.66–1.25)
DIFFERENTIAL METHOD BLD: NORMAL
EOSINOPHIL # BLD AUTO: 0.1 10E9/L (ref 0–0.7)
EOSINOPHIL NFR BLD AUTO: 2.4 %
ERYTHROCYTE [DISTWIDTH] IN BLOOD BY AUTOMATED COUNT: 13.4 % (ref 10–15)
FERRITIN SERPL-MCNC: 55 NG/ML (ref 26–388)
GFR SERPL CREATININE-BSD FRML MDRD: 89 ML/MIN/{1.73_M2}
GLUCOSE SERPL-MCNC: 95 MG/DL (ref 70–99)
HCT VFR BLD AUTO: 41.1 % (ref 40–53)
HDLC SERPL-MCNC: 44 MG/DL
HGB BLD-MCNC: 13.3 G/DL (ref 13.3–17.7)
IMM GRANULOCYTES # BLD: 0 10E9/L (ref 0–0.4)
IMM GRANULOCYTES NFR BLD: 0.2 %
IRON SATN MFR SERPL: 21 % (ref 15–46)
IRON SERPL-MCNC: 70 UG/DL (ref 35–180)
LDH SERPL L TO P-CCNC: 168 U/L (ref 85–227)
LDLC SERPL CALC-MCNC: 53 MG/DL
LYMPHOCYTES # BLD AUTO: 0.8 10E9/L (ref 0.8–5.3)
LYMPHOCYTES NFR BLD AUTO: 15.3 %
MCH RBC QN AUTO: 29.5 PG (ref 26.5–33)
MCHC RBC AUTO-ENTMCNC: 32.4 G/DL (ref 31.5–36.5)
MCV RBC AUTO: 91 FL (ref 78–100)
MONOCYTES # BLD AUTO: 0.5 10E9/L (ref 0–1.3)
MONOCYTES NFR BLD AUTO: 8.9 %
NEUTROPHILS # BLD AUTO: 4 10E9/L (ref 1.6–8.3)
NEUTROPHILS NFR BLD AUTO: 72.5 %
NONHDLC SERPL-MCNC: 70 MG/DL
NRBC # BLD AUTO: 0 10*3/UL
NRBC BLD AUTO-RTO: 0 /100
PLATELET # BLD AUTO: 180 10E9/L (ref 150–450)
POTASSIUM SERPL-SCNC: 3.9 MMOL/L (ref 3.4–5.3)
PROT SERPL-MCNC: 8 G/DL (ref 6.8–8.8)
RBC # BLD AUTO: 4.51 10E12/L (ref 4.4–5.9)
SODIUM SERPL-SCNC: 138 MMOL/L (ref 133–144)
T4 FREE SERPL-MCNC: 0.96 NG/DL (ref 0.76–1.46)
TIBC SERPL-MCNC: 334 UG/DL (ref 240–430)
TRIGL SERPL-MCNC: 83 MG/DL
TSH SERPL DL<=0.005 MIU/L-ACNC: 4.11 MU/L (ref 0.4–4)
WBC # BLD AUTO: 5.5 10E9/L (ref 4–11)

## 2020-02-14 PROCEDURE — 82728 ASSAY OF FERRITIN: CPT | Mod: ZL | Performed by: FAMILY MEDICINE

## 2020-02-14 PROCEDURE — 36415 COLL VENOUS BLD VENIPUNCTURE: CPT | Mod: ZL | Performed by: FAMILY MEDICINE

## 2020-02-14 PROCEDURE — 80061 LIPID PANEL: CPT | Mod: ZL | Performed by: FAMILY MEDICINE

## 2020-02-14 PROCEDURE — 83883 ASSAY NEPHELOMETRY NOT SPEC: CPT | Mod: ZL | Performed by: FAMILY MEDICINE

## 2020-02-14 PROCEDURE — 82784 ASSAY IGA/IGD/IGG/IGM EACH: CPT | Mod: ZL | Performed by: FAMILY MEDICINE

## 2020-02-14 PROCEDURE — G0463 HOSPITAL OUTPT CLINIC VISIT: HCPCS

## 2020-02-14 PROCEDURE — 80053 COMPREHEN METABOLIC PANEL: CPT | Mod: ZL | Performed by: FAMILY MEDICINE

## 2020-02-14 PROCEDURE — 84443 ASSAY THYROID STIM HORMONE: CPT | Mod: ZL | Performed by: FAMILY MEDICINE

## 2020-02-14 PROCEDURE — 00000402 ZZHCL STATISTIC TOTAL PROTEIN: Mod: ZL | Performed by: FAMILY MEDICINE

## 2020-02-14 PROCEDURE — 85025 COMPLETE CBC W/AUTO DIFF WBC: CPT | Mod: ZL | Performed by: FAMILY MEDICINE

## 2020-02-14 PROCEDURE — 99213 OFFICE O/P EST LOW 20 MIN: CPT | Performed by: FAMILY MEDICINE

## 2020-02-14 PROCEDURE — 83550 IRON BINDING TEST: CPT | Mod: ZL | Performed by: FAMILY MEDICINE

## 2020-02-14 PROCEDURE — 84165 PROTEIN E-PHORESIS SERUM: CPT | Mod: ZL | Performed by: FAMILY MEDICINE

## 2020-02-14 PROCEDURE — 83540 ASSAY OF IRON: CPT | Mod: ZL | Performed by: FAMILY MEDICINE

## 2020-02-14 PROCEDURE — 86334 IMMUNOFIX E-PHORESIS SERUM: CPT | Mod: ZL | Performed by: FAMILY MEDICINE

## 2020-02-14 PROCEDURE — 84439 ASSAY OF FREE THYROXINE: CPT | Mod: ZL | Performed by: FAMILY MEDICINE

## 2020-02-14 PROCEDURE — 83615 LACTATE (LD) (LDH) ENZYME: CPT | Mod: ZL | Performed by: FAMILY MEDICINE

## 2020-02-14 RX ORDER — OMEPRAZOLE 40 MG/1
CAPSULE, DELAYED RELEASE ORAL
Qty: 90 CAPSULE | Refills: 3 | Status: SHIPPED | OUTPATIENT
Start: 2020-02-14 | End: 2021-04-11

## 2020-02-14 RX ORDER — MULTIVIT-MIN/IRON/FOLIC ACID/K 18-600-40
CAPSULE ORAL
COMMUNITY

## 2020-02-14 RX ORDER — ATORVASTATIN CALCIUM 10 MG/1
10 TABLET, FILM COATED ORAL DAILY
Qty: 90 TABLET | Refills: 3 | Status: SHIPPED | OUTPATIENT
Start: 2020-02-14 | End: 2021-03-08

## 2020-02-14 ASSESSMENT — MIFFLIN-ST. JEOR: SCORE: 1415.05

## 2020-02-14 NOTE — NURSING NOTE
"Chief Complaint   Patient presents with     Recheck Medication       Initial /60 (BP Location: Left arm, Patient Position: Sitting, Cuff Size: Adult Regular)   Pulse 63   Temp 97.1  F (36.2  C) (Tympanic)   Ht 1.638 m (5' 4.5\")   Wt 77.1 kg (170 lb)   SpO2 92%   BMI 28.73 kg/m   Estimated body mass index is 28.73 kg/m  as calculated from the following:    Height as of this encounter: 1.638 m (5' 4.5\").    Weight as of this encounter: 77.1 kg (170 lb).  Medication Reconciliation: complete  Shayla Grider LPN  "

## 2020-02-17 LAB
ALBUMIN SERPL ELPH-MCNC: 3.9 G/DL (ref 3.7–5.1)
ALPHA1 GLOB SERPL ELPH-MCNC: 0.3 G/DL (ref 0.2–0.4)
ALPHA2 GLOB SERPL ELPH-MCNC: 0.9 G/DL (ref 0.5–0.9)
B-GLOBULIN SERPL ELPH-MCNC: 2 G/DL (ref 0.6–1)
GAMMA GLOB SERPL ELPH-MCNC: 0.6 G/DL (ref 0.7–1.6)
IGA SERPL-MCNC: 123 MG/DL (ref 84–499)
IGG SERPL-MCNC: 916 MG/DL (ref 610–1616)
IGM SERPL-MCNC: 1874 MG/DL (ref 35–242)
KAPPA LC UR-MCNC: 4.06 MG/DL (ref 0.33–1.94)
KAPPA LC/LAMBDA SER: 3.44 {RATIO} (ref 0.26–1.65)
LAMBDA LC SERPL-MCNC: 1.18 MG/DL (ref 0.57–2.63)
M PROTEIN SERPL ELPH-MCNC: 1.2 G/DL
PROT PATTERN SERPL ELPH-IMP: ABNORMAL
PROT PATTERN SERPL IFE-IMP: NORMAL

## 2020-02-20 NOTE — PROGRESS NOTES
Hematology Follow-up Visit:  February 20, 2020    Reason for Visit:  Patient presents with:  RECHECK: Monoclonal gammopathy of unknown significance (MGUS)      Nursing Notes and Documentation reviewed:  yes    HPI:   This is a 79-year-old male patient who presents to the clinic today in followup of monoclonal gammopathy of undetermined significance diagnosed 2/2018 during workup for hypercoag state, along with previous diagnosis of anemia related to GI bleed.  Patient does have a history of previous DVT and PE and has been on long-term anticoagulation with Coumadin.  Coumadin was held after GI bleed was diagnosed and has been resumed.  He had been on iron replacement another visit about 4 months ago he did discontinue this as recommended related to normal iron studies.    He presents to the clinic today stating he is doing well and offers no new complaints.  He does question his vitamins.  He is no longer on the iron therapy and it appears none of his vitamins have iron in them.  Denies any increase in fatigue.  He does have some chronic shortness of breath which he relates to his COPD.    He does follow closely with his PCP, Dr. Richard.    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   Protein C and protein S along with antiphospholipid profile were negative.  SPEP was completed showing an M-spike of 1.1 with serum immunofixation showing monoclonal IgM immunoglobulin, kappa light chain type with IgM of 1530.  Bone marrow aspiration biopsy showed iron deficiency, slightly increased plasma cell proximally 2% kappa and toxic neutrophils with reactive lymphocytes and increased rouleaux formation; flow cytometry and immunophenotyping was unremarkable. Skeletal bone survey was completed on 2/6/2018 showing a stable T12 compression fracture but no evidence of lytic lesions.      He was placed on oral iron with improvement in his  hemoglobin and iron studies.      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 Simeon; silent; transient hyperthyroid, now normal; monitor TSH monthly for hypothyroidism     Chronic anticoagulation 1/1/2012     Elevated serum alkaline phosphatase level 5/4/2016    normal GGT, normal bone skeletal survery     Gastric ulcer     endoscopy 6/2016, repeat 6 months; PPI Carafate     Hyperlipidemia      Normocytic anemia 5/4/2016     Pulmonary nodule     CT 5/2014, right; consider repeat 1 year if high risk     Upper GI bleed 11/30/2017     Vitamin D deficiency 1/1/2012       Social History     Socioeconomic History     Marital status:      Spouse name: Not on file     Number of children: Not on file     Years of education: Not on file     Highest education level: Not on file   Occupational History     Employer: [employer]     Comment: Retired    Social Needs     Financial resource strain: Not on file     Food insecurity:     Worry: Not on file     Inability: Not on file     Transportation needs:     Medical: Not on file     Non-medical: Not on file   Tobacco Use     Smoking status: Former Smoker     Types: Cigarettes     Start date: 5/15/1958     Last attempt to quit: 10/28/2009     Years since quitting: 10.3     Smokeless tobacco: Never Used     Tobacco comment: Quit 2009   Substance and Sexual Activity     Alcohol use: No     Alcohol/week: 0.0 standard drinks     Drug use: No     Sexual activity: Not on file   Lifestyle     Physical activity:     Days per week: Not on file     Minutes per session: Not on file     Stress: Not on file   Relationships     Social connections:     Talks on phone: Not on file     Gets together: Not on file     Attends Tenriism service: Not on file     Active member of club or organization: Not on file     Attends meetings of clubs or organizations: Not on file     Relationship status: Not on file      Intimate partner violence:     Fear of current or ex partner: Not on file     Emotionally abused: Not on file     Physically abused: Not on file     Forced sexual activity: Not on file   Other Topics Concern      Service Yes     Comment: [employer]     Blood Transfusions Yes     Comment: Permits if needed     Caffeine Concern Yes     Comment: Tea     Occupational Exposure No     Hobby Hazards No     Sleep Concern No     Stress Concern No     Weight Concern No     Special Diet No     Back Care No     Exercise No     Bike Helmet Not Asked     Seat Belt Yes     Self-Exams Yes     Parent/sibling w/ CABG, MI or angioplasty before 65F 55M? No   Social History Narrative     Not on file       Past Surgical History:   Procedure Laterality Date     ANGIOGRAM  6/23/2014     BIOPSY  2018    bone marrow biopsy     BONE MARROW BIOPSY, BONE SPECIMEN, NEEDLE/TROCAR N/A 1/16/2018    Procedure: BIOPSY BONE MARROW;  BONE MARROW BIOPSY;  Surgeon: Nuno Castro MD;  Location: [facility]     C REGULAR ECHO (FL)  6/23/2014    Dr. Cavazos     C. Difficile with intussuseption       cataract extraction       colonoscopy       COLONOSCOPY  6/17     ENDOSCOPY UPPER, COLONOSCOPY, COMBINED N/A 6/17/2016    Procedure: COMBINED ENDOSCOPY UPPER, COLONOSCOPY;  Surgeon: Andrea Stearns DO;  Location: [facility]     ESOPHAGOSCOPY, GASTROSCOPY, DUODENOSCOPY (EGD), COMBINED N/A 11/30/2017    Procedure: COMBINED ESOPHAGOSCOPY, GASTROSCOPY, DUODENOSCOPY (EGD);  UPPER ENDOSCOPY;  Surgeon: Narinder Torres MD;  Location: [facility]     left knee meniscal tear       Left lower extremity DVT       lens implant       nasal polypectomy       Pulmonary Embolism         Family History   Problem Relation Age of Onset     Cancer Mother         Ovarian     Other Cancer Mother         lung/breast/ cervical ?     Respiratory Father         emphysemia     Lung Cancer Brother      Diabetes No family hx of      Hypertension No family hx of      Hyperlipidemia No family  hx of      Thyroid Disease No family hx of      Asthma No family hx of      Colon Cancer No family hx of      Prostate Cancer No family hx of      Anesthesia Reaction No family hx of      Genetic Disorder No family hx of      Cerebrovascular Disease No family hx of      Coronary Artery Disease No family hx of      Breast Cancer No family hx of        Allergies:  Allergies as of 02/21/2020     (No Known Allergies)       Current Medications:  Current Outpatient Medications   Medication Sig Dispense Refill     Ascorbic Acid (VITAMIN C) 500 MG CAPS        atorvastatin (LIPITOR) 10 MG tablet Take 1 tablet (10 mg) by mouth daily 90 tablet 3     Biotin 5000 MCG PO CAPS        cyanocobalamin ([employer]S VITAMIN  B12) 1000 MCG TABS Take 1,000 mcg by mouth daily 90 tablet 3     Lycopene 10 MG CAPS        Multiple Vitamins-Minerals (MULTIVITAMIN ADULT PO) Take 1 tablet by mouth       omeprazole (PRILOSEC) 40 MG DR capsule TAKE 1 CAPSULE(40 MG) BY MOUTH DAILY 90 capsule 3     pyridoxine (VITAMIN B-6) 50 MG TABS Take 1 tablet (50 mg) by mouth daily 90 tablet 3     warfarin ANTICOAGULANT (COUMADIN) 4 MG tablet TAKE 1& 1/2 TABLETS BY MOUTH ON MONDAY& FRIDAY. TAKE 1 TABLET ON ALL OTHER DAYS 120 tablet 3     Cholecalciferol (VITAMIN D) 400 UNITS tablet Take 1,000 Units by mouth daily        ferrous sulfate (FEROSUL) 325 (65 Fe) MG tablet Take 1 tablet (325 mg) by mouth daily (with breakfast) 90 tablet 11          Review Of Systems:  Constitutional: denies fever, weight changes  Eyes: right eye is foggy-cataract  Ears/Nose/Throat: denies ear pain, difficulty swallowing; some sinus issues  Respiratory: see hPI  Skin: denies rash, lesions  Cardiovascular: denies chest pain, palpitations  Gastrointestinal: denies abdominal pain, bloating, nausea, vomiting, early satiety-1-3 BM's daily and at times loose  Genitourinary: denies difficulty with urination, blood in urine  Musculoskeletal: denies new muscle pain, bone pain  Neurologic: denies  "lightheadedness, has headaches on occasion  Hematologic/Lymphatic/Immunologic: denies easy bruising, easy bleeding, lumps or bumps noted  Endocrine: Denies increased thirst, night sweats      Physical Exam:  /58   Pulse 65   Temp 97.1  F (36.2  C) (Tympanic)   Ht 1.638 m (5' 4.5\")   Wt 80.3 kg (177 lb 0.5 oz)   SpO2 94%   BMI 29.92 kg/m    GENERAL APPEARANCE: Healthy, alert and in no acute distress.  HEENT: Normocephalic, Sclerae anicteric. Oropharynx without ulcers, lesions, or thrush.  NECK:  No asymmetry or masses, no thyromegaly.  LYMPHATICS: No palpable cervical, supraclavicular, axillary, or inguinal nodes   RESP: Lungs clear to auscultation bilaterally but dim throughout, respirations regular and easy  CARDIOVASCULAR: Regular rate and rhythm. Normal S1, S2  ABDOMEN: Soft, nontender. Bowel sounds auscultated all 4 quadrants. No palpable organomegaly or masses.  MUSCULOSKELETAL: Extremities without gross deformities noted.   NEURO: Alert and oriented x 3.  Gait steady.  PSYCHIATRIC: Mentation and affect appear normal.  Mood appropriate.    Laboratory/Imaging Studies:  Results for RADHA VALVERDE (MRN [id number]) as of 2/19/2020 19:40   Ref. Range 2/14/2020 09:50   Sodium Latest Ref Range: 133 - 144 mmol/L 138   Potassium Latest Ref Range: 3.4 - 5.3 mmol/L 3.9   Chloride Latest Ref Range: 94 - 109 mmol/L 103   Carbon Dioxide Latest Ref Range: 20 - 32 mmol/L 29   Urea Nitrogen Latest Ref Range: 7 - 30 mg/dL 13   Creatinine Latest Ref Range: 0.66 - 1.25 mg/dL 0.73   GFR Estimate Latest Ref Range: >60 mL/min/1.73_m2 89   GFR Estimate If Black Latest Ref Range: >60 mL/min/1.73_m2 >90   Calcium Latest Ref Range: 8.5 - 10.1 mg/dL 8.5   Anion Gap Latest Ref Range: 3 - 14 mmol/L 6   Albumin Latest Ref Range: 3.4 - 5.0 g/dL 3.5   Protein Total Latest Ref Range: 6.8 - 8.8 g/dL 8.0   Bilirubin Total Latest Ref Range: 0.2 - 1.3 mg/dL 0.5   Alkaline Phosphatase Latest Ref Range: 40 - 150 U/L 171 (H)   ALT " Latest Ref Range: 0 - 70 U/L 40   AST Latest Ref Range: 0 - 45 U/L 36     Results for RADHA VALVERDE (MRN [id number]) as of 2/19/2020 19:40   Ref. Range 2/14/2020 09:50   Ferritin Latest Ref Range: 26 - 388 ng/mL 55       Results for RADHA VALVERDE (MRN [id number]) as of 2/19/2020 19:40   Ref. Range 2/14/2020 09:50   Iron Latest Ref Range: 35 - 180 ug/dL 70   Iron Binding Cap Latest Ref Range: 240 - 430 ug/dL 334   Iron Saturation Index Latest Ref Range: 15 - 46 % 21   Lactate Dehydrogenase Latest Ref Range: 85 - 227 U/L 168     Results for RADHA VALVERDE (MRN [id number]) as of 2/19/2020 19:40   Ref. Range 2/14/2020 09:50   Glucose Latest Ref Range: 70 - 99 mg/dL 95   WBC Latest Ref Range: 4.0 - 11.0 10e9/L 5.5   Hemoglobin Latest Ref Range: 13.3 - 17.7 g/dL 13.3   Hematocrit Latest Ref Range: 40.0 - 53.0 % 41.1   Platelet Count Latest Ref Range: 150 - 450 10e9/L 180   RBC Count Latest Ref Range: 4.4 - 5.9 10e12/L 4.51   MCV Latest Ref Range: 78 - 100 fl 91   MCH Latest Ref Range: 26.5 - 33.0 pg 29.5   MCHC Latest Ref Range: 31.5 - 36.5 g/dL 32.4   RDW Latest Ref Range: 10.0 - 15.0 % 13.4   Diff Method Unknown Automated Method   % Neutrophils Latest Units: % 72.5   % Lymphocytes Latest Units: % 15.3   % Monocytes Latest Units: % 8.9   % Eosinophils Latest Units: % 2.4   % Basophils Latest Units: % 0.7   % Immature Granulocytes Latest Units: % 0.2   Nucleated RBCs Latest Ref Range: 0 /100 0   Absolute Neutrophil Latest Ref Range: 1.6 - 8.3 10e9/L 4.0   Absolute Lymphocytes Latest Ref Range: 0.8 - 5.3 10e9/L 0.8   Absolute Monocytes Latest Ref Range: 0.0 - 1.3 10e9/L 0.5   Absolute Eosinophils Latest Ref Range: 0.0 - 0.7 10e9/L 0.1   Absolute Basophils Latest Ref Range: 0.0 - 0.2 10e9/L 0.0   Abs Immature Granulocytes Latest Ref Range: 0 - 0.4 10e9/L 0.0   Absolute Nucleated RBC Unknown 0.0   Albumin Fraction Latest Ref Range: 3.7 - 5.1 g/dL 3.9   Alpha 1 Fraction Latest Ref Range: 0.2 - 0.4 g/dL 0.3    Alpha 2 Fraction Latest Ref Range: 0.5 - 0.9 g/dL 0.9   Beta Fraction Latest Ref Range: 0.6 - 1.0 g/dL 2.0 (H)   ELP Interpretation: Unknown Monoclonal protei...   Gamma Fraction Latest Ref Range: 0.7 - 1.6 g/dL 0.6 (L)   IGA Latest Ref Range: 84 - 499 mg/dL 123   IGG Latest Ref Range: 610 - 1,616 mg/dL 916   IGM Latest Ref Range: 35 - 242 mg/dL 1,874 (H)   Immunofixation ELP Unknown (Note)   Kappa Free Lt Chain Latest Ref Range: 0.33 - 1.94 mg/dL 4.06 (H)   Kappa Lambda Ratio Latest Ref Range: 0.26 - 1.65  3.44 (H)   Lambda Free Lt Chain Latest Ref Range: 0.57 - 2.63 mg/dL 1.18   Monoclonal Peak Latest Ref Range: 0.0 g/dL 1.2 (H)       ASSESSMENT/PLAN:    #1 MGUS: IgM Monoclonal gammopathy of undetermined significance diagnosed January 2018.  MGUS considered essentially stable.  No evidence of end organ damage.  We'll see him back in 6 months with the same lab work.     #2  Anemia: Hemoglobin is within normal limits along with iron studies including iron/TIBC and ferritin.  He will stay off of the iron at this point and we will recheck the studies again in 3 months and call hi with these results.    I encouraged patient to call with any questions or concerns.      Britney Mahoney, NP APRN, FNP-BC, AOCNP

## 2020-02-21 ENCOUNTER — ONCOLOGY VISIT (OUTPATIENT)
Dept: ONCOLOGY | Facility: OTHER | Age: 80
End: 2020-02-21
Attending: NURSE PRACTITIONER
Payer: COMMERCIAL

## 2020-02-21 VITALS
BODY MASS INDEX: 29.49 KG/M2 | WEIGHT: 177.03 LBS | DIASTOLIC BLOOD PRESSURE: 58 MMHG | HEIGHT: 65 IN | SYSTOLIC BLOOD PRESSURE: 108 MMHG | TEMPERATURE: 97.1 F | HEART RATE: 65 BPM | OXYGEN SATURATION: 94 %

## 2020-02-21 DIAGNOSIS — D47.2 IGM MONOCLONAL GAMMOPATHY OF UNCERTAIN SIGNIFICANCE: ICD-10-CM

## 2020-02-21 DIAGNOSIS — D64.9 ANEMIA, UNSPECIFIED TYPE: Primary | ICD-10-CM

## 2020-02-21 PROCEDURE — G0463 HOSPITAL OUTPT CLINIC VISIT: HCPCS

## 2020-02-21 PROCEDURE — 99213 OFFICE O/P EST LOW 20 MIN: CPT | Performed by: NURSE PRACTITIONER

## 2020-02-21 ASSESSMENT — PAIN SCALES - GENERAL: PAINLEVEL: NO PAIN (0)

## 2020-02-21 ASSESSMENT — MIFFLIN-ST. JEOR: SCORE: 1446.94

## 2020-02-21 NOTE — PATIENT INSTRUCTIONS
We will have you go for lab in 3 months and call you with the results.    We would like to see you back for follow up in 6 months. Please come 1 week prior for lab work.     If you have any questions please call [phone]    Other instructions:  none

## 2020-02-28 ENCOUNTER — ANTICOAGULATION THERAPY VISIT (OUTPATIENT)
Dept: ANTICOAGULATION | Facility: OTHER | Age: 80
End: 2020-02-28
Attending: FAMILY MEDICINE
Payer: MEDICARE

## 2020-02-28 DIAGNOSIS — Z79.01 LONG TERM CURRENT USE OF ANTICOAGULANT THERAPY: ICD-10-CM

## 2020-02-28 DIAGNOSIS — Z86.711 HX PULMONARY EMBOLISM: ICD-10-CM

## 2020-02-28 DIAGNOSIS — Z86.718 HISTORY OF DVT OF LOWER EXTREMITY: ICD-10-CM

## 2020-02-28 DIAGNOSIS — K92.2 ACUTE UPPER GASTROINTESTINAL BLEEDING: ICD-10-CM

## 2020-02-28 LAB — INR POINT OF CARE: 1.7 (ref 0.86–1.14)

## 2020-02-28 PROCEDURE — 85610 PROTHROMBIN TIME: CPT | Mod: QW,ZL

## 2020-02-28 NOTE — PROGRESS NOTES
ANTICOAGULATION FOLLOW-UP CLINIC VISIT    Patient Name:  Rey Tristan  Date:  2020  Contact Type:  Face to Face    SUBJECTIVE:  Patient Findings     Comments:   No changes        Clinical Outcomes     Negatives:   Major bleeding event, Thromboembolic event, Anticoagulation-related hospital admission, Anticoagulation-related ED visit, Anticoagulation-related fatality    Comments:   No changes           OBJECTIVE    INR Protime   Date Value Ref Range Status   2020 1.7 (A) 0.86 - 1.14 Final       ASSESSMENT / PLAN  INR assessment THER    Recheck INR In: 5 WEEKS    INR Location Clinic      Anticoagulation Summary  As of 2020    INR goal:   1.5-2.0   TTR:   94.2 % (1 y)   INR used for dosin.7 (2020)   Warfarin maintenance plan:   6 mg (4 mg x 1.5) every Mon, Fri; 4 mg (4 mg x 1) all other days   Full warfarin instructions:   6 mg every Mon, Fri; 4 mg all other days   Weekly warfarin total:   32 mg   No change documented:   Lala Boss RN   Plan last modified:   Lala Boss RN (2018)   Next INR check:   4/3/2020   Priority:   Maintenance   Target end date:   Indefinite    Indications    Long-term (current) use of anticoagulants [Z79.01] [Z79.01]  Hx pulmonary embolism [Z86.711]  History of DVT of lower extremity [Z86.718]  Acute upper gastrointestinal bleeding [K92.2]             Anticoagulation Episode Summary     INR check location:       Preferred lab:       Send INR reminders to:   [facility] ANTICOAG POOL    Comments:         Anticoagulation Care Providers     Provider Role Specialty Phone number    Gianna Richard MD Referring [facility] [phone]            See the Encounter Report to view Anticoagulation Flowsheet and Dosing Calendar (Go to Encounters tab in chart review, and find the Anticoagulation Therapy Visit)        Lala Boss RN

## 2020-03-31 DIAGNOSIS — Z86.711 HX PULMONARY EMBOLISM: Primary | ICD-10-CM

## 2020-04-02 DIAGNOSIS — Z86.711 HX PULMONARY EMBOLISM: Primary | ICD-10-CM

## 2020-04-03 ENCOUNTER — ANTICOAGULATION THERAPY VISIT (OUTPATIENT)
Dept: ANTICOAGULATION | Facility: OTHER | Age: 80
End: 2020-04-03
Attending: FAMILY MEDICINE

## 2020-04-03 ENCOUNTER — HOSPITAL ENCOUNTER (OUTPATIENT)
Dept: CT IMAGING | Facility: HOSPITAL | Age: 80
End: 2020-04-03
Attending: NURSE PRACTITIONER

## 2020-04-03 ENCOUNTER — OFFICE VISIT (OUTPATIENT)
Dept: FAMILY MEDICINE | Facility: OTHER | Age: 80
End: 2020-04-03
Attending: NURSE PRACTITIONER

## 2020-04-03 VITALS
WEIGHT: 177.4 LBS | DIASTOLIC BLOOD PRESSURE: 60 MMHG | BODY MASS INDEX: 29.56 KG/M2 | HEART RATE: 73 BPM | TEMPERATURE: 97.5 F | HEIGHT: 65 IN | SYSTOLIC BLOOD PRESSURE: 122 MMHG | OXYGEN SATURATION: 95 %
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
## 2020-04-03 DIAGNOSIS — Z79.01 LONG TERM CURRENT USE OF ANTICOAGULANT THERAPY: ICD-10-CM
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
## 2020-04-03 DIAGNOSIS — K92.2 ACUTE UPPER GASTROINTESTINAL BLEEDING: ICD-10-CM

## 2020-04-03 DIAGNOSIS — Z86.711 HX PULMONARY EMBOLISM: ICD-10-CM

## 2020-04-03 DIAGNOSIS — S09.90XA HEAD INJURY, INITIAL ENCOUNTER: ICD-10-CM

## 2020-04-03 DIAGNOSIS — Z86.718 HISTORY OF DVT OF LOWER EXTREMITY: ICD-10-CM

## 2020-04-03 LAB — INR BLD: 1.8 (ref 0.86–1.14)

## 2020-04-03 PROCEDURE — 36416 COLLJ CAPILLARY BLOOD SPEC: CPT | Mod: ZL | Performed by: FAMILY MEDICINE

## 2020-04-03 PROCEDURE — 85610 PROTHROMBIN TIME: CPT | Mod: QW,ZL | Performed by: FAMILY MEDICINE

## 2020-04-03 PROCEDURE — G0463 HOSPITAL OUTPT CLINIC VISIT: HCPCS

## 2020-04-03 PROCEDURE — G0463 HOSPITAL OUTPT CLINIC VISIT: HCPCS | Mod: 25

## 2020-04-03 PROCEDURE — 99214 OFFICE O/P EST MOD 30 MIN: CPT | Performed by: NURSE PRACTITIONER

## 2020-04-03 PROCEDURE — 70450 CT HEAD/BRAIN W/O DYE: CPT | Mod: TC

## 2020-04-03 ASSESSMENT — PAIN SCALES - GENERAL: PAINLEVEL: NO PAIN (1)

## 2020-04-03 ASSESSMENT — MIFFLIN-ST. JEOR: SCORE: 1448.62

## 2020-04-03 NOTE — PROGRESS NOTES
ANTICOAGULATION FOLLOW-UP CLINIC VISIT    Patient Name:  Rey Tristan  Date:  4/3/2020  Contact Type:  Telephone    SUBJECTIVE:  Patient Findings     Positives:   Other complaints (fell coming into building. No lacerations. )    Comments:   INR done by lab. Call placed to patient cell phone and spoke to him re: INR result, warfarin dosing/INR recheck date. He states he is in the lobby and is waiting to see provider as he fell on sidewalk when coming into clinic. He states he slipped on icy sidewalk and fell but did say he hit his head. He has no lacerations and not bleeding anywhere. He verbalized understanding of warfarin dosing/INR recheck date and has no questions.         Clinical Outcomes     Negatives:   Major bleeding event, Thromboembolic event, Anticoagulation-related hospital admission, Anticoagulation-related ED visit, Anticoagulation-related fatality    Comments:   INR done by lab. Call placed to patient cell phone and spoke to him re: INR result, warfarin dosing/INR recheck date. He states he is in the lobby and is waiting to see provider as he fell on sidewalk when coming into clinic. He states he slipped on icy sidewalk and fell but did say he hit his head. He has no lacerations and not bleeding anywhere. He verbalized understanding of warfarin dosing/INR recheck date and has no questions.            OBJECTIVE    INR Point of Care   Date Value Ref Range Status   2020 1.8 (H) 0.86 - 1.14 Final     Comment:     This test is intended for monitoring Coumadin therapy.  Results are not   accurate in patients with prolonged INR due to factor deficiency.         ASSESSMENT / PLAN  INR assessment THER    Recheck INR In: 7 WEEKS    INR Location Clinic      Anticoagulation Summary  As of 4/3/2020    INR goal:   1.5-2.0   TTR:   94.3 % (1 y)   INR used for dosin.8 (4/3/2020)   Warfarin maintenance plan:   6 mg (4 mg x 1.5) every Mon, Fri; 4 mg (4 mg x 1) all other days   Full warfarin  instructions:   6 mg every Mon, Fri; 4 mg all other days   Weekly warfarin total:   32 mg   No change documented:   Lala Boss, RN   Plan last modified:   Lala Boss RN (6/21/2018)   Next INR check:   5/22/2020   Priority:   Maintenance   Target end date:   Indefinite    Indications    Long-term (current) use of anticoagulants [Z79.01] [Z79.01]  Hx pulmonary embolism [Z86.711]  History of DVT of lower extremity [Z86.718]  Acute upper gastrointestinal bleeding [K92.2]             Anticoagulation Episode Summary     INR check location:       Preferred lab:       Send INR reminders to:   [facility] ANTICOAG POOL    Comments:         Anticoagulation Care Providers     Provider Role Specialty Phone number    Gianna Richard MD Referring [facility] [phone]            See the Encounter Report to view Anticoagulation Flowsheet and Dosing Calendar (Go to Encounters tab in chart review, and find the Anticoagulation Therapy Visit)        Lala Boss RN

## 2020-04-03 NOTE — PROGRESS NOTES
Subjective     Rey Tristan is a 79 year old male who presents to clinic today for the following health issues:    HPI     Fell hit the back of his head       Duration: slipped on the ice outside the clinic entrance today on concrete sidewalk. He stated that he hit his back and the back of his head on the ground     Description (location/character/radiation): slight pain to the back of his head denies any dizziness or visual disturbance. NO LOC. No dizziness prior to fall. Denies syncope. He slipped on ice and lost his footing    Intensity:  mild    Accompanying signs and symptoms: none     History (similar episodes/previous evaluation): HX of PE/DVT and on Coumadin    Precipitating or alleviating factors: None    Therapies tried and outcome: None    Denies neck pain    Denies nausea or vomiting       Patient Active Problem List   Diagnosis     History of DVT of lower extremity     Hx pulmonary embolism     Advanced care planning/counseling discussion     Hyperlipidemia     Colonoscopy refused     ACP (advance care planning)     Elevated serum alkaline phosphatase level     Normocytic anemia     Colitis due to Clostridium difficile     History of tobacco use     Hyponatremia     Deep vein thrombosis (DVT) of lower extremity (H)     Vitamin D deficiency     Long-term (current) use of anticoagulants [Z79.01]     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     PUD (peptic ulcer disease)     Autoimmune thyroiditis     Syncope, unspecified syncope type     Bradycardia     Acute upper gastrointestinal bleeding     IgM monoclonal gammopathy of uncertain significance     Pulmonary embolism (H)     Thyroiditis     Monoclonal gammopathy of unknown significance (MGUS)     Past Surgical History:   Procedure Laterality Date     ANGIOGRAM  6/23/2014     BIOPSY  2018    bone marrow biopsy     BONE MARROW BIOPSY, BONE SPECIMEN, NEEDLE/TROCAR N/A 1/16/2018    Procedure: BIOPSY BONE MARROW;  BONE MARROW BIOPSY;  Surgeon: Matthew  Nuno ROMEO MD;  Location: [facility]     C REGULAR ECHO (FL)  6/23/2014    Dr. Dirk EATON Difficile with intussuseption       cataract extraction       colonoscopy       COLONOSCOPY  6/17     ENDOSCOPY UPPER, COLONOSCOPY, COMBINED N/A 6/17/2016    Procedure: COMBINED ENDOSCOPY UPPER, COLONOSCOPY;  Surgeon: Andrea Stearns DO;  Location: [facility] OR     ESOPHAGOSCOPY, GASTROSCOPY, DUODENOSCOPY (EGD), COMBINED N/A 11/30/2017    Procedure: COMBINED ESOPHAGOSCOPY, GASTROSCOPY, DUODENOSCOPY (EGD);  UPPER ENDOSCOPY;  Surgeon: Narinder Torres MD;  Location: [facility] OR     left knee meniscal tear       Left lower extremity DVT       lens implant       nasal polypectomy       Pulmonary Embolism         Social History     Tobacco Use     Smoking status: Former Smoker     Types: Cigarettes     Start date: 5/15/1958     Last attempt to quit: 10/28/2009     Years since quitting: 10.4     Smokeless tobacco: Never Used     Tobacco comment: Quit 2009   Substance Use Topics     Alcohol use: No     Alcohol/week: 0.0 standard drinks     Family History   Problem Relation Age of Onset     Cancer Mother         Ovarian     Other Cancer Mother         lung/breast/ cervical ?     Respiratory Father         emphysemia     Lung Cancer Brother      Diabetes No family hx of      Hypertension No family hx of      Hyperlipidemia No family hx of      Thyroid Disease No family hx of      Asthma No family hx of      Colon Cancer No family hx of      Prostate Cancer No family hx of      Anesthesia Reaction No family hx of      Genetic Disorder No family hx of      Cerebrovascular Disease No family hx of      Coronary Artery Disease No family hx of      Breast Cancer No family hx of          Current Outpatient Medications   Medication Sig Dispense Refill     Ascorbic Acid (VITAMIN C) 500 MG CAPS        atorvastatin (LIPITOR) 10 MG tablet Take 1 tablet (10 mg) by mouth daily 90 tablet 3     Biotin 5000 MCG PO CAPS Take 1,000 mg by mouth         "Cholecalciferol (VITAMIN D) 400 UNITS tablet Take 1,000 Units by mouth daily        cyanocobalamin ([employer] VITAMIN  B12) 1000 MCG TABS Take 1,000 mcg by mouth daily 90 tablet 3     Lycopene 10 MG CAPS        Multiple Vitamins-Minerals (MULTIVITAMIN ADULT PO) Take 1 tablet by mouth       omeprazole (PRILOSEC) 40 MG DR capsule TAKE 1 CAPSULE(40 MG) BY MOUTH DAILY 90 capsule 3     pyridoxine (VITAMIN B-6) 50 MG TABS Take 1 tablet (50 mg) by mouth daily 90 tablet 3     warfarin ANTICOAGULANT (COUMADIN) 4 MG tablet TAKE 1& 1/2 TABLETS BY MOUTH ON MONDAY& FRIDAY. TAKE 1 TABLET ON ALL OTHER DAYS 120 tablet 3     No Known Allergies      Reviewed and updated as needed this visit by Provider         Review of Systems   ROS COMP: Constitutional, HEENT, cardiovascular, pulmonary, gi and gu systems are negative, except as otherwise noted. +head injury. +tenderness to right lower back. Denies neck pain      Objective    /60 (BP Location: Right arm, Patient Position: Chair, Cuff Size: Adult Regular)   Pulse 73   Temp 97.5  F (36.4  C) (Tympanic)   Ht 1.638 m (5' 4.5\")   Wt 80.5 kg (177 lb 6.4 oz)   SpO2 95%   BMI 29.98 kg/m    Body mass index is 29.98 kg/m .  Physical Exam   GENERAL: healthy, alert and no distress  EYES: Eyes grossly normal to inspection, PERRL and conjunctivae and sclerae normal  HENT: ear canals and TM's normal, nose and mouth without ulcers or lesions  NECK: no adenopathy, no asymmetry, masses, or scars and thyroid normal to palpation. No TTP to cervical spine. Full ROM intact to neck without pain  RESP: lungs clear to auscultation - no rales, rhonchi or wheezes  CV: regular rate and rhythm, normal S1 S2, no S3 or S4, no murmur, click or rub, no peripheral edema and peripheral pulses strong  ABDOMEN: soft, nontender, no hepatosplenomegaly, no masses and bowel sounds normal  MS: no gross musculoskeletal defects noted, no edema  SKIN: no suspicious lesions or rashes  NEURO: Normal strength and tone, " mentation intact and speech normal. CN II-XII grossly intact  BACK: no CVA tenderness, no paralumbar tenderness. TTP to right L5 paraspinal muscles  PSYCH: mentation appears normal, affect normal/bright  LYMPH: no cervical, supraclavicular, axillary, or inguinal adenopathy    Diagnostic Test Results:  Labs reviewed in Epic      Results for orders placed or performed during the hospital encounter of 04/03/20   CT Head w/o Contrast     Status: None    Narrative    PROCEDURE: CT HEAD W/O CONTRAST     HISTORY: Head trauma, minor, pt on anticoagulation; Fall, initial  encounter.    COMPARISON: None.    TECHNIQUE:  Helical images of the head from the foramen magnum to the  vertex were obtained without contrast.    FINDINGS: The ventricles and sulci are normal in volume. No acute  intracranial hemorrhage, mass effect, midline shift, hydrocephalus or  basilar cystern effacement are present.    The grey-white matter interface is preserved.    The calvarium is intact. The mastoid air cells are clear.  There is  mucosal thickening in all the paranasal sinuses. 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      Impression    IMPRESSION: No acute brain abnormality      YOANA SAUCEDO MD   Results for orders placed or performed in visit on 04/03/20   INR point of care     Status: Abnormal   Result Value Ref Range    INR Point of Care 1.8 (H) 0.86 - 1.14       Assessment & Plan   Assessment      Plan  (W19.XXXA) Fall, initial encounter  (primary encounter diagnosis)  Comment: with anticoagulant (coumadin), standard of care is a head CT  Plan: CT Head w/o Contrast            (S09.90XA) Head injury, initial encounter  Comment: negative head CT today. Hand out on head injury  Plan: hand out for head injury care. Watch for signs and symptoms.       See Patient Instructions    Return if symptoms worsen or fail to improve.    Kath Carver  NP  [facility]

## 2020-04-03 NOTE — PATIENT INSTRUCTIONS
Patient Education     Mechanical Fall  You have had a fall today. It appears that the cause is what is called mechanical. That means that you slipped, tripped, or lost your balance. If your fall had been because of fainting or a seizure, you might need other tests.  It is normal to feel sore and tight in your muscles and back the next day, and not just the muscles you injured at first. Remember, all the parts of your body are connected, so while initially one area hurts, the next day another may hurt. Also, when you injure yourself, it causes inflammation, which then causes the muscles to tighten up and hurt more. After the initial worsening, it should gradually improve over the next few days. Do report more severe pain.  Even without a definite head injury, you can still get a concussion from your head suddenly jerking forward, backward, or sideways when falling. Concussions and even bleeding can still happen, especially if you have had a recent injury or take blood thinner medicine. It is not unusual to have a mild headache and feel tired and even nauseous or dizzy.   Home care    Rest today and go back to your normal activities when you are feeling back to normal.    If you were injured during the fall, follow the advice from your healthcare provider regarding care of your injury.    At first, do not try to stretch out the sore spots. If there is a strain, stretching may make it worse. Massage may help relax the muscles without stretching them.    You can use an ice pack or cold compress on and off to the sore spots 10 to 20 minutes at a time, as often as you feel comfortable. This may help reduce the inflammation, swelling and pain.    If you have any scrapes or abrasions, they usually heal within 10 days. It is important to keep the abrasions clean while they initially start to heal. However, an infection may happen even with proper care, so watch for early signs of infection (such as warmth, redness, or  swelling).    Medicines    Talk to your healthcare provider before taking new medicines, especially if you have other medical problems or are taking other medicines.    If you need anything for pain, you can take acetaminophen or ibuprofen, unless you were given a different pain medicine to use. Talk with your healthcare provider before using these medicines if you have chronic liver or kidney disease, or ever had a stomach ulcer or gastrointestinal bleeding, or are taking blood thinner medicines.    Be careful if you are given prescription pain medicines, narcotics, or medicine for muscle spasm. They can make you sleepy and dizzy, and can affect your coordination, reflexes, and judgment. Do not drive or do work where you can injure yourself when taking them.    Fall prevention    Fix, remove, or replace anything that caused your fall.    Make your home safe by keeping walkways clear of objects you may trip over.    Use nonslip pads under rugs. Don't use small area rugs or throw rugs.    Don't walk in poorly lit areas.    Don't stand on chairs or wobbly ladders.    Use caution when reaching overhead or looking upward. This position can cause a loss of balance.    Be sure your shoes fit properly, have nonslip bottoms and are in good condition.    Be cautious when going up and down curbs, and walking on uneven sidewalks.    If your balance is poor, consider using a cane or walker.    Stay as active as you can. Balance, flexibility, strength, and endurance all come from exercise. They all play a role in preventing falls.    If you have pets, know where they are before you stand up or walk so you don't trip over them.    Limit alcohol intake. Alcohol can cause balance problems and increase the risk of falls.    Use night lights.    Have your eyes tested to be sure you are seeing well, even if you already wear glasses.     Follow-up  Follow up with your healthcare provider, or as advised. If X-rays or CT scans were done,  "you will be notified if there is a change in the reading, especially if it affects treatment.  Call 911  Call 911 if any of these happen:    Trouble breathing    Confused or difficulty arousing    Fainting or loss of consciousness    Rapid or very slow heart rate    Seizure    Difficulty with speech or vision, weakness of an arm or leg    Difficulty walking or talking, loss of balance, numbness or weakness in one side of your body, or facial droop  When to seek medical advice  Call your healthcare provider right away if any of these happen:    Repeated mechanical falls, or unexplained falls    Dizziness    Severe headache    Blood in vomit, stools (black or red color)  Date Last Reviewed: 11/1/2017 2000-2019 [employer]. [address] [place] [address], [place] [id number]. All rights reserved. This information is not intended as a substitute for professional medical care. Always follow your healthcare professional's instructions.    HEAD INJURY    Although no evidence of any serious injury has been found at this time, close observation for the next 24-48 hours is advised.    Should any of the following occur, contact or return to the clinic or Emergency Department:  1. Repeated or persistent vomiting.    2. Headache which worsens or lasts more than a day.    3. Unequal pupils (one large and one small).     4. Difficulty seeing (double or blurry vision).    5. Dizziness, confusion or loss of consciousness.    6. Increasing drowsiness or inability to arouse the person.  Look for a personality change or irritability (wake the person during the first night every two or three hours).  If the child says \"Quit bugging me, go away\" the child is OK.    7. Bleeding or discharge of fluid from the nose or ears.    8. Slurred speech.    9. New or worsening neck pain.    10. Seizure/convulsions (uncontrolled movements of the face, arms, and legs).    TREATMENT/SPECIAL INSTRUCTIONS:    Avoid strenuous physical activity for " at least 24 hours.   Remain in bed until the headache is gone.    Eat a light diet for 24 hours.    Tylenol for relief of mild pain.    Avoid tranquilizers, sedatives or alcohol for 24 hours after the injury.

## 2020-05-22 ENCOUNTER — ANTICOAGULATION THERAPY VISIT (OUTPATIENT)
Dept: ANTICOAGULATION | Facility: OTHER | Age: 80
End: 2020-05-22
Attending: FAMILY MEDICINE
Payer: MEDICARE

## 2020-05-22 DIAGNOSIS — K92.2 ACUTE UPPER GASTROINTESTINAL BLEEDING: ICD-10-CM

## 2020-05-22 DIAGNOSIS — Z86.711 HX PULMONARY EMBOLISM: ICD-10-CM

## 2020-05-22 DIAGNOSIS — Z86.718 HISTORY OF DVT OF LOWER EXTREMITY: ICD-10-CM

## 2020-05-22 DIAGNOSIS — D64.9 ANEMIA, UNSPECIFIED TYPE: ICD-10-CM

## 2020-05-22 DIAGNOSIS — Z79.01 LONG TERM CURRENT USE OF ANTICOAGULANT THERAPY: ICD-10-CM

## 2020-05-22 LAB
BASOPHILS # BLD AUTO: 0.1 10E9/L (ref 0–0.2)
BASOPHILS NFR BLD AUTO: 1 %
DIFFERENTIAL METHOD BLD: ABNORMAL
EOSINOPHIL # BLD AUTO: 0.2 10E9/L (ref 0–0.7)
EOSINOPHIL NFR BLD AUTO: 3.3 %
ERYTHROCYTE [DISTWIDTH] IN BLOOD BY AUTOMATED COUNT: 13.5 % (ref 10–15)
FERRITIN SERPL-MCNC: 57 NG/ML (ref 26–388)
HCT VFR BLD AUTO: 41 % (ref 40–53)
HGB BLD-MCNC: 13.1 G/DL (ref 13.3–17.7)
IMM GRANULOCYTES # BLD: 0 10E9/L (ref 0–0.4)
IMM GRANULOCYTES NFR BLD: 0.2 %
INR BLD: 1.6 (ref 0.86–1.14)
IRON SATN MFR SERPL: 27 % (ref 15–46)
IRON SERPL-MCNC: 81 UG/DL (ref 35–180)
LYMPHOCYTES # BLD AUTO: 1 10E9/L (ref 0.8–5.3)
LYMPHOCYTES NFR BLD AUTO: 20.5 %
MCH RBC QN AUTO: 29.7 PG (ref 26.5–33)
MCHC RBC AUTO-ENTMCNC: 32 G/DL (ref 31.5–36.5)
MCV RBC AUTO: 93 FL (ref 78–100)
MONOCYTES # BLD AUTO: 0.6 10E9/L (ref 0–1.3)
MONOCYTES NFR BLD AUTO: 11.7 %
NEUTROPHILS # BLD AUTO: 3 10E9/L (ref 1.6–8.3)
NEUTROPHILS NFR BLD AUTO: 63.3 %
NRBC # BLD AUTO: 0 10*3/UL
NRBC BLD AUTO-RTO: 0 /100
PLATELET # BLD AUTO: 166 10E9/L (ref 150–450)
RBC # BLD AUTO: 4.41 10E12/L (ref 4.4–5.9)
TIBC SERPL-MCNC: 298 UG/DL (ref 240–430)
WBC # BLD AUTO: 4.8 10E9/L (ref 4–11)

## 2020-05-22 PROCEDURE — 36415 COLL VENOUS BLD VENIPUNCTURE: CPT | Mod: ZL | Performed by: FAMILY MEDICINE

## 2020-05-22 PROCEDURE — 85025 COMPLETE CBC W/AUTO DIFF WBC: CPT | Mod: ZL | Performed by: FAMILY MEDICINE

## 2020-05-22 PROCEDURE — 82728 ASSAY OF FERRITIN: CPT | Mod: ZL | Performed by: FAMILY MEDICINE

## 2020-05-22 PROCEDURE — 36416 COLLJ CAPILLARY BLOOD SPEC: CPT | Mod: ZL | Performed by: FAMILY MEDICINE

## 2020-05-22 PROCEDURE — 85610 PROTHROMBIN TIME: CPT | Mod: QW,ZL | Performed by: FAMILY MEDICINE

## 2020-05-22 PROCEDURE — 83550 IRON BINDING TEST: CPT | Mod: ZL | Performed by: FAMILY MEDICINE

## 2020-05-22 PROCEDURE — 83540 ASSAY OF IRON: CPT | Mod: ZL | Performed by: FAMILY MEDICINE

## 2020-05-22 NOTE — PROGRESS NOTES
ANTICOAGULATION FOLLOW-UP CLINIC VISIT    Patient Name:  Rey Tristan  Date:  2020  Contact Type:  Telephone    SUBJECTIVE:  Patient Findings     Positives:   Missed doses (missed dose)    Comments:   INR done by lab. Call placed to patient mobile number and spoke to him re: INR result, warfarin dosing/INR recheck date. He states no unusual bleeding/bruising and no new changes in diet/meds/activity. He verbalized understanding of warfarin dosing/INR recheck date and has no questions.         Clinical Outcomes     Negatives:   Major bleeding event, Thromboembolic event, Anticoagulation-related hospital admission, Anticoagulation-related ED visit, Anticoagulation-related fatality    Comments:   INR done by lab. Call placed to patient mobile number and spoke to him re: INR result, warfarin dosing/INR recheck date. He states no unusual bleeding/bruising and no new changes in diet/meds/activity. He verbalized understanding of warfarin dosing/INR recheck date and has no questions.            OBJECTIVE    Recent labs: (last 7 days)     20  1028   INR 1.6*       ASSESSMENT / PLAN  INR assessment THER    Recheck INR In: 9 WEEKS    INR Location Clinic      Anticoagulation Summary  As of 2020    INR goal:   1.5-2.0   TTR:   95.9 % (1 y)   INR used for dosin.6 (2020)   Warfarin maintenance plan:   6 mg (4 mg x 1.5) every Mon, Fri; 4 mg (4 mg x 1) all other days   Full warfarin instructions:   6 mg every Mon, Fri; 4 mg all other days   Weekly warfarin total:   32 mg   No change documented:   Lala Boss RN   Plan last modified:   Lala Boss RN (2018)   Next INR check:   2020   Priority:   Maintenance   Target end date:   Indefinite    Indications    Long-term (current) use of anticoagulants [Z79.01] [Z79.01]  Hx pulmonary embolism [Z86.711]  History of DVT of lower extremity [Z86.718]  Acute upper gastrointestinal bleeding [K92.2]             Anticoagulation Episode Summary      INR check location:       Preferred lab:       Send INR reminders to:   [facility] FUNK    Comments:         Anticoagulation Care Providers     Provider Role Specialty Phone number    Gianna Richard MD Referring [facility] [phone]            See the Encounter Report to view Anticoagulation Flowsheet and Dosing Calendar (Go to Encounters tab in chart review, and find the Anticoagulation Therapy Visit)        Lala Boss RN

## 2020-07-31 ENCOUNTER — ANTICOAGULATION THERAPY VISIT (OUTPATIENT)
Dept: ANTICOAGULATION | Facility: OTHER | Age: 80
End: 2020-07-31
Attending: FAMILY MEDICINE
Payer: MEDICARE

## 2020-07-31 DIAGNOSIS — K92.2 ACUTE UPPER GASTROINTESTINAL BLEEDING: ICD-10-CM

## 2020-07-31 DIAGNOSIS — Z86.711 HX PULMONARY EMBOLISM: ICD-10-CM

## 2020-07-31 DIAGNOSIS — Z79.01 LONG TERM CURRENT USE OF ANTICOAGULANT THERAPY: ICD-10-CM

## 2020-07-31 DIAGNOSIS — Z86.718 HISTORY OF DVT OF LOWER EXTREMITY: ICD-10-CM

## 2020-07-31 LAB
CAPILLARY BLOOD COLLECTION: NORMAL
INR BLD: 1.8 (ref 0.86–1.14)

## 2020-07-31 PROCEDURE — 85610 PROTHROMBIN TIME: CPT | Mod: QW,ZL | Performed by: FAMILY MEDICINE

## 2020-07-31 PROCEDURE — 36416 COLLJ CAPILLARY BLOOD SPEC: CPT | Mod: ZL | Performed by: FAMILY MEDICINE

## 2020-07-31 NOTE — PROGRESS NOTES
ANTICOAGULATION FOLLOW-UP CLINIC VISIT    Patient Name:  Rey Tristan  Date:  2020  Contact Type:  Telephone/ message left on both home and mobile phones    SUBJECTIVE:  Patient Findings     Comments:   INR done by lab. Call placed to patient and messsage left on both home and cell phone re: INR result, warfarin dosing/INR recheck date. He is to call warfarin clinic if he has any unusual bleeding/bruising, new changes in diet/meds/activity, questions or illness        Clinical Outcomes     Negatives:   Major bleeding event, Thromboembolic event, Anticoagulation-related hospital admission, Anticoagulation-related ED visit, Anticoagulation-related fatality    Comments:   INR done by lab. Call placed to patient and messsage left on both home and cell phone re: INR result, warfarin dosing/INR recheck date. He is to call warfarin clinic if he has any unusual bleeding/bruising, new changes in diet/meds/activity, questions or illness           OBJECTIVE    Recent labs: (last 7 days)     20  1026   INR 1.8*       ASSESSMENT / PLAN  INR assessment THER    Recheck INR In: 6 WEEKS    INR Location Clinic      Anticoagulation Summary  As of 2020    INR goal:   1.5-2.0   TTR:   100.0 % (1 y)   INR used for dosin.8 (2020)   Warfarin maintenance plan:   6 mg (4 mg x 1.5) every Mon, Fri; 4 mg (4 mg x 1) all other days   Full warfarin instructions:   6 mg every Mon, Fri; 4 mg all other days   Weekly warfarin total:   32 mg   No change documented:   Lala Boss RN   Plan last modified:   Lala Boss RN (2018)   Next INR check:   2020   Priority:   High   Target end date:   Indefinite    Indications    Long-term (current) use of anticoagulants [Z79.01] [Z79.01]  Hx pulmonary embolism [Z86.711]  History of DVT of lower extremity [Z86.718]  Acute upper gastrointestinal bleeding [K92.2]             Anticoagulation Episode Summary     INR check location:       Preferred lab:       Send INR  reminders to:   [facility]E FUNK    Comments:         Anticoagulation Care Providers     Provider Role Specialty Phone number    Gianna Richard MD Referring [facility] [phone]            See the Encounter Report to view Anticoagulation Flowsheet and Dosing Calendar (Go to Encounters tab in chart review, and find the Anticoagulation Therapy Visit)        Lala Boss RN

## 2020-08-14 ENCOUNTER — ANTICOAGULATION THERAPY VISIT (OUTPATIENT)
Dept: ANTICOAGULATION | Facility: OTHER | Age: 80
End: 2020-08-14
Attending: NURSE PRACTITIONER
Payer: MEDICARE

## 2020-08-14 DIAGNOSIS — Z86.718 HISTORY OF DVT OF LOWER EXTREMITY: ICD-10-CM

## 2020-08-14 DIAGNOSIS — D64.9 ANEMIA, UNSPECIFIED TYPE: ICD-10-CM

## 2020-08-14 DIAGNOSIS — K92.2 ACUTE UPPER GASTROINTESTINAL BLEEDING: ICD-10-CM

## 2020-08-14 DIAGNOSIS — D47.2 IGM MONOCLONAL GAMMOPATHY OF UNCERTAIN SIGNIFICANCE: ICD-10-CM

## 2020-08-14 DIAGNOSIS — Z86.711 HX PULMONARY EMBOLISM: ICD-10-CM

## 2020-08-14 DIAGNOSIS — Z79.01 LONG TERM CURRENT USE OF ANTICOAGULANT THERAPY: ICD-10-CM

## 2020-08-14 LAB
ALBUMIN SERPL-MCNC: 3.3 G/DL (ref 3.4–5)
ALP SERPL-CCNC: 176 U/L (ref 40–150)
ALT SERPL W P-5'-P-CCNC: 31 U/L (ref 0–70)
ANION GAP SERPL CALCULATED.3IONS-SCNC: 2 MMOL/L (ref 3–14)
AST SERPL W P-5'-P-CCNC: 33 U/L (ref 0–45)
BASOPHILS # BLD AUTO: 0 10E9/L (ref 0–0.2)
BASOPHILS NFR BLD AUTO: 0.9 %
BILIRUB SERPL-MCNC: 0.6 MG/DL (ref 0.2–1.3)
BUN SERPL-MCNC: 12 MG/DL (ref 7–30)
CALCIUM SERPL-MCNC: 8.7 MG/DL (ref 8.5–10.1)
CHLORIDE SERPL-SCNC: 108 MMOL/L (ref 94–109)
CO2 SERPL-SCNC: 29 MMOL/L (ref 20–32)
CREAT SERPL-MCNC: 0.76 MG/DL (ref 0.66–1.25)
DIFFERENTIAL METHOD BLD: ABNORMAL
EOSINOPHIL # BLD AUTO: 0.1 10E9/L (ref 0–0.7)
EOSINOPHIL NFR BLD AUTO: 3 %
ERYTHROCYTE [DISTWIDTH] IN BLOOD BY AUTOMATED COUNT: 13.7 % (ref 10–15)
FERRITIN SERPL-MCNC: 57 NG/ML (ref 26–388)
GFR SERPL CREATININE-BSD FRML MDRD: 87 ML/MIN/{1.73_M2}
GLUCOSE SERPL-MCNC: 113 MG/DL (ref 70–99)
HCT VFR BLD AUTO: 39.6 % (ref 40–53)
HGB BLD-MCNC: 12.7 G/DL (ref 13.3–17.7)
IMM GRANULOCYTES # BLD: 0 10E9/L (ref 0–0.4)
IMM GRANULOCYTES NFR BLD: 0.2 %
INR PPP: 1.81 (ref 0.86–1.14)
IRON SATN MFR SERPL: 29 % (ref 15–46)
IRON SERPL-MCNC: 82 UG/DL (ref 35–180)
LDH SERPL L TO P-CCNC: 199 U/L (ref 85–227)
LYMPHOCYTES # BLD AUTO: 1 10E9/L (ref 0.8–5.3)
LYMPHOCYTES NFR BLD AUTO: 22.5 %
MCH RBC QN AUTO: 29.5 PG (ref 26.5–33)
MCHC RBC AUTO-ENTMCNC: 32.1 G/DL (ref 31.5–36.5)
MCV RBC AUTO: 92 FL (ref 78–100)
MONOCYTES # BLD AUTO: 0.4 10E9/L (ref 0–1.3)
MONOCYTES NFR BLD AUTO: 9.9 %
NEUTROPHILS # BLD AUTO: 2.8 10E9/L (ref 1.6–8.3)
NEUTROPHILS NFR BLD AUTO: 63.5 %
NRBC # BLD AUTO: 0 10*3/UL
NRBC BLD AUTO-RTO: 0 /100
PLATELET # BLD AUTO: 157 10E9/L (ref 150–450)
POTASSIUM SERPL-SCNC: 4.3 MMOL/L (ref 3.4–5.3)
PROT SERPL-MCNC: 8.1 G/DL (ref 6.8–8.8)
RBC # BLD AUTO: 4.3 10E12/L (ref 4.4–5.9)
SODIUM SERPL-SCNC: 139 MMOL/L (ref 133–144)
TIBC SERPL-MCNC: 283 UG/DL (ref 240–430)
WBC # BLD AUTO: 4.4 10E9/L (ref 4–11)

## 2020-08-14 PROCEDURE — 82232 ASSAY OF BETA-2 PROTEIN: CPT | Mod: ZL | Performed by: NURSE PRACTITIONER

## 2020-08-14 PROCEDURE — 85025 COMPLETE CBC W/AUTO DIFF WBC: CPT | Mod: ZL | Performed by: NURSE PRACTITIONER

## 2020-08-14 PROCEDURE — 83540 ASSAY OF IRON: CPT | Mod: ZL | Performed by: NURSE PRACTITIONER

## 2020-08-14 PROCEDURE — 80053 COMPREHEN METABOLIC PANEL: CPT | Mod: ZL | Performed by: NURSE PRACTITIONER

## 2020-08-14 PROCEDURE — 83883 ASSAY NEPHELOMETRY NOT SPEC: CPT | Mod: ZL | Performed by: NURSE PRACTITIONER

## 2020-08-14 PROCEDURE — 84165 PROTEIN E-PHORESIS SERUM: CPT | Mod: ZL | Performed by: NURSE PRACTITIONER

## 2020-08-14 PROCEDURE — 82784 ASSAY IGA/IGD/IGG/IGM EACH: CPT | Mod: ZL | Performed by: NURSE PRACTITIONER

## 2020-08-14 PROCEDURE — 82728 ASSAY OF FERRITIN: CPT | Mod: ZL | Performed by: NURSE PRACTITIONER

## 2020-08-14 PROCEDURE — 83550 IRON BINDING TEST: CPT | Mod: ZL | Performed by: NURSE PRACTITIONER

## 2020-08-14 PROCEDURE — 00000402 ZZHCL STATISTIC TOTAL PROTEIN: Mod: ZL | Performed by: NURSE PRACTITIONER

## 2020-08-14 PROCEDURE — 83615 LACTATE (LD) (LDH) ENZYME: CPT | Mod: ZL | Performed by: NURSE PRACTITIONER

## 2020-08-14 PROCEDURE — 85810 BLOOD VISCOSITY EXAMINATION: CPT | Mod: ZL | Performed by: NURSE PRACTITIONER

## 2020-08-14 PROCEDURE — 86334 IMMUNOFIX E-PHORESIS SERUM: CPT | Mod: ZL | Performed by: NURSE PRACTITIONER

## 2020-08-14 PROCEDURE — 85610 PROTHROMBIN TIME: CPT | Mod: ZL | Performed by: NURSE PRACTITIONER

## 2020-08-14 PROCEDURE — 36415 COLL VENOUS BLD VENIPUNCTURE: CPT | Mod: ZL | Performed by: NURSE PRACTITIONER

## 2020-08-14 NOTE — PROGRESS NOTES
ANTICOAGULATION FOLLOW-UP CLINIC VISIT    Patient Name:  Rey Tristan  Date:  2020  Contact Type:  Telephone/ message left on home phone voicemail    SUBJECTIVE:  Patient Findings     Comments:   INR done with other labwork today. Call placed to patient home and message left on voicemail re: INR result, warfarin dosing/INR recheck date. He is to call warfarin clinic if he has any bleeding/bruising, new changes in diet/meds/activity, questions or illness        Clinical Outcomes     Negatives:   Major bleeding event, Thromboembolic event, Anticoagulation-related hospital admission, Anticoagulation-related ED visit, Anticoagulation-related fatality    Comments:   INR done with other labwork today. Call placed to patient home and message left on voicemail re: INR result, warfarin dosing/INR recheck date. He is to call warfarin clinic if he has any bleeding/bruising, new changes in diet/meds/activity, questions or illness           OBJECTIVE    Recent labs: (last 7 days)     20  1039   INR 1.81*       ASSESSMENT / PLAN  INR assessment THER    Recheck INR In: 6 WEEKS    INR Location Clinic      Anticoagulation Summary  As of 2020    INR goal:   1.5-2.0   TTR:   100.0 % (1 y)   INR used for dosin.81 (2020)   Warfarin maintenance plan:   6 mg (4 mg x 1.5) every Mon, Fri; 4 mg (4 mg x 1) all other days   Full warfarin instructions:   6 mg every Mon, Fri; 4 mg all other days   Weekly warfarin total:   32 mg   No change documented:   Lala Boss RN   Plan last modified:   Lala Boss RN (2018)   Next INR check:   2020   Priority:   High   Target end date:   Indefinite    Indications    Long-term (current) use of anticoagulants [Z79.01] [Z79.01]  Hx pulmonary embolism [Z86.711]  History of DVT of lower extremity [Z86.718]  Acute upper gastrointestinal bleeding [K92.2]             Anticoagulation Episode Summary     INR check location:       Preferred lab:       Send INR  reminders to:   [facility]E FUNK    Comments:         Anticoagulation Care Providers     Provider Role Specialty Phone number    Gianna Richard MD Referring [facility] [phone]            See the Encounter Report to view Anticoagulation Flowsheet and Dosing Calendar (Go to Encounters tab in chart review, and find the Anticoagulation Therapy Visit)        Lala Boss RN

## 2020-08-17 LAB
ALBUMIN SERPL ELPH-MCNC: 3.7 G/DL (ref 3.7–5.1)
ALPHA1 GLOB SERPL ELPH-MCNC: 0.3 G/DL (ref 0.2–0.4)
ALPHA2 GLOB SERPL ELPH-MCNC: 0.9 G/DL (ref 0.5–0.9)
B-GLOBULIN SERPL ELPH-MCNC: 1.9 G/DL (ref 0.6–1)
B2 MICROGLOB SERPL-MCNC: 2.2 MG/L
GAMMA GLOB SERPL ELPH-MCNC: 0.7 G/DL (ref 0.7–1.6)
IGA SERPL-MCNC: 120 MG/DL (ref 84–499)
IGG SERPL-MCNC: 928 MG/DL (ref 610–1616)
IGM SERPL-MCNC: 1386 MG/DL (ref 35–242)
KAPPA LC UR-MCNC: 4.74 MG/DL (ref 0.33–1.94)
KAPPA LC/LAMBDA SER: 3.43 {RATIO} (ref 0.26–1.65)
LAMBDA LC SERPL-MCNC: 1.38 MG/DL (ref 0.57–2.63)
M PROTEIN SERPL ELPH-MCNC: 1.1 G/DL
PROT PATTERN SERPL ELPH-IMP: ABNORMAL
PROT PATTERN SERPL IFE-IMP: NORMAL
VISC SER: 1.7 CP (ref 1.4–1.8)

## 2020-08-19 NOTE — PROGRESS NOTES
Hematology Follow-up Visit:  August 20, 2020    Reason for Visit:  Patient presents with:  RECHECK: Follow up Monoclonal gammopathy of unknown significance (MGUS)     Nursing Note and documentation reviewed: yes    HPI:  This is a 80-year-old male patient who presents to the clinic today in followup of monoclonal gammopathy of undetermined significance diagnosed 2/2018 during workup for hypercoag state, along with previous diagnosis of anemia related to GI bleed.  Patient does have a history of previous DVT and PE and has been on long-term anticoagulation with Coumadin.  Coumadin was held after GI bleed was diagnosed and has been resumed.  Iron has been discontinued.     He presents to the clinic today accompanied by his wife stating he is doing good.  He has had a few episodes where he wakes up in the morning to look at the clock and feels somewhat dizzy.  This is not frequent.  He has no complaints of dizziness while up ambulating.  Otherwise, he has no other complaints.    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   Protein C and protein S along with antiphospholipid profile were negative.  SPEP was completed showing an M-spike of 1.1 with serum immunofixation showing monoclonal IgM immunoglobulin, kappa light chain type with IgM of 1530.  Bone marrow aspiration biopsy showed iron deficiency, slightly increased plasma cell proximally 2% kappa and toxic neutrophils with reactive lymphocytes and increased rouleaux formation; flow cytometry and immunophenotyping was unremarkable. Skeletal bone survey was completed on 2/6/2018 showing a stable T12 compression fracture but no evidence of lytic lesions.      He was placed on oral iron with improvement in his hemoglobin and iron studies.      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 Simeon; silent; transient  hyperthyroid, now normal; monitor TSH monthly for hypothyroidism     Chronic anticoagulation 1/1/2012     Elevated serum alkaline phosphatase level 5/4/2016    normal GGT, normal bone skeletal survery     Gastric ulcer     endoscopy 6/2016, repeat 6 months; PPI Carafate     Hyperlipidemia      Normocytic anemia 5/4/2016     Pulmonary nodule     CT 5/2014, right; consider repeat 1 year if high risk     Upper GI bleed 11/30/2017     Vitamin D deficiency 1/1/2012       Past Surgical History:   Procedure Laterality Date     ANGIOGRAM  6/23/2014     BIOPSY  2018    bone marrow biopsy     BONE MARROW BIOPSY, BONE SPECIMEN, NEEDLE/TROCAR N/A 1/16/2018    Procedure: BIOPSY BONE MARROW;  BONE MARROW BIOPSY;  Surgeon: Nuno Castro MD;  Location: [facility]     C REGULAR ECHO ([place])  6/23/2014    Dr. Dirk Gimenez with intussuseption       cataract extraction       colonoscopy       COLONOSCOPY  6/17     ENDOSCOPY UPPER, COLONOSCOPY, COMBINED N/A 6/17/2016    Procedure: COMBINED ENDOSCOPY UPPER, COLONOSCOPY;  Surgeon: Andrea Stearns DO;  Location: [facility]     ESOPHAGOSCOPY, GASTROSCOPY, DUODENOSCOPY (EGD), COMBINED N/A 11/30/2017    Procedure: COMBINED ESOPHAGOSCOPY, GASTROSCOPY, DUODENOSCOPY (EGD);  UPPER ENDOSCOPY;  Surgeon: Narinder Torres MD;  Location: [facility]     left knee meniscal tear       Left lower extremity DVT       lens implant       nasal polypectomy       Pulmonary Embolism         Family History   Problem Relation Age of Onset     Cancer Mother         Ovarian     Other Cancer Mother         lung/breast/ cervical ?     Respiratory Father         emphysemia     Lung Cancer Brother      Diabetes No family hx of      Hypertension No family hx of      Hyperlipidemia No family hx of      Thyroid Disease No family hx of      Asthma No family hx of      Colon Cancer No family hx of      Prostate Cancer No family hx of      Anesthesia Reaction No family hx of      Genetic Disorder No family hx of       Cerebrovascular Disease No family hx of      Coronary Artery Disease No family hx of      Breast Cancer No family hx of        Social History     Socioeconomic History     Marital status:      Spouse name: Not on file     Number of children: Not on file     Years of education: Not on file     Highest education level: Not on file   Occupational History     Employer: [employer]     Comment: Retired    Social Needs     Financial resource strain: Not on file     Food insecurity     Worry: Not on file     Inability: Not on file     Transportation needs     Medical: Not on file     Non-medical: Not on file   Tobacco Use     Smoking status: Former Smoker     Types: Cigarettes     Start date: 5/15/1958     Last attempt to quit: 10/28/2009     Years since quitting: 10.8     Smokeless tobacco: Never Used     Tobacco comment: Quit 2009   Substance and Sexual Activity     Alcohol use: No     Alcohol/week: 0.0 standard drinks     Drug use: No     Sexual activity: Not on file   Lifestyle     Physical activity     Days per week: Not on file     Minutes per session: Not on file     Stress: Not on file   Relationships     Social connections     Talks on phone: Not on file     Gets together: Not on file     Attends Jehovah's witness service: Not on file     Active member of club or organization: Not on file     Attends meetings of clubs or organizations: Not on file     Relationship status: Not on file     Intimate partner violence     Fear of current or ex partner: Not on file     Emotionally abused: Not on file     Physically abused: Not on file     Forced sexual activity: Not on file   Other Topics Concern      Service Yes     Comment: [employer]     Blood Transfusions Yes     Comment: Permits if needed     Caffeine Concern Yes     Comment: Tea     Occupational Exposure No     Hobby Hazards No     Sleep Concern No     Stress Concern No     Weight Concern No     Special Diet No     Back Care No      "Exercise No     Bike Helmet Not Asked     Seat Belt Yes     Self-Exams Yes     Parent/sibling w/ CABG, MI or angioplasty before 65F 55M? No   Social History Narrative     Not on file       Current Outpatient Medications   Medication     Ascorbic Acid (VITAMIN C) 500 MG CAPS     atorvastatin (LIPITOR) 10 MG tablet     Biotin 5000 MCG PO CAPS     Cholecalciferol (VITAMIN D) 400 UNITS tablet     cyanocobalamin ([employer] VITAMIN  B12) 1000 MCG TABS     Lycopene 10 MG CAPS     Multiple Vitamins-Minerals (MULTIVITAMIN ADULT PO)     omeprazole (PRILOSEC) 40 MG DR capsule     pyridoxine (VITAMIN B-6) 50 MG TABS     warfarin ANTICOAGULANT (COUMADIN) 4 MG tablet     No current facility-administered medications for this visit.         No Known Allergies    Review Of Systems:  Constitutional:    denies fever, weight changes, chills, and night sweats.  Eyes:    No changes in his vision-sees opthamology  Ears/Nose/Throat:   denies ear pain, nose problems, difficulty swallowing  Respiratory:   Has some shortness of breath with exertion, no cough   Skin:   denies new rash, lesions  Cardiovascular:   denies chest pain, palpitations, mild edema to left ankle-no change  Gastrointestinal:   denies abdominal pain, bloating, nausea,  early satiety; no change in bowel habits or blood in stool  Genitourinary:   denies difficulty with urination, blood in urine  Musculoskeletal:    denies new muscle pain, bone pain  Neurologic:   Has some lightheadedness at time, gets headaches that are brief, no numbness or tingling  Hematologic/Lymphatic/Immunologic:   denies easy bleeding, lumps or bumps noted; has easy bruising-on anticoagulationv  Endocrine:   Denies increased thirst        Physical Exam:  /70   Pulse 61   Temp 98  F (36.7  C) (Tympanic)   Resp 18   Ht 1.638 m (5' 4.5\")   Wt 76.6 kg (168 lb 14 oz)   SpO2 93%   BMI 28.54 kg/m      GENERAL APPEARANCE: Healthy, alert and in no acute distress.  HEENT: Normocephalic, Sclerae " anicteric. Oropharynx without ulcers, lesions, or thrush.  NECK:   No asymmetry or masses, no thyromegaly.  LYMPHATICS: No palpable cervical, supraclavicular, axillary, or inguinal nodes   RESP: Lungs clear to auscultation bilaterally, respirations regular and easy  CARDIOVASCULAR: Regular rate and rhythm. Normal S1, S2  ABDOMEN: Nontender. Bowel sounds auscultated all 4 quadrants. No palpable organomegaly or masses.  MUSCULOSKELETAL: Extremities without gross deformities noted.  NEURO: Alert and oriented x 3.  Gait steady.  PSYCHIATRIC: Mentation and affect appear normal.  Mood appropriate.    Laboratory:  Component      Latest Ref Rng & Units 8/14/2020   WBC      4.0 - 11.0 10e9/L 4.4   RBC Count      4.4 - 5.9 10e12/L 4.30 (L)   Hemoglobin      13.3 - 17.7 g/dL 12.7 (L)   Hematocrit      40.0 - 53.0 % 39.6 (L)   MCV      78 - 100 fl 92   MCH      26.5 - 33.0 pg 29.5   MCHC      31.5 - 36.5 g/dL 32.1   RDW      10.0 - 15.0 % 13.7   Platelet Count      150 - 450 10e9/L 157   Diff Method       Automated Method   % Neutrophils      % 63.5   % Lymphocytes      % 22.5   % Monocytes      % 9.9   % Eosinophils      % 3.0   % Basophils      % 0.9   % Immature Granulocytes      % 0.2   Nucleated RBCs      0 /100 0   Absolute Neutrophil      1.6 - 8.3 10e9/L 2.8   Absolute Lymphocytes      0.8 - 5.3 10e9/L 1.0   Absolute Monocytes      0.0 - 1.3 10e9/L 0.4   Absolute Eosinophils      0.0 - 0.7 10e9/L 0.1   Absolute Basophils      0.0 - 0.2 10e9/L 0.0   Abs Immature Granulocytes      0 - 0.4 10e9/L 0.0   Absolute Nucleated RBC       0.0   Sodium      133 - 144 mmol/L 139   Potassium      3.4 - 5.3 mmol/L 4.3   Chloride      94 - 109 mmol/L 108   Carbon Dioxide      20 - 32 mmol/L 29   Anion Gap      3 - 14 mmol/L 2 (L)   Glucose      70 - 99 mg/dL 113 (H)   Urea Nitrogen      7 - 30 mg/dL 12   Creatinine      0.66 - 1.25 mg/dL 0.76   GFR Estimate      >60 mL/min/1.73:m2 87   GFR Estimate If Black      >60 mL/min/1.73:m2 >90    Calcium      8.5 - 10.1 mg/dL 8.7   Bilirubin Total      0.2 - 1.3 mg/dL 0.6   Albumin      3.4 - 5.0 g/dL 3.3 (L)   Protein Total      6.8 - 8.8 g/dL 8.1   Alkaline Phosphatase      40 - 150 U/L 176 (H)   ALT      0 - 70 U/L 31   AST      0 - 45 U/L 33   Albumin Fraction      3.7 - 5.1 g/dL 3.7   Alpha 1 Fraction      0.2 - 0.4 g/dL 0.3   Alpha 2 Fraction      0.5 - 0.9 g/dL 0.9   Beta Fraction      0.6 - 1.0 g/dL 1.9 (H)   Gamma Fraction      0.7 - 1.6 g/dL 0.7   Monoclonal Peak      0.0 g/dL 1.1 (H)   ELP Interpretation:       Monoclonal protein (about 1.1 g/dL) seen in the beta fraction comigrating with C3 . . .   Between Free Lt Chain      0.33 - 1.94 mg/dL 4.74 (H)   Lambda Free Lt Chain      0.57 - 2.63 mg/dL 1.38   Kappa Lambda Ratio      0.26 - 1.65 3.43 (H)   IGG      610 - 1,616 mg/dL 928   IGA      84 - 499 mg/dL 120   IGM      35 - 242 mg/dL 1,386 (H)   Iron      35 - 180 ug/dL 82   Iron Binding Cap      240 - 430 ug/dL 283   Iron Saturation Index      15 - 46 % 29   Viscosity Index      1.4 - 1.8 1.7   Beta-2-Microglobulin      <2.3 mg/L 2.2   Lactate Dehydrogenase      85 - 227 U/L 199   Ferritin      26 - 388 ng/mL 57     Imaging Studies:  None completed for today      ASSESSMENT/PLAN:    #1 MGUS: IgM Monoclonal gammopathy of undetermined significance diagnosed January 2018.  MGUS considered essentially stable.  No evidence of end organ damage.  We'll see him back in 6 months with the same lab work.     #2  Anemia: hemoglobin shows a very slight drop; unsure of etiology.  He's on chronic anticoagulation.  Normal iron studies.  I did instruct him to reinstitute one iron tablet daily and will recheck the iron studies and hemoglobin in 6 months.    I encouraged patient to call with any questions or concerns.     Britney Mahoney, NP  APRN, FNP-BC, AOCNP

## 2020-08-20 ENCOUNTER — ONCOLOGY VISIT (OUTPATIENT)
Dept: ONCOLOGY | Facility: OTHER | Age: 80
End: 2020-08-20
Attending: NURSE PRACTITIONER
Payer: COMMERCIAL

## 2020-08-20 VITALS
HEART RATE: 61 BPM | TEMPERATURE: 98 F | RESPIRATION RATE: 18 BRPM | WEIGHT: 168.87 LBS | SYSTOLIC BLOOD PRESSURE: 110 MMHG | BODY MASS INDEX: 28.14 KG/M2 | HEIGHT: 65 IN | OXYGEN SATURATION: 93 % | DIASTOLIC BLOOD PRESSURE: 70 MMHG

## 2020-08-20 DIAGNOSIS — D64.9 ANEMIA, UNSPECIFIED TYPE: ICD-10-CM

## 2020-08-20 DIAGNOSIS — D47.2 IGM MONOCLONAL GAMMOPATHY OF UNCERTAIN SIGNIFICANCE: Primary | ICD-10-CM

## 2020-08-20 PROCEDURE — 99213 OFFICE O/P EST LOW 20 MIN: CPT | Mod: CR | Performed by: NURSE PRACTITIONER

## 2020-08-20 PROCEDURE — G0463 HOSPITAL OUTPT CLINIC VISIT: HCPCS

## 2020-08-20 ASSESSMENT — PAIN SCALES - GENERAL: PAINLEVEL: NO PAIN (0)

## 2020-08-20 ASSESSMENT — MIFFLIN-ST. JEOR: SCORE: 1404.94

## 2020-08-20 NOTE — NURSING NOTE
"Chief Complaint   Patient presents with     RECHECK     Follow up Monoclonal gammopathy of unknown significance (MGUS)       Initial /70   Pulse 61   Temp 98  F (36.7  C) (Tympanic)   Resp 18   Ht 1.638 m (5' 4.5\")   Wt 76.6 kg (168 lb 14 oz)   SpO2 93%   BMI 28.54 kg/m   Estimated body mass index is 28.54 kg/m  as calculated from the following:    Height as of this encounter: 1.638 m (5' 4.5\").    Weight as of this encounter: 76.6 kg (168 lb 14 oz).  Medication Reconciliation: complete.  Immunizations and advance directives status reviewed. Pain scale =0 , PHQ-2=0.            Jayleen Fleming LPN    "

## 2020-08-20 NOTE — PATIENT INSTRUCTIONS
Start 1 iron tablet daily.    We would like to see you back in 6 months. Please come 1 week prior for lab work.     If you have any questions please call [phone]    Other instructions:  none

## 2020-09-25 ENCOUNTER — ANTICOAGULATION THERAPY VISIT (OUTPATIENT)
Dept: ANTICOAGULATION | Facility: OTHER | Age: 80
End: 2020-09-25
Attending: FAMILY MEDICINE
Payer: MEDICARE

## 2020-09-25 ENCOUNTER — TELEPHONE (OUTPATIENT)
Dept: FAMILY MEDICINE | Facility: OTHER | Age: 80
End: 2020-09-25

## 2020-09-25 DIAGNOSIS — E78.5 HYPERLIPIDEMIA, UNSPECIFIED HYPERLIPIDEMIA TYPE: ICD-10-CM

## 2020-09-25 DIAGNOSIS — Z86.711 HX PULMONARY EMBOLISM: ICD-10-CM

## 2020-09-25 DIAGNOSIS — K92.2 ACUTE UPPER GASTROINTESTINAL BLEEDING: ICD-10-CM

## 2020-09-25 DIAGNOSIS — Z86.718 HISTORY OF DVT OF LOWER EXTREMITY: ICD-10-CM

## 2020-09-25 DIAGNOSIS — E78.5 HYPERLIPIDEMIA, UNSPECIFIED HYPERLIPIDEMIA TYPE: Primary | ICD-10-CM

## 2020-09-25 DIAGNOSIS — Z79.01 LONG TERM CURRENT USE OF ANTICOAGULANT THERAPY: ICD-10-CM

## 2020-09-25 LAB
ALBUMIN SERPL-MCNC: 3.3 G/DL (ref 3.4–5)
ALP SERPL-CCNC: 164 U/L (ref 40–150)
ALT SERPL W P-5'-P-CCNC: 31 U/L (ref 0–70)
ANION GAP SERPL CALCULATED.3IONS-SCNC: 5 MMOL/L (ref 3–14)
AST SERPL W P-5'-P-CCNC: 32 U/L (ref 0–45)
BILIRUB SERPL-MCNC: 0.5 MG/DL (ref 0.2–1.3)
BUN SERPL-MCNC: 10 MG/DL (ref 7–30)
CALCIUM SERPL-MCNC: 8.4 MG/DL (ref 8.5–10.1)
CHLORIDE SERPL-SCNC: 107 MMOL/L (ref 94–109)
CHOLEST SERPL-MCNC: 108 MG/DL
CO2 SERPL-SCNC: 27 MMOL/L (ref 20–32)
CREAT SERPL-MCNC: 0.68 MG/DL (ref 0.66–1.25)
GFR SERPL CREATININE-BSD FRML MDRD: >90 ML/MIN/{1.73_M2}
GLUCOSE SERPL-MCNC: 94 MG/DL (ref 70–99)
HDLC SERPL-MCNC: 40 MG/DL
INR BLD: 1.8 (ref 0.86–1.14)
LDLC SERPL CALC-MCNC: 51 MG/DL
NONHDLC SERPL-MCNC: 68 MG/DL
POTASSIUM SERPL-SCNC: 4.2 MMOL/L (ref 3.4–5.3)
PROT SERPL-MCNC: 7.8 G/DL (ref 6.8–8.8)
SODIUM SERPL-SCNC: 139 MMOL/L (ref 133–144)
TRIGL SERPL-MCNC: 86 MG/DL

## 2020-09-25 PROCEDURE — 80061 LIPID PANEL: CPT | Mod: ZL | Performed by: FAMILY MEDICINE

## 2020-09-25 PROCEDURE — 36416 COLLJ CAPILLARY BLOOD SPEC: CPT | Mod: ZL | Performed by: FAMILY MEDICINE

## 2020-09-25 PROCEDURE — 85610 PROTHROMBIN TIME: CPT | Mod: QW,ZL | Performed by: FAMILY MEDICINE

## 2020-09-25 PROCEDURE — 80053 COMPREHEN METABOLIC PANEL: CPT | Mod: ZL | Performed by: FAMILY MEDICINE

## 2020-09-25 NOTE — PROGRESS NOTES
ANTICOAGULATION FOLLOW-UP CLINIC VISIT    Patient Name:  Rey Tristan  Date:  2020  Contact Type:  Telephone    SUBJECTIVE:  Patient Findings     Comments:   INR done by lab. Call placed to patient and spoke to him re: INR result, warfarin dosing/INR recheck date. He verbalized understanding of instruction and has no questions. He states he did start an iron supplement sometime in August, other than that  No new changes in diet/activity. He will call warfarin clinic if any changes/issues/illness.         Clinical Outcomes     Negatives:   Major bleeding event, Thromboembolic event, Anticoagulation-related hospital admission, Anticoagulation-related ED visit, Anticoagulation-related fatality    Comments:   INR done by lab. Call placed to patient and spoke to him re: INR result, warfarin dosing/INR recheck date. He verbalized understanding of instruction and has no questions. He states he did start an iron supplement sometime in August, other than that  No new changes in diet/activity. He will call warfarin clinic if any changes/issues/illness.            OBJECTIVE    Recent labs: (last 7 days)     20  1034   INR 1.8*       ASSESSMENT / PLAN  INR assessment THER    Recheck INR In: 6 WEEKS    INR Location Clinic      Anticoagulation Summary  As of 2020    INR goal:   1.5-2.0   TTR:   100.0 % (1 y)   INR used for dosin.8 (2020)   Warfarin maintenance plan:   6 mg (4 mg x 1.5) every Mon, Fri; 4 mg (4 mg x 1) all other days   Full warfarin instructions:   6 mg every Mon, Fri; 4 mg all other days   Weekly warfarin total:   32 mg   No change documented:   Lala Boss RN   Plan last modified:   Lala Boss RN (2018)   Next INR check:   2020   Priority:   High   Target end date:   Indefinite    Indications    Long-term (current) use of anticoagulants [Z79.01] [Z79.01]  Hx pulmonary embolism [Z86.711]  History of DVT of lower extremity [Z86.718]  Acute upper gastrointestinal  bleeding [K92.2]             Anticoagulation Episode Summary     INR check location:       Preferred lab:       Send INR reminders to:   ABE FUNK    Comments:         Anticoagulation Care Providers     Provider Role Specialty Phone number    Gianna Richard MD Referring [facility] [phone]            See the Encounter Report to view Anticoagulation Flowsheet and Dosing Calendar (Go to Encounters tab in chart review, and find the Anticoagulation Therapy Visit)        Lala Boss RN

## 2020-11-06 ENCOUNTER — ANTICOAGULATION THERAPY VISIT (OUTPATIENT)
Dept: ANTICOAGULATION | Facility: OTHER | Age: 80
End: 2020-11-06
Attending: FAMILY MEDICINE
Payer: MEDICARE

## 2020-11-06 DIAGNOSIS — K92.2 ACUTE UPPER GASTROINTESTINAL BLEEDING: ICD-10-CM

## 2020-11-06 DIAGNOSIS — Z86.711 HX PULMONARY EMBOLISM: ICD-10-CM

## 2020-11-06 DIAGNOSIS — Z79.01 LONG TERM CURRENT USE OF ANTICOAGULANT THERAPY: ICD-10-CM

## 2020-11-06 DIAGNOSIS — Z86.718 HISTORY OF DVT OF LOWER EXTREMITY: ICD-10-CM

## 2020-11-06 LAB
CAPILLARY BLOOD COLLECTION: NORMAL
INR BLD: 1.8 (ref 0.86–1.14)

## 2020-11-06 PROCEDURE — 36416 COLLJ CAPILLARY BLOOD SPEC: CPT | Mod: ZL | Performed by: FAMILY MEDICINE

## 2020-11-06 PROCEDURE — 85610 PROTHROMBIN TIME: CPT | Mod: ZL | Performed by: FAMILY MEDICINE

## 2020-11-06 NOTE — PROGRESS NOTES
ANTICOAGULATION FOLLOW-UP CLINIC VISIT    Patient Name:  Rey Tristan  Date:  2020  Contact Type:  Telephone    SUBJECTIVE:  Patient Findings     Comments:  INR done by lab. Call placed to pt and spoke with pt re: INR results/Warfarin dosing/INR recheck date. Pt denies any bleeding/bruising. He states that he has started an Iron pill that he takes every other day. Otherwise there has not been any other new changes in diet/meds/activity. He is to call Warfarin Clinic if any changes/questions/illness.         Clinical Outcomes     Comments:  INR done by lab. Call placed to pt and spoke with pt re: INR results/Warfarin dosing/INR recheck date. Pt denies any bleeding/bruising. He states that he has started an Iron pill that he takes every other day. Otherwise there has not been any other new changes in diet/meds/activity. He is to call Warfarin Clinic if any changes/questions/illness.            OBJECTIVE    Recent labs: (last 7 days)     20  1053   INR 1.8*       ASSESSMENT / PLAN  INR assessment THER    Recheck INR In: 6 WEEKS    INR Location Clinic      Anticoagulation Summary  As of 2020    INR goal:  1.5-2.0   TTR:  100.0 % (1 y)   INR used for dosin.8 (2020)   Warfarin maintenance plan:  6 mg (4 mg x 1.5) every Mon, Fri; 4 mg (4 mg x 1) all other days   Full warfarin instructions:  6 mg every Mon, Fri; 4 mg all other days   Weekly warfarin total:  32 mg   Plan last modified:  Lala Boss RN (2018)   Next INR check:  2020   Priority:  High   Target end date:  Indefinite    Indications    Long-term (current) use of anticoagulants [Z79.01] [Z79.01]  Hx pulmonary embolism [Z86.711]  History of DVT of lower extremity [Z86.718]  Acute upper gastrointestinal bleeding [K92.2]             Anticoagulation Episode Summary     INR check location:      Preferred lab:      Send INR reminders to:  ABE FUNK    Comments:        Anticoagulation Care Providers     Provider Role  Specialty Phone number    Gianna Richard MD Referring Family Medicine [phone]            See the Encounter Report to view Anticoagulation Flowsheet and Dosing Calendar (Go to Encounters tab in chart review, and find the Anticoagulation Therapy Visit)        Denisse Flores RN

## 2020-11-30 DIAGNOSIS — Z79.01 LONG TERM CURRENT USE OF ANTICOAGULANT THERAPY: ICD-10-CM

## 2020-11-30 DIAGNOSIS — Z86.718 HISTORY OF DVT OF LOWER EXTREMITY: ICD-10-CM

## 2020-11-30 DIAGNOSIS — Z86.711 HX PULMONARY EMBOLISM: ICD-10-CM

## 2020-11-30 NOTE — TELEPHONE ENCOUNTER
Warfarin       Last Written Prescription Date:  11/12/2019  Last Fill Quantity: 120,   # refills: 3  Last Office Visit: 4/3/2020  Future Office visit:    Next 5 appointments (look out 90 days)    Feb 19, 2021 11:15 AM  (Arrive by 11:00 AM)  Return Visit with Britney Mahoney NP  [facility] ([facility] ) [address] [place] [id number]  [phone]

## 2020-12-01 RX ORDER — WARFARIN SODIUM 4 MG/1
TABLET ORAL
Qty: 120 TABLET | Refills: 3 | Status: SHIPPED | OUTPATIENT
Start: 2020-12-01 | End: 2022-02-22

## 2020-12-18 ENCOUNTER — ANTICOAGULATION THERAPY VISIT (OUTPATIENT)
Dept: ANTICOAGULATION | Facility: OTHER | Age: 80
End: 2020-12-18
Attending: FAMILY MEDICINE
Payer: MEDICARE

## 2020-12-18 DIAGNOSIS — K92.2 ACUTE UPPER GASTROINTESTINAL BLEEDING: ICD-10-CM

## 2020-12-18 DIAGNOSIS — Z79.01 LONG TERM CURRENT USE OF ANTICOAGULANT THERAPY: ICD-10-CM

## 2020-12-18 DIAGNOSIS — Z86.711 HX PULMONARY EMBOLISM: ICD-10-CM

## 2020-12-18 DIAGNOSIS — Z86.718 HISTORY OF DVT OF LOWER EXTREMITY: ICD-10-CM

## 2020-12-18 LAB
CAPILLARY BLOOD COLLECTION: NORMAL
INR BLD: 1.9 (ref 0.86–1.14)

## 2020-12-18 PROCEDURE — 36416 COLLJ CAPILLARY BLOOD SPEC: CPT | Mod: ZL | Performed by: FAMILY MEDICINE

## 2020-12-18 PROCEDURE — 85610 PROTHROMBIN TIME: CPT | Mod: ZL | Performed by: FAMILY MEDICINE

## 2020-12-18 NOTE — PROGRESS NOTES
ANTICOAGULATION FOLLOW-UP CLINIC VISIT    Patient Name:  Rey Tristan  Date:  2020  Contact Type:  Telephone/ Left voicemail on pt home phone    SUBJECTIVE:  Patient Findings     Comments:  INR done by lab. Call placed to pt and left voicemail on pt home phone re: INR results/Warfarin dosing/INR recheck date. He is to call Warfarin clinic if any bleeding/bruising or any other new changes to his diet/meds/activity or any questions/issues/illness.         Clinical Outcomes     Negatives:  Major bleeding event, Thromboembolic event, Anticoagulation-related hospital admission, Anticoagulation-related ED visit, Anticoagulation-related fatality    Comments:  INR done by lab. Call placed to pt and left voicemail on pt home phone re: INR results/Warfarin dosing/INR recheck date. He is to call Warfarin clinic if any bleeding/bruising or any other new changes to his diet/meds/activity or any questions/issues/illness.            OBJECTIVE    Recent labs: (last 7 days)     20  0958   INR 1.9*       ASSESSMENT / PLAN  INR assessment THER    Recheck INR In: 8 WEEKS    INR Location Clinic      Anticoagulation Summary  As of 2020    INR goal:  1.5-2.0   TTR:  100.0 % (1 y)   INR used for dosin.9 (2020)   Warfarin maintenance plan:  6 mg (4 mg x 1.5) every Mon, Fri; 4 mg (4 mg x 1) all other days   Full warfarin instructions:  6 mg every Mon, Fri; 4 mg all other days   Weekly warfarin total:  32 mg   No change documented:  Denisse Flores RN   Plan last modified:  Lala Boss RN (2018)   Next INR check:  2021   Priority:  High   Target end date:  Indefinite    Indications    Long-term (current) use of anticoagulants [Z79.01] [Z79.01]  Hx pulmonary embolism [Z86.711]  History of DVT of lower extremity [Z86.718]  Acute upper gastrointestinal bleeding [K92.2]             Anticoagulation Episode Summary     INR check location:      Preferred lab:      Send INR reminders to:  ABE  GOOD    Comments:        Anticoagulation Care Providers     Provider Role Specialty Phone number    Gianna Richard MD Referring Family Medicine [phone]            See the Encounter Report to view Anticoagulation Flowsheet and Dosing Calendar (Go to Encounters tab in chart review, and find the Anticoagulation Therapy Visit)        Denisse Flores, RN

## 2021-02-12 ENCOUNTER — ANTICOAGULATION THERAPY VISIT (OUTPATIENT)
Dept: ANTICOAGULATION | Facility: OTHER | Age: 81
End: 2021-02-12
Attending: FAMILY MEDICINE
Payer: MEDICARE

## 2021-02-12 DIAGNOSIS — Z86.718 HISTORY OF DVT OF LOWER EXTREMITY: ICD-10-CM

## 2021-02-12 DIAGNOSIS — D64.9 ANEMIA, UNSPECIFIED TYPE: ICD-10-CM

## 2021-02-12 DIAGNOSIS — Z86.711 HX PULMONARY EMBOLISM: ICD-10-CM

## 2021-02-12 DIAGNOSIS — K92.2 ACUTE UPPER GASTROINTESTINAL BLEEDING: ICD-10-CM

## 2021-02-12 DIAGNOSIS — Z79.01 LONG TERM CURRENT USE OF ANTICOAGULANT THERAPY: ICD-10-CM

## 2021-02-12 DIAGNOSIS — D47.2 IGM MONOCLONAL GAMMOPATHY OF UNCERTAIN SIGNIFICANCE: ICD-10-CM

## 2021-02-12 LAB
ALBUMIN SERPL-MCNC: 3.4 G/DL (ref 3.4–5)
ALP SERPL-CCNC: 164 U/L (ref 40–150)
ALT SERPL W P-5'-P-CCNC: 36 U/L (ref 0–70)
ANION GAP SERPL CALCULATED.3IONS-SCNC: 2 MMOL/L (ref 3–14)
AST SERPL W P-5'-P-CCNC: 31 U/L (ref 0–45)
BASOPHILS # BLD AUTO: 0.1 10E9/L (ref 0–0.2)
BASOPHILS NFR BLD AUTO: 0.9 %
BILIRUB SERPL-MCNC: 0.3 MG/DL (ref 0.2–1.3)
BUN SERPL-MCNC: 14 MG/DL (ref 7–30)
CALCIUM SERPL-MCNC: 8.7 MG/DL (ref 8.5–10.1)
CHLORIDE SERPL-SCNC: 106 MMOL/L (ref 94–109)
CO2 SERPL-SCNC: 30 MMOL/L (ref 20–32)
CREAT SERPL-MCNC: 0.68 MG/DL (ref 0.66–1.25)
DIFFERENTIAL METHOD BLD: ABNORMAL
EOSINOPHIL # BLD AUTO: 0.3 10E9/L (ref 0–0.7)
EOSINOPHIL NFR BLD AUTO: 4.3 %
ERYTHROCYTE [DISTWIDTH] IN BLOOD BY AUTOMATED COUNT: 13.8 % (ref 10–15)
FERRITIN SERPL-MCNC: 45 NG/ML (ref 26–388)
GFR SERPL CREATININE-BSD FRML MDRD: >90 ML/MIN/{1.73_M2}
GLUCOSE SERPL-MCNC: 83 MG/DL (ref 70–99)
HCT VFR BLD AUTO: 42.8 % (ref 40–53)
HGB BLD-MCNC: 13.4 G/DL (ref 13.3–17.7)
IMM GRANULOCYTES # BLD: 0 10E9/L (ref 0–0.4)
IMM GRANULOCYTES NFR BLD: 0.2 %
INR BLD: 1.7 (ref 0.86–1.14)
IRON SATN MFR SERPL: 19 % (ref 15–46)
IRON SERPL-MCNC: 64 UG/DL (ref 35–180)
LDH SERPL L TO P-CCNC: 176 U/L (ref 85–227)
LYMPHOCYTES # BLD AUTO: 1 10E9/L (ref 0.8–5.3)
LYMPHOCYTES NFR BLD AUTO: 16.6 %
MCH RBC QN AUTO: 28.8 PG (ref 26.5–33)
MCHC RBC AUTO-ENTMCNC: 31.3 G/DL (ref 31.5–36.5)
MCV RBC AUTO: 92 FL (ref 78–100)
MONOCYTES # BLD AUTO: 0.6 10E9/L (ref 0–1.3)
MONOCYTES NFR BLD AUTO: 9.8 %
NEUTROPHILS # BLD AUTO: 4 10E9/L (ref 1.6–8.3)
NEUTROPHILS NFR BLD AUTO: 68.2 %
NRBC # BLD AUTO: 0 10*3/UL
NRBC BLD AUTO-RTO: 0 /100
PLATELET # BLD AUTO: 180 10E9/L (ref 150–450)
POTASSIUM SERPL-SCNC: 3.9 MMOL/L (ref 3.4–5.3)
PROT SERPL-MCNC: 8.2 G/DL (ref 6.8–8.8)
RBC # BLD AUTO: 4.65 10E12/L (ref 4.4–5.9)
SODIUM SERPL-SCNC: 138 MMOL/L (ref 133–144)
TIBC SERPL-MCNC: 332 UG/DL (ref 240–430)
WBC # BLD AUTO: 5.8 10E9/L (ref 4–11)

## 2021-02-12 PROCEDURE — 82728 ASSAY OF FERRITIN: CPT | Mod: ZL | Performed by: NURSE PRACTITIONER

## 2021-02-12 PROCEDURE — 84165 PROTEIN E-PHORESIS SERUM: CPT | Mod: TC,ZL | Performed by: NURSE PRACTITIONER

## 2021-02-12 PROCEDURE — 85610 PROTHROMBIN TIME: CPT | Mod: ZL | Performed by: NURSE PRACTITIONER

## 2021-02-12 PROCEDURE — 80053 COMPREHEN METABOLIC PANEL: CPT | Mod: ZL | Performed by: NURSE PRACTITIONER

## 2021-02-12 PROCEDURE — 83883 ASSAY NEPHELOMETRY NOT SPEC: CPT | Mod: ZL | Performed by: NURSE PRACTITIONER

## 2021-02-12 PROCEDURE — 83550 IRON BINDING TEST: CPT | Mod: ZL | Performed by: NURSE PRACTITIONER

## 2021-02-12 PROCEDURE — 83615 LACTATE (LD) (LDH) ENZYME: CPT | Mod: ZL | Performed by: NURSE PRACTITIONER

## 2021-02-12 PROCEDURE — 83540 ASSAY OF IRON: CPT | Mod: ZL | Performed by: NURSE PRACTITIONER

## 2021-02-12 PROCEDURE — 82784 ASSAY IGA/IGD/IGG/IGM EACH: CPT | Mod: ZL | Performed by: NURSE PRACTITIONER

## 2021-02-12 PROCEDURE — 36415 COLL VENOUS BLD VENIPUNCTURE: CPT | Mod: ZL | Performed by: NURSE PRACTITIONER

## 2021-02-12 PROCEDURE — 85025 COMPLETE CBC W/AUTO DIFF WBC: CPT | Mod: ZL | Performed by: NURSE PRACTITIONER

## 2021-02-12 PROCEDURE — 86334 IMMUNOFIX E-PHORESIS SERUM: CPT | Mod: TC,ZL | Performed by: NURSE PRACTITIONER

## 2021-02-12 PROCEDURE — 999N001036 HC STATISTIC TOTAL PROTEIN: Mod: ZL | Performed by: NURSE PRACTITIONER

## 2021-02-12 NOTE — PROGRESS NOTES
ANTICOAGULATION FOLLOW-UP CLINIC VISIT    Patient Name:  Rey Tristan  Date:  2021  Contact Type:  Telephone    SUBJECTIVE:  Patient Findings     Comments:  INR done by lab. Call placed to pt and spoke to patient re: INR results/Warfarin dosing/INR recheck date. Pt denied any bleeding/bruising or any other new changes to their diet/meds/activity. Pt verbalized understanding of all instructions. Pt is to call Warfarin Clinic if any questions/issues/illness.         Clinical Outcomes     Negatives:  Major bleeding event, Thromboembolic event, Anticoagulation-related hospital admission, Anticoagulation-related ED visit, Anticoagulation-related fatality    Comments:  INR done by lab. Call placed to pt and spoke to patient re: INR results/Warfarin dosing/INR recheck date. Pt denied any bleeding/bruising or any other new changes to their diet/meds/activity. Pt verbalized understanding of all instructions. Pt is to call Warfarin Clinic if any questions/issues/illness.            OBJECTIVE    Recent labs: (last 7 days)     21  1008   INR 1.7*       ASSESSMENT / PLAN  INR assessment THER    Recheck INR In: 6 WEEKS    INR Location Clinic      Anticoagulation Summary  As of 2021    INR goal:  1.5-2.0   TTR:  100.0 % (1 y)   INR used for dosin.7 (2021)   Warfarin maintenance plan:  6 mg (4 mg x 1.5) every Mon, Fri; 4 mg (4 mg x 1) all other days   Full warfarin instructions:  6 mg every Mon, Fri; 4 mg all other days   Weekly warfarin total:  32 mg   No change documented:  Denisse Flores RN   Plan last modified:  Lala Boss RN (2018)   Next INR check:  3/26/2021   Priority:  High   Target end date:  Indefinite    Indications    Long-term (current) use of anticoagulants [Z79.01] [Z79.01]  Hx pulmonary embolism [Z86.711]  History of DVT of lower extremity [Z86.718]  Acute upper gastrointestinal bleeding [K92.2]             Anticoagulation Episode Summary     INR check location:       Preferred lab:      Send INR reminders to:  ABE FUNK    Comments:        Anticoagulation Care Providers     Provider Role Specialty Phone number    Gianna Richard MD Referring Family Medicine [phone]            See the Encounter Report to view Anticoagulation Flowsheet and Dosing Calendar (Go to Encounters tab in chart review, and find the Anticoagulation Therapy Visit)        Denisse Flores RN

## 2021-02-14 LAB
IGA SERPL-MCNC: 125 MG/DL (ref 84–499)
IGG SERPL-MCNC: 917 MG/DL (ref 610–1616)
IGM SERPL-MCNC: 1780 MG/DL (ref 35–242)
KAPPA LC UR-MCNC: 5.27 MG/DL (ref 0.33–1.94)
KAPPA LC/LAMBDA SER: 3.05 {RATIO} (ref 0.26–1.65)
LAMBDA LC SERPL-MCNC: 1.73 MG/DL (ref 0.57–2.63)

## 2021-02-15 LAB
ALBUMIN SERPL ELPH-MCNC: 3.9 G/DL (ref 3.7–5.1)
ALPHA1 GLOB SERPL ELPH-MCNC: 0.3 G/DL (ref 0.2–0.4)
ALPHA2 GLOB SERPL ELPH-MCNC: 0.8 G/DL (ref 0.5–0.9)
B-GLOBULIN SERPL ELPH-MCNC: 2 G/DL (ref 0.6–1)
GAMMA GLOB SERPL ELPH-MCNC: 0.6 G/DL (ref 0.7–1.6)
M PROTEIN SERPL ELPH-MCNC: 1.1 G/DL
PROT PATTERN SERPL ELPH-IMP: ABNORMAL
PROT PATTERN SERPL IFE-IMP: NORMAL

## 2021-02-19 ENCOUNTER — ONCOLOGY VISIT (OUTPATIENT)
Dept: ONCOLOGY | Facility: OTHER | Age: 81
End: 2021-02-19
Attending: NURSE PRACTITIONER
Payer: COMMERCIAL

## 2021-02-19 VITALS
HEART RATE: 79 BPM | SYSTOLIC BLOOD PRESSURE: 100 MMHG | OXYGEN SATURATION: 94 % | BODY MASS INDEX: 28.47 KG/M2 | HEIGHT: 65 IN | DIASTOLIC BLOOD PRESSURE: 60 MMHG | TEMPERATURE: 97.3 F | WEIGHT: 170.86 LBS | RESPIRATION RATE: 20 BRPM

## 2021-02-19 DIAGNOSIS — D47.2 IGM MONOCLONAL GAMMOPATHY OF UNCERTAIN SIGNIFICANCE: Primary | ICD-10-CM

## 2021-02-19 DIAGNOSIS — D50.9 IRON DEFICIENCY ANEMIA, UNSPECIFIED IRON DEFICIENCY ANEMIA TYPE: ICD-10-CM

## 2021-02-19 PROCEDURE — 99214 OFFICE O/P EST MOD 30 MIN: CPT | Performed by: NURSE PRACTITIONER

## 2021-02-19 PROCEDURE — G0463 HOSPITAL OUTPT CLINIC VISIT: HCPCS

## 2021-02-19 RX ORDER — FERROUS SULFATE 325(65) MG
325 TABLET ORAL EVERY OTHER DAY
COMMUNITY

## 2021-02-19 ASSESSMENT — PAIN SCALES - GENERAL: PAINLEVEL: NO PAIN (0)

## 2021-02-19 ASSESSMENT — MIFFLIN-ST. JEOR: SCORE: 1413.94

## 2021-02-19 NOTE — NURSING NOTE
"Chief Complaint   Patient presents with     RECHECK     Follow up Monoclonal gammopathy of unknown significance (MGUS)       Initial /60   Pulse 79   Temp 97.3  F (36.3  C) (Tympanic)   Resp 20   Ht 1.638 m (5' 4.5\")   Wt 77.5 kg (170 lb 13.7 oz)   SpO2 94%   BMI 28.87 kg/m   Estimated body mass index is 28.87 kg/m  as calculated from the following:    Height as of this encounter: 1.638 m (5' 4.5\").    Weight as of this encounter: 77.5 kg (170 lb 13.7 oz).  Medication Reconciliation: complete.  Immunizations and advance directives status reviewed. Pain scale =0 , PHQ-2=0.            Jayleen Fleming LPN    "

## 2021-02-19 NOTE — PATIENT INSTRUCTIONS
We would like to see you back in 1 year. Please come 1 week prior for lab work.     If you have any questions please call [phone]    Other instructions:  none

## 2021-02-19 NOTE — PROGRESS NOTES
Hematology Follow-up Visit:  February 19, 2021    Reason for Visit:  Patient presents with:  RECHECK: Follow up Monoclonal gammopathy of unknown significance (MGUS)      Nursing Notes and Documentation reviewed:  yes    HPI: This is a 80-year-old male patient who presents to the clinic today in followup of monoclonal gammopathy of undetermined significance diagnosed 2/2018 during workup for hypercoag state, along with previous diagnosis of anemia related to GI bleed.  Patient does have a history of previous DVT and PE and has been on long-term anticoagulation with Coumadin.  Coumadin was held after GI bleed was diagnosed and has been resumed.  Iron has been discontinued.    He presents to the clinic today with no new complaints.  He states he did initiate the 1 tablet of iron daily since our last visit but states he did develop constipation so has changed this to every other day.  He is tolerating this fine.  He has been staying healthy.    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   Protein C and protein S along with antiphospholipid profile were negative.  SPEP was completed showing an M-spike of 1.1 with serum immunofixation showing monoclonal IgM immunoglobulin, kappa light chain type with IgM of 1530.  Bone marrow aspiration biopsy showed iron deficiency, slightly increased plasma cell proximally 2% kappa and toxic neutrophils with reactive lymphocytes and increased rouleaux formation; flow cytometry and immunophenotyping was unremarkable. Skeletal bone survey was completed on 2/6/2018 showing a stable T12 compression fracture but no evidence of lytic lesions.      He was placed on oral iron with improvement in his hemoglobin and iron studies.      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 Simeon; silent; transient hyperthyroid, now normal; monitor TSH monthly  for hypothyroidism     Chronic anticoagulation 2012     Elevated serum alkaline phosphatase level 2016    normal GGT, normal bone skeletal survery     Gastric ulcer     endoscopy 2016, repeat 6 months; PPI Carafate     Hyperlipidemia      Normocytic anemia 2016     Pulmonary nodule     CT 2014, right; consider repeat 1 year if high risk     Upper GI bleed 2017     Vitamin D deficiency 2012       Social History     Socioeconomic History     Marital status:      Spouse name: Not on file     Number of children: Not on file     Years of education: Not on file     Highest education level: Not on file   Occupational History     Employer: [employer]     Comment: Retired    Social Needs     Financial resource strain: Not on file     Food insecurity     Worry: Not on file     Inability: Not on file     Transportation needs     Medical: Not on file     Non-medical: Not on file   Tobacco Use     Smoking status: Former Smoker     Types: Cigarettes     Start date: 5/15/1958     Quit date: 10/28/2009     Years since quittin.3     Smokeless tobacco: Never Used     Tobacco comment: Quit    Substance and Sexual Activity     Alcohol use: No     Alcohol/week: 0.0 standard drinks     Drug use: No     Sexual activity: Not on file   Lifestyle     Physical activity     Days per week: Not on file     Minutes per session: Not on file     Stress: Not on file   Relationships     Social connections     Talks on phone: Not on file     Gets together: Not on file     Attends Adventism service: Not on file     Active member of club or organization: Not on file     Attends meetings of clubs or organizations: Not on file     Relationship status: Not on file     Intimate partner violence     Fear of current or ex partner: Not on file     Emotionally abused: Not on file     Physically abused: Not on file     Forced sexual activity: Not on file   Other Topics Concern       Service Yes     Comment: [employer]     Blood Transfusions Yes     Comment: Permits if needed     Caffeine Concern Yes     Comment: Tea     Occupational Exposure No     Hobby Hazards No     Sleep Concern No     Stress Concern No     Weight Concern No     Special Diet No     Back Care No     Exercise No     Bike Helmet Not Asked     Seat Belt Yes     Self-Exams Yes     Parent/sibling w/ CABG, MI or angioplasty before 65F 55M? No   Social History Narrative     Not on file       Past Surgical History:   Procedure Laterality Date     ANGIOGRAM  6/23/2014     BIOPSY  2018    bone marrow biopsy     BONE MARROW BIOPSY, BONE SPECIMEN, NEEDLE/TROCAR N/A 1/16/2018    Procedure: BIOPSY BONE MARROW;  BONE MARROW BIOPSY;  Surgeon: Nuno Castro MD;  Location: [facility]     C REGULAR ECHO ([place])  6/23/2014    Dr. Dirk EATON Difficile with intussuseption       cataract extraction       colonoscopy       COLONOSCOPY  6/17     ENDOSCOPY UPPER, COLONOSCOPY, COMBINED N/A 6/17/2016    Procedure: COMBINED ENDOSCOPY UPPER, COLONOSCOPY;  Surgeon: Andrea Stearns DO;  Location: [facility]     ESOPHAGOSCOPY, GASTROSCOPY, DUODENOSCOPY (EGD), COMBINED N/A 11/30/2017    Procedure: COMBINED ESOPHAGOSCOPY, GASTROSCOPY, DUODENOSCOPY (EGD);  UPPER ENDOSCOPY;  Surgeon: Narinder Torres MD;  Location: [facility]     left knee meniscal tear       Left lower extremity DVT       lens implant       nasal polypectomy       Pulmonary Embolism         Family History   Problem Relation Age of Onset     Cancer Mother         Ovarian     Other Cancer Mother         lung/breast/ cervical ?     Respiratory Father         emphysemia     Lung Cancer Brother      Diabetes No family hx of      Hypertension No family hx of      Hyperlipidemia No family hx of      Thyroid Disease No family hx of      Asthma No family hx of      Colon Cancer No family hx of      Prostate Cancer No family hx of      Anesthesia Reaction No family hx of      Genetic Disorder No family hx of       Cerebrovascular Disease No family hx of      Coronary Artery Disease No family hx of      Breast Cancer No family hx of        Allergies:  Allergies as of 02/19/2021     (No Known Allergies)       Current Medications:  Current Outpatient Medications   Medication Sig Dispense Refill     Ascorbic Acid (VITAMIN C) 500 MG CAPS        atorvastatin (LIPITOR) 10 MG tablet Take 1 tablet (10 mg) by mouth daily 90 tablet 3     Biotin 5000 MCG PO CAPS Take 1,000 mg by mouth        Cholecalciferol (VITAMIN D) 400 UNITS tablet Take 1,000 Units by mouth daily        cyanocobalamin ([employer] VITAMIN  B12) 1000 MCG TABS Take 1,000 mcg by mouth daily 90 tablet 3     ferrous sulfate (FEROSUL) 325 (65 Fe) MG tablet Take 325 mg by mouth every other day       Lycopene 10 MG CAPS        Multiple Vitamins-Minerals (MULTIVITAMIN ADULT PO) Take 1 tablet by mouth       omeprazole (PRILOSEC) 40 MG DR capsule TAKE 1 CAPSULE(40 MG) BY MOUTH DAILY 90 capsule 3     pyridoxine (VITAMIN B-6) 50 MG TABS Take 1 tablet (50 mg) by mouth daily 90 tablet 3     warfarin ANTICOAGULANT (COUMADIN) 4 MG tablet TAKE 1& 1/2 TABLETS BY MOUTH ON MONDAY& FRIDAY. TAKE 1 TABLET ON ALL OTHER DAYS 120 tablet 3          Review Of Systems:  Constitutional: denies fever, weight changes  Eyes: denies blurred or double vision  Ears/Nose/Throat: denies ear pain, nose problems, difficulty swallowing  Respiratory: denies cough; has some SOB with exertion-not new or changed  Skin: denies new rash, lesions  Cardiovascular: denies chest pain, palpitations, mild edema to lower extremities  Gastrointestinal: denies abdominal pain, bloating, nausea, early satiety  Genitourinary: denies difficulty with urination, blood in urine  Musculoskeletal: denies new muscle pain, bone pain  Neurologic: denies lightheadedness,  numbness orTingling, gets occasional headaches  Hematologic/Lymphatic/Immunologic: denies easy bleeding, lumps or bumps noted; bruises easy and is on  "anticoagulation  Endocrine: Denies increased thirst, night sweats      Physical Exam:  /60   Pulse 79   Temp 97.3  F (36.3  C) (Tympanic)   Resp 20   Ht 1.638 m (5' 4.5\")   Wt 77.5 kg (170 lb 13.7 oz)   SpO2 94%   BMI 28.87 kg/m    GENERAL APPEARANCE: Healthy, alert and in no acute distress.  HEENT: Normocephalic, Sclerae anicteric.   NECK:  No asymmetry or masses, no thyromegaly.  LYMPHATICS: No palpable cervical, supraclavicular, axillary, or inguinal nodes   RESP: Lungs clear to auscultation bilaterally but dim throughout, respirations regular and easy  CARDIOVASCULAR: Regular rate and rhythm. Normal S1, S2  ABDOMEN: Nontender. Bowel sounds auscultated all 4 quadrants. No palpable organomegaly or masses.  MUSCULOSKELETAL: Extremities without gross deformities noted.  NEURO: Alert and oriented x 3.  Gait steady.  PSYCHIATRIC: Mentation and affect appear normal.  Mood appropriate.    Laboratory/Imaging Studies:  Component      Latest Ref Rng & Units 2/12/2021   WBC      4.0 - 11.0 10e9/L 5.8   RBC Count      4.4 - 5.9 10e12/L 4.65   Hemoglobin      13.3 - 17.7 g/dL 13.4   Hematocrit      40.0 - 53.0 % 42.8   MCV      78 - 100 fl 92   MCH      26.5 - 33.0 pg 28.8   MCHC      31.5 - 36.5 g/dL 31.3 (L)   RDW      10.0 - 15.0 % 13.8   Platelet Count      150 - 450 10e9/L 180   Diff Method       Automated Method   % Neutrophils      % 68.2   % Lymphocytes      % 16.6   % Monocytes      % 9.8   % Eosinophils      % 4.3   % Basophils      % 0.9   % Immature Granulocytes      % 0.2   Nucleated RBCs      0 /100 0   Absolute Neutrophil      1.6 - 8.3 10e9/L 4.0   Absolute Lymphocytes      0.8 - 5.3 10e9/L 1.0   Absolute Monocytes      0.0 - 1.3 10e9/L 0.6   Absolute Eosinophils      0.0 - 0.7 10e9/L 0.3   Absolute Basophils      0.0 - 0.2 10e9/L 0.1   Abs Immature Granulocytes      0 - 0.4 10e9/L 0.0   Absolute Nucleated RBC       0.0   Sodium      133 - 144 mmol/L 138   Potassium      3.4 - 5.3 mmol/L 3.9 "   Chloride      94 - 109 mmol/L 106   Carbon Dioxide      20 - 32 mmol/L 30   Anion Gap      3 - 14 mmol/L 2 (L)   Glucose      70 - 99 mg/dL 83   Urea Nitrogen      7 - 30 mg/dL 14   Creatinine      0.66 - 1.25 mg/dL 0.68   GFR Estimate      >60 mL/min/1.73:m2 >90   GFR Estimate If Black      >60 mL/min/1.73:m2 >90   Calcium      8.5 - 10.1 mg/dL 8.7   Bilirubin Total      0.2 - 1.3 mg/dL 0.3   Albumin      3.4 - 5.0 g/dL 3.4   Protein Total      6.8 - 8.8 g/dL 8.2   Alkaline Phosphatase      40 - 150 U/L 164 (H)   ALT      0 - 70 U/L 36   AST      0 - 45 U/L 31   Albumin Fraction      3.7 - 5.1 g/dL 3.9   Alpha 1 Fraction      0.2 - 0.4 g/dL 0.3   Alpha 2 Fraction      0.5 - 0.9 g/dL 0.8   Beta Fraction      0.6 - 1.0 g/dL 2.0 (H)   Gamma Fraction      0.7 - 1.6 g/dL 0.6 (L)   Monoclonal Peak      0.0 g/dL 1.1 (H)   ELP Interpretation:       Monoclonal protein (1.1 g/dL) seen in the beta fraction comigrating with C3 complement. . . .   [place] Free Lt Chain      0.33 - 1.94 mg/dL 5.27 (H)   Lambda Free Lt Chain      0.57 - 2.63 mg/dL 1.73   Kappa Lambda Ratio      0.26 - 1.65 3.05 (H)   Iron      35 - 180 ug/dL 64   Iron Binding Cap      240 - 430 ug/dL 332   Iron Saturation Index      15 - 46 % 19   IGG      610 - 1,616 mg/dL 917   IGA      84 - 499 mg/dL 125   IGM      35 - 242 mg/dL 1,780 (H)   Lactate Dehydrogenase      85 - 227 U/L 176   Ferritin      26 - 388 ng/mL 45   Immunofixation ELP       (Note)       ASSESSMENT/PLAN:    #1 MGUS: IgM Monoclonal gammopathy of undetermined significance diagnosed January 2018.  MGUS considered stable now for 3 years.  No evidence of end organ damage.  We'll see him back in 1 year with the same lab work.       #2  Anemia:  Hemoglobin and iron studies within normal limits.  He will continue with the iron tablet every other day.    I encouraged patient to call with any questions or concerns.    35 minutes spent in the patient's encounter today with time spent in chart review  along with chart preparation.  Time was also spent in obtaining a review of systems and performing a physical exam along with reviewing all of patient's lab work with him in detail.  Time was spent in discussion of recommendations for follow-up and in planning that follow-up and ordering future lab work along with chart documentation.     Britney Mahoney NP APRN, FNP-BC, AOCNP

## 2021-03-08 DIAGNOSIS — E78.5 HYPERLIPIDEMIA, UNSPECIFIED HYPERLIPIDEMIA TYPE: ICD-10-CM

## 2021-03-08 RX ORDER — ATORVASTATIN CALCIUM 10 MG/1
TABLET, FILM COATED ORAL
Qty: 90 TABLET | Refills: 0 | Status: SHIPPED | OUTPATIENT
Start: 2021-03-08 | End: 2021-06-03

## 2021-03-26 ENCOUNTER — ANTICOAGULATION THERAPY VISIT (OUTPATIENT)
Dept: ANTICOAGULATION | Facility: OTHER | Age: 81
End: 2021-03-26
Attending: FAMILY MEDICINE
Payer: MEDICARE

## 2021-03-26 DIAGNOSIS — K92.2 ACUTE UPPER GASTROINTESTINAL BLEEDING: ICD-10-CM

## 2021-03-26 DIAGNOSIS — Z86.711 HX PULMONARY EMBOLISM: ICD-10-CM

## 2021-03-26 DIAGNOSIS — Z79.01 LONG TERM CURRENT USE OF ANTICOAGULANT THERAPY: ICD-10-CM

## 2021-03-26 DIAGNOSIS — Z86.718 HISTORY OF DVT OF LOWER EXTREMITY: ICD-10-CM

## 2021-03-26 LAB
CAPILLARY BLOOD COLLECTION: NORMAL
INR BLD: 2 (ref 0.86–1.14)

## 2021-03-26 PROCEDURE — 36416 COLLJ CAPILLARY BLOOD SPEC: CPT | Mod: ZL | Performed by: FAMILY MEDICINE

## 2021-03-26 PROCEDURE — 85610 PROTHROMBIN TIME: CPT | Mod: ZL | Performed by: FAMILY MEDICINE

## 2021-03-26 NOTE — PROGRESS NOTES
ANTICOAGULATION FOLLOW-UP CLINIC VISIT    Patient Name:  Rey Tristan  Date:  3/26/2021  Contact Type:  Telephone/ Left voicemail on patient's home phone    SUBJECTIVE:  Patient Findings     Comments:  INR done by lab. Call placed to pt and left voicemail on home phone re: INR results/Warfarin dosing/INR recheck date. Pt is to call Warfarin clinic with any bleeding/bruising or any new changes to their diet/meds/activity or any questions/issues/illness.           Clinical Outcomes     Negatives:  Major bleeding event, Thromboembolic event, Anticoagulation-related hospital admission, Anticoagulation-related ED visit, Anticoagulation-related fatality    Comments:  INR done by lab. Call placed to pt and left voicemail on home phone re: INR results/Warfarin dosing/INR recheck date. Pt is to call Warfarin clinic with any bleeding/bruising or any new changes to their diet/meds/activity or any questions/issues/illness.              OBJECTIVE    Recent labs: (last 7 days)     21  1035   INR 2.0*       ASSESSMENT / PLAN  INR assessment THER    Recheck INR In: 6 WEEKS    INR Location Clinic      Anticoagulation Summary  As of 3/26/2021    INR goal:  1.5-2.0   TTR:  100.0 % (1 y)   INR used for dosin.0 (3/26/2021)   Warfarin maintenance plan:  6 mg (4 mg x 1.5) every Mon, Fri; 4 mg (4 mg x 1) all other days   Full warfarin instructions:  6 mg every Mon, Fri; 4 mg all other days   Weekly warfarin total:  32 mg   No change documented:  Denisse Flores RN   Plan last modified:  Lala Boss RN (2018)   Next INR check:  2021   Priority:  High   Target end date:  Indefinite    Indications    Long-term (current) use of anticoagulants [Z79.01] [Z79.01]  Hx pulmonary embolism [Z86.711]  History of DVT of lower extremity [Z86.718]  Acute upper gastrointestinal bleeding [K92.2]             Anticoagulation Episode Summary     INR check location:      Preferred lab:      Send INR reminders to:  ABE  GOOD    Comments:        Anticoagulation Care Providers     Provider Role Specialty Phone number    Gianna Richard MD Referring Family Medicine [phone]            See the Encounter Report to view Anticoagulation Flowsheet and Dosing Calendar (Go to Encounters tab in chart review, and find the Anticoagulation Therapy Visit)        Denisse Flores, RN

## 2021-04-09 DIAGNOSIS — K92.2 ACUTE UPPER GASTROINTESTINAL BLEEDING: ICD-10-CM

## 2021-04-09 DIAGNOSIS — K22.70 BARRETT'S ESOPHAGUS WITHOUT DYSPLASIA: ICD-10-CM

## 2021-04-09 NOTE — TELEPHONE ENCOUNTER
Omeprazole 40 mg      Last Written Prescription Date:  2/14/20  Last Fill Quantity: 90,   # refills: 3  Last Office Visit: 4/3/20  Future Office visit:       Routing refill request to provider for review/approval because:

## 2021-04-11 RX ORDER — OMEPRAZOLE 40 MG/1
CAPSULE, DELAYED RELEASE ORAL
Qty: 90 CAPSULE | Refills: 3 | Status: SHIPPED | OUTPATIENT
Start: 2021-04-11 | End: 2022-03-11

## 2021-05-07 ENCOUNTER — ANTICOAGULATION THERAPY VISIT (OUTPATIENT)
Dept: ANTICOAGULATION | Facility: OTHER | Age: 81
End: 2021-05-07
Attending: FAMILY MEDICINE
Payer: MEDICARE

## 2021-05-07 DIAGNOSIS — Z86.718 HISTORY OF DVT OF LOWER EXTREMITY: ICD-10-CM

## 2021-05-07 DIAGNOSIS — Z86.711 HX PULMONARY EMBOLISM: ICD-10-CM

## 2021-05-07 DIAGNOSIS — Z79.01 LONG TERM CURRENT USE OF ANTICOAGULANT THERAPY: ICD-10-CM

## 2021-05-07 DIAGNOSIS — K92.2 ACUTE UPPER GASTROINTESTINAL BLEEDING: ICD-10-CM

## 2021-05-07 LAB
CAPILLARY BLOOD COLLECTION: NORMAL
INR BLD: 1.4 (ref 0.86–1.14)

## 2021-05-07 PROCEDURE — 85610 PROTHROMBIN TIME: CPT | Mod: ZL | Performed by: FAMILY MEDICINE

## 2021-05-07 PROCEDURE — 36416 COLLJ CAPILLARY BLOOD SPEC: CPT | Mod: ZL | Performed by: FAMILY MEDICINE

## 2021-05-07 NOTE — PROGRESS NOTES
ANTICOAGULATION FOLLOW-UP CLINIC VISIT    Patient Name:  Rey Tristan  Date:  2021  Contact Type:  Face to Face    SUBJECTIVE:  Patient Findings     Comments:  INR done by lab. Met with patient face to face per patient request. We discussed INR results/Warfarin dosing/INR recheck date. Pt denied any bleeding/bruising or any other new changes to their diet/meds/activity. INR sub-therapeutic today- 1.4. A 1x dose increase was given to today's dose and after today he is to continue on his maintenance dose.Pt verbalized understanding of all instructions. Pt is to call Warfarin Clinic if any questions/issues/illness.           Clinical Outcomes     Negatives:  Major bleeding event, Thromboembolic event, Anticoagulation-related hospital admission, Anticoagulation-related ED visit, Anticoagulation-related fatality    Comments:  INR done by lab. Met with patient face to face per patient request. We discussed INR results/Warfarin dosing/INR recheck date. Pt denied any bleeding/bruising or any other new changes to their diet/meds/activity. INR sub-therapeutic today- 1.4. A 1x dose increase was given to today's dose and after today he is to continue on his maintenance dose.Pt verbalized understanding of all instructions. Pt is to call Warfarin Clinic if any questions/issues/illness.              OBJECTIVE    Recent labs: (last 7 days)     21  1035   INR 1.4*       ASSESSMENT / PLAN  INR assessment SUB    Recheck INR In: 4 WEEKS    INR Location Clinic      Anticoagulation Summary  As of 2021    INR goal:  1.5-2.0   TTR:  98.1 % (1 y)   INR used for dosin.4 (2021)   Warfarin maintenance plan:  6 mg (4 mg x 1.5) every Mon, Fri; 4 mg (4 mg x 1) all other days   Full warfarin instructions:  : 8 mg; Otherwise 6 mg every Mon, Fri; 4 mg all other days   Weekly warfarin total:  32 mg   Plan last modified:  Lala Boss RN (2018)   Next INR check:  2021   Priority:  High   Target end date:   Indefinite    Indications    Long-term (current) use of anticoagulants [Z79.01] [Z79.01]  Hx pulmonary embolism [Z86.711]  History of DVT of lower extremity [Z86.718]  Acute upper gastrointestinal bleeding [K92.2]             Anticoagulation Episode Summary     INR check location:      Preferred lab:      Send INR reminders to:  ABE FUNK    Comments:        Anticoagulation Care Providers     Provider Role Specialty Phone number    Gianna Richard MD Referring [facility] Medicine [phone]            See the Encounter Report to view Anticoagulation Flowsheet and Dosing Calendar (Go to Encounters tab in chart review, and find the Anticoagulation Therapy Visit)        Denisse Flores RN

## 2021-06-02 DIAGNOSIS — E78.5 HYPERLIPIDEMIA, UNSPECIFIED HYPERLIPIDEMIA TYPE: ICD-10-CM

## 2021-06-03 RX ORDER — ATORVASTATIN CALCIUM 10 MG/1
TABLET, FILM COATED ORAL
Qty: 90 TABLET | Refills: 0 | Status: SHIPPED | OUTPATIENT
Start: 2021-06-03 | End: 2021-09-10

## 2021-06-03 NOTE — TELEPHONE ENCOUNTER
LIPITOR      Last Written Prescription Date:  3-8-2021  Last Fill Quantity: 90,   # refills: 0  Last Office Visit: 4-3-2020  Future Office visit:       Routing refill request to provider for review/approval because:

## 2021-06-04 ENCOUNTER — ANTICOAGULATION THERAPY VISIT (OUTPATIENT)
Dept: ANTICOAGULATION | Facility: OTHER | Age: 81
End: 2021-06-04
Attending: FAMILY MEDICINE
Payer: MEDICARE

## 2021-06-04 DIAGNOSIS — Z79.01 LONG TERM CURRENT USE OF ANTICOAGULANT THERAPY: ICD-10-CM

## 2021-06-04 DIAGNOSIS — Z86.711 HX PULMONARY EMBOLISM: ICD-10-CM

## 2021-06-04 DIAGNOSIS — K92.2 ACUTE UPPER GASTROINTESTINAL BLEEDING: ICD-10-CM

## 2021-06-04 DIAGNOSIS — Z86.718 HISTORY OF DVT OF LOWER EXTREMITY: ICD-10-CM

## 2021-06-04 LAB
CAPILLARY BLOOD COLLECTION: NORMAL
INR BLD: 1.5 (ref 0.86–1.14)

## 2021-06-04 PROCEDURE — 85610 PROTHROMBIN TIME: CPT | Mod: ZL | Performed by: FAMILY MEDICINE

## 2021-06-04 PROCEDURE — 36416 COLLJ CAPILLARY BLOOD SPEC: CPT | Mod: ZL | Performed by: FAMILY MEDICINE

## 2021-06-04 NOTE — PROGRESS NOTES
ANTICOAGULATION FOLLOW-UP CLINIC VISIT    Patient Name:  Rey Tristan  Date:  2021  Contact Type:  Telephone    SUBJECTIVE:  Patient Findings     Comments:  INR done by lab. Call placed to pt and left voicemail on home phone re: INR results/Warfarin dosing/INR recheck date. INR therapeutic today- 1.5. He is to continue on his maintenance dose. Pt is to call Warfarin clinic with any bleeding/bruising or any new changes to their diet/meds/activity or any questions/issues/illness.           Clinical Outcomes     Negatives:  Major bleeding event, Thromboembolic event, Anticoagulation-related hospital admission, Anticoagulation-related ED visit, Anticoagulation-related fatality    Comments:  INR done by lab. Call placed to pt and left voicemail on home phone re: INR results/Warfarin dosing/INR recheck date. INR therapeutic today- 1.5. He is to continue on his maintenance dose. Pt is to call Warfarin clinic with any bleeding/bruising or any new changes to their diet/meds/activity or any questions/issues/illness.              OBJECTIVE    Recent labs: (last 7 days)     21  1033   INR 1.5*       ASSESSMENT / PLAN  INR assessment THER    Recheck INR In: 6 WEEKS    INR Location Clinic      Anticoagulation Summary  As of 2021    INR goal:  1.5-2.0   TTR:  90.4 % (1 y)   INR used for dosin.5 (2021)   Warfarin maintenance plan:  6 mg (4 mg x 1.5) every Mon, Fri; 4 mg (4 mg x 1) all other days   Full warfarin instructions:  6 mg every Mon, Fri; 4 mg all other days   Weekly warfarin total:  32 mg   No change documented:  Denisse Flores RN   Plan last modified:  Lala Boss RN (2018)   Next INR check:  2021   Priority:  Maintenance   Target end date:  Indefinite    Indications    Long-term (current) use of anticoagulants [Z79.01] [Z79.01]  Hx pulmonary embolism [Z86.711]  History of DVT of lower extremity [Z86.718]  Acute upper gastrointestinal bleeding [K92.2]              Anticoagulation Episode Summary     INR check location:      Preferred lab:      Send INR reminders to:  ABE FUNK    Comments:        Anticoagulation Care Providers     Provider Role Specialty Phone number    Gianna Richard MD Referring Family Medicine [phone]            See the Encounter Report to view Anticoagulation Flowsheet and Dosing Calendar (Go to Encounters tab in chart review, and find the Anticoagulation Therapy Visit)        Denisse Flores RN

## 2021-06-28 ENCOUNTER — OFFICE VISIT (OUTPATIENT)
Dept: FAMILY MEDICINE | Facility: OTHER | Age: 81
End: 2021-06-28
Attending: NURSE PRACTITIONER
Payer: COMMERCIAL

## 2021-06-28 ENCOUNTER — ANTICOAGULATION THERAPY VISIT (OUTPATIENT)
Dept: ANTICOAGULATION | Facility: OTHER | Age: 81
End: 2021-06-28

## 2021-06-28 ENCOUNTER — NURSE TRIAGE (OUTPATIENT)
Dept: FAMILY MEDICINE | Facility: OTHER | Age: 81
End: 2021-06-28

## 2021-06-28 VITALS
WEIGHT: 169 LBS | BODY MASS INDEX: 28.56 KG/M2 | HEART RATE: 92 BPM | SYSTOLIC BLOOD PRESSURE: 130 MMHG | TEMPERATURE: 99.6 F | OXYGEN SATURATION: 96 % | DIASTOLIC BLOOD PRESSURE: 74 MMHG

## 2021-06-28 DIAGNOSIS — Z79.01 LONG TERM CURRENT USE OF ANTICOAGULANT THERAPY: ICD-10-CM

## 2021-06-28 DIAGNOSIS — Z86.718 HISTORY OF DVT OF LOWER EXTREMITY: ICD-10-CM

## 2021-06-28 DIAGNOSIS — Z86.711 HX PULMONARY EMBOLISM: ICD-10-CM

## 2021-06-28 DIAGNOSIS — K92.2 ACUTE UPPER GASTROINTESTINAL BLEEDING: ICD-10-CM

## 2021-06-28 DIAGNOSIS — L03.213 PRESEPTAL CELLULITIS OF RIGHT EYE: Primary | ICD-10-CM

## 2021-06-28 PROCEDURE — G0463 HOSPITAL OUTPT CLINIC VISIT: HCPCS

## 2021-06-28 PROCEDURE — G0463 HOSPITAL OUTPT CLINIC VISIT: HCPCS | Mod: 25

## 2021-06-28 PROCEDURE — 99214 OFFICE O/P EST MOD 30 MIN: CPT | Performed by: NURSE PRACTITIONER

## 2021-06-28 RX ORDER — SULFAMETHOXAZOLE/TRIMETHOPRIM 800-160 MG
1 TABLET ORAL 2 TIMES DAILY
Qty: 10 TABLET | Refills: 0 | Status: SHIPPED | OUTPATIENT
Start: 2021-06-28 | End: 2021-07-03

## 2021-06-28 RX ORDER — AMOXICILLIN 875 MG
875 TABLET ORAL 2 TIMES DAILY
Qty: 10 TABLET | Refills: 0 | Status: SHIPPED | OUTPATIENT
Start: 2021-06-28 | End: 2021-07-03

## 2021-06-28 ASSESSMENT — PATIENT HEALTH QUESTIONNAIRE - PHQ9: SUM OF ALL RESPONSES TO PHQ QUESTIONS 1-9: 0

## 2021-06-28 ASSESSMENT — PAIN SCALES - GENERAL: PAINLEVEL: MILD PAIN (3)

## 2021-06-28 NOTE — PROGRESS NOTES
ANTICOAGULATION FOLLOW-UP CLINIC VISIT    Patient Name:  Rey Tristan  Date:  6/28/2021  Contact Type:  Face to Face    SUBJECTIVE:  Patient Findings     Positives:  Change in medications (Bactrim x5d, Amoxicillin )    Comments:  Met with patient face to face due to starting antibiotics. We discussed Warfarin dosing and provided AVS with anticoagulation calender included. Warfarin cut back on 2 days to accommodate antibiotics. He is to check his INR on 7/2. He verbalized understanding of all instructions. He is to contact Warfarin Clinic with any unusual bleeding/bruising or any other new changes to his diet/meds/activity or any questions/issues/illness.         Clinical Outcomes     Negatives:  Major bleeding event, Thromboembolic event, Anticoagulation-related hospital admission, Anticoagulation-related ED visit, Anticoagulation-related fatality    Comments:  Met with patient face to face due to starting antibiotics. We discussed Warfarin dosing and provided AVS with anticoagulation calender included. Warfarin cut back on 2 days to accommodate antibiotics. He is to check his INR on 7/2. He verbalized understanding of all instructions. He is to contact Warfarin Clinic with any unusual bleeding/bruising or any other new changes to his diet/meds/activity or any questions/issues/illness.            OBJECTIVE    No results for input(s): INR, [id number], [id number], 2AGN, F2 in the last 168 hours.    ASSESSMENT / PLAN  No question data found.  Anticoagulation Summary  As of 6/28/2021    INR goal:  1.5-2.0   TTR:  89.7 % (11.4 mo)   INR used for dosing:     Warfarin maintenance plan:  6 mg (4 mg x 1.5) every Mon, Fri; 4 mg (4 mg x 1) all other days   Full warfarin instructions:  6/29: 2 mg; 7/1: 2 mg; Otherwise 6 mg every Mon, Fri; 4 mg all other days   Weekly warfarin total:  32 mg   Plan last modified:  Lala Boss RN (6/21/2018)   Next INR check:  7/2/2021   Priority:  Maintenance   Target end date:   Indefinite    Indications    Long-term (current) use of anticoagulants [Z79.01] [Z79.01]  Hx pulmonary embolism [Z86.711]  History of DVT of lower extremity [Z86.718]  Acute upper gastrointestinal bleeding [K92.2]             Anticoagulation Episode Summary     INR check location:      Preferred lab:      Send INR reminders to:  ABE FUNK    Comments:        Anticoagulation Care Providers     Provider Role Specialty Phone number    Gianna Richard MD Referring [facility] Medicine [phone]            See the Encounter Report to view Anticoagulation Flowsheet and Dosing Calendar (Go to Encounters tab in chart review, and find the Anticoagulation Therapy Visit)        Denisse Flores RN

## 2021-06-28 NOTE — TELEPHONE ENCOUNTER
"    Additional Information    Negative: Sounds like a life-threatening emergency to the triager    Negative: Difficulty breathing, and not from stuffy nose (e.g., not relieved by cleaning out the nose)    Negative: SEVERE headache and has fever    Negative: Patient sounds very sick or weak to the triager    Negative: SEVERE sinus pain    Negative: Severe headache    Negative: Redness or swelling on the cheek, forehead, or around the eye    Negative: Fever > 103 F (39.4 C)    Negative: Fever > 101 F (38.3 C) and over 60 years of age    Negative: Fever > 100.0 F (37.8 C) and has diabetes mellitus or a weak immune system (e.g., HIV positive, cancer chemotherapy, organ transplant, splenectomy, chronic steroids)    Negative: Fever > 100.0 F (37.8 C) and bedridden (e.g., nursing home patient, stroke, chronic illness, recovering from surgery)    Negative: Fever present > 3 days (72 hours)    Negative: Fever returns after gone for over 24 hours and symptoms worse or not improved    Negative: Sinus pain (not just congestion) and fever    Negative: Earache    Negative: Nasal discharge present > 10 days    Negative: Sinus congestion (pressure, fullness) present > 10 days    Negative: Using nasal washes and pain medicine > 24 hours and sinus pain (lower forehead, cheekbone, or eye) persists    Negative: Lots of coughing    Patient wants to be seen    Answer Assessment - Initial Assessment Questions  1. LOCATION: \"Where does it hurt?\"       Above R eye  2. ONSET: \"When did the sinus pain start?\"  (e.g., hours, days)       This AM  3. SEVERITY: \"How bad is the pain?\"   (Scale 1-10; mild, moderate or severe)    - MILD (1-3): doesn't interfere with normal activities     - MODERATE (4-7): interferes with normal activities (e.g., work or school) or awakens from sleep    - SEVERE (8-10): excruciating pain and patient unable to do any normal activities         moderate  4. RECURRENT SYMPTOM: \"Have you ever had sinus problems before?\" " "If so, ask: \"When was the last time?\" and \"What happened that time?\"       Sinus infections previously  5. NASAL CONGESTION: \"Is the nose blocked?\" If so, ask, \"Can you open it or must you breathe through the mouth?\"      yes  6. NASAL DISCHARGE: \"Do you have discharge from your nose?\" If so ask, \"What color?\"      green  7. FEVER: \"Do you have a fever?\" If so, ask: \"What is it, how was it measured, and when did it start?\"       no  8. OTHER SYMPTOMS: \"Do you have any other symptoms?\" (e.g., sore throat, cough, earache, difficulty breathing)      Swelling of eyelid  9. PREGNANCY: \"Is there any chance you are pregnant?\" \"When was your last menstrual period?\"      na    Protocols used: SINUS PAIN AND CONGESTION-A-[place]      "

## 2021-06-28 NOTE — PROGRESS NOTES
"    Assessment & Plan     Preseptal cellulitis of right eye  Patient with swelling and erythema above right eye. See picture below. Also tender over this area with a right sided headache, but no ophthalmoplegia, pain with eye movements, or proptosis to suggest an orbital cellulitis. Will treat as preseptal cellulitis. According to UpToDate, need to treat with either bactrim or clindamycin in addition to amoxicillin or augmentin. Due to his h/o C. Diff, will avoid clindamycin and treat with bactrim as well as amoxicllin. Amoxicillin should have no effect on his INR, but the bactrim may increase this. Steph from the INR clinic was notified and will recheck his INR on Friday. Per UpToDate, recommend treating for 5-7 days. Will treat only for 5 days as he has had a GI bleed in the past. Agrees to return with new or worsening symptoms.       - sulfamethoxazole-trimethoprim (BACTRIM DS) 800-160 MG tablet; Take 1 tablet by mouth 2 times daily for 5 days  - amoxicillin (AMOXIL) 875 MG tablet; Take 1 tablet (875 mg) by mouth 2 times daily for 5 days  [id number]}     BMI:   Estimated body mass index is 28.56 kg/m  as calculated from the following:    Height as of 2/19/21: 1.638 m (5' 4.5\").    Weight as of this encounter: 76.7 kg (169 lb).       Janet Raza NP  [facility] is a 81 year old who presents for the following health issues    HPI     Acute Illness  Acute illness concerns:  Right eye swelling and erythema   Onset/Duration: 1 day; symptoms began this morning  Symptoms:  Fever: no  Chills/Sweats: no  Headache (location?): YES right side   Sinus Pressure: YES  Conjunctivitis:  no  Ear Pain: no  Rhinorrhea: no  Congestion: yes  Sore Throat: no  Cough: no  Wheeze: no  Decreased Appetite: no  Nausea: no  Vomiting: no  Diarrhea: no  Dysuria/Freq.: no  Dysuria or Hematuria: no  Fatigue/Achiness: no  Sick/Strep Exposure: no, but states [place] " [place]  Therapies tried and outcome: [facility]- not helping   -He does not smoke.   -No known asthma. He tells me he has COPD, although it is not listed in his problem list.   -h/o c-diff colitis; got this when he was hospitalized.   -No vision changes.   -No eye drainage.   -He is on warfarin.   -No chest pain or shortness of breath.      Review of Systems   As noted in the HPI.       Objective    /74 (BP Location: Left arm, Patient Position: Chair, Cuff Size: Adult Regular)   Pulse 92   Temp 99.6  F (37.6  C) (Tympanic)   Wt 76.7 kg (169 lb)   SpO2 96%   BMI 28.56 kg/m    Body mass index is 28.56 kg/m .  Physical Exam   GENERAL: healthy, alert and no distress  EYES: Patient with significant swelling and erythema above right eye. Also tender over this area. PERRL, EOMI without pain, visual fields normal and conjunctivae and sclerae normal  HENT: ear canals and TM's normal, nose and mouth without ulcers or lesions  NECK: no adenopathy, no asymmetry, masses, or scars and thyroid normal to palpation  RESP: lungs clear to auscultation - no rales, rhonchi or wheezes  CV: regular rate and rhythm, normal S1 S2, no S3 or S4, no murmur, click or rub, no peripheral edema and peripheral pulses strong  NEURO: Normal strength and tone, mentation intact and speech normal  PSYCH: mentation appears normal, affect normal/bright

## 2021-06-28 NOTE — NURSING NOTE
"Chief Complaint   Patient presents with     Sinus Problem     Right side started this morning      Eye Problem     right eye        Initial There were no vitals taken for this visit. Estimated body mass index is 28.87 kg/m  as calculated from the following:    Height as of 2/19/21: 1.638 m (5' 4.5\").    Weight as of 2/19/21: 77.5 kg (170 lb 13.7 oz).  Medication Reconciliation: complete  Saranya Camarillo LPN  "

## 2021-07-02 ENCOUNTER — TELEPHONE (OUTPATIENT)
Dept: FAMILY MEDICINE | Facility: OTHER | Age: 81
End: 2021-07-02

## 2021-07-02 ENCOUNTER — ANTICOAGULATION THERAPY VISIT (OUTPATIENT)
Dept: ANTICOAGULATION | Facility: OTHER | Age: 81
End: 2021-07-02
Attending: FAMILY MEDICINE
Payer: MEDICARE

## 2021-07-02 DIAGNOSIS — K92.2 ACUTE UPPER GASTROINTESTINAL BLEEDING: ICD-10-CM

## 2021-07-02 DIAGNOSIS — Z86.718 HISTORY OF DVT OF LOWER EXTREMITY: ICD-10-CM

## 2021-07-02 DIAGNOSIS — Z86.711 HX PULMONARY EMBOLISM: ICD-10-CM

## 2021-07-02 DIAGNOSIS — Z79.01 LONG TERM CURRENT USE OF ANTICOAGULANT THERAPY: ICD-10-CM

## 2021-07-02 LAB
CAPILLARY BLOOD COLLECTION: NORMAL
INR BLD: 1.5 (ref 0.86–1.14)

## 2021-07-02 PROCEDURE — 85610 PROTHROMBIN TIME: CPT | Mod: ZL | Performed by: FAMILY MEDICINE

## 2021-07-02 PROCEDURE — 36416 COLLJ CAPILLARY BLOOD SPEC: CPT | Mod: ZL | Performed by: FAMILY MEDICINE

## 2021-07-02 NOTE — TELEPHONE ENCOUNTER
Please try to contact pt to schedule appropriately as noted in prior note. Shayla Grider LPN  Thank you in advance.  Shayla Grider LPN

## 2021-07-02 NOTE — TELEPHONE ENCOUNTER
My apologies.  I am out of office for a few days, and completely full next week.  If acute infection should be seen - can go to UC if needed or other available provider.

## 2021-07-02 NOTE — TELEPHONE ENCOUNTER
Based off schedule, please reply how you'd like to proceed with patients request of over book noted in prior notes.  Shayla Grider LPN

## 2021-07-02 NOTE — PROGRESS NOTES
ANTICOAGULATION FOLLOW-UP CLINIC VISIT    Patient Name:  Rey Tristan  Date:  2021  Contact Type:  Face to Face    SUBJECTIVE:  Patient Findings     Positives:  Change in medications (Antibiotics for 3 more days)    Comments:  INR done by lab. Met with patient face to face and we discussed INR results/Warfarin dosing/INR recheck date. INR therapeutic today- 1.5. He has 3 days left of antibiotics. A 1x dose decrease was made to 's dose. Otherwise, he is to continue on his maintenance dose. He verbalized understanding of all instructions. He is to contact Warfarin Clinic with any unusual bleeding/bruising or any other new changes to her diet/meds/activity or any questions/issues/illness.         Clinical Outcomes     Negatives:  Major bleeding event, Thromboembolic event, Anticoagulation-related hospital admission, Anticoagulation-related ED visit, Anticoagulation-related fatality    Comments:  INR done by lab. Met with patient face to face and we discussed INR results/Warfarin dosing/INR recheck date. INR therapeutic today- 1.5. He has 3 days left of antibiotics. A 1x dose decrease was made to 's dose. Otherwise, he is to continue on his maintenance dose. He verbalized understanding of all instructions. He is to contact Warfarin Clinic with any unusual bleeding/bruising or any other new changes to her diet/meds/activity or any questions/issues/illness.            OBJECTIVE    Recent labs: (last 7 days)     21  1032   INR 1.5*       ASSESSMENT / PLAN  No question data found.  Anticoagulation Summary  As of 2021    INR goal:  1.5-2.0   TTR:  90.4 % (1 y)   INR used for dosin.5 (2021)   Warfarin maintenance plan:  6 mg (4 mg x 1.5) every Mon, Fri; 4 mg (4 mg x 1) all other days   Full warfarin instructions:  : 2 mg; Otherwise 6 mg every Mon, Fri; 4 mg all other days   Weekly warfarin total:  32 mg   Plan last modified:  Lala Boss RN (2018)   Next INR check:   8/13/2021   Priority:  Maintenance   Target end date:  Indefinite    Indications    Long-term (current) use of anticoagulants [Z79.01] [Z79.01]  Hx pulmonary embolism [Z86.711]  History of DVT of lower extremity [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

## 2021-07-02 NOTE — TELEPHONE ENCOUNTER
9:03 AM    Reason for Call: OVERBOOK    Patient is having the following symptoms:  Follow up sinus infection and referral to Dr. Rogers    The patient is requesting an appointment for Next week  with  Dr. Richard .    Was an appointment offered for this call?   No     Preferred method for responding to this message: [phone]    If we cannot reach you directly, may we leave a detailed response at the number you provided?  Yes        Lala Syed

## 2021-07-02 NOTE — TELEPHONE ENCOUNTER
"Per Dr. Richard nurse (Shayla) Dr. Richard does not have any available appointments for today (7/2/21) \"offer him to be seen by any other provider\" I offered Dr. Lira @ 3:15 today, he declined & said he would call for possible opening next week.  "

## 2021-07-02 NOTE — TELEPHONE ENCOUNTER
Patient stopped at the registration desk to discuss seeing Dr. Richard next week fore sinus referral (possible infection, into his eye as well).  He asked when she was in clinic.  I told him Thursday but there were no appointments available.  He is requesting to be put in as an overbook.  Please return his call today 7/2/21 @ [phone] to discuss.  Thank you

## 2021-07-02 NOTE — TELEPHONE ENCOUNTER
Called pt and advised that he could be seen in UC if needed also offered an appt in [facility]; pt stated will call scheduling and make appt for after holiday

## 2021-07-23 ENCOUNTER — ANTICOAGULATION THERAPY VISIT (OUTPATIENT)
Dept: ANTICOAGULATION | Facility: OTHER | Age: 81
End: 2021-07-23

## 2021-07-23 DIAGNOSIS — Z86.711 HX PULMONARY EMBOLISM: ICD-10-CM

## 2021-07-23 DIAGNOSIS — Z79.01 LONG TERM CURRENT USE OF ANTICOAGULANT THERAPY: Primary | ICD-10-CM

## 2021-07-23 DIAGNOSIS — Z86.718 HISTORY OF DVT OF LOWER EXTREMITY: ICD-10-CM

## 2021-07-23 DIAGNOSIS — K92.2 ACUTE UPPER GASTROINTESTINAL BLEEDING: ICD-10-CM

## 2021-07-23 NOTE — PROGRESS NOTES
Patient called stating that he had forgotten to take his Warfarin last night. Instructed to take 8 mg today and after today he is to continue on his maintenance dose. INR recheck date remains as scheduled. Anticoagulation Calendar updated to reflect changes.

## 2021-08-11 ENCOUNTER — DOCUMENTATION ONLY (OUTPATIENT)
Dept: ANTICOAGULATION | Facility: OTHER | Age: 81
End: 2021-08-11

## 2021-08-11 DIAGNOSIS — Z86.711 HX PULMONARY EMBOLISM: ICD-10-CM

## 2021-08-11 DIAGNOSIS — Z79.01 LONG TERM CURRENT USE OF ANTICOAGULANT THERAPY: Primary | ICD-10-CM

## 2021-08-11 DIAGNOSIS — K92.2 ACUTE UPPER GASTROINTESTINAL BLEEDING: ICD-10-CM

## 2021-08-11 DIAGNOSIS — Z86.718 HISTORY OF DVT OF LOWER EXTREMITY: ICD-10-CM

## 2021-08-11 NOTE — PROGRESS NOTES
Patient called stating that he has to cancel his appointment for 8/13 due to family emergency and he will be traveling to [place]. He stated he will call when he returns to schedule another appointment.   
0

## 2021-08-20 ENCOUNTER — LAB (OUTPATIENT)
Dept: LAB | Facility: OTHER | Age: 81
End: 2021-08-20
Attending: FAMILY MEDICINE
Payer: MEDICARE

## 2021-08-20 ENCOUNTER — ANTICOAGULATION THERAPY VISIT (OUTPATIENT)
Dept: ANTICOAGULATION | Facility: OTHER | Age: 81
End: 2021-08-20
Attending: FAMILY MEDICINE
Payer: MEDICARE

## 2021-08-20 DIAGNOSIS — Z86.711 HX PULMONARY EMBOLISM: ICD-10-CM

## 2021-08-20 DIAGNOSIS — K92.2 ACUTE UPPER GASTROINTESTINAL BLEEDING: ICD-10-CM

## 2021-08-20 DIAGNOSIS — Z86.718 HISTORY OF DVT OF LOWER EXTREMITY: ICD-10-CM

## 2021-08-20 DIAGNOSIS — Z79.01 LONG TERM CURRENT USE OF ANTICOAGULANT THERAPY: Primary | ICD-10-CM

## 2021-08-20 LAB — INR BLD: 1.3 (ref 0.9–1.1)

## 2021-08-20 PROCEDURE — 85610 PROTHROMBIN TIME: CPT | Mod: ZL

## 2021-08-20 NOTE — PROGRESS NOTES
ANTICOAGULATION MANAGEMENT     Rey Tristan 81 year old male is on warfarin with subtherapeutic INR result. (Goal INR 1.5-2.0)    Patient recently traveled to [place] and had a change in his diet for a week or so. INR recheck date in 6 weeks per patient request as he plans to resume previous habits prior to travel.     Recent labs: (last 7 days)     08/20/21  1036   INR 1.3*       ASSESSMENT     Source(s): Chart Review and Patient/Caregiver Call       Warfarin doses taken: Warfarin taken as instructed    Diet: Recent travel and change of diet for the last week    New illness, injury, or hospitalization: No    Medication/supplement changes: None noted    Signs or symptoms of bleeding or clotting: No    Previous INR: Therapeutic last 2(+) visits    Additional findings: None     PLAN     Recommended plan for no diet, medication or health factor changes affecting INR     Dosing Instructions: Continue your current warfarin dose with next INR in 6 weeks       Summary  As of 8/20/2021    Full warfarin instructions:  8/20: 8 mg; Otherwise 6 mg every Mon, Fri; 4 mg all other days   Next INR check:  10/1/2021             Telephone call with Rey who verbalizes understanding and agrees to plan    Lab visit scheduled    Education provided: Please call back if any changes to your diet, medications or how you've been taking warfarin, Monitoring for bleeding signs and symptoms and Monitoring for clotting signs and symptoms    Plan made per [facility] anticoagulation protocol    Denisse Flores RN  Anticoagulation Clinic  8/20/2021    _______________________________________________________________________     Anticoagulation Episode Summary     Current INR goal:  1.5-2.0   TTR:  77.0 % (1 y)   Target end date:  Indefinite   Send INR reminders to:  [facility]    Indications    Long-term (current) use of anticoagulants [Z79.01] [Z79.01]  Hx pulmonary embolism [Z86.711]  History of DVT of lower extremity [Z86.718]  Acute  upper gastrointestinal bleeding [K92.2]           Comments:           Anticoagulation Care Providers     Provider Role Specialty Phone number    Gianna Richard MD [facility] [place] [facility] [phone]

## 2021-09-10 DIAGNOSIS — E78.5 HYPERLIPIDEMIA, UNSPECIFIED HYPERLIPIDEMIA TYPE: ICD-10-CM

## 2021-09-10 RX ORDER — ATORVASTATIN CALCIUM 10 MG/1
TABLET, FILM COATED ORAL
Qty: 90 TABLET | Refills: 0 | Status: SHIPPED | OUTPATIENT
Start: 2021-09-10 | End: 2021-12-01

## 2021-09-10 NOTE — TELEPHONE ENCOUNTER
ATORVASTATIN 10MG TABLETS  Last Written Prescription Date:  6/3/2021  Last Fill Quantity: 90,   # refills: 0  Last Office Visit: 6/28/2021  Future Office visit:       Routing refill request to provider for review/approval because:

## 2021-10-01 ENCOUNTER — ANTICOAGULATION THERAPY VISIT (OUTPATIENT)
Dept: ANTICOAGULATION | Facility: OTHER | Age: 81
End: 2021-10-01
Attending: FAMILY MEDICINE
Payer: MEDICARE

## 2021-10-01 ENCOUNTER — LAB (OUTPATIENT)
Dept: LAB | Facility: OTHER | Age: 81
End: 2021-10-01
Attending: FAMILY MEDICINE
Payer: MEDICARE

## 2021-10-01 DIAGNOSIS — Z86.711 HX PULMONARY EMBOLISM: ICD-10-CM

## 2021-10-01 DIAGNOSIS — K92.2 ACUTE UPPER GASTROINTESTINAL BLEEDING: ICD-10-CM

## 2021-10-01 DIAGNOSIS — Z79.01 LONG TERM CURRENT USE OF ANTICOAGULANT THERAPY: Primary | ICD-10-CM

## 2021-10-01 DIAGNOSIS — Z86.718 HISTORY OF DVT OF LOWER EXTREMITY: ICD-10-CM

## 2021-10-01 LAB — INR BLD: 1.6 (ref 0.9–1.1)

## 2021-10-01 PROCEDURE — 85610 PROTHROMBIN TIME: CPT | Mod: ZL

## 2021-10-01 PROCEDURE — 36416 COLLJ CAPILLARY BLOOD SPEC: CPT | Mod: ZL

## 2021-10-01 NOTE — PROGRESS NOTES
ANTICOAGULATION MANAGEMENT     Rey Tristan 81 year old male is on warfarin with therapeutic INR result. (Goal INR 1.5-2.0)    Recent labs: (last 7 days)     10/01/21  1029   INR 1.6*       ASSESSMENT     Source(s): Chart Review and Patient/Caregiver Call       Warfarin doses taken: Warfarin taken as instructed    Diet: No new diet changes identified    New illness, injury, or hospitalization: No    Medication/supplement changes: None noted    Signs or symptoms of bleeding or clotting: No    Previous INR: Subtherapeutic    Additional findings: None     PLAN     Recommended plan for no diet, medication or health factor changes affecting INR     Dosing Instructions: Continue your current warfarin dose with next INR in 4 weeks       Summary  As of 10/1/2021    Full warfarin instructions:  6 mg every Mon, Fri; 4 mg all other days   Next INR check:  10/29/2021             Detailed voice message left for Rey with dosing instructions and follow up date.     Lab visit scheduled    Education provided: Please call back if any changes to your diet, medications or how you've been taking warfarin, Monitoring for bleeding signs and symptoms and Monitoring for clotting signs and symptoms    Plan made per [facility] anticoagulation protocol    Denisse Flores RN  Anticoagulation Clinic  10/1/2021    _______________________________________________________________________     Anticoagulation Episode Summary     Current INR goal:  1.5-2.0   TTR:  69.4 % (1 y)   Target end date:  Indefinite   Send INR reminders to:  [facility]    Indications    Long-term (current) use of anticoagulants [Z79.01] [Z79.01]  Hx pulmonary embolism [Z86.711]  History of DVT of lower extremity [Z86.718]  Acute upper gastrointestinal bleeding [K92.2]           Comments:           Anticoagulation Care Providers     Provider Role Specialty Phone number    Gianna Richard MD Referring Family Medicine [phone]

## 2021-10-29 ENCOUNTER — ANTICOAGULATION THERAPY VISIT (OUTPATIENT)
Dept: ANTICOAGULATION | Facility: OTHER | Age: 81
End: 2021-10-29
Attending: FAMILY MEDICINE
Payer: MEDICARE

## 2021-10-29 ENCOUNTER — LAB (OUTPATIENT)
Dept: LAB | Facility: OTHER | Age: 81
End: 2021-10-29
Payer: MEDICARE

## 2021-10-29 DIAGNOSIS — Z79.01 LONG TERM CURRENT USE OF ANTICOAGULANT THERAPY: Primary | ICD-10-CM

## 2021-10-29 DIAGNOSIS — K92.2 ACUTE UPPER GASTROINTESTINAL BLEEDING: ICD-10-CM

## 2021-10-29 DIAGNOSIS — Z86.718 HISTORY OF DVT OF LOWER EXTREMITY: ICD-10-CM

## 2021-10-29 DIAGNOSIS — Z86.711 HX PULMONARY EMBOLISM: ICD-10-CM

## 2021-10-29 LAB — INR BLD: 1.6 (ref 0.9–1.1)

## 2021-10-29 PROCEDURE — 36416 COLLJ CAPILLARY BLOOD SPEC: CPT | Mod: ZL

## 2021-10-29 PROCEDURE — 85610 PROTHROMBIN TIME: CPT | Mod: ZL

## 2021-10-29 NOTE — PROGRESS NOTES
ANTICOAGULATION MANAGEMENT     Rey Tristan 81 year old male is on warfarin with therapeutic INR result. (Goal INR 1.5-2.0)    Recent labs: (last 7 days)     10/29/21  1048   INR 1.6*       ASSESSMENT     Source(s): Chart Review and Patient/Caregiver Call       Warfarin doses taken: Warfarin taken as instructed    Diet: No new diet changes identified    New illness, injury, or hospitalization: No    Medication/supplement changes: None noted    Signs or symptoms of bleeding or clotting: No    Previous INR: Therapeutic last visit; previously outside of goal range    Additional findings: None     PLAN     Recommended plan for no diet, medication or health factor changes affecting INR     Dosing Instructions: Continue your current warfarin dose with next INR in 6 weeks       Summary  As of 10/29/2021    Full warfarin instructions:  6 mg every Mon, Fri; 4 mg all other days   Next INR check:  12/10/2021             Detailed voice message left for Rey with dosing instructions and follow up date.     Lab visit scheduled    Education provided: Please call back if any changes to your diet, medications or how you've been taking warfarin, Monitoring for bleeding signs and symptoms and Monitoring for clotting signs and symptoms    Plan made per [facility] anticoagulation protocol    Denisse Flores RN  Anticoagulation Clinic  10/29/2021    _______________________________________________________________________     Anticoagulation Episode Summary     Current INR goal:  1.5-2.0   TTR:  69.4 % (1 y)   Target end date:  Indefinite   Send INR reminders to:  [facility]    Indications    Long-term (current) use of anticoagulants [Z79.01] [Z79.01]  Hx pulmonary embolism [Z86.711]  History of DVT of lower extremity [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

## 2021-11-30 DIAGNOSIS — E78.5 HYPERLIPIDEMIA, UNSPECIFIED HYPERLIPIDEMIA TYPE: ICD-10-CM

## 2021-12-01 RX ORDER — ATORVASTATIN CALCIUM 10 MG/1
TABLET, FILM COATED ORAL
Qty: 90 TABLET | Refills: 0 | Status: SHIPPED | OUTPATIENT
Start: 2021-12-01 | End: 2022-03-07

## 2021-12-01 NOTE — TELEPHONE ENCOUNTER
Lipitor  Last Written Prescription Date: 9/10/21  Last Fill Quantity: 90 # of Refills: 0  Last Office Visit: 6/28/21

## 2021-12-10 ENCOUNTER — LAB (OUTPATIENT)
Dept: LAB | Facility: OTHER | Age: 81
End: 2021-12-10
Attending: FAMILY MEDICINE
Payer: MEDICARE

## 2021-12-10 ENCOUNTER — ANTICOAGULATION THERAPY VISIT (OUTPATIENT)
Dept: ANTICOAGULATION | Facility: OTHER | Age: 81
End: 2021-12-10
Attending: FAMILY MEDICINE
Payer: MEDICARE

## 2021-12-10 DIAGNOSIS — Z86.718 HISTORY OF DVT OF LOWER EXTREMITY: ICD-10-CM

## 2021-12-10 DIAGNOSIS — Z86.711 HX PULMONARY EMBOLISM: ICD-10-CM

## 2021-12-10 DIAGNOSIS — Z79.01 LONG TERM CURRENT USE OF ANTICOAGULANT THERAPY: Primary | ICD-10-CM

## 2021-12-10 DIAGNOSIS — K92.2 ACUTE UPPER GASTROINTESTINAL BLEEDING: ICD-10-CM

## 2021-12-10 DIAGNOSIS — E78.5 HYPERLIPIDEMIA, UNSPECIFIED HYPERLIPIDEMIA TYPE: ICD-10-CM

## 2021-12-10 LAB
CHOLEST SERPL-MCNC: 110 MG/DL
FASTING STATUS PATIENT QL REPORTED: YES
HDLC SERPL-MCNC: 45 MG/DL
INR BLD: 1.6 (ref 0.9–1.1)
LDLC SERPL CALC-MCNC: 49 MG/DL
NONHDLC SERPL-MCNC: 65 MG/DL
TRIGL SERPL-MCNC: 80 MG/DL

## 2021-12-10 PROCEDURE — 36415 COLL VENOUS BLD VENIPUNCTURE: CPT | Mod: ZL

## 2021-12-10 PROCEDURE — 85610 PROTHROMBIN TIME: CPT | Mod: ZL

## 2021-12-10 PROCEDURE — 82465 ASSAY BLD/SERUM CHOLESTEROL: CPT | Mod: ZL

## 2021-12-10 NOTE — PROGRESS NOTES
ANTICOAGULATION MANAGEMENT     Rey Tristan 81 year old male is on warfarin with therapeutic INR result. (Goal INR 1.5-2.0)    Recent labs: (last 7 days)     12/10/21  1033   INR 1.6*       ASSESSMENT     Source(s): Chart Review and Patient/Caregiver Call       Warfarin doses taken: Warfarin taken as instructed    Diet: No new diet changes identified    New illness, injury, or hospitalization: No    Medication/supplement changes: None noted    Signs or symptoms of bleeding or clotting: No    Previous INR: Therapeutic last 2(+) visits    Additional findings: None     PLAN     Recommended plan for no diet, medication or health factor changes affecting INR     Dosing Instructions: Continue your current warfarin dose with next INR in 6 weeks       Summary  As of 12/10/2021    Full warfarin instructions:  6 mg every Mon, Fri; 4 mg all other days   Next INR check:  1/21/2022             Detailed voice message left for Rey with dosing instructions and follow up date.     Lab visit scheduled    Education provided: Please call back if any changes to your diet, medications or how you've been taking warfarin, Monitoring for bleeding signs and symptoms and Monitoring for clotting signs and symptoms    Plan made per [facility] anticoagulation protocol    Denisse Flores RN  Anticoagulation Clinic  12/10/2021    _______________________________________________________________________     Anticoagulation Episode Summary     Current INR goal:  1.5-2.0   TTR:  69.4 % (1 y)   Target end date:  Indefinite   Send INR reminders to:  [facility]    Indications    Long-term (current) use of anticoagulants [Z79.01] [Z79.01]  Hx pulmonary embolism [Z86.711]  History of DVT of lower extremity [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

## 2022-01-21 ENCOUNTER — LAB (OUTPATIENT)
Dept: LAB | Facility: OTHER | Age: 82
End: 2022-01-21
Attending: FAMILY MEDICINE
Payer: MEDICARE

## 2022-01-21 ENCOUNTER — ANTICOAGULATION THERAPY VISIT (OUTPATIENT)
Dept: ANTICOAGULATION | Facility: OTHER | Age: 82
End: 2022-01-21
Attending: FAMILY MEDICINE
Payer: MEDICARE

## 2022-01-21 DIAGNOSIS — Z86.711 HX PULMONARY EMBOLISM: ICD-10-CM

## 2022-01-21 DIAGNOSIS — K92.2 ACUTE UPPER GASTROINTESTINAL BLEEDING: ICD-10-CM

## 2022-01-21 DIAGNOSIS — Z86.718 HISTORY OF DVT OF LOWER EXTREMITY: ICD-10-CM

## 2022-01-21 DIAGNOSIS — Z79.01 LONG TERM CURRENT USE OF ANTICOAGULANT THERAPY: Primary | ICD-10-CM

## 2022-01-21 LAB — INR BLD: 1.6 (ref 0.9–1.1)

## 2022-01-21 PROCEDURE — 85610 PROTHROMBIN TIME: CPT | Mod: ZL

## 2022-01-21 PROCEDURE — 36416 COLLJ CAPILLARY BLOOD SPEC: CPT | Mod: ZL

## 2022-01-21 NOTE — PROGRESS NOTES
ANTICOAGULATION MANAGEMENT     Rey Tristan 81 year old male is on warfarin with therapeutic INR result. (Goal INR 1.5-2.0)    Recent labs: (last 7 days)     01/21/22  1027   INR 1.6*       ASSESSMENT     Source(s): Chart Review and Patient/Caregiver Call       Warfarin doses taken: Warfarin taken as instructed    Diet: No new diet changes identified    New illness, injury, or hospitalization: No    Medication/supplement changes: None noted    Signs or symptoms of bleeding or clotting: No    Previous INR: Therapeutic last 2(+) visits    Additional findings: None     PLAN     Recommended plan for no diet, medication or health factor changes affecting INR     Dosing Instructions: Continue your current warfarin dose with next INR in 6 weeks       Summary  As of 1/21/2022    Full warfarin instructions:  6 mg every Mon, Fri; 4 mg all other days   Next INR check:  3/4/2022             Detailed voice message left for Rey with dosing instructions and follow up date.     Lab visit scheduled    Education provided: Please call back if any changes to your diet, medications or how you've been taking warfarin, Monitoring for bleeding signs and symptoms, Monitoring for clotting signs and symptoms, Importance of notifying clinic for changes in medications; a sooner lab recheck maybe needed., Importance of notifying clinic for diarrhea, nausea/vomiting, reduced intake, and/or illness; a sooner lab recheck maybe needed. 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

## 2022-01-31 ENCOUNTER — NURSE TRIAGE (OUTPATIENT)
Dept: FAMILY MEDICINE | Facility: OTHER | Age: 82
End: 2022-01-31
Payer: COMMERCIAL

## 2022-01-31 NOTE — TELEPHONE ENCOUNTER
Protocol advises patient to be seen within 24 hours for dizziness that started last week on Wednesday and then again this morning. While on the phone triaging, patient stated he had a pain on his left side/chest area that is through the rib cage. Pain lasted one minute or less and was gone while this writer was still triaging. Patient denies difficulty breathing, radiating pain, weakness/numbness in face, arm or leg on one side of the body. No available appointment with PCP or covering provider in [place]. Would you like to see patient or have patient be seen in ER/UC? Please advise, thank you.     Reason for Disposition    [1] MODERATE dizziness (e.g., interferes with normal activities) AND [2] has NOT been evaluated by physician for this  (Exception: dizziness caused by heat exposure, sudden standing, or poor fluid intake)    Additional Information    Negative: Severe difficulty breathing (e.g., struggling for each breath, speaks in single words)    Negative: [1] Difficulty breathing or swallowing AND [2] started suddenly after medicine, an allergic food or bee sting    Negative: Shock suspected (e.g., cold/pale/clammy skin, too weak to stand, low BP, rapid pulse)    Negative: Difficult to awaken or acting confused (e.g., disoriented, slurred speech)    Negative: [1] Weakness (i.e., paralysis, loss of muscle strength) of the face, arm or leg on one side of the body AND [2] sudden onset AND [3] present now    Negative: [1] Numbness (i.e., loss of sensation) of the face, arm or leg on one side of the body AND [2] sudden onset AND [3] present now    Negative: [1] Loss of speech or garbled speech AND [2] sudden onset AND [3] present now    Negative: Overdose (accidental or intentional) of medications    Negative: [1] Fainted > 15 minutes ago AND [2] still feels too weak or dizzy to stand    Negative: Heart beating < 50 beats per minute OR > 140 beats per minute    Negative: Sounds like a life-threatening emergency to  "the triager    Negative: Chest pain    Negative: Rectal bleeding, bloody stool, or tarry-black stool    Negative: [1] Vomiting AND [2] contains red blood or black (\"coffee ground\") material    Negative: Vomiting is main symptom    Negative: Diarrhea is main symptom    Negative: Headache is main symptom    Negative: Patient states that he/she is having an anxiety/panic attack    Negative: Dizziness from low blood sugar (i.e., < 60 mg/dl or 3.5 mmol/l)    Negative: Dizziness is described as a spinning sensation (i.e., vertigo)    Negative: Heat exhaustion suspected (i.e., dehydration from heat exposure)    Negative: Difficulty breathing    Negative: SEVERE dizziness (e.g., unable to stand, requires support to walk, feels like passing out now)    Negative: Extra heart beats OR irregular heart beating  (i.e., \"palpitations\")    Negative: [1] Drinking very little AND [2] dehydration suspected (e.g., no urine > 12 hours, very dry mouth, very lightheaded)    Negative: Patient sounds very sick or weak to the triager    Negative: [1] Dizziness caused by heat exposure, sudden standing, or poor fluid intake AND [2] no improvement after 2 hours of rest and fluids    Negative: [1] Fever > 103 F (39.4 C) AND [2] not able to get the fever down using Fever Care Advice    Negative: [1] Fever > 101 F (38.3 C) AND [2] age > 60    Negative: [1] Fever > 100.0 F (37.8 C) AND [2] bedridden (e.g., nursing home patient, CVA, chronic illness, recovering from surgery)    Negative: [1] Fever > 100.0 F (37.8 C) AND [2] diabetes mellitus or weak immune system (e.g., HIV positive, cancer chemo, splenectomy, organ transplant, chronic steroids)    Answer Assessment - Initial Assessment Questions  1. DESCRIPTION: \"Describe your dizziness.\"      Feel kind of odd, don't feel right. Had a dizzy spell and sat down when feeling lightheaded. Whole body feels kind of tingly.   2. LIGHTHEADED: \"Do you feel lightheaded?\" (e.g., somewhat faint, woozy, weak " "upon standing)      A little lightheaded right now  3. VERTIGO: \"Do you feel like either you or the room is spinning or tilting?\" (i.e. vertigo)      no  4. SEVERITY: \"How bad is it?\"  \"Do you feel like you are going to faint?\" \"Can you stand and walk?\"    - MILD - walking normally    - MODERATE - interferes with normal activities (e.g., work, school)     - SEVERE - unable to stand, requires support to walk, feels like passing out now.       moderate  5. ONSET:  \"When did the dizziness begin?\"      Last week  6. AGGRAVATING FACTORS: \"Does anything make it worse?\" (e.g., standing, change in head position)      no  7. HEART RATE: \"Can you tell me your heart rate?\" \"How many beats in 15 seconds?\"  (Note: not all patients can do this)        NA  8. CAUSE: \"What do you think is causing the dizziness?\"      unsure  9. RECURRENT SYMPTOM: \"Have you had dizziness before?\" If so, ask: \"When was the last time?\" \"What happened that time?\"      Yes. 2018. Working out side and passed out and went to ER.  Patient had thyroid issue and was dehydrated.   10. OTHER SYMPTOMS: \"Do you have any other symptoms?\" (e.g., fever, chest pain, vomiting, diarrhea, bleeding)        no  11. PREGNANCY: \"Is there any chance you are pregnant?\" \"When was your last menstrual period?\"        NA    Protocols used: DIZZINESS - [id number]      "

## 2022-01-31 NOTE — TELEPHONE ENCOUNTER
Patient declined ED - stated he wants to schedule follow up appt. Patient declined 2/21 at 0830 due to it being too early in the morning and selected an appointment 2/24/22 at 1030

## 2022-02-14 ENCOUNTER — LAB (OUTPATIENT)
Dept: LAB | Facility: OTHER | Age: 82
End: 2022-02-14
Payer: MEDICARE

## 2022-02-14 DIAGNOSIS — D47.2 IGM MONOCLONAL GAMMOPATHY OF UNCERTAIN SIGNIFICANCE: ICD-10-CM

## 2022-02-14 DIAGNOSIS — D50.9 IRON DEFICIENCY ANEMIA, UNSPECIFIED IRON DEFICIENCY ANEMIA TYPE: ICD-10-CM

## 2022-02-14 LAB
ALBUMIN SERPL-MCNC: 3.5 G/DL (ref 3.4–5)
ALP SERPL-CCNC: 148 U/L (ref 40–150)
ALT SERPL W P-5'-P-CCNC: 26 U/L (ref 0–70)
ANION GAP SERPL CALCULATED.3IONS-SCNC: 3 MMOL/L (ref 3–14)
AST SERPL W P-5'-P-CCNC: 29 U/L (ref 0–45)
BASOPHILS # BLD AUTO: 0 10E3/UL (ref 0–0.2)
BASOPHILS NFR BLD AUTO: 1 %
BILIRUB SERPL-MCNC: 0.4 MG/DL (ref 0.2–1.3)
BUN SERPL-MCNC: 11 MG/DL (ref 7–30)
CALCIUM SERPL-MCNC: 8.9 MG/DL (ref 8.5–10.1)
CHLORIDE BLD-SCNC: 107 MMOL/L (ref 94–109)
CO2 SERPL-SCNC: 28 MMOL/L (ref 20–32)
CREAT SERPL-MCNC: 0.71 MG/DL (ref 0.66–1.25)
EOSINOPHIL # BLD AUTO: 0.1 10E3/UL (ref 0–0.7)
EOSINOPHIL NFR BLD AUTO: 3 %
ERYTHROCYTE [DISTWIDTH] IN BLOOD BY AUTOMATED COUNT: 14.1 % (ref 10–15)
FERRITIN SERPL-MCNC: 53 NG/ML (ref 26–388)
GFR SERPL CREATININE-BSD FRML MDRD: >90 ML/MIN/1.73M2
GLUCOSE BLD-MCNC: 91 MG/DL (ref 70–99)
HCT VFR BLD AUTO: 41 % (ref 40–53)
HGB BLD-MCNC: 13.1 G/DL (ref 13.3–17.7)
IMM GRANULOCYTES # BLD: 0 10E3/UL
IMM GRANULOCYTES NFR BLD: 0 %
IRON SATN MFR SERPL: 21 % (ref 15–46)
IRON SERPL-MCNC: 73 UG/DL (ref 35–180)
LDH SERPL L TO P-CCNC: 187 U/L (ref 85–227)
LYMPHOCYTES # BLD AUTO: 0.7 10E3/UL (ref 0.8–5.3)
LYMPHOCYTES NFR BLD AUTO: 14 %
MCH RBC QN AUTO: 29.5 PG (ref 26.5–33)
MCHC RBC AUTO-ENTMCNC: 32 G/DL (ref 31.5–36.5)
MCV RBC AUTO: 92 FL (ref 78–100)
MONOCYTES # BLD AUTO: 0.4 10E3/UL (ref 0–1.3)
MONOCYTES NFR BLD AUTO: 9 %
NEUTROPHILS # BLD AUTO: 3.7 10E3/UL (ref 1.6–8.3)
NEUTROPHILS NFR BLD AUTO: 73 %
NRBC # BLD AUTO: 0 10E3/UL
NRBC BLD AUTO-RTO: 0 /100
PLATELET # BLD AUTO: 170 10E3/UL (ref 150–450)
POTASSIUM BLD-SCNC: 3.9 MMOL/L (ref 3.4–5.3)
PROT SERPL-MCNC: 7.8 G/DL (ref 6.8–8.8)
RBC # BLD AUTO: 4.44 10E6/UL (ref 4.4–5.9)
SODIUM SERPL-SCNC: 138 MMOL/L (ref 133–144)
TIBC SERPL-MCNC: 348 UG/DL (ref 240–430)
TOTAL PROTEIN SERUM FOR ELP: 7.6 G/DL (ref 6.8–8.8)
WBC # BLD AUTO: 5 10E3/UL (ref 4–11)

## 2022-02-14 PROCEDURE — 83521 IG LIGHT CHAINS FREE EACH: CPT | Mod: ZL

## 2022-02-14 PROCEDURE — 82232 ASSAY OF BETA-2 PROTEIN: CPT | Mod: ZL

## 2022-02-14 PROCEDURE — 83615 LACTATE (LD) (LDH) ENZYME: CPT | Mod: ZL

## 2022-02-14 PROCEDURE — 80053 COMPREHEN METABOLIC PANEL: CPT | Mod: ZL

## 2022-02-14 PROCEDURE — 84155 ASSAY OF PROTEIN SERUM: CPT | Mod: ZL

## 2022-02-14 PROCEDURE — 82784 ASSAY IGA/IGD/IGG/IGM EACH: CPT | Mod: ZL

## 2022-02-14 PROCEDURE — 83550 IRON BINDING TEST: CPT | Mod: ZL

## 2022-02-14 PROCEDURE — 84165 PROTEIN E-PHORESIS SERUM: CPT | Mod: ZL | Performed by: STUDENT IN AN ORGANIZED HEALTH CARE EDUCATION/TRAINING PROGRAM

## 2022-02-14 PROCEDURE — 36415 COLL VENOUS BLD VENIPUNCTURE: CPT | Mod: ZL

## 2022-02-14 PROCEDURE — 82728 ASSAY OF FERRITIN: CPT | Mod: ZL

## 2022-02-14 PROCEDURE — 82040 ASSAY OF SERUM ALBUMIN: CPT | Mod: ZL

## 2022-02-14 PROCEDURE — 85810 BLOOD VISCOSITY EXAMINATION: CPT | Mod: ZL

## 2022-02-14 PROCEDURE — 86334 IMMUNOFIX E-PHORESIS SERUM: CPT | Mod: ZL | Performed by: STUDENT IN AN ORGANIZED HEALTH CARE EDUCATION/TRAINING PROGRAM

## 2022-02-14 PROCEDURE — 85025 COMPLETE CBC W/AUTO DIFF WBC: CPT | Mod: ZL

## 2022-02-15 LAB
ALBUMIN SERPL ELPH-MCNC: 4 G/DL (ref 3.7–5.1)
ALPHA1 GLOB SERPL ELPH-MCNC: 0.3 G/DL (ref 0.2–0.4)
ALPHA2 GLOB SERPL ELPH-MCNC: 0.8 G/DL (ref 0.5–0.9)
B-GLOBULIN SERPL ELPH-MCNC: 0.6 G/DL (ref 0.6–1)
B2 MICROGLOB TUMOR MARKER SER-MCNC: 2 MG/L
GAMMA GLOB SERPL ELPH-MCNC: 1.9 G/DL (ref 0.7–1.6)
IGA SERPL-MCNC: 108 MG/DL (ref 84–499)
IGG SERPL-MCNC: 769 MG/DL (ref 610–1616)
IGM SERPL-MCNC: 1659 MG/DL (ref 35–242)
KAPPA LC FREE SER-MCNC: 5.2 MG/DL (ref 0.33–1.94)
KAPPA LC FREE/LAMBDA FREE SER NEPH: 3.27 {RATIO} (ref 0.26–1.65)
LAMBDA LC FREE SERPL-MCNC: 1.59 MG/DL (ref 0.57–2.63)
M PROTEIN SERPL ELPH-MCNC: 1.1 G/DL
PROT PATTERN SERPL ELPH-IMP: ABNORMAL
PROT PATTERN SERPL IFE-IMP: NORMAL
VISC SER: 1.8 CP (ref 1.4–1.8)

## 2022-02-15 PROCEDURE — 84165 PROTEIN E-PHORESIS SERUM: CPT | Mod: 26 | Performed by: STUDENT IN AN ORGANIZED HEALTH CARE EDUCATION/TRAINING PROGRAM

## 2022-02-15 PROCEDURE — 86334 IMMUNOFIX E-PHORESIS SERUM: CPT | Performed by: STUDENT IN AN ORGANIZED HEALTH CARE EDUCATION/TRAINING PROGRAM

## 2022-02-19 DIAGNOSIS — Z79.01 LONG TERM CURRENT USE OF ANTICOAGULANT THERAPY: ICD-10-CM

## 2022-02-19 DIAGNOSIS — Z86.718 HISTORY OF DVT OF LOWER EXTREMITY: ICD-10-CM

## 2022-02-19 DIAGNOSIS — Z86.711 HX PULMONARY EMBOLISM: ICD-10-CM

## 2022-02-20 NOTE — TELEPHONE ENCOUNTER
WARFARIN      Last Written Prescription Date:  12-1-2020  Last Fill Quantity: 120,   # refills: 3  Last Office Visit: 6-  Future Office visit:    Next 5 appointments (look out 90 days)    Feb 24, 2022 10:45 AM  (Arrive by 10:30 AM)  SHORT with Gianna Richard MD  [facility] ([facility] ) [address] [facility] [address]  [phone]   Feb 25, 2022  2:15 PM  (Arrive by 2:00 PM)  Return Visit with Britney Mahoney NP  [facility] ([facility] ) [address]  [facility] [address]  [phone]           Routing refill request to provider for review/approval because:  Drug not on the [facility], [facility] or [facility] refill protocol or controlled substance

## 2022-02-22 RX ORDER — WARFARIN SODIUM 4 MG/1
TABLET ORAL
Qty: 120 TABLET | Refills: 0 | Status: SHIPPED | OUTPATIENT
Start: 2022-02-22 | End: 2022-03-04

## 2022-02-24 ENCOUNTER — OFFICE VISIT (OUTPATIENT)
Dept: FAMILY MEDICINE | Facility: OTHER | Age: 82
End: 2022-02-24
Attending: FAMILY MEDICINE
Payer: COMMERCIAL

## 2022-02-24 VITALS — WEIGHT: 165 LBS | OXYGEN SATURATION: 95 % | BODY MASS INDEX: 27.88 KG/M2

## 2022-02-24 DIAGNOSIS — R42 DIZZINESS: Primary | ICD-10-CM

## 2022-02-24 DIAGNOSIS — Z87.891 PERSONAL HISTORY OF TOBACCO USE: ICD-10-CM

## 2022-02-24 DIAGNOSIS — E06.3 AUTOIMMUNE THYROIDITIS: ICD-10-CM

## 2022-02-24 DIAGNOSIS — Z87.891 PERSONAL HISTORY OF TOBACCO USE, PRESENTING HAZARDS TO HEALTH: ICD-10-CM

## 2022-02-24 LAB — TSH SERPL DL<=0.005 MIU/L-ACNC: 3.12 MU/L (ref 0.4–4)

## 2022-02-24 PROCEDURE — 84443 ASSAY THYROID STIM HORMONE: CPT | Mod: ZL | Performed by: FAMILY MEDICINE

## 2022-02-24 PROCEDURE — G0463 HOSPITAL OUTPT CLINIC VISIT: HCPCS

## 2022-02-24 PROCEDURE — 36415 COLL VENOUS BLD VENIPUNCTURE: CPT | Mod: ZL | Performed by: FAMILY MEDICINE

## 2022-02-24 PROCEDURE — G0463 HOSPITAL OUTPT CLINIC VISIT: HCPCS | Mod: 25

## 2022-02-24 PROCEDURE — 99214 OFFICE O/P EST MOD 30 MIN: CPT | Performed by: FAMILY MEDICINE

## 2022-02-24 ASSESSMENT — PAIN SCALES - GENERAL: PAINLEVEL: NO PAIN (0)

## 2022-02-24 ASSESSMENT — PATIENT HEALTH QUESTIONNAIRE - PHQ9: SUM OF ALL RESPONSES TO PHQ QUESTIONS 1-9: 0

## 2022-02-24 NOTE — PATIENT INSTRUCTIONS
Lung Cancer Screening   Frequently Asked Questions  If you are at high-risk for lung cancer, getting screened with low-dose computed tomography (LDCT) every year can help save your life. This handout offers answers to some of the most common questions about lung cancer screening. If you have other questions, please call [phone] ([phone]).     What is it?  Lung cancer screening uses special X-ray technology to create an image of your lung tissue. The exam is quick and easy and takes less than 10 seconds. We don t give you any medicine or use any needles. You can eat before and after the exam. You don t need to change your clothes as long as the clothing on your chest doesn t contain metal. But, you do need to be able to hold your breath for at least 6 seconds during the exam.    What is the goal of lung cancer screening?  The goal of lung cancer screening is to save lives. Many times, lung cancer is not found until a person starts having physical symptoms. Lung cancer screening can help detect lung cancer in the earliest stages when it may be easier to treat.    Who should be screened for lung cancer?  We suggest lung cancer screening for anyone who is at high-risk for lung cancer. You are in the high-risk group if you:      are between the ages of 55 and 79, and    have smoked at least 1 pack of cigarettes a day for 20 or more years, and    still smoke or have quit within the past 15 years.    However, if you have a new cough or shortness of breath, you should talk to your doctor before being screened.    Why does it matter if I have symptoms?  Certain symptoms can be a sign that you have a condition in your lungs that should be checked and treated by your doctor. These symptoms include fever, chest pain, a new or changing cough, shortness of breath that you have never felt before, coughing up blood or unexplained weight loss. Having any of these symptoms can greatly affect the results of lung  cancer screening.       Should all smokers get an LDCT lung cancer screening exam?  It depends. Lung cancer screening is for a very specific group of men and women who have a history of heavy smoking over a long period of time (see  Who should be screened for lung cancer  above).  I am in the high-risk group, but have been diagnosed with cancer in the past. Is LDCT lung cancer screening right for me?  In some cases, you should not have LDCT lung screening, such as when your doctor is already following your cancer with CT scan studies. Your doctor will help you decide if LDCT lung screening is right for you.  Do I need to have a screening exam every year?  Yes. If you are in the high-risk group described earlier, you should get an LDCT lung cancer screening exam every year until you are 79, or are no longer willing or able to undergo screening and possible procedures to diagnose and treat lung cancer.  How effective is LDCT at preventing death from lung cancer?  Studies have shown that LDCT lung cancer screening can lower the risk of death from lung cancer by 20 percent in people who are at high-risk.  What are the risks?  There are some risks and limitations of LDCT lung cancer screening. We want to make sure you understand the risks and benefits, so please let us know if you have any questions. Your doctor may want to talk with you more about these risks.    Radiation exposure: As with any exam that uses radiation, there is a very small increased risk of cancer. The amount of radiation in LDCT is small--about the same amount a person would get from a mammogram. Your doctor orders the exam when he or she feels the potential benefits outweigh the risks.    False negatives: No test is perfect, including LDCT. It is possible that you may have a medical condition, including lung cancer, that is not found during your exam. This is called a false negative result.    False positives and more testing: LDCT very often finds  something in the lung that could be cancer, but in fact is not. This is called a false positive result. False positive tests often cause anxiety. To make sure these findings are not cancer, you may need to have more tests. These tests will be done only if you give us permission. Sometimes patients need a treatment that can have side effects, such as a biopsy. For more information on false positives, see  What can I expect from the results?     Findings not related to lung cancer: Your LDCT exam also takes pictures of areas of your body next to your lungs. In a very small number of cases, the CT scan will show an abnormal finding in one of these areas, such as your kidneys, adrenal glands, liver or thyroid. This finding may not be serious, but you may need more tests. Your doctor can help you decide what other tests you may need, if any.  What can I expect from the results?  About 1 out of 4 LDCT exams will find something that may need more tests. Most of the time, these findings are lung nodules. Lung nodules are very small collections of tissue in the lung. These nodules are very common, and the vast majority--more than 97 percent--are not cancer (benign). Most are normal lymph nodes or small areas of scarring from past infections.  But, if a small lung nodule is found to be cancer, the cancer can be cured more than 90 percent of the time. To know if the nodule is cancer, we may need to get more images before your next yearly screening exam. If the nodule has suspicious features (for example, it is large, has an odd shape or grows over time), we will refer you to a specialist for further testing.  Will my doctor also get the results?  Yes. Your doctor will get a copy of your results.

## 2022-02-24 NOTE — NURSING NOTE
"Chief Complaint   Patient presents with     Dizziness     has had a couple episodes 2 weeks ago        Initial There were no vitals taken for this visit. Estimated body mass index is 28.56 kg/m  as calculated from the following:    Height as of 2/19/21: 1.638 m (5' 4.5\").    Weight as of 6/28/21: 76.7 kg (169 lb).  Medication Reconciliation: complete  Saranya Camarillo LPN  "

## 2022-02-24 NOTE — PROGRESS NOTES
Assessment & Plan     Dizziness  Updating TSH given history of thyroiditis.  Other recent labs stable.  Episodes brief - non-exertional - low suspicion for cardiac cause as far as CAD or arryhthymia.  1st one may cameron been orthostatic.  Second one at rest.  No bruits noted.  Obtain carotid US next.  If recurs - re-evaluate.  Consider event/holter monitor.  - TSH with free T4 reflex; Future  - US Carotid Bilateral; Future  - TSH with free T4 reflex    Autoimmune thyroiditis  As above.  - TSH with free T4 reflex; Future  - TSH with free T4 reflex    Personal history of tobacco use, presenting hazards to health  New medicare guidelines - age exceeds screening CT limit.  Personal history of tobacco use        30 minutes spent on the date of the encounter doing chart review, history and exam, documentation and further activities per the note       See Patient Instructions        Gianna Mckeon MD  [facility] is a 81 year old who presents for the following health issues  accompanied by his spouse.    HPI     Flu - declined    Dizziness  Onset/Duration: 2 weeks  Had 2 episodes 2 weeks ago . Lat episode was back in 2017   Description:   Do you feel faint: no  Does it feel like the surroundings (bed, room) are moving: no  Unsteady/off balance: YES  Have you passed out or fallen: no  Intensity: mild  Progression of Symptoms: improving  Accompanying Signs & Symptoms:  Heart palpitations or chest pain: no  Nausea, vomiting: no  Weakness or lack of coordination in arms or legs: no  Vision or speech changes: no  Numbness or tingling: no  Ringing in ears (Tinnitus): no  Hearing Loss: no  History:   Head trauma/concussion history: no  Previous similar symptoms: YES had an episode of dizziness back in 2017 was DX with Transient Thyroiditis    Recent bleeding history: no  Any new medications (BP?): no  Precipitating factors:   Worse with activity: no  Worse with head movement:  no  Alleviating factors:   Does staying in a fixed position give relief: YES lasted about 10-15 minutes   Therapies tried and outcome: None    On Coumadin.  History PE, DVT.    Just had labs 2/14/22 - CBC, CMP stable overall.  HGB 13.1.    Last TSH 4.11 2 years ago.    1st episode end 1/2022 - walking to phone; felt dizzy x few seconds.  2nd episode beginning 2/2022 - called clinic - triaged; lasted 15 min; started while seated.    Lightheaded, felt tingly.  Did have a soreness in left armpit x 1 min.    Last echo - 2017 - EF 60-65%; Grade 1 diastolic dysfunction.  Stress test 2014.  Abnormal.  Then angiogram - Dr Cavazos.  Statin added.        Review of Systems   Constitutional, HEENT, cardiovascular, pulmonary, gi and gu systems are negative, except as otherwise noted.      Objective    Wt 74.8 kg (165 lb)   SpO2 95%   BMI 27.88 kg/m    Body mass index is 27.88 kg/m .     See orthostatic vitals - normal today.      Physical Exam   GENERAL: healthy, alert and no distress  EYES: Eyes grossly normal to inspection, PERRL and conjunctivae and sclerae normal  HENT: ear canals and TM's normal, nose and mouth without ulcers or lesions  NECK: no adenopathy, no asymmetry, masses, or scars and thyroid normal to palpation  RESP: lungs clear to auscultation - no rales, rhonchi or wheezes  CV: regular rate and rhythm, normal S1 S2, no S3 or S4, no murmur, click or rub, no peripheral edema and peripheral pulses strong; no bruit heard in carotids  ABDOMEN: soft, nontender, no hepatosplenomegaly, no masses and bowel sounds normal  MS: no gross musculoskeletal defects noted, no edema  NEURO: Normal strength and tone, mentation intact and speech normal  PSYCH: mentation appears normal, affect normal/bright    No results found for this or any previous visit (from the past 24 hour(s)).

## 2022-02-25 ENCOUNTER — ONCOLOGY VISIT (OUTPATIENT)
Dept: ONCOLOGY | Facility: OTHER | Age: 82
End: 2022-02-25
Attending: NURSE PRACTITIONER
Payer: COMMERCIAL

## 2022-02-25 VITALS
BODY MASS INDEX: 27.73 KG/M2 | HEIGHT: 65 IN | SYSTOLIC BLOOD PRESSURE: 110 MMHG | TEMPERATURE: 96.5 F | DIASTOLIC BLOOD PRESSURE: 72 MMHG | HEART RATE: 67 BPM | WEIGHT: 166.45 LBS | OXYGEN SATURATION: 96 % | RESPIRATION RATE: 20 BRPM

## 2022-02-25 DIAGNOSIS — D64.9 ANEMIA, UNSPECIFIED TYPE: ICD-10-CM

## 2022-02-25 DIAGNOSIS — D47.2 IGM MONOCLONAL GAMMOPATHY OF UNCERTAIN SIGNIFICANCE: Primary | ICD-10-CM

## 2022-02-25 PROCEDURE — 99213 OFFICE O/P EST LOW 20 MIN: CPT | Performed by: NURSE PRACTITIONER

## 2022-02-25 PROCEDURE — G0463 HOSPITAL OUTPT CLINIC VISIT: HCPCS

## 2022-02-25 ASSESSMENT — PAIN SCALES - GENERAL: PAINLEVEL: NO PAIN (0)

## 2022-02-25 NOTE — NURSING NOTE
"Oncology Rooming Note    February 25, 2022 2:07 PM   Rey Tristan is a 81 year old male who presents for:    Chief Complaint   Patient presents with     Oncology Clinic Visit     Follow up Monoclonal gammopathy of unknown significance (MGUS)     Initial Vitals: /72   Pulse 67   Temp (!) 96.5  F (35.8  C) (Tympanic)   Resp 20   Ht 1.638 m (5' 4.5\")   Wt 75.5 kg (166 lb 7.2 oz)   SpO2 96%   BMI 28.13 kg/m   Estimated body mass index is 28.13 kg/m  as calculated from the following:    Height as of this encounter: 1.638 m (5' 4.5\").    Weight as of this encounter: 75.5 kg (166 lb 7.2 oz). Body surface area is 1.85 meters squared.  No Pain (0) Comment: Data Unavailable   No LMP for male patient.  Allergies reviewed: Yes  Medications reviewed: Yes    Medications: Medication refills not needed today.  Pharmacy name entered into [facility]:    [id number] - [place], [place] - [address]  [id number] [place] - [address] AT [facility] [address]          Jayleen Fleming LPN            "

## 2022-02-25 NOTE — PROGRESS NOTES
Hematology Follow-up Visit:  February 25, 2022    Reason for Visit:  Patient presents with:  Oncology Clinic Visit: Follow up Monoclonal gammopathy of unknown significance (MGUS)      Nursing Notes and Documentation reviewed:  yes    HPI: This is a 81-year-old male patient who presents to the clinic today in followup of monoclonal gammopathy of undetermined significance diagnosed 2/2018 during workup for hypercoag state, along with previous diagnosis of anemia related to GI bleed.  Patient does have a history of previous DVT and PE and has been on long-term anticoagulation with Coumadin.  Coumadin was held after GI bleed was diagnosed and has been resumed.  Iron has been discontinued.     He presents to the clinic today stating he is feeling good and has no new complaints.  He will be undergoing bilateral carotid ultrasounds related to some lightheadedness and dizziness that he has been experiencing.  He was evaluated by his PCP.  He continues to take 1 iron tablet every other day.  He is not having any issues with constipation.    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   Protein C and protein S along with antiphospholipid profile were negative.  SPEP was completed showing an M-spike of 1.1 with serum immunofixation showing monoclonal IgM immunoglobulin, kappa light chain type with IgM of 1530.  Bone marrow aspiration biopsy showed iron deficiency, slightly increased plasma cell proximally 2% kappa and toxic neutrophils with reactive lymphocytes and increased rouleaux formation; flow cytometry and immunophenotyping was unremarkable. Skeletal bone survey was completed on 2/6/2018 showing a stable T12 compression fracture but no evidence of lytic lesions.      He was placed on oral iron with improvement in his hemoglobin and iron studies.      Treatment: n/a    Past Medical History:   Diagnosis Date     Autoimmune  thyroiditis 2017    Dr. Simeon; silent; transient hyperthyroid, now normal; monitor TSH monthly for hypothyroidism     Chronic anticoagulation 2012     Elevated serum alkaline phosphatase level 2016    normal GGT, normal bone skeletal survery     Gastric ulcer     endoscopy 2016, repeat 6 months; PPI Carafate     Hyperlipidemia      Normocytic anemia 2016     Pulmonary nodule     CT 2014, right; consider repeat 1 year if high risk     Upper GI bleed 2017     Vitamin D deficiency 2012       Social History     Socioeconomic History     Marital status:      Spouse name: Not on file     Number of children: Not on file     Years of education: Not on file     Highest education level: Not on file   Occupational History     Employer: [employer]     Comment: Retired    Tobacco Use     Smoking status: Former Smoker     Types: Cigarettes     Start date: 5/15/1958     Quit date: 10/28/2009     Years since quittin.3     Smokeless tobacco: Never Used     Tobacco comment: Quit    Substance and Sexual Activity     Alcohol use: No     Alcohol/week: 0.0 standard drinks     Drug use: No     Sexual activity: Not on file   Other Topics Concern      Service Yes     Comment: [employer]     Blood Transfusions Yes     Comment: Permits if needed     Caffeine Concern Yes     Comment: Tea     Occupational Exposure No     Hobby Hazards No     Sleep Concern No     Stress Concern No     Weight Concern No     Special Diet No     Back Care No     Exercise No     Bike Helmet Not Asked     Seat Belt Yes     Self-Exams Yes     Parent/sibling w/ CABG, MI or angioplasty before 65F 55M? No   Social History Narrative     Not on file     Social Determinants of Health     Financial Resource Strain: Not on file   Food Insecurity: Not on file   Transportation Needs: Not on file   Physical Activity: Not on file   Stress: Not on file   Social Connections: Not on file   Intimate  Partner Violence: Not on file   Housing Stability: Not on file       Past Surgical History:   Procedure Laterality Date     ANGIOGRAM  6/23/2014     BIOPSY  2018    bone marrow biopsy     BONE MARROW BIOPSY, BONE SPECIMEN, NEEDLE/TROCAR N/A 1/16/2018    Procedure: BIOPSY BONE MARROW;  BONE MARROW BIOPSY;  Surgeon: Nuno Castor MD;  Location: [facility]     C. Difficile with intussuseption       cataract extraction       colonoscopy       COLONOSCOPY  6/17     ENDOSCOPY UPPER, COLONOSCOPY, COMBINED N/A 6/17/2016    Procedure: COMBINED ENDOSCOPY UPPER, COLONOSCOPY;  Surgeon: Andrea Stearns DO;  Location: [facility]     ESOPHAGOSCOPY, GASTROSCOPY, DUODENOSCOPY (EGD), COMBINED N/A 11/30/2017    Procedure: COMBINED ESOPHAGOSCOPY, GASTROSCOPY, DUODENOSCOPY (EGD);  UPPER ENDOSCOPY;  Surgeon: Narinder Torres MD;  Location: [facility]     left knee meniscal tear       Left lower extremity DVT       lens implant       nasal polypectomy       Pulmonary Embolism       ZZC REGULAR ECHO ([place])  6/23/2014    Dr. Cavazos       Family History   Problem Relation Age of Onset     Cancer Mother         Ovarian     Other Cancer Mother         lung/breast/ cervical ?     Respiratory Father         emphysemia     Lung Cancer Brother      Diabetes No family hx of      Hypertension No family hx of      Hyperlipidemia No family hx of      Thyroid Disease No family hx of      Asthma No family hx of      Colon Cancer No family hx of      Prostate Cancer No family hx of      Anesthesia Reaction No family hx of      Genetic Disorder No family hx of      Cerebrovascular Disease No family hx of      Coronary Artery Disease No family hx of      Breast Cancer No family hx of        Allergies:  Allergies as of 02/25/2022     (No Known Allergies)       Current Medications:  Current Outpatient Medications   Medication Sig Dispense Refill     Ascorbic Acid (VITAMIN C) 500 MG CAPS        atorvastatin (LIPITOR) 10 MG tablet TAKE 1 TABLET(10 MG) BY MOUTH DAILY  "90 tablet 0     Biotin 5000 MCG PO CAPS Take 1,000 mg by mouth        Cholecalciferol (VITAMIN D) 400 UNITS tablet Take 1,000 Units by mouth daily        cyanocobalamin ([employer] VITAMIN  B12) 1000 MCG TABS Take 1,000 mcg by mouth daily 90 tablet 3     ferrous sulfate (FEROSUL) 325 (65 Fe) MG tablet Take 325 mg by mouth every other day       Multiple Vitamins-Minerals (MULTIVITAMIN ADULT PO) Take 1 tablet by mouth       omeprazole (PRILOSEC) 40 MG DR capsule TAKE 1 CAPSULE(40 MG) BY MOUTH DAILY 90 capsule 3     pyridoxine (VITAMIN B-6) 50 MG TABS Take 1 tablet (50 mg) by mouth daily 90 tablet 3     warfarin ANTICOAGULANT (COUMADIN) 4 MG tablet TAKE 1 1/2 TABLETS BY MOUTH ON MONDAYS AND FRIDAYS, TAKE 1 TABLET BY MOUTH ON ALL OTHER DAYS 120 tablet 0          Review Of Systems:  Constitutional: denies fever, weight changes  Eyes: denies blurred or double vision  Ears/Nose/Throat: denies ear pain, nose problems, difficulty swallowing  Respiratory: has ongoing shortness of breath, no change cough  Skin: has rash on and off for years; red blotches to face for last month  Cardiovascular: denies chest pain, palpitations, edema  Gastrointestinal: denies abdominal pain, bloating, nausea, early satiety  Genitourinary: denies difficulty with urination, blood in urine  Musculoskeletal: denies new muscle pain, bone pain  Neurologic: denies numbness orTingling, has occasional headaches  Hematologic/Lymphatic/Immunologic: denies easy bleeding, lumps or bumps noted; has easy bruising  Endocrine: Denies increased thirst, night sweats      Physical Exam:  /72   Pulse 67   Temp (!) 96.5  F (35.8  C) (Tympanic)   Resp 20   Ht 1.638 m (5' 4.5\")   Wt 75.5 kg (166 lb 7.2 oz)   SpO2 96%   BMI 28.13 kg/m    GENERAL APPEARANCE: Healthy, alert and in no acute distress.  HEENT: Normocephalic, Sclerae anicteric.  NECK:  No asymmetry or masses, no thyromegaly.  LYMPHATICS: No palpable cervical nodes   RESP: Lungs clear to auscultation " bilaterally, respirations regular and easy  CARDIOVASCULAR: Regular rate and rhythm. Normal S1, S2; no murmur, gallop, or rub.  SKIN: red, blotchy rash to forehead  NEURO: Alert and oriented x 3.    PSYCHIATRIC: Mentation and affect appear normal.  Mood appropriate.    Laboratory/Imaging Studies:    Component      Latest Ref Rng & Units 2/14/2022   WBC      4.0 - 11.0 10e3/uL 5.0   RBC Count      4.40 - 5.90 10e6/uL 4.44   Hemoglobin      13.3 - 17.7 g/dL 13.1 (L)   Hematocrit      40.0 - 53.0 % 41.0   MCV      78 - 100 fL 92   MCH      26.5 - 33.0 pg 29.5   MCHC      31.5 - 36.5 g/dL 32.0   RDW      10.0 - 15.0 % 14.1   Platelet Count      150 - 450 10e3/uL 170   % Neutrophils      % 73   % Lymphocytes      % 14   % Monocytes      % 9   % Eosinophils      % 3   % Basophils      % 1   % Immature Granulocytes      % 0   NRBCs per 100 WBC      <1 /100 0   Absolute Neutrophils      1.6 - 8.3 10e3/uL 3.7   Absolute Lymphocytes      0.8 - 5.3 10e3/uL 0.7 (L)   Absolute Monocytes      0.0 - 1.3 10e3/uL 0.4   Absolute Eosinophils      0.0 - 0.7 10e3/uL 0.1   Absolute Basophils      0.0 - 0.2 10e3/uL 0.0   Absolute Immature Granulocytes      <=0.4 10e3/uL 0.0   Absolute NRBCs      10e3/uL 0.0   Sodium      133 - 144 mmol/L 138   Potassium      3.4 - 5.3 mmol/L 3.9   Chloride      94 - 109 mmol/L 107   Carbon Dioxide      20 - 32 mmol/L 28   Anion Gap      3 - 14 mmol/L 3   Urea Nitrogen      7 - 30 mg/dL 11   Creatinine      0.66 - 1.25 mg/dL 0.71   Calcium      8.5 - 10.1 mg/dL 8.9   Glucose      70 - 99 mg/dL 91   Alkaline Phosphatase      40 - 150 U/L 148   AST      0 - 45 U/L 29   ALT      0 - 70 U/L 26   Protein Total      6.8 - 8.8 g/dL 7.8   Albumin      3.4 - 5.0 g/dL 3.5   Bilirubin Total      0.2 - 1.3 mg/dL 0.4   GFR Estimate      >60 mL/min/1.73m2 >90   Albumin Fraction      3.7 - 5.1 g/dL 4.0   Alpha 1 Fraction      0.2 - 0.4 g/dL 0.3   Alpha 2 Fraction      0.5 - 0.9 g/dL 0.8   Beta Fraction      0.6 - 1.0  g/dL 0.6   Gamma Fraction      0.7 - 1.6 g/dL 1.9 (H)   Monoclonal Peak      <=0.0 g/dL 1.1 (H)   ELP Interpretation:       Monoclonal protein (1.1 g/dL) seen in the gamma fraction. See immunofixation report on same specimen. Pathologic significance requires clinical correlation. Blaire Kelly M.D., Ph.D.   Kappa Free Lt Chain      0.33 - 1.94 mg/dL 5.20 (H)   Lambda Free Lt Chain      0.57 - 2.63 mg/dL 1.59   Kappa Lambda Ratio      0.26 - 1.65 3.27 (H)   Iron      35 - 180 ug/dL 73   Iron Binding Cap      240 - 430 ug/dL 348   Iron Saturation Index      15 - 46 % 21   IGG      610-1,616 mg/dL 769   IGA      84 - 499 mg/dL 108   IGM      35 - 242 mg/dL 1,659 (H)   Viscosity Index      1.4 - 1.8 1.8   Lactate Dehydrogenase      85 - 227 U/L 187   Ferritin      26 - 388 ng/mL 53   Beta-2-Microglobulin      <2.3 mg/L 2.0   Total Protein Serum for ELP      6.8 - 8.8 g/dL 7.6   Immunofixation ELP       Monoclonal IgM  immunoglobulin of kappa light chain type. Pathologic significance requires clinical correlation.  Blaire Kelly M.D., Ph.D.       ASSESSMENT/PLAN:    #1 MGUS: IgM Monoclonal gammopathy of undetermined significance diagnosed January 2018.  MGUS considered stable. No evidence of end organ damage.  We'll see him back in 1 year with the same lab work.       #2  Anemia:   Hemoglobin slightly down with normal iron studies.  We will recheck in 1 year.     I encouraged patient to call with any questions or concerns.    Britney Mahoney, NP APRN, FNP-BC, AOCNP

## 2022-02-25 NOTE — PATIENT INSTRUCTIONS
We would like to see you back in 1 year. Please come 1 week prior for lab work.     If you have any questions please call [phone]    Other instructions:  none

## 2022-02-28 ENCOUNTER — HOSPITAL ENCOUNTER (OUTPATIENT)
Dept: ULTRASOUND IMAGING | Facility: OTHER | Age: 82
Discharge: HOME | End: 2022-02-28
Attending: FAMILY MEDICINE | Admitting: FAMILY MEDICINE
Payer: MEDICARE

## 2022-02-28 DIAGNOSIS — R42 DIZZINESS: ICD-10-CM

## 2022-02-28 PROCEDURE — 93880 EXTRACRANIAL BILAT STUDY: CPT

## 2022-03-04 ENCOUNTER — ANTICOAGULATION THERAPY VISIT (OUTPATIENT)
Dept: ANTICOAGULATION | Facility: OTHER | Age: 82
End: 2022-03-04
Attending: FAMILY MEDICINE
Payer: MEDICARE

## 2022-03-04 ENCOUNTER — LAB (OUTPATIENT)
Dept: LAB | Facility: OTHER | Age: 82
End: 2022-03-04
Attending: FAMILY MEDICINE
Payer: MEDICARE

## 2022-03-04 DIAGNOSIS — Z86.711 HX PULMONARY EMBOLISM: ICD-10-CM

## 2022-03-04 DIAGNOSIS — Z79.01 LONG TERM CURRENT USE OF ANTICOAGULANT THERAPY: ICD-10-CM

## 2022-03-04 DIAGNOSIS — Z86.718 HISTORY OF DVT OF LOWER EXTREMITY: ICD-10-CM

## 2022-03-04 LAB — INR BLD: 1.7 (ref 0.9–1.1)

## 2022-03-04 PROCEDURE — 85610 PROTHROMBIN TIME: CPT | Mod: ZL

## 2022-03-04 PROCEDURE — 36416 COLLJ CAPILLARY BLOOD SPEC: CPT | Mod: ZL

## 2022-03-04 RX ORDER — WARFARIN SODIUM 4 MG/1
TABLET ORAL
Qty: 120 TABLET | Refills: 3 | Status: SHIPPED | OUTPATIENT
Start: 2022-03-04 | End: 2023-05-02

## 2022-03-04 NOTE — PROGRESS NOTES
ANTICOAGULATION FOLLOW-UP CLINIC VISIT    Patient Name:  Rey Tristan  Date:  3/4/2022  Contact Type:  Telephone- Voicemail on home phone, see comments.     SUBJECTIVE:  Patient Findings     Comments:  Left a detailed message to continue same dose, INR with in goal, recheck in 6 weeks on 4/15/2022, Left phone number of clinic to call back with questions.         Clinical Outcomes     Negatives:  Major bleeding event, Thromboembolic event, Anticoagulation-related hospital admission, Anticoagulation-related ED visit, Anticoagulation-related fatality    Comments:  Left a detailed message to continue same dose, INR with in goal, recheck in 6 weeks on 4/15/2022, Left phone number of clinic to call back with questions.            OBJECTIVE    Recent labs: (last 7 days)     22  1035   INR 1.7*       ASSESSMENT / PLAN  INR assessment THER    Recheck INR In: 6 WEEKS    INR Location Outside lab      Anticoagulation Summary  As of 3/4/2022    INR goal:  1.5-2.0   TTR:  69.4 % (1 y)   INR used for dosin.7 (3/4/2022)   Warfarin maintenance plan:  6 mg (4 mg x 1.5) every Mon, Fri; 4 mg (4 mg x 1) all other days   Full warfarin instructions:  6 mg every Mon, Fri; 4 mg all other days   Weekly warfarin total:  32 mg   Plan last modified:  Lala Boss RN (2018)   Next INR check:  4/15/2022   Priority:  Maintenance   Target end date:  Indefinite    Indications    Long-term (current) use of anticoagulants [Z79.01] [Z79.01]  Hx pulmonary embolism [Z86.711]  History of DVT of lower extremity [Z86.718]  Acute upper gastrointestinal bleeding [K92.2]             Anticoagulation Episode Summary     INR check location:      Preferred lab:      Send INR reminders to:  [facility] FUNK    Comments:        Anticoagulation Care Providers     Provider Role Specialty Phone number    Gianna Richard MD Referring Family Medicine [phone]            See the Encounter Report to view Anticoagulation Flowsheet and Dosing  Calendar (Go to Encounters tab in chart review, and find the Anticoagulation Therapy Visit)        Tricia Watson RN

## 2022-03-04 NOTE — TELEPHONE ENCOUNTER
Pt called requesting warfarin be sent to [facility] pharmacy closed.     Warfarin       Last Written Prescription Date:  2/22/22  Last Fill Quantity: 120,   # refills: 0  Last Office Visit: 2/24/22  Future Office visit:       Routing refill request to provider for review/approval because:  INR   Date Value Ref Range Status   03/04/2022 1.7 (H) 0.9 - 1.1 Final   08/14/2020 1.81 (H) 0.86 - 1.14 Final     INR Point of Care   Date Value Ref Range Status   07/02/2021 1.5 (H) 0.86 - 1.14 Final     Comment:     This test is intended for monitoring Coumadin therapy.  Results are not   accurate in patients with prolonged INR due to factor deficiency.

## 2022-03-07 DIAGNOSIS — E78.5 HYPERLIPIDEMIA, UNSPECIFIED HYPERLIPIDEMIA TYPE: ICD-10-CM

## 2022-03-07 RX ORDER — ATORVASTATIN CALCIUM 10 MG/1
TABLET, FILM COATED ORAL
Qty: 90 TABLET | Refills: 2 | Status: SHIPPED | OUTPATIENT
Start: 2022-03-07 | End: 2022-12-07

## 2022-03-07 NOTE — TELEPHONE ENCOUNTER
Lipitor      Last Written Prescription Date:  12/1/21  Last Fill Quantity: 90,   # refills: 0  Last Office Visit: 2/24/22  Future Office visit:       Routing refill request to provider for review/approval because:

## 2022-03-10 DIAGNOSIS — K92.2 ACUTE UPPER GASTROINTESTINAL BLEEDING: ICD-10-CM

## 2022-03-10 DIAGNOSIS — K22.70 BARRETT'S ESOPHAGUS WITHOUT DYSPLASIA: ICD-10-CM

## 2022-03-11 RX ORDER — OMEPRAZOLE 40 MG/1
CAPSULE, DELAYED RELEASE ORAL
Qty: 90 CAPSULE | Refills: 3 | Status: SHIPPED | OUTPATIENT
Start: 2022-03-11 | End: 2023-05-02

## 2022-03-15 ENCOUNTER — APPOINTMENT (OUTPATIENT)
Dept: GENERAL RADIOLOGY | Facility: HOSPITAL | Age: 82
End: 2022-03-15
Attending: STUDENT IN AN ORGANIZED HEALTH CARE EDUCATION/TRAINING PROGRAM
Payer: MEDICARE

## 2022-03-15 ENCOUNTER — HOSPITAL ENCOUNTER (EMERGENCY)
Facility: HOSPITAL | Age: 82
Discharge: HOME | End: 2022-03-15
Attending: STUDENT IN AN ORGANIZED HEALTH CARE EDUCATION/TRAINING PROGRAM | Admitting: STUDENT IN AN ORGANIZED HEALTH CARE EDUCATION/TRAINING PROGRAM
Payer: MEDICARE

## 2022-03-15 VITALS
HEART RATE: 49 BPM | RESPIRATION RATE: 16 BRPM | TEMPERATURE: 97.2 F | WEIGHT: 155 LBS | DIASTOLIC BLOOD PRESSURE: 71 MMHG | BODY MASS INDEX: 26.19 KG/M2 | OXYGEN SATURATION: 96 % | SYSTOLIC BLOOD PRESSURE: 107 MMHG

## 2022-03-15 DIAGNOSIS — R42 LIGHTHEADEDNESS: ICD-10-CM

## 2022-03-15 LAB
ANION GAP SERPL CALCULATED.3IONS-SCNC: 5 MMOL/L (ref 3–14)
BASOPHILS # BLD AUTO: 0.1 10E3/UL (ref 0–0.2)
BASOPHILS NFR BLD AUTO: 1 %
BUN SERPL-MCNC: 13 MG/DL (ref 7–30)
CALCIUM SERPL-MCNC: 8.6 MG/DL (ref 8.5–10.1)
CHLORIDE BLD-SCNC: 105 MMOL/L (ref 94–109)
CO2 SERPL-SCNC: 27 MMOL/L (ref 20–32)
CREAT SERPL-MCNC: 0.72 MG/DL (ref 0.66–1.25)
D DIMER PPP FEU-MCNC: 0.39 UG/ML FEU (ref 0–0.5)
EOSINOPHIL # BLD AUTO: 0.2 10E3/UL (ref 0–0.7)
EOSINOPHIL NFR BLD AUTO: 4 %
ERYTHROCYTE [DISTWIDTH] IN BLOOD BY AUTOMATED COUNT: 14.3 % (ref 10–15)
GFR SERPL CREATININE-BSD FRML MDRD: >90 ML/MIN/1.73M2
GLUCOSE BLD-MCNC: 107 MG/DL (ref 70–99)
HCT VFR BLD AUTO: 40.3 % (ref 40–53)
HGB BLD-MCNC: 13.2 G/DL (ref 13.3–17.7)
HOLD SPECIMEN: NORMAL
IMM GRANULOCYTES # BLD: 0 10E3/UL
IMM GRANULOCYTES NFR BLD: 0 %
INR PPP: 1.65 (ref 0.85–1.15)
LYMPHOCYTES # BLD AUTO: 0.8 10E3/UL (ref 0.8–5.3)
LYMPHOCYTES NFR BLD AUTO: 18 %
MCH RBC QN AUTO: 29.7 PG (ref 26.5–33)
MCHC RBC AUTO-ENTMCNC: 32.8 G/DL (ref 31.5–36.5)
MCV RBC AUTO: 91 FL (ref 78–100)
MONOCYTES # BLD AUTO: 0.5 10E3/UL (ref 0–1.3)
MONOCYTES NFR BLD AUTO: 11 %
NEUTROPHILS # BLD AUTO: 3 10E3/UL (ref 1.6–8.3)
NEUTROPHILS NFR BLD AUTO: 66 %
NRBC # BLD AUTO: 0 10E3/UL
NRBC BLD AUTO-RTO: 0 /100
PLATELET # BLD AUTO: 166 10E3/UL (ref 150–450)
POTASSIUM BLD-SCNC: 4.2 MMOL/L (ref 3.4–5.3)
RBC # BLD AUTO: 4.45 10E6/UL (ref 4.4–5.9)
SODIUM SERPL-SCNC: 137 MMOL/L (ref 133–144)
TROPONIN I SERPL HS-MCNC: 5 NG/L
TROPONIN I SERPL HS-MCNC: 6 NG/L
WBC # BLD AUTO: 4.6 10E3/UL (ref 4–11)

## 2022-03-15 PROCEDURE — 99285 EMERGENCY DEPT VISIT HI MDM: CPT | Mod: 25

## 2022-03-15 PROCEDURE — 71045 X-RAY EXAM CHEST 1 VIEW: CPT

## 2022-03-15 PROCEDURE — 93005 ELECTROCARDIOGRAM TRACING: CPT

## 2022-03-15 PROCEDURE — 85610 PROTHROMBIN TIME: CPT | Performed by: STUDENT IN AN ORGANIZED HEALTH CARE EDUCATION/TRAINING PROGRAM

## 2022-03-15 PROCEDURE — 36415 COLL VENOUS BLD VENIPUNCTURE: CPT | Performed by: STUDENT IN AN ORGANIZED HEALTH CARE EDUCATION/TRAINING PROGRAM

## 2022-03-15 PROCEDURE — 84484 ASSAY OF TROPONIN QUANT: CPT | Performed by: STUDENT IN AN ORGANIZED HEALTH CARE EDUCATION/TRAINING PROGRAM

## 2022-03-15 PROCEDURE — 99284 EMERGENCY DEPT VISIT MOD MDM: CPT | Performed by: STUDENT IN AN ORGANIZED HEALTH CARE EDUCATION/TRAINING PROGRAM

## 2022-03-15 PROCEDURE — 85025 COMPLETE CBC W/AUTO DIFF WBC: CPT | Performed by: STUDENT IN AN ORGANIZED HEALTH CARE EDUCATION/TRAINING PROGRAM

## 2022-03-15 PROCEDURE — 82310 ASSAY OF CALCIUM: CPT | Performed by: STUDENT IN AN ORGANIZED HEALTH CARE EDUCATION/TRAINING PROGRAM

## 2022-03-15 PROCEDURE — 85379 FIBRIN DEGRADATION QUANT: CPT | Performed by: STUDENT IN AN ORGANIZED HEALTH CARE EDUCATION/TRAINING PROGRAM

## 2022-03-15 PROCEDURE — 93010 ELECTROCARDIOGRAM REPORT: CPT | Performed by: INTERNAL MEDICINE

## 2022-03-15 NOTE — DISCHARGE INSTRUCTIONS
Return to the emergency department you have worsening symptoms or new concerning symptoms, please follow-up with primary care provider within the next week.  Call to schedule an appointment.  When she discuss your visit to the emergency department and plans for further evaluation

## 2022-03-15 NOTE — ED TRIAGE NOTES
"\"Shoveling snow this morning and shortness of breath and weakness occurred.  Denies chest pain.  Has been having dizzy spells on and off since the end of January.\"    "

## 2022-03-15 NOTE — ED PROVIDER NOTES
"  History     Chief Complaint   Patient presents with     Shortness of Breath     Generalized Weakness     Dizziness     HPI  Rey Tristan is a 81 year old male with past medical history of spontaneous DVT and pulmonary embolism as well as anticoagulation related upper GI bleed who continues on warfarin with a goal of 1.6 presents to the emergency department today complaining of lightheadedness that occurred while he was shoveling today.  He states that this has been happening since January on and off, that he has seen Dr. Richard for this and is being worked up for it: her note indicates, \"Episodes brief - non-exertional - low suspicion for cardiac cause as far as CAD or arryhthymia.  1st one may cameron been orthostatic.  Second one at rest. No bruits noted.  Obtain carotid US next.  If recurs - re-evaluate. Consider event/holter monitor.\"  The carotid ultrasounds were normal and he has had no numbness weakness difficulty speaking confusion or other symptoms.  He states that the symptoms resolved on their own he has no chest pain and never had any chest pain.  No recent fevers cough.  No other complaints at this time.    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 08/23/2016     Priority: Medium     PUD (peptic ulcer disease) 08/23/2016     Priority: Medium     Long-term (current) use of anticoagulants [Z79.01] 07/27/2016     " Priority: Medium     Elevated serum alkaline phosphatase level 05/04/2016     Priority: Medium     Normocytic anemia 05/04/2016     Priority: Medium     Colonoscopy refused 04/28/2016     Priority: Medium     Patient refused colonoscopy given occult stool kit         ACP (advance care planning) 04/28/2016     Priority: Medium     Advance Care Planning 4/28/2016: ACP Review of Chart / Resources Provided:  Reviewed chart for advance care plan.  Rey Tristan has been provided information and resources to begin or update their advance care plan.  Added by Jayleen Fleming             Hyperlipidemia      Priority: Medium     Hx pulmonary embolism 05/03/2013     Priority: Medium     History of DVT of lower extremity 04/19/2013     Priority: Medium     Advanced care planning/counseling discussion 11/27/2012     Priority: Medium     Colitis due to Clostridium difficile 05/19/2011     Priority: Medium     Pulmonary embolism (H) 05/18/2011     Priority: Medium     Overview:   Bilateral noted on 5/18/2011       History of tobacco use 05/17/2011     Priority: Medium     Hyponatremia 05/17/2011     Priority: Medium     Deep vein thrombosis (DVT) of lower extremity (H) 05/17/2011     Priority: Medium     Vitamin D deficiency 05/17/2011     Priority: Medium        Past Medical History:    Past Medical History:   Diagnosis Date     Autoimmune thyroiditis 09/2017     Chronic anticoagulation 1/1/2012     Elevated serum alkaline phosphatase level 5/4/2016     Gastric ulcer      Hyperlipidemia      Normocytic anemia 5/4/2016     Pulmonary nodule      Upper GI bleed 11/30/2017     Vitamin D deficiency 1/1/2012       Past Surgical History:    Past Surgical History:   Procedure Laterality Date     ANGIOGRAM  6/23/2014     BIOPSY  2018    bone marrow biopsy     BONE MARROW BIOPSY, BONE SPECIMEN, NEEDLE/TROCAR N/A 1/16/2018    Procedure: BIOPSY BONE MARROW;  BONE MARROW BIOPSY;  Surgeon: Nuno Castro MD;  Location: [facility]  Difficile with intussuseption       cataract extraction       colonoscopy       COLONOSCOPY       ENDOSCOPY UPPER, COLONOSCOPY, COMBINED N/A 2016    Procedure: COMBINED ENDOSCOPY UPPER, COLONOSCOPY;  Surgeon: Andrea Setarns DO;  Location: [facility]     ESOPHAGOSCOPY, GASTROSCOPY, DUODENOSCOPY (EGD), COMBINED N/A 2017    Procedure: COMBINED ESOPHAGOSCOPY, GASTROSCOPY, DUODENOSCOPY (EGD);  UPPER ENDOSCOPY;  Surgeon: Narinder Torres MD;  Location: [facility]     left knee meniscal tear       Left lower extremity DVT       lens implant       nasal polypectomy       Pulmonary Embolism       ZZC REGULAR ECHO ([place])  2014    Dr. Cavazos       Family History:    Family History   Problem Relation Age of Onset     Cancer Mother         Ovarian     Other Cancer Mother         lung/breast/ cervical ?     Respiratory Father         emphysemia     Lung Cancer Brother      Diabetes No family hx of      Hypertension No family hx of      Hyperlipidemia No family hx of      Thyroid Disease No family hx of      Asthma No family hx of      Colon Cancer No family hx of      Prostate Cancer No family hx of      Anesthesia Reaction No family hx of      Genetic Disorder No family hx of      Cerebrovascular Disease No family hx of      Coronary Artery Disease No family hx of      Breast Cancer No family hx of        Social History:  Marital Status:   [2]  Social History     Tobacco Use     Smoking status: Former Smoker     Types: Cigarettes     Start date: 5/15/1958     Quit date: 10/28/2009     Years since quittin.3     Smokeless tobacco: Never Used     Tobacco comment: Quit    Substance Use Topics     Alcohol use: No     Alcohol/week: 0.0 standard drinks     Drug use: No        Medications:    Ascorbic Acid (VITAMIN C) 500 MG CAPS  atorvastatin (LIPITOR) 10 MG tablet  Biotin 5000 MCG PO CAPS  Cholecalciferol (VITAMIN D) 400 UNITS tablet  cyanocobalamin ([employer] VITAMIN  B12) 1000 MCG TABS  ferrous sulfate  (FEROSUL) 325 (65 Fe) MG tablet  Multiple Vitamins-Minerals (MULTIVITAMIN ADULT PO)  omeprazole (PRILOSEC) 40 MG DR capsule  warfarin ANTICOAGULANT (COUMADIN) 4 MG tablet  pyridoxine (VITAMIN B-6) 50 MG TABS          Review of Systems  A complete review of systems was performed and is otherwise negative.     Physical Exam   BP: 122/70  Pulse: 68  Temp: 97.6  F (36.4  C)  Resp: 18  Weight: 70.3 kg (155 lb)  SpO2: 95 %      Physical Exam  Constitutional: Alert and conversant. NAD   HENT: NCAT   Eyes: Normal pupils   Neck: supple   CV: Normal rate, regular rhythm, no murmur   Pulmonary/Chest: Non-labored respirations, clear to auscultation bilaterally   Abdominal: Soft, non-tender, non-distended   MSK: GRIER.   Neuro: Alert and appropriate   Skin: Warm and dry. No diaphoresis. No rashes on exposed skin    Psych: Appropriate mood and affect     ED Course              ED Course as of 03/15/22 1806   Tue Mar 15, 2022   1344 81-year-old male here with shortness of breath and weakness during shoveling.  Vitals within normal limits and reassuring, ACS work-up, also considering blood clots, patient would like to be evaluated for them, while it is not my highest degree of suspicion, he is subtherapeutic on his INR recently and he had spontaneous blood clots before.  We will check a dimer now.  Troponin testing.  Based on patient's age, presentation, history, duration of symptoms, single troponin not sufficient to rule out ACS, will have to move onto a second if the first 1 is negative.   1345 Hemoglobin(!): 13.2  No anemia   1345 WBC: 4.6  Normal white blood cell count, doubt infectious etiology.   1345 XR Chest Port 1 View  No concerning findings on the chest x-ray, nothing to explain the patient's lightheadedness   1431 D-Dimer Quantitative: 0.39  No CTA   1432 INR(!): 1.65   1432 Troponin I High Sensitivity: 5  Reassuring   1546 Troponin I High Sensitivity: 6  Stable, ACS rule out   1546 Reassuring troponins, patient still  asymptomatic appropriate for further outpatient management discharged stable condition with all questions and return precautions given.  Will recommend close primary care follow-up and discussion about stress testing.     Procedures            Results for orders placed or performed during the hospital encounter of 03/15/22 (from the past 24 hour(s))   CBC with platelets differential    Narrative    The following orders were created for panel order CBC with platelets differential.  Procedure                               Abnormality         Status                     ---------                               -----------         ------                     CBC with platelets and d...[[id number]]  Abnormal            Final result                 Please view results for these tests on the individual orders.   Basic metabolic panel   Result Value Ref Range    Sodium 137 133 - 144 mmol/L    Potassium 4.2 3.4 - 5.3 mmol/L    Chloride 105 94 - 109 mmol/L    Carbon Dioxide (CO2) 27 20 - 32 mmol/L    Anion Gap 5 3 - 14 mmol/L    Urea Nitrogen 13 7 - 30 mg/dL    Creatinine 0.72 0.66 - 1.25 mg/dL    Calcium 8.6 8.5 - 10.1 mg/dL    Glucose 107 (H) 70 - 99 mg/dL    GFR Estimate >90 >60 mL/min/1.73m2   Troponin I   Result Value Ref Range    Troponin I High Sensitivity 5 <79 ng/L   INR   Result Value Ref Range    INR 1.65 (H) 0.85 - 1.15   CBC with platelets and differential   Result Value Ref Range    WBC Count 4.6 4.0 - 11.0 10e3/uL    RBC Count 4.45 4.40 - 5.90 10e6/uL    Hemoglobin 13.2 (L) 13.3 - 17.7 g/dL    Hematocrit 40.3 40.0 - 53.0 %    MCV 91 78 - 100 fL    MCH 29.7 26.5 - 33.0 pg    MCHC 32.8 31.5 - 36.5 g/dL    RDW 14.3 10.0 - 15.0 %    Platelet Count 166 150 - 450 10e3/uL    % Neutrophils 66 %    % Lymphocytes 18 %    % Monocytes 11 %    % Eosinophils 4 %    % Basophils 1 %    % Immature Granulocytes 0 %    NRBCs per 100 WBC 0 <1 /100    Absolute Neutrophils 3.0 1.6 - 8.3 10e3/uL    Absolute Lymphocytes 0.8 0.8 - 5.3  10e3/uL    Absolute Monocytes 0.5 0.0 - 1.3 10e3/uL    Absolute Eosinophils 0.2 0.0 - 0.7 10e3/uL    Absolute Basophils 0.1 0.0 - 0.2 10e3/uL    Absolute Immature Granulocytes 0.0 <=0.4 10e3/uL    Absolute NRBCs 0.0 10e3/uL   D dimer quantitative   Result Value Ref Range    D-Dimer Quantitative 0.39 0.00 - 0.50 ug/mL FEU    Narrative    This D-dimer assay is intended for use in conjunction with a clinical pretest probability assessment model to exclude pulmonary embolism (PE) and deep venous thrombosis (DVT) in outpatients suspected of PE or DVT. The cut-off value is 0.50 ug/mL FEU.   Extra Tube    Narrative    The following orders were created for panel order Extra Tube.  Procedure                               Abnormality         Status                     ---------                               -----------         ------                     Extra Red Top Tube[[id number]]                                                          Extra Green Top (Lithium...[[id number]]                      Final result                 Please view results for these tests on the individual orders.   Extra Green Top (Lithium Heparin) Tube   Result Value Ref Range    Hold Specimen JIC    XR Chest Port 1 View    Narrative    PROCEDURE:  XR CHEST PORT 1 VIEW    HISTORY:  SOB with exertion.     COMPARISON:  None.    FINDINGS:   The cardiac silhouette is normal in size. The pulmonary vasculature is  normal.  The lungs are clear. No pleural effusion or pneumothorax.      Impression    IMPRESSION:  No acute cardiopulmonary disease.      YOANA SAUCEDO MD         SYSTEM ID:  [id number]   Troponin I   Result Value Ref Range    Troponin I High Sensitivity 6 <79 ng/L       Medications - No data to display    Assessments & Plan (with Medical Decision Making)     I have reviewed the nursing notes.    I have reviewed the findings, diagnosis, plan and need for follow up with the patient.    Discharge Medication List as of 3/15/2022  3:52 PM           Final diagnoses:   Lightheadedness       3/15/2022   [facility]     Pj Aiken MD  03/15/22 3104

## 2022-04-01 NOTE — DISCHARGE INSTRUCTIONS
DISCHARGE INSTRUCTIONS  Bone Marrow Biopsy      IMPORTANT: As you prepare for discharge, the following information will help you return to your best level of health.       This Information Is About Your Follow Up Care   Call your doctor if you do not get better. Call sooner if you feel worse. You can reach your doctor by calling their clinic phone number.                                    This Information Is About Procedures    BONE MARROW BIOPSY.  Your bone marrow was taken out through the hip bone. The lab will check for abnormal cells. We will examine it and your doctor will know the results in 2-3 days. Your biopsy site may be tender.    Do the following:    Rest today and slowly increase your activity.    Avoid heavy exercise and activity for 6 weeks.    Take pain medicines exactly as prescribed.    Call your doctor if you have:    Increased bleeding from the site of the biopsy.    Increased drainage, redness, or swelling from the wound.    Red streaks from the wound.    Foul odor or pus from the dressing or wound.    Chills or fever over 101 degrees (by mouth).  Any new problems or concerns.                      Post-Anesthesia Patient Instructions    IMMEDIATELY FOLLOWING SURGERY:  Do not drive or operate machinery for the first twenty four hours after surgery.  Do not make any important decisions for twenty four hours after surgery or while taking narcotic pain medications or sedatives.  If you develop intractable nausea and vomiting or a severe headache please notify your doctor immediately.    FOLLOW-UP:  Please make an appointment with your surgeon as instructed. You do not need to follow up with anesthesia unless specifically instructed to do so.    WOUND CARE INSTRUCTIONS (if applicable):  Keep a dry clean dressing on the anesthesia/puncture wound site if there is drainage.  Once the wound has quit draining you may leave it open to air.  Generally you should leave the bandage intact for twenty four  hours unless there is drainage.  If the epidural site drains for more than 36-48 hours please call the anesthesia department.    QUESTIONS?:  Please feel free to call your physician or the hospital  if you have any questions, and they will be happy to assist you.      41 y/o male, undomiciled, panhandler, single, no family support, w/ PMH of L hip surgery, hx of polysubstance abuse (cocaine, K2, heroin, alcohol) vs bipolar/schizophrenia, high utilizer, last hospitalization in April 2021 at [facility] for 9 days as per [facility], multiple ED presentation in the context of substance use with rapid resolution, coming in today reporting he used heroin, cocaine, other substances, and does not feel safe, and would like to stop using drugs and is interested in detox/rehabilitation.    On evaluation, patient is concrete. He is noted to be looking around, scanning room, with episodes of what appears to be internal preoccupation, but is able to participate, although usually just answers with one word answers. Patient states that he is using heroin, last use today, in increasing amount and quantity, and currently his family does not want him at home, and patient stating he wants detox from drugs.    Patient reported suicidal ideation in the context of substance use, but also patient is undomiciled, and states he does not know what to do, and wants help. Patient is known as a high utilizer, although this should not be a factor in providing care for substance use, though patient does usually reconstitute rather quickly and retracts suicidal ideation when substances have metabolized, and is usually discharged.    Currently feels safe in the hospital, and does not have active intent, but given recent drug use, past history of suicidal ideation in the past, and patient's interest in detox, would be beneficial to hold patient until the morning and provide resources.

## 2022-04-15 ENCOUNTER — ANTICOAGULATION THERAPY VISIT (OUTPATIENT)
Dept: ANTICOAGULATION | Facility: OTHER | Age: 82
End: 2022-04-15
Attending: FAMILY MEDICINE
Payer: MEDICARE

## 2022-04-15 ENCOUNTER — LAB (OUTPATIENT)
Dept: LAB | Facility: OTHER | Age: 82
End: 2022-04-15
Payer: MEDICARE

## 2022-04-15 DIAGNOSIS — Z86.718 HISTORY OF DVT OF LOWER EXTREMITY: ICD-10-CM

## 2022-04-15 DIAGNOSIS — Z79.01 LONG TERM CURRENT USE OF ANTICOAGULANT THERAPY: Primary | ICD-10-CM

## 2022-04-15 DIAGNOSIS — K92.2 ACUTE UPPER GASTROINTESTINAL BLEEDING: ICD-10-CM

## 2022-04-15 DIAGNOSIS — Z86.711 HX PULMONARY EMBOLISM: ICD-10-CM

## 2022-04-15 LAB — INR BLD: 1.8 (ref 0.9–1.1)

## 2022-04-15 PROCEDURE — 85610 PROTHROMBIN TIME: CPT | Mod: ZL

## 2022-04-15 PROCEDURE — 36416 COLLJ CAPILLARY BLOOD SPEC: CPT | Mod: ZL

## 2022-04-15 NOTE — PROGRESS NOTES
ANTICOAGULATION FOLLOW-UP CLINIC VISIT    Patient Name:  Rey Tristan  Date:  4/15/2022  Contact Type:  Telephone/ Called patient and left detailed message about dosing and the clinic number to call back if there has been any changes or concerns.     SUBJECTIVE:  Patient Findings     Positives:  Emergency department visit (ED visit on 3/15 for lightheadness)        Clinical Outcomes     Negatives:  Major bleeding event, Thromboembolic event, Anticoagulation-related hospital admission, Anticoagulation-related ED visit, Anticoagulation-related fatality           OBJECTIVE    Recent labs: (last 7 days)     04/15/22  1033   INR 1.8*       ASSESSMENT / PLAN  INR assessment THER    Recheck INR In: 6 WEEKS    INR Location Clinic      Anticoagulation Summary  As of 4/15/2022    INR goal:  1.5-2.0   TTR:  69.4 % (1 y)   INR used for dosin.8 (4/15/2022)   Warfarin maintenance plan:  6 mg (4 mg x 1.5) every Mon, Fri; 4 mg (4 mg x 1) all other days   Full warfarin instructions:  6 mg every Mon, Fri; 4 mg all other days   Weekly warfarin total:  32 mg   No change documented:  Rhonda Lopez, RN   Plan last modified:  Lala Boss RN (2018)   Next INR check:  2022   Priority:  Maintenance   Target end date:  Indefinite    Indications    Long-term (current) use of anticoagulants [Z79.01] [Z79.01]  Hx pulmonary embolism [Z86.711]  History of DVT of lower extremity [Z86.718]  Acute upper gastrointestinal bleeding [K92.2]             Anticoagulation Episode Summary     INR check location:      Preferred lab:      Send INR reminders to:  [facility]    Comments:        Anticoagulation Care Providers     Provider Role Specialty Phone number    Gianna Richard MD Referring Family Medicine [phone]            See the Encounter Report to view Anticoagulation Flowsheet and Dosing Calendar (Go to Encounters tab in chart review, and find the Anticoagulation Therapy Visit)        Rhonda ROSS  HOPE Lopez

## 2022-04-25 NOTE — PROGRESS NOTES
ANTICOAGULATION FOLLOW-UP CLINIC VISIT    Patient Name:  Rey Tristan  Date:  11/13/2017  Contact Type:  Face to Face    SUBJECTIVE:     Patient Findings     Positives No Problem Findings           OBJECTIVE    INR Protime   Date Value Ref Range Status   11/13/2017 3.3 (A) 0.86 - 1.14 Final       ASSESSMENT / PLAN  INR assessment SUPRA    Recheck INR In: 5 WEEKS    INR Location Clinic      Anticoagulation Summary as of 11/13/2017     INR goal 2.0-3.0   Today's INR 3.3!   Maintenance plan 6 mg (4 mg x 1.5) every day   Full instructions 11/13: 4 mg; Otherwise 6 mg every day   Weekly total 42 mg   Plan last modified Vanessa Jacob, RN (7/27/2016)   Next INR check 12/18/2017   Target end date Indefinite    Indications   Long-term (current) use of anticoagulants [Z79.01] [Z79.01]  History of DVT of lower extremity [Z86.718]  Hx pulmonary embolism [Z86.711]         Anticoagulation Episode Summary     INR check location     Preferred lab     Send INR reminders to  [facility]    Comments       Anticoagulation Care Providers     Provider Role Specialty Phone number    Gianna Richard MD [facility] [phone]            See the Encounter Report to view Anticoagulation Flowsheet and Dosing Calendar (Go to Encounters tab in chart review, and find the Anticoagulation Therapy Visit)        Lala Boss, RN                Withdrawal

## 2022-05-27 ENCOUNTER — LAB (OUTPATIENT)
Dept: LAB | Facility: OTHER | Age: 82
End: 2022-05-27
Payer: MEDICARE

## 2022-05-27 ENCOUNTER — TELEPHONE (OUTPATIENT)
Dept: FAMILY MEDICINE | Facility: OTHER | Age: 82
End: 2022-05-27

## 2022-05-27 ENCOUNTER — ANTICOAGULATION THERAPY VISIT (OUTPATIENT)
Dept: ANTICOAGULATION | Facility: OTHER | Age: 82
End: 2022-05-27
Attending: FAMILY MEDICINE
Payer: COMMERCIAL

## 2022-05-27 DIAGNOSIS — Z86.718 HISTORY OF DVT OF LOWER EXTREMITY: ICD-10-CM

## 2022-05-27 DIAGNOSIS — Z86.711 HX PULMONARY EMBOLISM: ICD-10-CM

## 2022-05-27 DIAGNOSIS — Z79.01 CHRONIC ANTICOAGULATION: Primary | ICD-10-CM

## 2022-05-27 DIAGNOSIS — K92.2 ACUTE UPPER GASTROINTESTINAL BLEEDING: ICD-10-CM

## 2022-05-27 DIAGNOSIS — Z79.01 LONG TERM CURRENT USE OF ANTICOAGULANT THERAPY: Primary | ICD-10-CM

## 2022-05-27 LAB — INR BLD: 1.5 (ref 0.9–1.1)

## 2022-05-27 PROCEDURE — 36416 COLLJ CAPILLARY BLOOD SPEC: CPT | Mod: ZL

## 2022-05-27 PROCEDURE — 85610 PROTHROMBIN TIME: CPT | Mod: ZL

## 2022-05-27 NOTE — TELEPHONE ENCOUNTER
Left message for patient to return to ER with symptoms but to call back to clinic to schedule in person visit with Dr. Mckeon so we can go over ER visit from march and if stress test needs to be ordered.

## 2022-05-27 NOTE — TELEPHONE ENCOUNTER
10:51 AM    Reason for Call: Phone Call    Description: Patient requesting a call back regarding stress test he thought he was supposed to have done a couple months ago. Please call him     Was an appointment offered for this call? No    Preferred method for responding to this message: Telephone Call  What is your phone number ?[phone]    If we cannot reach you directly, may we leave a detailed response at the number you provided? Yes    Can this message wait until your PCP/provider returns, if available today? Not applicable    Maru Cook

## 2022-05-27 NOTE — PROGRESS NOTES
ANTICOAGULATION MANAGEMENT     Rey Tristan 82 year old male is on warfarin with therapeutic INR result. (Goal INR 1.5-2.0)    Recent labs: (last 7 days)     05/27/22  1048   INR 1.5*       ASSESSMENT       Source(s): Chart Review and Patient/Caregiver Call       Warfarin doses taken: Warfarin taken as instructed    Diet: No new diet changes identified    New illness, injury, or hospitalization: No    Medication/supplement changes: None noted    Signs or symptoms of bleeding or clotting: No    Previous INR: Therapeutic last 2(+) visits    Additional findings: None       PLAN     Recommended plan for no diet, medication or health factor changes affecting INR     Dosing Instructions: continue your current warfarin dose with next INR in 6 weeks       Summary  As of 5/27/2022    Full warfarin instructions:  6 mg every Mon, Fri; 4 mg all other days   Next INR check:  7/8/2022             Telephone call with Rey who verbalizes understanding and agrees to plan    Lab visit scheduled    Education provided: Please call back if any changes to your diet, medications or how you've been taking warfarin    Plan made per [facility] anticoagulation protocol    Rhonda Lopez RN  Anticoagulation Clinic  5/27/2022    _______________________________________________________________________     Anticoagulation Episode Summary     Current INR goal:  1.5-2.0   TTR:  76.6 % (1 y)   Target end date:  Indefinite   Send INR reminders to:  [facility] [place]    Indications    Long-term (current) use of anticoagulants [Z79.01] [Z79.01]  Hx pulmonary embolism [Z86.711]  History of DVT of lower extremity [Z86.718]  Acute upper gastrointestinal bleeding [K92.2]           Comments:           Anticoagulation Care Providers     Provider Role Specialty Phone number    Gianna Richard MD Referring Family Medicine [phone]

## 2022-05-27 NOTE — TELEPHONE ENCOUNTER
ER note reviewed from March.  Yes - patient was suppose to schedule follow up with PCP to discuss stress test.  Needs to schedule visit.  If acute symptoms occurring - ER.

## 2022-05-27 NOTE — TELEPHONE ENCOUNTER
Reassuring troponins, patient still asymptomatic appropriate for further outpatient management discharged stable condition with all questions and return precautions given.  Will recommend close primary care follow-up and discussion about stress testing.     This is from ER visit on 3-15 in ER.. Have not seen him since this visit. Were we thinking needing stress test? He had EKG on 3-15 but that was all that was ordered in ER.

## 2022-07-08 ENCOUNTER — ANTICOAGULATION THERAPY VISIT (OUTPATIENT)
Dept: ANTICOAGULATION | Facility: OTHER | Age: 82
End: 2022-07-08
Attending: FAMILY MEDICINE
Payer: MEDICARE

## 2022-07-08 ENCOUNTER — LAB (OUTPATIENT)
Dept: LAB | Facility: OTHER | Age: 82
End: 2022-07-08
Payer: MEDICARE

## 2022-07-08 DIAGNOSIS — Z79.01 LONG TERM CURRENT USE OF ANTICOAGULANT THERAPY: Primary | ICD-10-CM

## 2022-07-08 DIAGNOSIS — Z86.711 HX PULMONARY EMBOLISM: ICD-10-CM

## 2022-07-08 DIAGNOSIS — K92.2 ACUTE UPPER GASTROINTESTINAL BLEEDING: ICD-10-CM

## 2022-07-08 DIAGNOSIS — Z86.718 HISTORY OF DVT OF LOWER EXTREMITY: ICD-10-CM

## 2022-07-08 DIAGNOSIS — Z79.01 CHRONIC ANTICOAGULATION: Primary | ICD-10-CM

## 2022-07-08 LAB — INR BLD: 1.7 (ref 0.9–1.1)

## 2022-07-08 PROCEDURE — 36416 COLLJ CAPILLARY BLOOD SPEC: CPT | Mod: ZL

## 2022-07-08 PROCEDURE — 85610 PROTHROMBIN TIME: CPT | Mod: ZL

## 2022-07-08 NOTE — PROGRESS NOTES
ANTICOAGULATION MANAGEMENT     Rey Tristan 82 year old male is on warfarin with therapeutic INR result. (Goal INR 1.5-2.0)    Recent labs: (last 7 days)     07/08/22  1036   INR 1.7*       ASSESSMENT       Source(s): Chart Review    Previous INR was Therapeutic last 2(+) visits    Medication, diet, health changes since last INR chart reviewed; none identified           PLAN     Recommended plan for no diet, medication or health factor changes affecting INR     Dosing Instructions: continue your current warfarin dose with next INR in 6 weeks       Summary  As of 7/8/2022    Full warfarin instructions:  6 mg every Mon, Fri; 4 mg all other days   Next INR check:  8/19/2022             Detailed voice message left for Rey with dosing instructions and follow up date.     Contact [phone] to schedule and with any changes, questions or concerns.     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

## 2022-08-19 ENCOUNTER — LAB (OUTPATIENT)
Dept: LAB | Facility: OTHER | Age: 82
End: 2022-08-19
Payer: MEDICARE

## 2022-08-19 ENCOUNTER — ANTICOAGULATION THERAPY VISIT (OUTPATIENT)
Dept: ANTICOAGULATION | Facility: OTHER | Age: 82
End: 2022-08-19
Attending: FAMILY MEDICINE
Payer: MEDICARE

## 2022-08-19 DIAGNOSIS — K92.2 ACUTE UPPER GASTROINTESTINAL BLEEDING: ICD-10-CM

## 2022-08-19 DIAGNOSIS — Z86.711 HX PULMONARY EMBOLISM: ICD-10-CM

## 2022-08-19 DIAGNOSIS — Z86.718 HISTORY OF DVT OF LOWER EXTREMITY: ICD-10-CM

## 2022-08-19 DIAGNOSIS — Z79.01 LONG TERM CURRENT USE OF ANTICOAGULANT THERAPY: Primary | ICD-10-CM

## 2022-08-19 DIAGNOSIS — Z79.01 CHRONIC ANTICOAGULATION: ICD-10-CM

## 2022-08-19 LAB — INR BLD: 1.8 (ref 0.9–1.1)

## 2022-08-19 PROCEDURE — 85610 PROTHROMBIN TIME: CPT | Mod: ZL

## 2022-08-19 PROCEDURE — 36416 COLLJ CAPILLARY BLOOD SPEC: CPT | Mod: ZL

## 2022-08-19 NOTE — PROGRESS NOTES
ANTICOAGULATION MANAGEMENT     Rey Tristan 82 year old male is on warfarin with therapeutic INR result. (Goal INR 1.5-2.0)    Recent labs: (last 7 days)     08/19/22  1045   INR 1.8*       ASSESSMENT       Source(s): Chart Review    Previous INR was Therapeutic last 2(+) visits    Medication, diet, health changes since last INR chart reviewed; none identified           PLAN     Recommended plan for no diet, medication or health factor changes affecting INR     Dosing Instructions: Continue your current warfarin dose with next INR in 6 weeks       Summary  As of 8/19/2022    Full warfarin instructions:  6 mg every Mon, Fri; 4 mg all other days   Next INR check:  9/30/2022             Detailed voice message left for Rey with dosing instructions and follow up date.     Contact [phone] to schedule and with any changes, questions or concerns.     Education provided: Contact [phone] with any changes, questions or concerns.     Plan made per [facility] anticoagulation protocol    Aisha Minor RN  Anticoagulation Clinic  8/19/2022    _______________________________________________________________________     Anticoagulation Episode Summary     Current INR goal:  1.5-2.0   TTR:  91.9 % (1 y)   Target end date:  Indefinite   Send INR reminders to:  [facility]    Indications    Long-term (current) use of anticoagulants [Z79.01] [Z79.01]  Hx pulmonary embolism [Z86.711]  History of DVT of lower extremity [Z86.718]  Acute upper gastrointestinal bleeding [K92.2]           Comments:           Anticoagulation Care Providers     Provider Role Specialty Phone number    Gianna Richard MD Referring Family Medicine [phone]

## 2022-09-26 ENCOUNTER — TELEPHONE (OUTPATIENT)
Dept: FAMILY MEDICINE | Facility: OTHER | Age: 82
End: 2022-09-26

## 2022-09-26 NOTE — TELEPHONE ENCOUNTER
8:59 AM    Reason for Call: OVERBOOK    Patient is having the following symptoms: Anxiety/depression for 2 months. Patient states he has been having anxiety attacks almost daily     The patient is requesting an appointment for ASAP with Dr Richard.    Was an appointment offered for this call? Yes I offered next available - 10/24 - patient hoping to be seen right away     Preferred method for responding to this message: Telephone Call  What is your phone number ?[phone]    If we cannot reach you directly, may we leave a detailed response at the number you provided? Yes    Can this message wait until your PCP/provider returns, if unavailable today? Not applicable    Maru Cook

## 2022-09-30 ENCOUNTER — ANTICOAGULATION THERAPY VISIT (OUTPATIENT)
Dept: ANTICOAGULATION | Facility: OTHER | Age: 82
End: 2022-09-30
Payer: MEDICARE

## 2022-09-30 ENCOUNTER — LAB (OUTPATIENT)
Dept: LAB | Facility: OTHER | Age: 82
End: 2022-09-30
Payer: MEDICARE

## 2022-09-30 DIAGNOSIS — Z79.01 CHRONIC ANTICOAGULATION: ICD-10-CM

## 2022-09-30 DIAGNOSIS — Z86.718 HISTORY OF DVT OF LOWER EXTREMITY: ICD-10-CM

## 2022-09-30 DIAGNOSIS — Z79.01 LONG TERM CURRENT USE OF ANTICOAGULANT THERAPY: Primary | ICD-10-CM

## 2022-09-30 DIAGNOSIS — Z86.711 HX PULMONARY EMBOLISM: ICD-10-CM

## 2022-09-30 DIAGNOSIS — K92.2 ACUTE UPPER GASTROINTESTINAL BLEEDING: ICD-10-CM

## 2022-09-30 LAB — INR BLD: 1.6 (ref 0.9–1.1)

## 2022-09-30 PROCEDURE — 85610 PROTHROMBIN TIME: CPT | Mod: ZL

## 2022-09-30 PROCEDURE — 36416 COLLJ CAPILLARY BLOOD SPEC: CPT | Mod: ZL

## 2022-09-30 NOTE — PROGRESS NOTES
ANTICOAGULATION MANAGEMENT     Rey Tristan 82 year old male is on warfarin with therapeutic INR result. (Goal INR 1.5-2.0)    Recent labs: (last 7 days)     09/30/22  1029   INR 1.6*       ASSESSMENT       Source(s): Chart Review       Warfarin doses taken: Warfarin taken as instructed    Diet: No new diet changes identified    New illness, injury, or hospitalization: No    Medication/supplement changes: None noted    Signs or symptoms of bleeding or clotting: No    Previous INR: Therapeutic last 2(+) visits    Additional findings: None       PLAN     Recommended plan for no diet, medication or health factor changes affecting INR     Dosing Instructions: Continue your current warfarin dose with next INR in 6 weeks       Summary  As of 9/30/2022    Full warfarin instructions:  6 mg every Mon, Fri; 4 mg all other days   Next INR check:  11/11/2022             Detailed voice message left for Rey with dosing instructions and follow up date.     Contact [phone] to schedule and with any changes, questions or concerns.     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

## 2022-10-03 NOTE — PROGRESS NOTES
"  Assessment & Plan     Dermatitis  Left medial ankle - attempted to take photo with Epic Donnie - currently not working.  Chronic - pruritic.  No ulceration.  Does not appear infectious or malignant.   Trial of topical steroid cream.  - triamcinolone (KENALOG) 0.1 % external ointment; Apply topically 2 times daily    Chronic anticoagulation  Managed by coumadin clinic.  Stable.    Varicose veins of left lower extremity, unspecified whether complicated  Unchanged.  Non-tender.  Discussed heat if superficial phlebitis.  No NSAID due to Coumadin.    Anxiety  Situational stress - home projects, economy, finances, etc.  Had called requesting to be seen ASAP and was worked in.  Today, however, states it seems better.  Declines any form of treatment with medication or counseling.  Will route chart to care coordination - Senior Linkage information.         BMI:   Estimated body mass index is 27.04 kg/m  as calculated from the following:    Height as of this encounter: 1.638 m (5' 4.5\").    Weight as of this encounter: 72.6 kg (160 lb).   Weight management plan: Discussed healthy diet and exercise guidelines    See Patient Instructions        Gianna Mckeon MD  [facility] is a 82 year old accompanied by his spouse, presenting for the following health issues:  Depression and Anxiety      HPI     Flu - declined  COVID - declined    Abnormal Mood Symptoms  Onset/Duration: few months ago  Depression (if yes, do PHQ-9): YES  Anxiety (if yes, do ROMERO-7): YES  Accompanying Signs & Symptoms:  Still participating in activities that you used to enjoy: YES  Fatigue: No  Irritability: YES  Difficulty concentrating: No  Changes in appetite: No  Problems with sleep: YES  Heart racing/beating fast: No  Abnormally elevated, expansive, or irritable mood: No  Persistently increased activity or energy: No  Thoughts of hurting yourself or others: No  History:  Recent stress or major life event: " "YES- stress over getting some projects done at home before the end of the year  Prior depression or anxiety: None  Family history of depression or anxiety: No  Alcohol/drug use: No  Difficulty sleeping: No  Precipitating or alleviating factors: None  Therapies tried and outcome: none  Economy, inflation - worries.    Roof leaking; tree through roof.  Truck issue.  Wasn't sleeping well.  Typical nocturia.  Starting to improve now.  Keep busy - redirects.  Keeps active physical.  Declines counseling.  Declines medication.    Left lower leg -  Lateral lower area firm.  History of DVT.  On chronic coumadin.  INR 1.5 -2.  Stable.    Left inner ankle - rough, circular patch.  Present long time  Aquaphor, aveeno.  No topical steroid cream or fungal/antibiotic.    PHQ 6/28/2021 2/24/2022 10/5/2022   PHQ-9 Total Score 0 0 2   Q9: Thoughts of better off dead/self-harm past 2 weeks Not at all Not at all Not at all     ROMERO-7 SCORE 1/12/2018 10/12/2018 10/5/2022   Total Score 0 0 2         Review of Systems   Constitutional, HEENT, cardiovascular, pulmonary, gi and gu systems are negative, except as otherwise noted.      Objective    /70 (BP Location: Left arm, Patient Position: Sitting, Cuff Size: Adult Regular)   Pulse 60   Temp 97.5  F (36.4  C) (Tympanic)   Ht 1.638 m (5' 4.5\")   Wt 72.6 kg (160 lb)   SpO2 95%   BMI 27.04 kg/m    Body mass index is 27.04 kg/m .  Physical Exam   GENERAL: healthy, alert and no distress  NECK: no adenopathy, no asymmetry, masses, or scars and thyroid normal to palpation  RESP: lungs clear to auscultation - no rales, rhonchi or wheezes  CV: regular rate and rhythm, normal S1 S2, no S3 or S4, no murmur, click or rub, no peripheral edema and peripheral pulses strong  MS: normal muscle tone and trace edema  SKIN: left inner ankle with small, round, 2-3 area of mild erythema and slight scale; left outer ankle with superficial varicosity, non-tender, slightly firm, without overlying skin " change - redness or warmth  NEURO: Normal strength and tone, mentation intact and speech normal  PSYCH: mentation appears normal, affect normal/bright

## 2022-10-05 ENCOUNTER — OFFICE VISIT (OUTPATIENT)
Dept: FAMILY MEDICINE | Facility: OTHER | Age: 82
End: 2022-10-05
Attending: FAMILY MEDICINE
Payer: COMMERCIAL

## 2022-10-05 ENCOUNTER — PATIENT OUTREACH (OUTPATIENT)
Dept: CARE COORDINATION | Facility: OTHER | Age: 82
End: 2022-10-05

## 2022-10-05 VITALS
HEIGHT: 65 IN | TEMPERATURE: 97.5 F | BODY MASS INDEX: 26.66 KG/M2 | HEART RATE: 60 BPM | SYSTOLIC BLOOD PRESSURE: 108 MMHG | DIASTOLIC BLOOD PRESSURE: 70 MMHG | WEIGHT: 160 LBS | OXYGEN SATURATION: 95 %

## 2022-10-05 DIAGNOSIS — I83.92 VARICOSE VEINS OF LEFT LOWER EXTREMITY, UNSPECIFIED WHETHER COMPLICATED: ICD-10-CM

## 2022-10-05 DIAGNOSIS — Z79.01 CHRONIC ANTICOAGULATION: ICD-10-CM

## 2022-10-05 DIAGNOSIS — F41.9 ANXIETY: ICD-10-CM

## 2022-10-05 DIAGNOSIS — L30.9 DERMATITIS: Primary | ICD-10-CM

## 2022-10-05 PROCEDURE — G0463 HOSPITAL OUTPT CLINIC VISIT: HCPCS

## 2022-10-05 PROCEDURE — 99213 OFFICE O/P EST LOW 20 MIN: CPT | Performed by: FAMILY MEDICINE

## 2022-10-05 RX ORDER — TRIAMCINOLONE ACETONIDE 1 MG/G
OINTMENT TOPICAL 2 TIMES DAILY
Qty: 30 G | Refills: 1 | Status: SHIPPED | OUTPATIENT
Start: 2022-10-05

## 2022-10-05 ASSESSMENT — ANXIETY QUESTIONNAIRES
GAD7 TOTAL SCORE: 2
7. FEELING AFRAID AS IF SOMETHING AWFUL MIGHT HAPPEN: NOT AT ALL
3. WORRYING TOO MUCH ABOUT DIFFERENT THINGS: SEVERAL DAYS
6. BECOMING EASILY ANNOYED OR IRRITABLE: NOT AT ALL
GAD7 TOTAL SCORE: 2
5. BEING SO RESTLESS THAT IT IS HARD TO SIT STILL: NOT AT ALL
2. NOT BEING ABLE TO STOP OR CONTROL WORRYING: SEVERAL DAYS
1. FEELING NERVOUS, ANXIOUS, OR ON EDGE: NOT AT ALL
4. TROUBLE RELAXING: NOT AT ALL

## 2022-10-05 ASSESSMENT — PATIENT HEALTH QUESTIONNAIRE - PHQ9: SUM OF ALL RESPONSES TO PHQ QUESTIONS 1-9: 2

## 2022-10-05 ASSESSMENT — PAIN SCALES - GENERAL: PAINLEVEL: NO PAIN (0)

## 2022-10-05 NOTE — LETTER
[facility] CARE COORDINATION  October 5, 2022              Rey Tristan  [address]  [place] [address]          Dear Rey,    I am a clinic care coordinator who works with Gianna Mckeon MD with the [facility]. I wanted to provide information regarding Senior Linkage Line as requested by your primary care provider.    Please feel free to contact me with any questions or concerns regarding care coordination and what we can offer.      We are focused on providing you with the highest-quality healthcare experience possible.    Sincerely,     Aliya BERG RN, Care Coordinator  [facility]  [phone] Option 1

## 2022-10-05 NOTE — PATIENT INSTRUCTIONS
Will send information on  Senior Linkage.    Trial of topical steroid twice per day to left inner ankle.      Can do warm compresses on left outer ankle.    Psychologists/ Counselors                        [place]          ...            ..[phone]  [facility]                    ..  [phone]  [facility]                 [phone]  [facility] [facility] (kids)       .         [phone]  [employer](teens)                ..[phone]  [facility]                    [phone]  [facility]                   [phone]  [facility]           ...      .. [phone]  [facility]                ...[phone]  [facility]     ...          ...[phone]  [facility]               [phone]   [facility]..            [phone]  [facility]                       [phone]  [facility]               .    [phone]  [facility]                ..[phone]  [facility]     .      . ..[phone]     [facility]              .   [phone]  [facility]                   [phone]              [facility]              .   [phone]  [facility]              .  ...[phone]  [employer] [facility] [facility]              .   ..[phone]  [facility]           ...      .. [phone]  [facility]              . ..[phone]  [facility]              .    ..[phone]  [facility]              .     .. [phone]  [facility]                 ... [phone]  [facility]                         .[phone]  [facility]     .      . ..[phone]            [facility]              ..  [phone]                   [facility]             . ... ..[phone]  [facility]              .      ..[phone]  [facility]              .     ..[phone]  [facility]        .      . [phone]  [facility]           [phone]  [facility]         .    ..[phone]  [facility] )             . ..  ..  [phone]  [facility]            [phone]  [facility]              ..[phone]  [facility]                     [phone]    [facility]           ..     [phone]     [facility]    ...     ...[phone]     [facility]            [phone]                                                  [facility]                ..  .  [phone]  [facility]         .     .  [phone]  [facility]              . [phone]  [facility]  ..        [phone]  [facility]              .    [phone]    [facility]               ...  .  [phone]  [facility]             . [phone]  [facility]            [phone]  [facility]     .      . ..[phone]      Senior Linkage  *Facilities in bold italics indicate medication management  Services are offered.     Crisis support  Mobile crisis line- [phone]  [facility]: Free online resources including therapy for Mental Health and substance use problems: Http://thriverange.org     Crisis Text Line  Text HOME to 761-886 - The Crisis Text Line serves anyone, in any type of crisis, providing access to free, 24/7 support and information via the medium people already use and trust  http://www.crisistextline.org   Here's how it works:  Text HOME to 617-779 from anywhere in the [place], anytime, about any type of crisis.  A live, trained Crisis Counselor receives the text and responds quickly.  The volunteer Crisis Counselor will help you move from a 'hot moment to a cool  moment'

## 2022-10-05 NOTE — NURSING NOTE
"Chief Complaint   Patient presents with     Depression     Anxiety       Initial /70 (BP Location: Left arm, Patient Position: Sitting, Cuff Size: Adult Regular)   Pulse 60   Temp 97.5  F (36.4  C) (Tympanic)   Ht 1.638 m (5' 4.5\")   Wt 72.6 kg (160 lb)   SpO2 95%   BMI 27.04 kg/m   Estimated body mass index is 27.04 kg/m  as calculated from the following:    Height as of this encounter: 1.638 m (5' 4.5\").    Weight as of this encounter: 72.6 kg (160 lb).  Medication Reconciliation: complete  Liana Aguero LPN  "

## 2022-10-05 NOTE — PROGRESS NOTES
PCP requested Senior Linkage line information be sent to pt via mail service. Letter created and mailed with information enclosed. Attached CC contact information if any additional information is needed    Aliya BERG RN, Care Coordinator  [facility]  [phone] Option 1

## 2022-11-14 ENCOUNTER — LAB (OUTPATIENT)
Dept: LAB | Facility: OTHER | Age: 82
End: 2022-11-14
Payer: MEDICARE

## 2022-11-14 ENCOUNTER — ANTICOAGULATION THERAPY VISIT (OUTPATIENT)
Dept: ANTICOAGULATION | Facility: OTHER | Age: 82
End: 2022-11-14
Attending: FAMILY MEDICINE
Payer: MEDICARE

## 2022-11-14 DIAGNOSIS — K92.2 ACUTE UPPER GASTROINTESTINAL BLEEDING: ICD-10-CM

## 2022-11-14 DIAGNOSIS — Z79.01 CHRONIC ANTICOAGULATION: ICD-10-CM

## 2022-11-14 DIAGNOSIS — Z86.711 HX PULMONARY EMBOLISM: ICD-10-CM

## 2022-11-14 DIAGNOSIS — Z86.718 HISTORY OF DVT OF LOWER EXTREMITY: ICD-10-CM

## 2022-11-14 DIAGNOSIS — Z79.01 LONG TERM CURRENT USE OF ANTICOAGULANT THERAPY: Primary | ICD-10-CM

## 2022-11-14 LAB — INR BLD: 1.5 (ref 0.9–1.1)

## 2022-11-14 PROCEDURE — 36416 COLLJ CAPILLARY BLOOD SPEC: CPT | Mod: ZL

## 2022-11-14 PROCEDURE — 85610 PROTHROMBIN TIME: CPT | Mod: ZL

## 2022-11-14 NOTE — PROGRESS NOTES
ANTICOAGULATION MANAGEMENT     Rey Tristan 82 year old male is on warfarin with therapeutic INR result. (Goal INR 1.5-2.0)    Recent labs: (last 7 days)     11/14/22  1314   INR 1.5*       ASSESSMENT       Source(s): Chart Review       Warfarin doses taken: Warfarin taken as instructed    Diet: No new diet changes identified    New illness, injury, or hospitalization: No    Medication/supplement changes: None noted    Signs or symptoms of bleeding or clotting: No    Previous INR: Therapeutic last 2(+) visits    Additional findings: None       PLAN     Recommended plan for no diet, medication or health factor changes affecting INR     Dosing Instructions: Continue your current warfarin dose with next INR in 6 weeks       Summary  As of 11/14/2022    Full warfarin instructions:  6 mg every Mon, Fri; 4 mg all other days; Starting 11/14/2022   Next INR check:  12/26/2022             Detailed voice message left for Rey with dosing instructions and follow up date.     Contact [phone] to schedule and with any changes, questions or concerns.     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

## 2022-12-05 DIAGNOSIS — E78.5 HYPERLIPIDEMIA, UNSPECIFIED HYPERLIPIDEMIA TYPE: ICD-10-CM

## 2022-12-06 NOTE — TELEPHONE ENCOUNTER
atorvastatin (LIPITOR) 10 MG tablet    Last Written Prescription Date:  3-7-2022  Last Fill Quantity: 90,   # refills: 2  Last Office Visit: 10-5-2022  Future Office visit:    Next 5 appointments (look out 90 days)    Feb 24, 2023 10:50 AM  (Arrive by 10:35 AM)  Return Visit with Britney Mahoney NP  [facility] ([facility] ) [address] [place] [address] AVE  [facility] [address]  [phone]           Routing refill request to provider for review/approval because:

## 2022-12-07 RX ORDER — ATORVASTATIN CALCIUM 10 MG/1
TABLET, FILM COATED ORAL
Qty: 90 TABLET | Refills: 2 | Status: SHIPPED | OUTPATIENT
Start: 2022-12-07 | End: 2023-06-05

## 2022-12-08 NOTE — NURSING NOTE
"Chief Complaint   Patient presents with     RECHECK     Monoclonal gammopathy of unknown significance (MGUS)       Initial /58   Pulse 65   Temp 97.1  F (36.2  C) (Tympanic)   Ht 1.638 m (5' 4.5\")   Wt 80.3 kg (177 lb 0.5 oz)   SpO2 94%   BMI 29.92 kg/m   Estimated body mass index is 29.92 kg/m  as calculated from the following:    Height as of this encounter: 1.638 m (5' 4.5\").    Weight as of this encounter: 80.3 kg (177 lb 0.5 oz).  Medication Reconciliation: complete     Immunizations and advance directives status reviewed. Pain scale 0 , PHQ-9 -.          Diana Davis LPN  " Order placed Nulytely to pt's preferred pharmacy.

## 2022-12-23 ENCOUNTER — ANTICOAGULATION THERAPY VISIT (OUTPATIENT)
Dept: ANTICOAGULATION | Facility: OTHER | Age: 82
End: 2022-12-23
Payer: MEDICARE

## 2022-12-23 ENCOUNTER — LAB (OUTPATIENT)
Dept: LAB | Facility: OTHER | Age: 82
End: 2022-12-23
Payer: MEDICARE

## 2022-12-23 DIAGNOSIS — Z79.01 LONG TERM CURRENT USE OF ANTICOAGULANT THERAPY: Primary | ICD-10-CM

## 2022-12-23 DIAGNOSIS — Z86.718 HISTORY OF DVT OF LOWER EXTREMITY: ICD-10-CM

## 2022-12-23 DIAGNOSIS — Z86.711 HX PULMONARY EMBOLISM: ICD-10-CM

## 2022-12-23 DIAGNOSIS — Z79.01 CHRONIC ANTICOAGULATION: ICD-10-CM

## 2022-12-23 DIAGNOSIS — K92.2 ACUTE UPPER GASTROINTESTINAL BLEEDING: ICD-10-CM

## 2022-12-23 LAB — INR BLD: 1.7 (ref 0.9–1.1)

## 2022-12-23 PROCEDURE — 85610 PROTHROMBIN TIME: CPT | Mod: ZL

## 2022-12-23 PROCEDURE — 36416 COLLJ CAPILLARY BLOOD SPEC: CPT | Mod: ZL

## 2022-12-23 NOTE — PROGRESS NOTES
ANTICOAGULATION MANAGEMENT     Rey Tristan 82 year old male is on warfarin with therapeutic INR result. (Goal INR 1.5-2.0)    Recent labs: (last 7 days)     12/23/22  1015   INR 1.7*       ASSESSMENT       Source(s): Chart Review    Previous INR was Therapeutic last 2(+) visits    Medication, diet, health changes since last INR chart reviewed; none identified           PLAN     Recommended plan for no diet, medication or health factor changes affecting INR     Dosing Instructions: Continue your current warfarin dose with next INR in 6 weeks       Summary  As of 12/23/2022    Full warfarin instructions:  6 mg every Mon, Fri; 4 mg all other days   Next INR check:  2/3/2023             Detailed voice message left for Rey with dosing instructions and follow up date.     Left message on patients phone to call and make an appointment,    Education provided: Please call back if any changes to your diet, medications or how you've been taking warfarin    Plan made per [facility] anticoagulation protocol    Tricia Watson RN  Anticoagulation Clinic  12/23/2022    _______________________________________________________________________     Anticoagulation Episode Summary     Current INR goal:  1.5-2.0   TTR:  100.0 % (1 y)   Target end date:  Indefinite   Send INR reminders to:  [facility]    Indications    Long-term (current) use of anticoagulants [Z79.01] [Z79.01]  Hx pulmonary embolism [Z86.711]  History of DVT of lower extremity [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

## 2023-01-03 ENCOUNTER — OFFICE VISIT (OUTPATIENT)
Dept: FAMILY MEDICINE | Facility: OTHER | Age: 83
End: 2023-01-03
Attending: STUDENT IN AN ORGANIZED HEALTH CARE EDUCATION/TRAINING PROGRAM
Payer: COMMERCIAL

## 2023-01-03 ENCOUNTER — TELEPHONE (OUTPATIENT)
Dept: FAMILY MEDICINE | Facility: OTHER | Age: 83
End: 2023-01-03

## 2023-01-03 VITALS
BODY MASS INDEX: 24.5 KG/M2 | OXYGEN SATURATION: 96 % | DIASTOLIC BLOOD PRESSURE: 58 MMHG | SYSTOLIC BLOOD PRESSURE: 100 MMHG | TEMPERATURE: 97.5 F | HEART RATE: 61 BPM | RESPIRATION RATE: 16 BRPM | WEIGHT: 145 LBS

## 2023-01-03 DIAGNOSIS — Z86.711 HX PULMONARY EMBOLISM: ICD-10-CM

## 2023-01-03 DIAGNOSIS — Z86.718 HISTORY OF DVT OF LOWER EXTREMITY: ICD-10-CM

## 2023-01-03 DIAGNOSIS — J20.9 ACUTE BRONCHITIS, UNSPECIFIED ORGANISM: Primary | ICD-10-CM

## 2023-01-03 DIAGNOSIS — Z87.891 HISTORY OF TOBACCO USE: ICD-10-CM

## 2023-01-03 DIAGNOSIS — Z79.01 LONG TERM CURRENT USE OF ANTICOAGULANT THERAPY: Primary | ICD-10-CM

## 2023-01-03 DIAGNOSIS — K92.2 ACUTE UPPER GASTROINTESTINAL BLEEDING: ICD-10-CM

## 2023-01-03 PROBLEM — E06.3 AUTOIMMUNE THYROIDITIS: Chronic | Status: ACTIVE | Noted: 2017-09-01

## 2023-01-03 PROCEDURE — 99213 OFFICE O/P EST LOW 20 MIN: CPT | Performed by: STUDENT IN AN ORGANIZED HEALTH CARE EDUCATION/TRAINING PROGRAM

## 2023-01-03 PROCEDURE — G0463 HOSPITAL OUTPT CLINIC VISIT: HCPCS

## 2023-01-03 RX ORDER — AZITHROMYCIN 250 MG/1
TABLET, FILM COATED ORAL
Qty: 6 TABLET | Refills: 0 | Status: SHIPPED | OUTPATIENT
Start: 2023-01-03 | End: 2023-01-08

## 2023-01-03 RX ORDER — GUAIFENESIN 600 MG/1
600 TABLET, EXTENDED RELEASE ORAL 2 TIMES DAILY PRN
Qty: 20 TABLET | Refills: 0 | Status: SHIPPED | OUTPATIENT
Start: 2023-01-03 | End: 2023-05-30

## 2023-01-03 ASSESSMENT — PAIN SCALES - GENERAL: PAINLEVEL: NO PAIN (0)

## 2023-01-03 NOTE — PROGRESS NOTES
Assessment & Plan     Acute bronchitis, unspecified organism  History of tobacco use  6 days of URI symptoms, with progressive cough, chest tightness and sputum production that seems to have plateaued.  No signs of pneumonia on vitals or physical exam.  Is overall well-appearing.  Blood pressure on the softer side but this is where he tends to run and he is asymptomatic. With day 6 of illness, will not swab for COVID or influenza, as would not  at this time. With his 30 to 40-year pack year history in conjunction with plateau in symptoms, reviewed risks and benefits of antibiotic therapy at this time, as may indeed be viral.  Patient agreeable to proceed with azithromycin.  Discussed continuing supplements and considering probiotic.   Instructed he needs closer monitoring of his INR while on azithromycin and he will reach out to the Coumadin clinic today.  Reviewed signs or symptoms that would indicate need for follow-up.  - guaiFENesin (MUCINEX) 600 MG 12 hr tablet; Take 1 tablet (600 mg) by mouth 2 times daily as needed for congestion or cough  - azithromycin (ZITHROMAX) 250 MG tablet; Take 2 tablets (500 mg) by mouth daily for 1 day, THEN 1 tablet (250 mg) daily for 4 days.      Return if symptoms worsen or fail to improve.    Marisela Boggs MD  [facility] is a 82 year old, presenting for the following health issues:  Nasal Congestion      HPI     Acute Illness  Acute illness concerns: Chest congestion, nasal congestion  Onset/Duration: 12/28/22  Symptoms:  Fever: YES did go away  Chills/Sweats: YES at the beginning  Headache (location?): No  Sinus Pressure: YES  Conjunctivitis:  No  Ear Pain: no  Rhinorrhea: No  Congestion: YES  Sore Throat: No  Cough: YES - dry cough last week  Wheeze: No  Decreased Appetite: No  Nausea: No  Vomiting: No  Diarrhea: No  Dysuria/Freq.: No  Dysuria or Hematuria: No  Fatigue/Achiness: YES fatigue, no aches and  pains  Sick/Strep Exposure: YES, daughter and wife  Therapies tried and outcome: None, APAP at the beginning for fever for a couple days.    Daughter was sick originally, wife got it, then now he has it.   Day 6 of illness. Started with rhinorrhea and fatigue, some sinus headaches. Started tylenol. Didn't help much. Feels he has improved but still some chest tightness. Feels he is having a hard time clearing his cough. Mucous like phlegm, increasing over last few days. Has gradually built up with the chest congestion. Started in sinuses initially. No shortness of breath. No exertional chest pain. No pleuritic pain. No arm pain. No diaphoresis. No lightheadedness. Subjective fever in beginning, first day. None since. No bloody sputum. No lung disease. Non-smoker, quit in 2009, 20 plus pack year history. Has never needed an inhaler. Doesn't usually get pneumonia. Has not needed steroids for lungs. On coumadin for PE in 2010.         Objective    /58   Pulse 61   Temp 97.5  F (36.4  C) (Tympanic)   Resp 16   Wt 65.8 kg (145 lb)   SpO2 96%   BMI 24.50 kg/m    Body mass index is 24.5 kg/m .     Physical Exam  Constitutional:       General: He is not in acute distress.     Appearance: Normal appearance.   HENT:      Head: Normocephalic and atraumatic.      Right Ear: Tympanic membrane and external ear normal.      Left Ear: Tympanic membrane and external ear normal.      Nose: No congestion or rhinorrhea.      Right Sinus: No maxillary sinus tenderness or frontal sinus tenderness.      Left Sinus: No maxillary sinus tenderness or frontal sinus tenderness.      Mouth/Throat:      Mouth: Mucous membranes are moist.      Pharynx: Oropharynx is clear. No oropharyngeal exudate or posterior oropharyngeal erythema.   Eyes:      Extraocular Movements: Extraocular movements intact.      Conjunctiva/sclera: Conjunctivae normal.      Pupils: Pupils are equal, round, and reactive to light.   Cardiovascular:      Rate and  Rhythm: Normal rate and regular rhythm.      Heart sounds: No murmur heard.  Pulmonary:      Effort: Pulmonary effort is normal. No respiratory distress.      Breath sounds: Normal breath sounds. No wheezing or rhonchi.   Abdominal:      General: Bowel sounds are normal.      Palpations: Abdomen is soft. There is no mass.      Tenderness: There is no abdominal tenderness. There is no guarding or rebound.   Musculoskeletal:         General: Normal range of motion.      Cervical back: Normal range of motion and neck supple.   Lymphadenopathy:      Cervical: No cervical adenopathy.   Skin:     General: Skin is warm and dry.      Capillary Refill: Capillary refill takes less than 2 seconds.   Neurological:      General: No focal deficit present.      Mental Status: He is alert and oriented to person, place, and time.   Psychiatric:         Mood and Affect: Mood normal.         Behavior: Behavior normal.

## 2023-01-03 NOTE — TELEPHONE ENCOUNTER
ANTICOAGULATION  MANAGEMENT     Interacting Medication Review    Interacting medication(s): Azithromycin (Zithromax, Z-francesca) with warfarin.    Duration: 5 days (1/3/23 to 1/7/23)    Indication: UTI    New medication?: Concurrent use of AZITHROMYCIN and WARFARIN may result in an increased risk of bleeding.   Patient verbalized that he has some mild nose bleeding while using some tylenol the last week, but patient verbalized that he has stopped taking it. Educated patient to look out for signs of bleeding while being on the antibiotic.        PLAN     Continue your current warfarin dose with next INR in 3 days        Summary  As of 1/3/2023    Next INR check:               Telephone call with Rey who verbalizes understanding and agrees to plan    New recheck date updated on anticoagulation calendar     Plan made per [facility] anticoagulation protocol    Rhonda Lopez RN  Anticoagulation Clinic

## 2023-01-06 ENCOUNTER — LAB (OUTPATIENT)
Dept: LAB | Facility: OTHER | Age: 83
End: 2023-01-06
Payer: MEDICARE

## 2023-01-06 ENCOUNTER — ANTICOAGULATION THERAPY VISIT (OUTPATIENT)
Dept: ANTICOAGULATION | Facility: OTHER | Age: 83
End: 2023-01-06
Attending: FAMILY MEDICINE
Payer: MEDICARE

## 2023-01-06 DIAGNOSIS — Z86.718 HISTORY OF DVT OF LOWER EXTREMITY: ICD-10-CM

## 2023-01-06 DIAGNOSIS — K92.2 ACUTE UPPER GASTROINTESTINAL BLEEDING: ICD-10-CM

## 2023-01-06 DIAGNOSIS — Z79.01 CHRONIC ANTICOAGULATION: ICD-10-CM

## 2023-01-06 DIAGNOSIS — Z79.01 LONG TERM CURRENT USE OF ANTICOAGULANT THERAPY: Primary | ICD-10-CM

## 2023-01-06 DIAGNOSIS — Z86.711 HX PULMONARY EMBOLISM: ICD-10-CM

## 2023-01-06 LAB — INR BLD: 1.6 (ref 0.9–1.1)

## 2023-01-06 PROCEDURE — 36416 COLLJ CAPILLARY BLOOD SPEC: CPT | Mod: ZL

## 2023-01-06 PROCEDURE — 85610 PROTHROMBIN TIME: CPT | Mod: ZL

## 2023-01-06 NOTE — PROGRESS NOTES
ANTICOAGULATION MANAGEMENT     Rey Tristan 82 year old male is on warfarin with therapeutic INR result. (Goal INR 1.5-2.0)    Recent labs: (last 7 days)     01/06/23  1114   INR 1.6*       ASSESSMENT       Source(s): Chart Review and Patient/Caregiver Call       Warfarin doses taken: Warfarin taken as instructed    Diet: No new diet changes identified    New illness, injury, or hospitalization: Yes: Bronchitis     Medication/supplement changes: azithromycin (ZITHROMAX) 250 MG tablet started on 1/3/23 which may be increasing INR today    guaiFENesin (MUCINEX) 600 MG 12 hr tablet started on 1/3/23 No interaction anticipated    Signs or symptoms of bleeding or clotting: No    Previous INR: Therapeutic last 2(+) visits    Additional findings: None       PLAN     Recommended plan for no diet, medication or health factor changes affecting INR     Dosing Instructions: Continue your current warfarin dose with next INR in 2 weeks       Summary  As of 1/6/2023    Full warfarin instructions:  6 mg every Mon, Fri; 4 mg all other days   Next INR check:  1/20/2023             Telephone call with Rey who verbalizes understanding and agrees to plan    Lab visit scheduled    Education provided:     Please call back if any changes to your diet, medications or how you've been taking warfarin    Plan made per [facility] anticoagulation protocol    Gaby South, RN  Anticoagulation Clinic  1/6/2023    _______________________________________________________________________     Anticoagulation Episode Summary     Current INR goal:  1.5-2.0   TTR:  100.0 % (1 y)   Target end date:  Indefinite   Send INR reminders to:  [facility]    Indications    Long-term (current) use of anticoagulants [Z79.01] [Z79.01]  Hx pulmonary embolism [Z86.711]  History of DVT of lower extremity [Z86.718]  Acute upper gastrointestinal bleeding [K92.2]           Comments:           Anticoagulation Care Providers     Provider Role Specialty Phone number     Gianna Richard MD Referring Family Medicine [phone]    Saleem Carr MD Referring Family Medicine [phone]

## 2023-01-20 ENCOUNTER — APPOINTMENT (OUTPATIENT)
Dept: LAB | Facility: OTHER | Age: 83
End: 2023-01-20
Payer: MEDICARE

## 2023-01-20 ENCOUNTER — ANTICOAGULATION THERAPY VISIT (OUTPATIENT)
Dept: ANTICOAGULATION | Facility: OTHER | Age: 83
End: 2023-01-20
Attending: FAMILY MEDICINE
Payer: MEDICARE

## 2023-01-20 DIAGNOSIS — K92.2 ACUTE UPPER GASTROINTESTINAL BLEEDING: ICD-10-CM

## 2023-01-20 DIAGNOSIS — Z86.718 HISTORY OF DVT OF LOWER EXTREMITY: ICD-10-CM

## 2023-01-20 DIAGNOSIS — Z79.01 LONG TERM CURRENT USE OF ANTICOAGULANT THERAPY: Primary | ICD-10-CM

## 2023-01-20 DIAGNOSIS — Z86.711 HX PULMONARY EMBOLISM: ICD-10-CM

## 2023-01-20 LAB — INR BLD: 1.6 (ref 0.9–1.1)

## 2023-01-20 PROCEDURE — 85610 PROTHROMBIN TIME: CPT | Mod: ZL | Performed by: FAMILY MEDICINE

## 2023-01-20 PROCEDURE — 36416 COLLJ CAPILLARY BLOOD SPEC: CPT | Mod: ZL | Performed by: FAMILY MEDICINE

## 2023-01-20 NOTE — PROGRESS NOTES
ANTICOAGULATION MANAGEMENT     Rey Tristan 82 year old male is on warfarin with therapeutic INR result. (Goal INR 1.5-2.0)    Recent labs: (last 7 days)     01/20/23  1025   INR 1.6*       ASSESSMENT       Source(s): Chart Review    Previous INR was Therapeutic last 2(+) visits    Medication, diet, health changes since last INR chart reviewed; none identified           PLAN     Recommended plan for no diet, medication or health factor changes affecting INR     Dosing Instructions: Continue your current warfarin dose with next INR in 4 weeks       Summary  As of 1/20/2023    Full warfarin instructions:  6 mg every Mon, Fri; 4 mg all other days   Next INR check:  2/17/2023             Detailed voice message left for Rey with dosing instructions and follow up date.     Contact [phone] to schedule and with any changes, questions or concerns.     Education provided:     Please call back if any changes to your diet, medications or how you've been taking warfarin    Plan made per [facility] anticoagulation protocol    Aisha Minor RN  Anticoagulation Clinic  1/20/2023    _______________________________________________________________________     Anticoagulation Episode Summary     Current INR goal:  1.5-2.0   TTR:  100.0 % (1 y)   Target end date:  Indefinite   Send INR reminders to:  [facility]    Indications    Long-term (current) use of anticoagulants [Z79.01] [Z79.01]  Hx pulmonary embolism [Z86.711]  History of DVT of lower extremity [Z86.718]  Acute upper gastrointestinal bleeding [K92.2]           Comments:           Anticoagulation Care Providers     Provider Role Specialty Phone number    Gianna Richard MD Referring Family Medicine [phone]    Saleem Carr MD Referring Family Medicine [phone]

## 2023-02-17 ENCOUNTER — LAB (OUTPATIENT)
Dept: LAB | Facility: OTHER | Age: 83
End: 2023-02-17
Payer: MEDICARE

## 2023-02-17 ENCOUNTER — ANTICOAGULATION THERAPY VISIT (OUTPATIENT)
Dept: ANTICOAGULATION | Facility: OTHER | Age: 83
End: 2023-02-17
Attending: FAMILY MEDICINE
Payer: COMMERCIAL

## 2023-02-17 DIAGNOSIS — Z79.01 LONG TERM CURRENT USE OF ANTICOAGULANT THERAPY: Primary | ICD-10-CM

## 2023-02-17 DIAGNOSIS — Z79.01 CHRONIC ANTICOAGULATION: ICD-10-CM

## 2023-02-17 DIAGNOSIS — Z86.718 HISTORY OF DVT OF LOWER EXTREMITY: ICD-10-CM

## 2023-02-17 DIAGNOSIS — Z79.01 LONG TERM CURRENT USE OF ANTICOAGULANT THERAPY: ICD-10-CM

## 2023-02-17 DIAGNOSIS — D47.2 IGM MONOCLONAL GAMMOPATHY OF UNCERTAIN SIGNIFICANCE: ICD-10-CM

## 2023-02-17 DIAGNOSIS — K92.2 ACUTE UPPER GASTROINTESTINAL BLEEDING: ICD-10-CM

## 2023-02-17 DIAGNOSIS — Z86.711 HX PULMONARY EMBOLISM: ICD-10-CM

## 2023-02-17 DIAGNOSIS — D64.9 ANEMIA, UNSPECIFIED TYPE: ICD-10-CM

## 2023-02-17 LAB
ALBUMIN SERPL BCG-MCNC: 3.8 G/DL (ref 3.5–5.2)
ALP SERPL-CCNC: 130 U/L (ref 40–129)
ALT SERPL W P-5'-P-CCNC: 24 U/L (ref 10–50)
ANION GAP SERPL CALCULATED.3IONS-SCNC: 12 MMOL/L (ref 7–15)
AST SERPL W P-5'-P-CCNC: 37 U/L (ref 10–50)
BASOPHILS # BLD AUTO: 0.1 10E3/UL (ref 0–0.2)
BASOPHILS NFR BLD AUTO: 1 %
BILIRUB SERPL-MCNC: 0.5 MG/DL
BUN SERPL-MCNC: 11.1 MG/DL (ref 8–23)
CALCIUM SERPL-MCNC: 8.9 MG/DL (ref 8.8–10.2)
CHLORIDE SERPL-SCNC: 104 MMOL/L (ref 98–107)
CREAT SERPL-MCNC: 0.7 MG/DL (ref 0.67–1.17)
DEPRECATED HCO3 PLAS-SCNC: 24 MMOL/L (ref 22–29)
EOSINOPHIL # BLD AUTO: 0.1 10E3/UL (ref 0–0.7)
EOSINOPHIL NFR BLD AUTO: 3 %
ERYTHROCYTE [DISTWIDTH] IN BLOOD BY AUTOMATED COUNT: 13.9 % (ref 10–15)
FERRITIN SERPL-MCNC: 93 NG/ML (ref 31–409)
GFR SERPL CREATININE-BSD FRML MDRD: >90 ML/MIN/1.73M2
GLUCOSE SERPL-MCNC: 86 MG/DL (ref 70–99)
HCT VFR BLD AUTO: 38 % (ref 40–53)
HGB BLD-MCNC: 12.4 G/DL (ref 13.3–17.7)
IMM GRANULOCYTES # BLD: 0 10E3/UL
IMM GRANULOCYTES NFR BLD: 0 %
INR BLD: 1.5 (ref 0.9–1.1)
IRON BINDING CAPACITY (ROCHE): 268 UG/DL (ref 240–430)
IRON SATN MFR SERPL: 34 % (ref 15–46)
IRON SERPL-MCNC: 92 UG/DL (ref 61–157)
LDH SERPL L TO P-CCNC: 186 U/L (ref 0–250)
LYMPHOCYTES # BLD AUTO: 0.7 10E3/UL (ref 0.8–5.3)
LYMPHOCYTES NFR BLD AUTO: 14 %
MCH RBC QN AUTO: 30 PG (ref 26.5–33)
MCHC RBC AUTO-ENTMCNC: 32.6 G/DL (ref 31.5–36.5)
MCV RBC AUTO: 92 FL (ref 78–100)
MONOCYTES # BLD AUTO: 0.5 10E3/UL (ref 0–1.3)
MONOCYTES NFR BLD AUTO: 10 %
NEUTROPHILS # BLD AUTO: 3.4 10E3/UL (ref 1.6–8.3)
NEUTROPHILS NFR BLD AUTO: 72 %
NRBC # BLD AUTO: 0 10E3/UL
NRBC BLD AUTO-RTO: 0 /100
PLATELET # BLD AUTO: 168 10E3/UL (ref 150–450)
POTASSIUM SERPL-SCNC: 4.5 MMOL/L (ref 3.4–5.3)
PROT SERPL-MCNC: 6.9 G/DL (ref 6.4–8.3)
RBC # BLD AUTO: 4.14 10E6/UL (ref 4.4–5.9)
SODIUM SERPL-SCNC: 140 MMOL/L (ref 136–145)
TOTAL PROTEIN SERUM FOR ELP: 6.9 G/DL (ref 6.4–8.3)
WBC # BLD AUTO: 4.8 10E3/UL (ref 4–11)

## 2023-02-17 PROCEDURE — 83550 IRON BINDING TEST: CPT | Mod: ZL

## 2023-02-17 PROCEDURE — 82784 ASSAY IGA/IGD/IGG/IGM EACH: CPT | Mod: ZL

## 2023-02-17 PROCEDURE — 80053 COMPREHEN METABOLIC PANEL: CPT | Mod: ZL

## 2023-02-17 PROCEDURE — 84165 PROTEIN E-PHORESIS SERUM: CPT | Mod: ZL | Performed by: PATHOLOGY

## 2023-02-17 PROCEDURE — 84165 PROTEIN E-PHORESIS SERUM: CPT | Mod: 26

## 2023-02-17 PROCEDURE — 84155 ASSAY OF PROTEIN SERUM: CPT | Mod: ZL,XU

## 2023-02-17 PROCEDURE — 85025 COMPLETE CBC W/AUTO DIFF WBC: CPT | Mod: ZL

## 2023-02-17 PROCEDURE — 36415 COLL VENOUS BLD VENIPUNCTURE: CPT | Mod: ZL

## 2023-02-17 PROCEDURE — 82232 ASSAY OF BETA-2 PROTEIN: CPT | Mod: ZL

## 2023-02-17 PROCEDURE — 83615 LACTATE (LD) (LDH) ENZYME: CPT | Mod: ZL

## 2023-02-17 PROCEDURE — 82728 ASSAY OF FERRITIN: CPT | Mod: ZL

## 2023-02-17 PROCEDURE — 85810 BLOOD VISCOSITY EXAMINATION: CPT | Mod: ZL

## 2023-02-17 PROCEDURE — 85610 PROTHROMBIN TIME: CPT | Mod: ZL

## 2023-02-17 PROCEDURE — 83521 IG LIGHT CHAINS FREE EACH: CPT | Mod: ZL,91

## 2023-02-17 NOTE — PROGRESS NOTES
ANTICOAGULATION MANAGEMENT     Rey Tristan 82 year old male is on warfarin with therapeutic INR result. (Goal INR 1.5-2.0)    Recent labs: (last 7 days)     02/17/23  1019   INR 1.5*       ASSESSMENT       Source(s): Chart Review       Warfarin doses taken: Warfarin taken as instructed    Diet: No new diet changes identified    New illness, injury, or hospitalization: No    Medication/supplement changes: None noted    Signs or symptoms of bleeding or clotting: No    Previous INR: Therapeutic last 2(+) visits    Additional findings: None       PLAN     Recommended plan for no diet, medication or health factor changes affecting INR     Dosing Instructions: Continue your current warfarin dose with next INR in 5 weeks       Summary  As of 2/17/2023    Full warfarin instructions:  6 mg every Mon, Fri; 4 mg all other days   Next INR check:  3/24/2023             Detailed voice message left for Rey with dosing instructions and follow up date.     Contact [phone] to schedule and with any changes, questions or concerns.     Education provided: Please call back if any changes to your diet, medications or how you've been taking warfarin    Plan made per [facility] anticoagulation protocol    Rhonda Lopez RN  Anticoagulation Clinic  2/17/2023    _______________________________________________________________________     Anticoagulation Episode Summary     Current INR goal:  1.5-2.0   TTR:  100.0 % (1 y)   Target end date:  Indefinite   Send INR reminders to:  [facility]    Indications    Long-term (current) use of anticoagulants [Z79.01] [Z79.01]  Hx pulmonary embolism [Z86.711]  History of DVT of lower extremity [Z86.718]  Acute upper gastrointestinal bleeding [K92.2]           Comments:           Anticoagulation Care Providers     Provider Role Specialty Phone number    Gianna Richard MD Referring Family Medicine [phone]    Saleem Carr MD Referring Family Medicine [phone]

## 2023-02-20 LAB
ALBUMIN SERPL ELPH-MCNC: 3.5 G/DL (ref 3.7–5.1)
ALPHA1 GLOB SERPL ELPH-MCNC: 0.3 G/DL (ref 0.2–0.4)
ALPHA2 GLOB SERPL ELPH-MCNC: 0.8 G/DL (ref 0.5–0.9)
B-GLOBULIN SERPL ELPH-MCNC: 0.6 G/DL (ref 0.6–1)
B2 MICROGLOB TUMOR MARKER SER-MCNC: 2.4 MG/L
GAMMA GLOB SERPL ELPH-MCNC: 1.7 G/DL (ref 0.7–1.6)
IGA SERPL-MCNC: 79 MG/DL (ref 84–499)
IGG SERPL-MCNC: 769 MG/DL (ref 610–1616)
IGM SERPL-MCNC: 1417 MG/DL (ref 35–242)
KAPPA LC FREE SER-MCNC: 5.51 MG/DL (ref 0.33–1.94)
KAPPA LC FREE/LAMBDA FREE SER NEPH: 4.02 {RATIO} (ref 0.26–1.65)
LAMBDA LC FREE SERPL-MCNC: 1.37 MG/DL (ref 0.57–2.63)
M PROTEIN SERPL ELPH-MCNC: 1 G/DL
PROT PATTERN SERPL ELPH-IMP: ABNORMAL
VISC SER: 1.8 CP (ref 1.4–1.8)

## 2023-02-24 ENCOUNTER — ONCOLOGY VISIT (OUTPATIENT)
Dept: ONCOLOGY | Facility: OTHER | Age: 83
End: 2023-02-24
Attending: NURSE PRACTITIONER
Payer: COMMERCIAL

## 2023-02-24 VITALS
DIASTOLIC BLOOD PRESSURE: 72 MMHG | WEIGHT: 158.29 LBS | RESPIRATION RATE: 20 BRPM | HEIGHT: 65 IN | SYSTOLIC BLOOD PRESSURE: 120 MMHG | HEART RATE: 69 BPM | TEMPERATURE: 97.4 F | OXYGEN SATURATION: 94 % | BODY MASS INDEX: 26.37 KG/M2

## 2023-02-24 DIAGNOSIS — D47.2 IGM MONOCLONAL GAMMOPATHY OF UNCERTAIN SIGNIFICANCE: Primary | ICD-10-CM

## 2023-02-24 PROCEDURE — 99213 OFFICE O/P EST LOW 20 MIN: CPT | Performed by: NURSE PRACTITIONER

## 2023-02-24 PROCEDURE — G0463 HOSPITAL OUTPT CLINIC VISIT: HCPCS

## 2023-02-24 ASSESSMENT — PAIN SCALES - GENERAL: PAINLEVEL: NO PAIN (0)

## 2023-02-24 NOTE — PATIENT INSTRUCTIONS
We would like to see you back in 1 year. Please come 1 weeks prior for lab work.     If you have any questions please call [phone]    Other instructions:  none

## 2023-02-24 NOTE — PROGRESS NOTES
Hematology Follow-up Visit:  February 24, 2023    Reason for Visit:  Patient presents with:  Oncology Clinic Visit: Follow up IgM monoclonal gammopathy of uncertain significance        Nursing Notes and Documentation reviewed:  yes    HPI: This is a 82-year-old male patient who presents to the clinic today in followup of monoclonal gammopathy of undetermined significance diagnosed 2/2018 during workup for hypercoag state, along with previous diagnosis of anemia related to GI bleed.  Patient does have a history of previous DVT and PE and has been on long-term anticoagulation with Coumadin.  Coumadin was held after GI bleed was diagnosed and has been resumed.  Iron has been discontinued.    He presents to the clinic today stating he is doing well and offers no new complaints.  He continues on iron 1 tablet every other day.  States he does get occasional nosebleeds but this is rare.    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   Protein C and protein S along with antiphospholipid profile were negative.  SPEP was completed showing an M-spike of 1.1 with serum immunofixation showing monoclonal IgM immunoglobulin, kappa light chain type with IgM of 1530.  Bone marrow aspiration biopsy showed iron deficiency, slightly increased plasma cell proximally 2% kappa and toxic neutrophils with reactive lymphocytes and increased rouleaux formation; flow cytometry and immunophenotyping was unremarkable. Skeletal bone survey was completed on 2/6/2018 showing a stable T12 compression fracture but no evidence of lytic lesions.      He was placed on oral iron with improvement in his hemoglobin and iron studies.      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 Simeon; silent; transient hyperthyroid, now normal; monitor TSH monthly for hypothyroidism     Chronic anticoagulation 1/1/2012      Elevated serum alkaline phosphatase level 2016    normal GGT, normal bone skeletal survery     Gastric ulcer     endoscopy 2016, repeat 6 months; PPI Carafate     Hyperlipidemia      Normocytic anemia 2016     Pulmonary nodule     CT 2014, right; consider repeat 1 year if high risk     Upper GI bleed 2017     Vitamin D deficiency 2012       Social History     Socioeconomic History     Marital status:      Spouse name: Not on file     Number of children: Not on file     Years of education: Not on file     Highest education level: Not on file   Occupational History     Employer: [employer]     Comment: Retired    Tobacco Use     Smoking status: Former     Types: Cigarettes     Start date: 5/15/1958     Quit date: 10/28/2009     Years since quittin.3     Smokeless tobacco: Never     Tobacco comments:     Quit    Substance and Sexual Activity     Alcohol use: No     Alcohol/week: 0.0 standard drinks     Drug use: No     Sexual activity: Not on file   Other Topics Concern      Service Yes     Comment: [employer]     Blood Transfusions Yes     Comment: Permits if needed     Caffeine Concern Yes     Comment: Tea     Occupational Exposure No     Hobby Hazards No     Sleep Concern No     Stress Concern No     Weight Concern No     Special Diet No     Back Care No     Exercise No     Bike Helmet Not Asked     Seat Belt Yes     Self-Exams Yes     Parent/sibling w/ CABG, MI or angioplasty before 65F 55M? No   Social History Narrative     Not on file     Social Determinants of Health     Financial Resource Strain: Not on file   Food Insecurity: Not on file   Transportation Needs: Not on file   Physical Activity: Not on file   Stress: Not on file   Social Connections: Not on file   Intimate Partner Violence: Not on file   Housing Stability: Not on file       Past Surgical History:   Procedure Laterality Date     ANGIOGRAM  2014     BIOPSY  2018    bone  marrow biopsy     BONE MARROW BIOPSY, BONE SPECIMEN, NEEDLE/TROCAR N/A 1/16/2018    Procedure: BIOPSY BONE MARROW;  BONE MARROW BIOPSY;  Surgeon: Nuno Castro MD;  Location: [facility]     C. Difficile with intussuseption       cataract extraction       colonoscopy       COLONOSCOPY  6/17     ENDOSCOPY UPPER, COLONOSCOPY, COMBINED N/A 6/17/2016    Procedure: COMBINED ENDOSCOPY UPPER, COLONOSCOPY;  Surgeon: Andrea Stearns DO;  Location: [facility]     ESOPHAGOSCOPY, GASTROSCOPY, DUODENOSCOPY (EGD), COMBINED N/A 11/30/2017    Procedure: COMBINED ESOPHAGOSCOPY, GASTROSCOPY, DUODENOSCOPY (EGD);  UPPER ENDOSCOPY;  Surgeon: Narinder Torres MD;  Location: [facility]     left knee meniscal tear       Left lower extremity DVT       lens implant       nasal polypectomy       Pulmonary Embolism       ZZC REGULAR ECHO (FL)  6/23/2014    Dr. Cavazos       Family History   Problem Relation Age of Onset     Cancer Mother         Ovarian     Other Cancer Mother         lung/breast/ cervical ?     Respiratory Father         emphysemia     Lung Cancer Brother      Diabetes No family hx of      Hypertension No family hx of      Hyperlipidemia No family hx of      Thyroid Disease No family hx of      Asthma No family hx of      Colon Cancer No family hx of      Prostate Cancer No family hx of      Anesthesia Reaction No family hx of      Genetic Disorder No family hx of      Cerebrovascular Disease No family hx of      Coronary Artery Disease No family hx of      Breast Cancer No family hx of        Allergies:  Allergies as of 02/24/2023     (No Known Allergies)       Current Medications:  Current Outpatient Medications   Medication Sig Dispense Refill     Ascorbic Acid (VITAMIN C) 500 MG CAPS        atorvastatin (LIPITOR) 10 MG tablet TAKE 1 TABLET(10 MG) BY MOUTH DAILY 90 tablet 2     Biotin 5000 MCG PO CAPS Take 1,000 mg by mouth        Cholecalciferol (VITAMIN D) 400 UNITS tablet Take 1,000 Units by mouth daily        cyanocobalamin  "(CVS VITAMIN  B12) 1000 MCG TABS Take 1,000 mcg by mouth daily 90 tablet 3     ferrous sulfate (FEROSUL) 325 (65 Fe) MG tablet Take 325 mg by mouth every other day       guaiFENesin (MUCINEX) 600 MG 12 hr tablet Take 1 tablet (600 mg) by mouth 2 times daily as needed for congestion or cough 20 tablet 0     Multiple Vitamins-Minerals (MULTIVITAMIN ADULT PO) Take 1 tablet by mouth       omeprazole (PRILOSEC) 40 MG DR capsule TAKE 1 CAPSULE(40 MG) BY MOUTH DAILY 90 capsule 3     pyridoxine (VITAMIN B-6) 50 MG TABS Take 1 tablet (50 mg) by mouth daily 90 tablet 3     triamcinolone (KENALOG) 0.1 % external ointment Apply topically 2 times daily 30 g 1     warfarin ANTICOAGULANT (COUMADIN) 4 MG tablet TAKE 1 1/2 TABLETS BY MOUTH ON MONDAYS AND FRIDAYS, TAKE 1 TABLET BY MOUTH ON ALL OTHER DAYS 120 tablet 3          Review Of Systems:  Constitutional: denies fever, weight changes  Eyes: denies blurred or double vision  Ears/Nose/Throat: denies ear pain, nose problems, difficulty swallowing  Respiratory: denies shortness of breath, cough  Cardiovascular: denies chest pain, palpitations, edema  Gastrointestinal: denies abdominal pain, bloating, nausea, early satiety  Genitourinary: denies difficulty with urination, blood in urine  Neurologic: denies numbness orTingling, has ongoing occasional headaches  Hematologic/Lymphatic/Immunologic: denies easy bruising, easy bleeding, lumps or bumps noted      Physical Exam:  /72   Pulse 69   Temp 97.4  F (36.3  C) (Tympanic)   Resp 20   Ht 1.638 m (5' 4.5\")   Wt 71.8 kg (158 lb 4.6 oz)   SpO2 94%   BMI 26.75 kg/m    GENERAL APPEARANCE: Healthy, alert and in no acute distress.  HEENT: Normocephalic, Sclerae anicteric.   NECK:  No asymmetry or masses, no thyromegaly.  LYMPHATICS: No palpable cervical, supraclavicular nodes   RESP: Lungs clear to auscultation bilaterally, respirations regular and easy  CARDIOVASCULAR: Regular rate and rhythm. Normal S1, S2; no murmur, gallop, " or rub.  PSYCHIATRIC: Mentation and affect appear normal.  Mood appropriate.    Laboratory/Imaging Studies:    Component      Latest Ref Rng & Units 2/17/2023   WBC      4.0 - 11.0 10e3/uL 4.8   RBC Count      4.40 - 5.90 10e6/uL 4.14 (L)   Hemoglobin      13.3 - 17.7 g/dL 12.4 (L)   Hematocrit      40.0 - 53.0 % 38.0 (L)   MCV      78 - 100 fL 92   MCH      26.5 - 33.0 pg 30.0   MCHC      31.5 - 36.5 g/dL 32.6   RDW      10.0 - 15.0 % 13.9   Platelet Count      150 - 450 10e3/uL 168   % Neutrophils      % 72   % Lymphocytes      % 14   % Monocytes      % 10   % Eosinophils      % 3   % Basophils      % 1   % Immature Granulocytes      % 0   NRBCs per 100 WBC      <1 /100 0   Absolute Neutrophils      1.6 - 8.3 10e3/uL 3.4   Absolute Lymphocytes      0.8 - 5.3 10e3/uL 0.7 (L)   Absolute Monocytes      0.0 - 1.3 10e3/uL 0.5   Absolute Eosinophils      0.0 - 0.7 10e3/uL 0.1   Absolute Basophils      0.0 - 0.2 10e3/uL 0.1   Absolute Immature Granulocytes      <=0.4 10e3/uL 0.0   Absolute NRBCs      10e3/uL 0.0   Sodium      136 - 145 mmol/L 140   Potassium      3.4 - 5.3 mmol/L 4.5   Chloride      98 - 107 mmol/L 104   Carbon Dioxide (CO2)      22 - 29 mmol/L 24   Anion Gap      7 - 15 mmol/L 12   Urea Nitrogen      8.0 - 23.0 mg/dL 11.1   Creatinine      0.67 - 1.17 mg/dL 0.70   Calcium      8.8 - 10.2 mg/dL 8.9   Glucose      70 - 99 mg/dL 86   Alkaline Phosphatase      40 - 129 U/L 130 (H)   AST      10 - 50 U/L 37   ALT      10 - 50 U/L 24   Protein Total      6.4 - 8.3 g/dL 6.9   Albumin      3.5 - 5.2 g/dL 3.8   Bilirubin Total      <=1.2 mg/dL 0.5   GFR Estimate      >60 mL/min/1.73m2 >90   Albumin Fraction      3.7 - 5.1 g/dL 3.5 (L)   Alpha 1 Fraction      0.2 - 0.4 g/dL 0.3   Alpha 2 Fraction      0.5 - 0.9 g/dL 0.8   Beta Fraction      0.6 - 1.0 g/dL 0.6   Gamma Fraction      0.7 - 1.6 g/dL 1.7 (H)   Monoclonal Peak      <=0.0 g/dL 1.0 (H)   ELP Interpretation:       Monoclonal protein (1.0 g/dL) seen in the  gamma fraction. Previously characterized in our laboratory on 2/14/22 as a monoclonal IgM immunoglobulin of kappa light chain type. Hypoalbuminemia. Pathologic significance requires clinical correlation. Narinder Jose MD   Kappa Free Lt Chain      0.33 - 1.94 mg/dL 5.51 (H)   Lambda Free Lt Chain      0.57 - 2.63 mg/dL 1.37   Kappa Lambda Ratio      0.26 - 1.65 4.02 (H)   Iron      61 - 157 ug/dL 92   Iron Binding Capacity      240 - 430 ug/dL 268   Iron Sat Index      15 - 46 % 34   IGG      610 - 1,616 mg/dL 769   IGA      84 - 499 mg/dL 79 (L)   IGM      35 - 242 mg/dL 1,417 (H)   Viscosity Index      1.4 - 1.8 1.8   Lactate Dehydrogenase      0 - 250 U/L 186   Ferritin      31 - 409 ng/mL 93   Beta-2-Microglobulin      <2.3 mg/L 2.4 (H)   Total Protein Serum for ELP      6.4 - 8.3 g/dL 6.9       ASSESSMENT/PLAN:    #1 MGUS: IgM Monoclonal gammopathy of undetermined significance diagnosed January 2018.  MGUS considered stable. No evidence of end organ damage.  We'll see him back in 1 year with the same lab work.  Reviewed by Dr. Alfaro and in agreement with plan.      #2  Anemia: Hemoglobin slightly decreased with normal iron studies.  He will continue on the iron tablet every other day at this point.    I encouraged patient to call with any questions or concerns.    Britney Mahoney, NP APRN, FNP-BC, AOCNP

## 2023-02-24 NOTE — NURSING NOTE
"Oncology Rooming Note    February 24, 2023 11:12 AM   Rey Tristan is a 82 year old male who presents for:    Chief Complaint   Patient presents with     Oncology Clinic Visit     Follow up IgM monoclonal gammopathy of uncertain significance       Initial Vitals: /72   Pulse 69   Temp 97.4  F (36.3  C) (Tympanic)   Resp 20   Ht 1.638 m (5' 4.5\")   Wt 71.8 kg (158 lb 4.6 oz)   SpO2 94%   BMI 26.75 kg/m   Estimated body mass index is 26.75 kg/m  as calculated from the following:    Height as of this encounter: 1.638 m (5' 4.5\").    Weight as of this encounter: 71.8 kg (158 lb 4.6 oz). Body surface area is 1.81 meters squared.  No Pain (0) Comment: Data Unavailable   No LMP for male patient.  Allergies reviewed: Yes  Medications reviewed: Yes    Medications: Medication refills not needed today.  Pharmacy name entered into [facility]:    [id number] - [place], [place] - [address] AT [facility] [address]  [id number] - [place] - [address]  AT [facility] [place] [address]          Jayleen Fleming LPN            "

## 2023-03-24 ENCOUNTER — ANTICOAGULATION THERAPY VISIT (OUTPATIENT)
Dept: ANTICOAGULATION | Facility: OTHER | Age: 83
End: 2023-03-24
Attending: FAMILY MEDICINE
Payer: COMMERCIAL

## 2023-03-24 ENCOUNTER — LAB (OUTPATIENT)
Dept: LAB | Facility: OTHER | Age: 83
End: 2023-03-24
Payer: MEDICARE

## 2023-03-24 DIAGNOSIS — Z79.01 CHRONIC ANTICOAGULATION: ICD-10-CM

## 2023-03-24 DIAGNOSIS — Z86.711 HX PULMONARY EMBOLISM: ICD-10-CM

## 2023-03-24 DIAGNOSIS — Z86.718 HISTORY OF DVT OF LOWER EXTREMITY: ICD-10-CM

## 2023-03-24 DIAGNOSIS — K92.2 ACUTE UPPER GASTROINTESTINAL BLEEDING: ICD-10-CM

## 2023-03-24 DIAGNOSIS — Z79.01 LONG TERM CURRENT USE OF ANTICOAGULANT THERAPY: ICD-10-CM

## 2023-03-24 DIAGNOSIS — Z79.01 LONG TERM CURRENT USE OF ANTICOAGULANT THERAPY: Primary | ICD-10-CM

## 2023-03-24 LAB — INR BLD: 1.8 (ref 0.9–1.1)

## 2023-03-24 PROCEDURE — 85610 PROTHROMBIN TIME: CPT | Mod: ZL

## 2023-03-24 PROCEDURE — 36416 COLLJ CAPILLARY BLOOD SPEC: CPT | Mod: ZL

## 2023-03-24 NOTE — PROGRESS NOTES
ANTICOAGULATION MANAGEMENT     Rey Tristan 82 year old male is on warfarin with therapeutic INR result. (Goal INR 1.5-2.0)    Recent labs: (last 7 days)     03/24/23  1055   INR 1.8*       ASSESSMENT       Source(s): Chart Review    Previous INR was Therapeutic last 2(+) visits    Medication, diet, health changes since last INR chart reviewed; none identified           PLAN     Recommended plan for no diet, medication or health factor changes affecting INR     Dosing Instructions: Continue your current warfarin dose with next INR in 6 weeks       Summary  As of 3/24/2023    Full warfarin instructions:  6 mg every Mon, Fri; 4 mg all other days   Next INR check:  5/5/2023             Detailed voice message left for Rey with dosing instructions and follow up date.     Contact [phone] to schedule and with any changes, questions or concerns.     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

## 2023-04-28 ENCOUNTER — ANTICOAGULATION THERAPY VISIT (OUTPATIENT)
Dept: ANTICOAGULATION | Facility: OTHER | Age: 83
End: 2023-04-28
Attending: FAMILY MEDICINE
Payer: MEDICARE

## 2023-04-28 ENCOUNTER — TRANSFERRED RECORDS (OUTPATIENT)
Dept: HEALTH INFORMATION MANAGEMENT | Facility: CLINIC | Age: 83
End: 2023-04-28

## 2023-04-28 ENCOUNTER — LAB (OUTPATIENT)
Dept: LAB | Facility: OTHER | Age: 83
End: 2023-04-28
Payer: MEDICARE

## 2023-04-28 DIAGNOSIS — Z79.01 LONG TERM CURRENT USE OF ANTICOAGULANT THERAPY: Primary | ICD-10-CM

## 2023-04-28 DIAGNOSIS — Z86.718 HISTORY OF DVT OF LOWER EXTREMITY: ICD-10-CM

## 2023-04-28 DIAGNOSIS — Z86.711 HX PULMONARY EMBOLISM: ICD-10-CM

## 2023-04-28 DIAGNOSIS — K92.2 ACUTE UPPER GASTROINTESTINAL BLEEDING: ICD-10-CM

## 2023-04-28 DIAGNOSIS — Z79.01 CHRONIC ANTICOAGULATION: ICD-10-CM

## 2023-04-28 LAB — INR BLD: 1.6 (ref 0.9–1.1)

## 2023-04-28 PROCEDURE — 36416 COLLJ CAPILLARY BLOOD SPEC: CPT | Mod: ZL

## 2023-04-28 PROCEDURE — 85610 PROTHROMBIN TIME: CPT | Mod: ZL

## 2023-04-28 NOTE — PROGRESS NOTES
ANTICOAGULATION MANAGEMENT     Rey Tristan 83 year old male is on warfarin with therapeutic INR result. (Goal INR 1.5-2.0)    Recent labs: (last 7 days)     04/28/23  1006   INR 1.6*       ASSESSMENT       Source(s): Chart Review    Previous INR was Therapeutic last 2(+) visits    Medication, diet, health changes since last INR chart reviewed; none identified           PLAN     Recommended plan for no diet, medication or health factor changes affecting INR     Dosing Instructions: Continue your current warfarin dose with next INR in 6 weeks       Summary  As of 4/28/2023    Full warfarin instructions:  6 mg every Mon, Fri; 4 mg all other days   Next INR check:  6/9/2023             Detailed voice message left for Rey with dosing instructions and follow up date.     Contact [phone] to schedule and with any changes, questions or concerns.     Education provided: Please call back if any changes to your diet, medications or how you've been taking warfarin    Plan made per [facility] anticoagulation protocol    Tricia Watson RN  Anticoagulation Clinic  4/28/2023    _______________________________________________________________________     Anticoagulation Episode Summary     Current INR goal:  1.5-2.0   TTR:  100.0 % (1 y)   Target end date:  Indefinite   Send INR reminders to:  [facility]    Indications    Long-term (current) use of anticoagulants [Z79.01] [Z79.01]  Hx pulmonary embolism [Z86.711]  History of DVT of lower extremity [Z86.718]  Acute upper gastrointestinal bleeding [K92.2]           Comments:           Anticoagulation Care Providers     Provider Role Specialty Phone number    Gianna Richard MD Referring Family Medicine [phone]    Saleem Carr MD Referring Family Medicine [phone]

## 2023-05-01 DIAGNOSIS — Z86.711 HX PULMONARY EMBOLISM: ICD-10-CM

## 2023-05-01 DIAGNOSIS — K22.70 BARRETT'S ESOPHAGUS WITHOUT DYSPLASIA: ICD-10-CM

## 2023-05-01 DIAGNOSIS — K92.2 ACUTE UPPER GASTROINTESTINAL BLEEDING: ICD-10-CM

## 2023-05-01 DIAGNOSIS — Z86.718 HISTORY OF DVT OF LOWER EXTREMITY: ICD-10-CM

## 2023-05-01 DIAGNOSIS — Z79.01 LONG TERM CURRENT USE OF ANTICOAGULANT THERAPY: ICD-10-CM

## 2023-05-01 NOTE — TELEPHONE ENCOUNTER
warfarin 4mg  Last Written Prescription Date:  3/4/22  Last Fill Quantity: 120,   # refills: 3  Last Office Visit: 1/3/23  Future Office visit:    Next 5 appointments (look out 90 days)    Jul 21, 2023  9:45 AM  (Arrive by 9:30 AM)  PHYSICAL with Gianna Richard MD  [facility] ([facility] [place] ) [address] [facility] [address]  [phone]       Vitamin K Antagonists Failed    Rerun Protocol (5/1/2023 9:33 AM)    INR is within goal in the past 6 weeks    Confirm INR is within goal in the past 6 weeks.          Recent Labs   Lab Test 04/28/23  1006   INR 1.6*

## 2023-05-02 RX ORDER — WARFARIN SODIUM 4 MG/1
TABLET ORAL
Qty: 120 TABLET | Refills: 3 | Status: SHIPPED | OUTPATIENT
Start: 2023-05-02 | End: 2024-05-15

## 2023-05-02 RX ORDER — OMEPRAZOLE 40 MG/1
CAPSULE, DELAYED RELEASE ORAL
Qty: 90 CAPSULE | Refills: 3 | Status: SHIPPED | OUTPATIENT
Start: 2023-05-02 | End: 2023-06-05

## 2023-05-30 ENCOUNTER — OFFICE VISIT (OUTPATIENT)
Dept: FAMILY MEDICINE | Facility: OTHER | Age: 83
End: 2023-05-30
Attending: FAMILY MEDICINE
Payer: MEDICARE

## 2023-05-30 ENCOUNTER — HOSPITAL ENCOUNTER (OUTPATIENT)
Dept: ULTRASOUND IMAGING | Facility: HOSPITAL | Age: 83
Discharge: HOME | End: 2023-05-30
Attending: FAMILY MEDICINE
Payer: MEDICARE

## 2023-05-30 VITALS
HEIGHT: 65 IN | WEIGHT: 156.1 LBS | RESPIRATION RATE: 18 BRPM | OXYGEN SATURATION: 92 % | BODY MASS INDEX: 26.01 KG/M2 | DIASTOLIC BLOOD PRESSURE: 66 MMHG | TEMPERATURE: 98 F | SYSTOLIC BLOOD PRESSURE: 102 MMHG | HEART RATE: 65 BPM

## 2023-05-30 DIAGNOSIS — K40.90 LEFT INGUINAL HERNIA: ICD-10-CM

## 2023-05-30 DIAGNOSIS — E78.5 HYPERLIPIDEMIA, UNSPECIFIED HYPERLIPIDEMIA TYPE: ICD-10-CM

## 2023-05-30 DIAGNOSIS — M79.89 LEFT LEG SWELLING: Primary | ICD-10-CM

## 2023-05-30 DIAGNOSIS — Z87.19 HISTORY OF UPPER GASTROINTESTINAL BLEEDING: ICD-10-CM

## 2023-05-30 DIAGNOSIS — Z79.01 CHRONIC ANTICOAGULATION: ICD-10-CM

## 2023-05-30 DIAGNOSIS — M79.89 LEFT LEG SWELLING: ICD-10-CM

## 2023-05-30 DIAGNOSIS — Z87.891 PERSONAL HISTORY OF TOBACCO USE: ICD-10-CM

## 2023-05-30 DIAGNOSIS — K22.70 BARRETT'S ESOPHAGUS WITHOUT DYSPLASIA: ICD-10-CM

## 2023-05-30 PROCEDURE — G0463 HOSPITAL OUTPT CLINIC VISIT: HCPCS | Mod: 25

## 2023-05-30 PROCEDURE — 99215 OFFICE O/P EST HI 40 MIN: CPT | Performed by: FAMILY MEDICINE

## 2023-05-30 PROCEDURE — 93971 EXTREMITY STUDY: CPT | Mod: LT

## 2023-05-30 ASSESSMENT — PAIN SCALES - GENERAL: PAINLEVEL: NO PAIN (0)

## 2023-05-30 ASSESSMENT — PATIENT HEALTH QUESTIONNAIRE - PHQ9
SUM OF ALL RESPONSES TO PHQ QUESTIONS 1-9: 0
10. IF YOU CHECKED OFF ANY PROBLEMS, HOW DIFFICULT HAVE THESE PROBLEMS MADE IT FOR YOU TO DO YOUR WORK, TAKE CARE OF THINGS AT HOME, OR GET ALONG WITH OTHER PEOPLE: NOT DIFFICULT AT ALL
SUM OF ALL RESPONSES TO PHQ QUESTIONS 1-9: 0

## 2023-05-30 NOTE — PROGRESS NOTES
Assessment & Plan     Left leg swelling  Will ensure no DVT given his area of leg concern, although suspect an occasional superficial thrombophlebitis (not present currently) given his history of DVT and low INR target.  They report his INR is never outside of range when tested.  - No DVT found.  - US Lower Extremity Venous Duplex Left; Future    Left inguinal hernia  Recommended he discuss with surgeon as it's still small and not symptomatic - he is very active and currently still a good surgical candidate.  He declines.    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    Personal history of tobacco use  Recommend cessation.    Regarding his request for lung cancer screening, he is currently asymptomatic and doesn't meet screening criteria due to age.  We discussed potentially doing a CXR given his smoking history and questionable COPD history, although once again no symptoms.  We had a alia discussion about his age and his wishes if we found a cancer.  He would not chose treatment.  We also discussed would he want to know about it.  He chooses to not to test.    Similar discussion regarding PSA, discussed usually goes up with age and prostate size.  He is asymptomatic.  We discussed that prostate cancer is usually slow growing and he should develop symptoms.  We discussed steps if we check a PSA and it comes back elevated.  He wishes to NOT get testing at this time.      Time spent on date of service: 50 minutes including H&P and face-to-face discussion of the above.  This does not include documentation time.      BETTY HAGEN,  DO  [facility] [place]   Rey is a 83 year old, presenting for the following health issues:  Establish Care and COPD      HPI       83 year old male here with wife to establish care.  Patient of Dr. Richard, but switching due to long wait times to see her when they call.    He is requesting lung cancer screening.  Former smoker, quit , max 2/3 ppd, 51 yrs 2/3 ppd.    Brother (1 year older)  from lung cancer last year.  Mother  Dec 1949 from cancer, unsure if breast of Ovarian cancer.    History of DVT and PE.    Interested in prostate cancer screening.  Brother (5 yrs younger) diagnosed with prostate cancer.  Patient has no symptoms such as weak stream, waking to urinate, etc.    Concern of an occasional lump on his left shin that comes and goes, swelling and sore.  No current issue, although he feels a fullness in the area of concern.  Concerned due to history of DVT.  Apparently his INR goal is 1.5 to 2.0 due to history of DVT and GI bleed.    Wants cancer screenings.  Former smoker quit , max 2/3 ppd, 51 yrs x 2/3 ppd.  Brother dec from lung cancer (1 yr older) last year.  Mom also dec 1949 from breast cancer or ovarian cancer?    Prostate.  Brother (5 yrs younger) prostate cancer.  No symptoms.    Concern of a lump in his left groin, noticed this morning after heavy lifting doing yard work - lifted  yesterday.  No pain.      Oncology: Sees Nirmala Mahoney annually, note 23 reviewed.  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        Hyperlipidemia Follow-Up      Are you regularly taking any medication or supplement to lower your cholesterol?   Yes- Atorvastatin    Are you having muscle aches or other side effects that you think could be caused by your cholesterol lowering medication?  No    COPD Follow-Up    Overall, how are your COPD symptoms since your last clinic visit?  No change    How much fatigue  "or shortness of breath do you have when you are walking?  Same as usual    How much shortness of breath do you have when you are resting?  Same as usual    How often do you cough? Rarely    Have you noticed any change in your sputum/phlegm?  No    Have you experienced a recent fever? No    Please describe how far you can walk without stopping to rest:  1-2 miles    How many flights of stairs are you able to walk up without stopping?  1    Have you had any Emergency Room Visits, Urgent Care Visits, or Hospital Admissions because of your COPD since your last office visit?  No    History   Smoking Status     Former     Types: Cigarettes     Start date: 5/15/1958     Quit date: 10/28/2009   Smokeless Tobacco     Never           Review of Systems   Constitutional: Negative for appetite change and fever.   Respiratory: Negative for cough, shortness of breath and wheezing.    Cardiovascular: Negative for chest pain, palpitations and peripheral edema.   Gastrointestinal: Negative for abdominal pain.            Objective    /66   Pulse 65   Temp 98  F (36.7  C) (Tympanic)   Resp 18   Ht 1.638 m (5' 4.5\")   Wt 70.8 kg (156 lb 1.6 oz)   SpO2 92%   BMI 26.38 kg/m    Body mass index is 26.38 kg/m .  Physical Exam  Constitutional:       General: He is not in acute distress.     Appearance: Normal appearance.   Neck:      Vascular: No carotid bruit.   Cardiovascular:      Rate and Rhythm: Normal rate and regular rhythm.      Heart sounds: Normal heart sounds. No murmur heard.  Pulmonary:      Effort: Pulmonary effort is normal.      Breath sounds: Normal breath sounds. No wheezing, rhonchi or rales.   Abdominal:      General: Bowel sounds are normal. There is no distension.      Palpations: Abdomen is soft.      Tenderness: There is no abdominal tenderness.      Hernia: A hernia is present. Hernia is present in the left inguinal area. There is no hernia in the right inguinal area.   Lymphadenopathy:      Cervical: No " cervical adenopathy.      Lower Body: No right inguinal adenopathy. No left inguinal adenopathy.   Neurological:      Mental Status: He is alert and oriented to person, place, and time.

## 2023-06-05 RX ORDER — ATORVASTATIN CALCIUM 10 MG/1
10 TABLET, FILM COATED ORAL DAILY
Qty: 90 TABLET | Refills: 2 | Status: SHIPPED | OUTPATIENT
Start: 2023-06-05 | End: 2024-04-12

## 2023-06-05 RX ORDER — OMEPRAZOLE 40 MG/1
40 CAPSULE, DELAYED RELEASE ORAL DAILY
Qty: 90 CAPSULE | Refills: 3 | Status: SHIPPED | OUTPATIENT
Start: 2023-06-05 | End: 2024-05-22

## 2023-06-05 ASSESSMENT — ENCOUNTER SYMPTOMS
FEVER: 0
PALPITATIONS: 0
SHORTNESS OF BREATH: 0
COUGH: 0
APPETITE CHANGE: 0
WHEEZING: 0
ABDOMINAL PAIN: 0

## 2023-06-09 ENCOUNTER — LAB (OUTPATIENT)
Dept: LAB | Facility: OTHER | Age: 83
End: 2023-06-09
Payer: MEDICARE

## 2023-06-09 ENCOUNTER — ANTICOAGULATION THERAPY VISIT (OUTPATIENT)
Dept: ANTICOAGULATION | Facility: OTHER | Age: 83
End: 2023-06-09
Payer: MEDICARE

## 2023-06-09 DIAGNOSIS — Z79.01 LONG TERM CURRENT USE OF ANTICOAGULANT THERAPY: Primary | ICD-10-CM

## 2023-06-09 DIAGNOSIS — Z79.01 CHRONIC ANTICOAGULATION: ICD-10-CM

## 2023-06-09 DIAGNOSIS — Z86.711 HX PULMONARY EMBOLISM: ICD-10-CM

## 2023-06-09 DIAGNOSIS — K92.2 ACUTE UPPER GASTROINTESTINAL BLEEDING: ICD-10-CM

## 2023-06-09 DIAGNOSIS — Z86.718 HISTORY OF DVT OF LOWER EXTREMITY: ICD-10-CM

## 2023-06-09 LAB — INR BLD: 1.6 (ref 0.9–1.1)

## 2023-06-09 PROCEDURE — 85610 PROTHROMBIN TIME: CPT | Mod: ZL

## 2023-06-09 PROCEDURE — 36416 COLLJ CAPILLARY BLOOD SPEC: CPT | Mod: ZL

## 2023-06-09 NOTE — PROGRESS NOTES
ANTICOAGULATION MANAGEMENT     Rey Tristan 83 year old male is on warfarin with therapeutic INR result. (Goal INR 1.5-2.0)    Recent labs: (last 7 days)     06/09/23  1002   INR 1.6*       ASSESSMENT       Source(s): Chart Review       Warfarin doses taken: Warfarin taken as instructed    Diet: No new diet changes identified    New illness, injury, or hospitalization: No    Medication/supplement changes: None noted    Signs or symptoms of bleeding or clotting: No    Previous INR: Therapeutic last 2(+) visits    Additional findings: None       PLAN     Recommended plan for no diet, medication or health factor changes affecting INR     Dosing Instructions: Continue your current warfarin dose with next INR in 6 weeks       Summary  As of 6/9/2023    Full warfarin instructions:  6 mg every Mon, Fri; 4 mg all other days   Next INR check:  7/21/2023             Detailed voice message left for Rey with dosing instructions and follow up date.     Contact [phone] to schedule and with any changes, questions or concerns.     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

## 2023-07-17 ENCOUNTER — OFFICE VISIT (OUTPATIENT)
Dept: DERMATOLOGY | Facility: OTHER | Age: 83
End: 2023-07-17
Attending: DERMATOLOGY
Payer: COMMERCIAL

## 2023-07-17 VITALS
DIASTOLIC BLOOD PRESSURE: 60 MMHG | RESPIRATION RATE: 17 BRPM | TEMPERATURE: 97 F | SYSTOLIC BLOOD PRESSURE: 116 MMHG | OXYGEN SATURATION: 95 % | HEART RATE: 56 BPM

## 2023-07-17 DIAGNOSIS — L21.9 DERMATITIS, SEBORRHEIC: Primary | ICD-10-CM

## 2023-07-17 PROCEDURE — G0463 HOSPITAL OUTPT CLINIC VISIT: HCPCS

## 2023-07-17 PROCEDURE — 99203 OFFICE O/P NEW LOW 30 MIN: CPT | Performed by: DERMATOLOGY

## 2023-07-17 RX ORDER — KETOCONAZOLE 20 MG/G
CREAM TOPICAL
Qty: 30 G | Refills: 3 | Status: SHIPPED | OUTPATIENT
Start: 2023-07-17

## 2023-07-17 ASSESSMENT — PAIN SCALES - GENERAL: PAINLEVEL: NO PAIN (0)

## 2023-07-17 NOTE — LETTER
2023       RE: Radha Valverde  [address]   [place] [address]     Dear Colleague,    Thank you for referring your patient, Radha Valverde, to the [facility]. Please see a copy of my visit note below.    Visit Date: 2023    SUBJECTIVE:  First visit for Radha  who states that a few weeks ago, he had a haircut and somehow he thinks something might have gotten in his upper eyelids and since that time, he has had redness and slight puffiness of the eyelids.  He is not known to be allergic to anything, but feels that that incident had something to do with his eyelids being broken out.  He also has a lesion on his left ankle that appears to be a very tiny patch of eczema; however, it has not responded 0.1 triamcinolone.    OBJECTIVE:  Exam shows a healthy and pleasant gentleman in no distress.  Present on his eyelid is some redness and puffiness. Also has erythematous activity on the chin and mid cheek perinasal areas.    ASSESSMENT:  Probable seborrheic dermatitis, aggravated by something that may have gotten on his eyelids during a haircut.    PLAN:  Trial of ketoconazole cream nightly to the eyelids and 2 other red spots on his face.  Also, may try it on the left lateral ankle.  The ankle lesion is a patch of redness with slight papularity.  Did not appear to be anything dangerous to me, will simply make an empirical trial with an antifungal.  This is what ketoconazole is.  Return p.r.n., no response or poor response.    JENISE Monique MD        D: 2023   T: 2023   MT: robert    Name:     RADHA VALVERDE.  MRN:      [id number]        Account:    [id number]   :      1940           Visit Date: 2023     Document: [id number]      Again, thank you for allowing me to participate in the care of your patient.      Sincerely,    JENISE Monique MD

## 2023-07-17 NOTE — PROGRESS NOTES
Visit Date: 2023    SUBJECTIVE:  First visit for Rey  who states that a few weeks ago, he had a haircut and somehow he thinks something might have gotten in his upper eyelids and since that time, he has had redness and slight puffiness of the eyelids.  He is not known to be allergic to anything, but feels that that incident had something to do with his eyelids being broken out.  He also has a lesion on his left ankle that appears to be a very tiny patch of eczema; however, it has not responded 0.1 triamcinolone.    OBJECTIVE:  Exam shows a healthy and pleasant gentleman in no distress.  Present on his eyelid is some redness and puffiness. Also has erythematous activity on the chin and mid cheek perinasal areas.    ASSESSMENT:  Probable seborrheic dermatitis, aggravated by something that may have gotten on his eyelids during a haircut.    PLAN:  Trial of ketoconazole cream nightly to the eyelids and 2 other red spots on his face.  Also, may try it on the left lateral ankle.  The ankle lesion is a patch of redness with slight papularity.  Did not appear to be anything dangerous to me, will simply make an empirical trial with an antifungal.  This is what ketoconazole is.  Return p.r.n., no response or poor response.    JENISE Monique MD        D: 2023   T: 2023   MT: robert    Name:     REY VALVERDE  MRN:      [id number]        Account:    [id number]   :      1940           Visit Date: 2023     Document: [id number]

## 2023-07-21 ENCOUNTER — ANTICOAGULATION THERAPY VISIT (OUTPATIENT)
Dept: ANTICOAGULATION | Facility: OTHER | Age: 83
End: 2023-07-21
Payer: MEDICARE

## 2023-07-21 ENCOUNTER — LAB (OUTPATIENT)
Dept: LAB | Facility: OTHER | Age: 83
End: 2023-07-21
Payer: MEDICARE

## 2023-07-21 DIAGNOSIS — Z79.01 LONG TERM CURRENT USE OF ANTICOAGULANT THERAPY: Primary | ICD-10-CM

## 2023-07-21 DIAGNOSIS — Z79.01 CHRONIC ANTICOAGULATION: ICD-10-CM

## 2023-07-21 DIAGNOSIS — K92.2 ACUTE UPPER GASTROINTESTINAL BLEEDING: ICD-10-CM

## 2023-07-21 DIAGNOSIS — Z86.711 HX PULMONARY EMBOLISM: ICD-10-CM

## 2023-07-21 DIAGNOSIS — Z86.718 HISTORY OF DVT OF LOWER EXTREMITY: ICD-10-CM

## 2023-07-21 LAB — INR BLD: 1.7 (ref 0.9–1.1)

## 2023-07-21 PROCEDURE — 36416 COLLJ CAPILLARY BLOOD SPEC: CPT | Mod: ZL

## 2023-07-21 PROCEDURE — 85610 PROTHROMBIN TIME: CPT | Mod: ZL

## 2023-07-21 NOTE — PROGRESS NOTES
ANTICOAGULATION MANAGEMENT     Rey Tristan 83 year old male is on warfarin with therapeutic INR result. (Goal INR 1.5-2.0)    Recent labs: (last 7 days)     07/21/23  0959   INR 1.7*       ASSESSMENT       Source(s): Chart Review       Warfarin doses taken: Warfarin taken as instructed    Diet: No new diet changes identified    New illness, injury, or hospitalization: No    Medication/supplement changes: None noted    Signs or symptoms of bleeding or clotting: No    Previous INR: Therapeutic last 2(+) visits    Additional findings: None       PLAN     Recommended plan for no diet, medication or health factor changes affecting INR     Dosing Instructions: Continue your current warfarin dose with next INR in 6 weeks       Summary  As of 7/21/2023    Full warfarin instructions:  6 mg every Mon, Fri; 4 mg all other days   Next INR check:  9/1/2023             Detailed voice message left for Rey with dosing instructions and follow up date.     Contact [phone] and [phone] to schedule and with any changes, questions or concerns.     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

## 2023-08-04 ENCOUNTER — TELEPHONE (OUTPATIENT)
Dept: FAMILY MEDICINE | Facility: OTHER | Age: 83
End: 2023-08-04

## 2023-08-04 NOTE — TELEPHONE ENCOUNTER
Patient called and reported that he missed his INR dose last evening. He was given instructions to take now and take his evening coumadin a little later than usual. He is to continue his usual dosing and follow up as instructed. Tricia Watson RN on 8/4/2023 at 9:16 AM

## 2023-09-01 ENCOUNTER — LAB (OUTPATIENT)
Dept: LAB | Facility: OTHER | Age: 83
End: 2023-09-01
Attending: FAMILY MEDICINE
Payer: MEDICARE

## 2023-09-01 ENCOUNTER — ANTICOAGULATION THERAPY VISIT (OUTPATIENT)
Dept: ANTICOAGULATION | Facility: OTHER | Age: 83
End: 2023-09-01
Attending: FAMILY MEDICINE
Payer: COMMERCIAL

## 2023-09-01 DIAGNOSIS — Z86.718 HISTORY OF DVT OF LOWER EXTREMITY: ICD-10-CM

## 2023-09-01 DIAGNOSIS — Z86.711 HX PULMONARY EMBOLISM: ICD-10-CM

## 2023-09-01 DIAGNOSIS — K92.2 ACUTE UPPER GASTROINTESTINAL BLEEDING: ICD-10-CM

## 2023-09-01 DIAGNOSIS — Z79.01 LONG TERM CURRENT USE OF ANTICOAGULANT THERAPY: Primary | ICD-10-CM

## 2023-09-01 DIAGNOSIS — Z79.01 CHRONIC ANTICOAGULATION: ICD-10-CM

## 2023-09-01 LAB — INR BLD: 1.5 (ref 0.9–1.1)

## 2023-09-01 PROCEDURE — 85610 PROTHROMBIN TIME: CPT | Mod: ZL

## 2023-09-01 PROCEDURE — 36415 COLL VENOUS BLD VENIPUNCTURE: CPT | Mod: ZL

## 2023-09-01 NOTE — PROGRESS NOTES
ANTICOAGULATION MANAGEMENT     Rey Tristan 83 year old male is on warfarin with therapeutic INR result. (Goal INR 1.5-2.0)    Recent labs: (last 7 days)     09/01/23  1024   INR 1.5*       ASSESSMENT     Source(s): Chart Review and Patient/Caregiver Call     Warfarin doses taken: Warfarin taken as instructed  Diet: No new diet changes identified  New illness, injury, or hospitalization: No  Medication/supplement changes: None noted  Signs or symptoms of bleeding or clotting: No  Previous INR: Therapeutic last 2(+) visits  Additional findings: None     PLAN     Recommended plan for no diet, medication or health factor changes affecting INR     Dosing Instructions: Continue your current warfarin dose with next INR in 6 weeks       Summary  As of 9/1/2023      Full warfarin instructions:  6 mg every Mon, Fri; 4 mg all other days   Next INR check:  10/13/2023               Telephone call with Rey who verbalizes understanding and agrees to plan    Lab visit scheduled    Education provided: Please call back if any changes to your diet, medications or how you've been taking warfarin    Plan made per [facility] anticoagulation protocol    Rhonda Lopez, RN  Anticoagulation Clinic  9/1/2023    _______________________________________________________________________     Anticoagulation Episode Summary       Current INR goal:  1.5-2.0   TTR:  100.0 % (1 y)   Target end date:  Indefinite   Send INR reminders to:  [facility]    Indications    Long-term (current) use of anticoagulants [Z79.01] [Z79.01]  Hx pulmonary embolism [Z86.711]  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

## 2023-09-11 ENCOUNTER — OFFICE VISIT (OUTPATIENT)
Dept: FAMILY MEDICINE | Facility: OTHER | Age: 83
End: 2023-09-11
Attending: FAMILY MEDICINE
Payer: COMMERCIAL

## 2023-09-11 VITALS
HEART RATE: 58 BPM | BODY MASS INDEX: 25.89 KG/M2 | SYSTOLIC BLOOD PRESSURE: 119 MMHG | TEMPERATURE: 96.7 F | OXYGEN SATURATION: 96 % | DIASTOLIC BLOOD PRESSURE: 72 MMHG | WEIGHT: 153.2 LBS

## 2023-09-11 DIAGNOSIS — J01.90 ACUTE SINUSITIS, RECURRENCE NOT SPECIFIED, UNSPECIFIED LOCATION: Primary | ICD-10-CM

## 2023-09-11 DIAGNOSIS — K40.90 LEFT INGUINAL HERNIA: ICD-10-CM

## 2023-09-11 PROCEDURE — G0463 HOSPITAL OUTPT CLINIC VISIT: HCPCS

## 2023-09-11 PROCEDURE — 99213 OFFICE O/P EST LOW 20 MIN: CPT | Performed by: FAMILY MEDICINE

## 2023-09-11 ASSESSMENT — PAIN SCALES - GENERAL: PAINLEVEL: NO PAIN (0)

## 2023-09-11 NOTE — PROGRESS NOTES
Assessment & Plan     Acute sinusitis, recurrence not specified, unspecified location  - amoxicillin-clavulanate (AUGMENTIN) 875-125 MG tablet; Take 1 tablet by mouth 2 times daily for 10 days    Left inguinal hernia  - Adult General Surg Referral        BETTY HAGEN [facility] [place] is a 83 year old, presenting for the following health issues:  Sinus Problem and Eye Problem (Right Eye Swollen)        9/11/2023     3:47 PM   Additional Questions   Roomed by Zenaida Yanes   Accompanied by none         9/11/2023     3:47 PM   Patient Reported Additional Medications   Patient reports taking the following new medications none       HPI     Here for sinus congestion and swelling around right eye.  States these are his typical symptoms for sinus infections.  No pain with eye movement, no vision change, no eye discharge.    Acute Illness  Acute illness concerns: Sinsus issues, swollen right eye  Onset/Duration: 9/8/23  Symptoms:  Fever: No  Chills/Sweats: No  Headache (location?): No  Sinus Pressure: YES- above both eyes  Conjunctivitis:  YES- right eye drainage in the morning/eye swelling  Ear Pain: no  Rhinorrhea: No  Congestion: No  Sore Throat: No  Cough: no  Wheeze: No  Decreased Appetite: No  Nausea: No  Vomiting: No  Diarrhea: No  Dysuria/Freq.: No  Dysuria or Hematuria: No  Fatigue/Achiness: No  Sick/Strep Exposure: No  Therapies tried and outcome: tylenol      Has a hernia he's interested in getting fixed.  No significant pain, no bowel/bladder changes.      Review of Systems   Constitutional:  Negative for fever.   HENT:  Negative for ear pain and sore throat.    Gastrointestinal:  Negative for abdominal pain.            Objective    /72 (BP Location: Left arm, Patient Position: Sitting, Cuff Size: Adult Regular)   Pulse 58   Temp (!) 96.7  F (35.9  C) (Tympanic)   Wt 69.5 kg (153 lb 3.2 oz)   SpO2 96%   BMI 25.89 kg/m    Body mass index is 25.89  "kg/m .  Physical Exam  Constitutional:       Appearance: Normal appearance.   HENT:      Right Ear: Tympanic membrane normal.      Left Ear: Tympanic membrane normal.      Nose: Congestion present.      Mouth/Throat:      Mouth: Mucous membranes are moist.      Pharynx: Oropharynx is clear.   Eyes:      Extraocular Movements: Extraocular movements intact.      Conjunctiva/sclera: Conjunctivae normal.      Pupils: Pupils are equal, round, and reactive to light.      Comments: Boggy \"allergy\" swelling appearance right periorbital area - no tenderness/fluctuance/cellulitis   Cardiovascular:      Rate and Rhythm: Normal rate and regular rhythm.      Heart sounds: No murmur heard.  Pulmonary:      Effort: Pulmonary effort is normal.      Breath sounds: Normal breath sounds. No wheezing.   Abdominal:      General: Bowel sounds are normal.      Palpations: Abdomen is soft.      Comments: PARAM   Neurological:      Mental Status: He is alert.                              "

## 2023-09-15 ENCOUNTER — LAB (OUTPATIENT)
Dept: LAB | Facility: OTHER | Age: 83
End: 2023-09-15
Payer: MEDICARE

## 2023-09-15 ENCOUNTER — ANTICOAGULATION THERAPY VISIT (OUTPATIENT)
Dept: ANTICOAGULATION | Facility: OTHER | Age: 83
End: 2023-09-15
Payer: COMMERCIAL

## 2023-09-15 DIAGNOSIS — K92.2 ACUTE UPPER GASTROINTESTINAL BLEEDING: ICD-10-CM

## 2023-09-15 DIAGNOSIS — Z79.01 CHRONIC ANTICOAGULATION: ICD-10-CM

## 2023-09-15 DIAGNOSIS — Z86.711 HX PULMONARY EMBOLISM: ICD-10-CM

## 2023-09-15 DIAGNOSIS — Z79.01 LONG TERM CURRENT USE OF ANTICOAGULANT THERAPY: ICD-10-CM

## 2023-09-15 DIAGNOSIS — Z86.718 HISTORY OF DVT OF LOWER EXTREMITY: ICD-10-CM

## 2023-09-15 DIAGNOSIS — Z79.01 LONG TERM CURRENT USE OF ANTICOAGULANT THERAPY: Primary | ICD-10-CM

## 2023-09-15 LAB — INR BLD: 1.4 (ref 0.9–1.1)

## 2023-09-15 PROCEDURE — 85610 PROTHROMBIN TIME: CPT | Mod: ZL

## 2023-09-15 PROCEDURE — 36416 COLLJ CAPILLARY BLOOD SPEC: CPT | Mod: ZL

## 2023-09-15 NOTE — PROGRESS NOTES
ANTICOAGULATION MANAGEMENT     Rey Tristan 83 year old male is on warfarin with subtherapeutic INR result. (Goal INR 1.5-2.0)    Recent labs: (last 7 days)     09/15/23  0956   INR 1.4*       ASSESSMENT     Source(s): Chart Review and Patient/Caregiver Call     Warfarin doses taken: Warfarin taken as instructed  Diet: No new diet changes identified  New illness, injury, or hospitalization: Yes: Acute Sinuatus  Medication/supplement changes:  Augmentin 10 day course (dates: 9/11-9/21) not expected to affect INR, but may increase risk of bleeding  Signs or symptoms of bleeding or clotting: No  Previous INR: Therapeutic last 2(+) visits  Additional findings: None     PLAN     Recommended plan for temporary change(s) affecting INR     Dosing Instructions: Continue your current warfarin dose with next INR in 1 week       Summary  As of 9/15/2023      Full warfarin instructions:  6 mg every Mon, Fri; 4 mg all other days   Next INR check:  9/22/2023               Detailed voice message left for Rey with dosing instructions and follow up date.     Left message for patient to make an appointment for follow up.     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

## 2023-09-17 ASSESSMENT — ENCOUNTER SYMPTOMS
ABDOMINAL PAIN: 0
SORE THROAT: 0
FEVER: 0

## 2023-09-19 ENCOUNTER — PREP FOR PROCEDURE (OUTPATIENT)
Dept: SURGERY | Facility: OTHER | Age: 83
End: 2023-09-19

## 2023-09-19 ENCOUNTER — OFFICE VISIT (OUTPATIENT)
Dept: SURGERY | Facility: OTHER | Age: 83
End: 2023-09-19
Attending: SURGERY
Payer: COMMERCIAL

## 2023-09-19 VITALS
SYSTOLIC BLOOD PRESSURE: 108 MMHG | WEIGHT: 143 LBS | HEART RATE: 62 BPM | TEMPERATURE: 97.5 F | DIASTOLIC BLOOD PRESSURE: 60 MMHG | OXYGEN SATURATION: 94 % | BODY MASS INDEX: 23.82 KG/M2 | HEIGHT: 65 IN

## 2023-09-19 DIAGNOSIS — K40.90 LEFT INGUINAL HERNIA: Primary | ICD-10-CM

## 2023-09-19 PROCEDURE — G0463 HOSPITAL OUTPT CLINIC VISIT: HCPCS

## 2023-09-19 PROCEDURE — 99204 OFFICE O/P NEW MOD 45 MIN: CPT | Performed by: SURGERY

## 2023-09-19 ASSESSMENT — PAIN SCALES - GENERAL: PAINLEVEL: NO PAIN (0)

## 2023-09-19 NOTE — PATIENT INSTRUCTIONS
Thank you for allowing Dr. Dempsey and our surgical team to participate in your care. Please call our health unit coordinator at [phone] with scheduling questions or the nurse at [phone] with any other questions or concerns.    You have been scheduled for: OPEN LEFT INGUINAL HERNIA REPAIR with  on OCTOBER 23RD.   PAT SCHEDULED OCT 16TH 10:30.  Please see handout for additional instruction.  You WILL need a pre-operative appointment with your primary care provider.  You may call [phone] or [phone] with any questions.

## 2023-09-19 NOTE — PROGRESS NOTES
CLINIC NOTE - CONSULT  9/19/2023    Patient:Rey Tristan  Referring Physician: Kenneth Alonzo    Reason for Referral: Left Inguinal Hernia    This is a 83 year old male with a left  inguinal hernia.   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 Simeon; silent; transient hyperthyroid, now normal; monitor TSH monthly for hypothyroidism    Chronic anticoagulation 1/1/2012    Elevated serum alkaline phosphatase level 5/4/2016    normal GGT, normal bone skeletal survery    Gastric ulcer     endoscopy 6/2016, repeat 6 months; PPI Carafate    Hyperlipidemia     Normocytic anemia 5/4/2016    Pulmonary nodule     CT 5/2014, right; consider repeat 1 year if high risk    Upper GI bleed 11/30/2017    Vitamin D deficiency 1/1/2012       Past Surgical History:  Past Surgical History:   Procedure Laterality Date    ANGIOGRAM  6/23/2014    BIOPSY  2018    bone marrow biopsy    BONE MARROW BIOPSY, BONE SPECIMEN, NEEDLE/TROCAR N/A 1/16/2018    Procedure: BIOPSY BONE MARROW;  BONE MARROW BIOPSY;  Surgeon: Nuno Castro MD;  Location: [facility]    C. Difficile with intussuseption      cataract extraction      colonoscopy      COLONOSCOPY  6/17    ENDOSCOPY UPPER, COLONOSCOPY, COMBINED N/A 6/17/2016    Procedure: COMBINED ENDOSCOPY UPPER, COLONOSCOPY;  Surgeon: Andrea Stearns DO;  Location: [facility]    ESOPHAGOSCOPY, GASTROSCOPY, DUODENOSCOPY (EGD), COMBINED N/A 11/30/2017    Procedure: COMBINED ESOPHAGOSCOPY, GASTROSCOPY, DUODENOSCOPY (EGD);  UPPER ENDOSCOPY;  Surgeon: Narinder Torres MD;  Location: [facility] OR    left knee meniscal tear      Left lower extremity DVT      lens implant      nasal polypectomy      Pulmonary Embolism      [facility]CHO ([place]  6/23/2014    Dr. Cavazos       Family History History:  Family  History   Problem Relation Age of Onset    Cancer Mother         Ovarian    Other Cancer Mother         lung/breast/ cervical ?    Respiratory Father         emphysemia    Lung Cancer Brother     Diabetes No family hx of     Hypertension No family hx of     Hyperlipidemia No family hx of     Thyroid Disease No family hx of     Asthma No family hx of     Colon Cancer No family hx of     Prostate Cancer No family hx of     Anesthesia Reaction No family hx of     Genetic Disorder No family hx of     Cerebrovascular Disease No family hx of     Coronary Artery Disease No family hx of     Breast Cancer No family hx of        History of Tobacco Use:  History   Smoking Status    Former    Types: Cigarettes    Start date: 5/15/1958    Quit date: 10/28/2009   Smokeless Tobacco    Never       Current Medications:  Current Outpatient Medications   Medication Sig Dispense Refill    amoxicillin-clavulanate (AUGMENTIN) 875-125 MG tablet Take 1 tablet by mouth 2 times daily for 10 days 20 tablet 0    Ascorbic Acid (VITAMIN C) 500 MG CAPS       atorvastatin (LIPITOR) 10 MG tablet Take 1 tablet (10 mg) by mouth daily 90 tablet 2    Biotin 5000 MCG PO CAPS Take 1,000 mg by mouth       Cholecalciferol (VITAMIN D) 400 UNITS tablet Take 1,000 Units by mouth daily       cyanocobalamin ([employer] VITAMIN  B12) 1000 MCG TABS Take 1,000 mcg by mouth daily 90 tablet 3    ferrous sulfate (FEROSUL) 325 (65 Fe) MG tablet Take 325 mg by mouth every other day      ketoconazole (NIZORAL) 2 % external cream Apply to eyelids and red spots lower face at bedtime 30 g 3    Multiple Vitamins-Minerals (MULTIVITAMIN ADULT PO) Take 1 tablet by mouth      omeprazole (PRILOSEC) 40 MG DR capsule Take 1 capsule (40 mg) by mouth daily 90 capsule 3    pyridoxine (VITAMIN B-6) 50 MG TABS Take 1 tablet (50 mg) by mouth daily 90 tablet 3    triamcinolone (KENALOG) 0.1 % external ointment Apply topically 2 times daily 30 g 1    warfarin ANTICOAGULANT (COUMADIN) 4 MG tablet  "TAKE 1 1/2 TABLETS BY MOUTH ON MONDAYS AND FRIDAYS, TAKE 1 TABLET ON ALL OTHER DAYS 120 tablet 3       Allergies:  No Known Allergies    ROS:  Pertinent items are noted in HPI.  All other systems are negative.    PHYSICAL EXAM:     Vital signs: /60 (BP Location: Left arm, Cuff Size: Adult Regular)   Pulse 62   Temp 97.5  F (36.4  C) (Tympanic)   Ht 1.638 m (5' 4.5\")   Wt 64.9 kg (143 lb)   SpO2 94%   BMI 24.17 kg/m     Weight: [unfilled]   BMI: Body mass index is 24.17 kg/m .   General: Normal, healthy, cooperative, in no acute distress, alert   Skin: no jaundice   HEENT: PERRLA and EOMI   Neck: supple      Groin:  Normal male genitalia.  Positive left  inguinal hernia(s).  Hernias are not tender.  Hernias are reducible.      ASSESSMENT: 83 year old male with a left  inguinal hernia hernia.  The hernia is not recurrent.  The hernia is not symptomatic.  The herniais not incarcerated.  The hernia is not tender.    PLAN:  Discussed the options with the patient.  After discussion patients elects for surgical intervention and repair.  Will plan on a left  open inguinal hernia hernia repair.  The procedure was explained with its risk, benefits and alternatives.  Risks include but are not limited to recurrence, infections, damage to internal organs, need for additional procedures, reactions to anesthesia.  All of the patients questions were answered.  The patient consents to proceed with the plan above.  The procedure will be scheduled.    Pre-operative physical : YES    "

## 2023-09-22 ENCOUNTER — LAB (OUTPATIENT)
Dept: LAB | Facility: OTHER | Age: 83
End: 2023-09-22
Payer: MEDICARE

## 2023-09-22 ENCOUNTER — ANTICOAGULATION THERAPY VISIT (OUTPATIENT)
Dept: ANTICOAGULATION | Facility: OTHER | Age: 83
End: 2023-09-22
Payer: COMMERCIAL

## 2023-09-22 DIAGNOSIS — Z86.711 HX PULMONARY EMBOLISM: ICD-10-CM

## 2023-09-22 DIAGNOSIS — Z79.01 LONG TERM CURRENT USE OF ANTICOAGULANT THERAPY: Primary | ICD-10-CM

## 2023-09-22 DIAGNOSIS — Z79.01 CHRONIC ANTICOAGULATION: ICD-10-CM

## 2023-09-22 DIAGNOSIS — K92.2 ACUTE UPPER GASTROINTESTINAL BLEEDING: ICD-10-CM

## 2023-09-22 DIAGNOSIS — Z86.718 HISTORY OF DVT OF LOWER EXTREMITY: ICD-10-CM

## 2023-09-22 LAB — INR BLD: 1.4 (ref 0.9–1.1)

## 2023-09-22 PROCEDURE — 36415 COLL VENOUS BLD VENIPUNCTURE: CPT | Mod: ZL

## 2023-09-22 PROCEDURE — 85610 PROTHROMBIN TIME: CPT | Mod: ZL

## 2023-09-22 NOTE — PROGRESS NOTES
ANTICOAGULATION MANAGEMENT     Rey Tristan 83 year old male is on warfarin with subtherapeutic INR result. (Goal INR 1.5-2.0)    Recent labs: (last 7 days)     09/22/23  1040   INR 1.4*       ASSESSMENT     Source(s): Chart Review and Patient/Caregiver Call     Warfarin doses taken: Warfarin taken as instructed  Diet: No new diet changes identified  New illness, injury, or hospitalization: No  Medication/supplement changes:  Patient completed his Augmentin today.   Signs or symptoms of bleeding or clotting: No  Previous INR: Subtherapeutic  Additional findings: None and Augmentin is anticipated to be decreasing the effects of warfarin.  Having hernia repair on 10/23/23     PLAN     Recommended plan for temporary change(s) affecting INR     Dosing Instructions: Continue your current warfarin dose with next INR in 2 weeks       Summary  As of 9/22/2023      Full warfarin instructions:  6 mg every Mon, Fri; 4 mg all other days   Next INR check:  10/13/2023               Telephone call with Rey who verbalizes understanding and agrees to plan    Lab visit scheduled    Education provided:  Patient prefers to wait till his pre op visit to have INR rechecked he is having Hernia Repair on 10/23/23.    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

## 2023-10-11 ENCOUNTER — TELEPHONE (OUTPATIENT)
Dept: ONCOLOGY | Facility: OTHER | Age: 83
End: 2023-10-11

## 2023-10-11 NOTE — TELEPHONE ENCOUNTER
Patient called states he is having hernia surgery with Dr Dempsey 10/23/2023. He was told to stop hid vitamins 10 days prior to surgery: Vitamin B6, Vitamin B12, Vitamin D, Multivitamin, Vitamin C, Biotin, and Ferrous Sulfate.  Patient states the Vitamin B6, Vitamin B12, and Ferrous sulfate were order to take by you. Is it ok to Hold those medication for 10 days prior to surgery. Patient is very worried and would like to confirm its ok to hold those medications. Please advise and I will let patient know.  Jayleen Fleming LPN

## 2023-10-13 ENCOUNTER — ANTICOAGULATION THERAPY VISIT (OUTPATIENT)
Dept: ANTICOAGULATION | Facility: OTHER | Age: 83
End: 2023-10-13
Attending: FAMILY MEDICINE
Payer: COMMERCIAL

## 2023-10-13 ENCOUNTER — LAB (OUTPATIENT)
Dept: LAB | Facility: OTHER | Age: 83
End: 2023-10-13
Payer: MEDICARE

## 2023-10-13 DIAGNOSIS — Z86.718 HISTORY OF DVT OF LOWER EXTREMITY: ICD-10-CM

## 2023-10-13 DIAGNOSIS — Z79.01 CHRONIC ANTICOAGULATION: ICD-10-CM

## 2023-10-13 DIAGNOSIS — K92.2 ACUTE UPPER GASTROINTESTINAL BLEEDING: ICD-10-CM

## 2023-10-13 DIAGNOSIS — Z86.711 HX PULMONARY EMBOLISM: ICD-10-CM

## 2023-10-13 DIAGNOSIS — Z79.01 LONG TERM CURRENT USE OF ANTICOAGULANT THERAPY: Primary | ICD-10-CM

## 2023-10-13 LAB — INR BLD: 1.3 (ref 0.9–1.1)

## 2023-10-13 PROCEDURE — 36416 COLLJ CAPILLARY BLOOD SPEC: CPT | Mod: ZL

## 2023-10-13 PROCEDURE — 85610 PROTHROMBIN TIME: CPT | Mod: ZL

## 2023-10-13 NOTE — PROGRESS NOTES
ANTICOAGULATION MANAGEMENT     Rey Tristan 83 year old male is on warfarin with subtherapeutic INR result. (Goal INR 1.5-2.0)    Recent labs: (last 7 days)     10/13/23  1045   INR 1.3*       ASSESSMENT     Source(s): Chart Review and Patient/Caregiver Call     Warfarin doses taken: Warfarin taken as instructed  Diet: Increased greens/vitamin K in diet; plans to resume previous intake  New illness, injury, or hospitalization: No  Medication/supplement changes: None noted  Signs or symptoms of bleeding or clotting: No  Previous INR: Subtherapeutic  Additional findings: Upcoming surgery/procedure Hernia repair on 10/23/23.     PLAN     Recommended plan for temporary change(s) affecting INR     Dosing Instructions:  continue warfarin dose until 10/17/23, stop warfarin 10/18-10/22/23, restart warfarin on 10/23 with 8 mg that evening, then resume usual dosing  with next INR in 2 weeks       Summary  As of 10/13/2023      Full warfarin instructions:  10/18: Hold; 10/19: Hold; 10/20: Hold; 10/21: Hold; 10/22: Hold; 10/23: 8 mg; Otherwise 6 mg every Mon, Fri; 4 mg all other days   Next INR check:  10/30/2023               Telephone call with Rey who verbalizes understanding and agrees to plan    Lab visit scheduled    Education provided: Goal range and significance of current result, Importance of therapeutic range, Importance of following up at instructed interval, Importance of taking warfarin as instructed, Monitoring for clotting signs and symptoms, and education and risks of not using Lovenox     Plan made per [facility] anticoagulation protocol    Tricia Watson RN  Anticoagulation Clinic  10/13/2023    _______________________________________________________________________     Anticoagulation Episode Summary       Current INR goal:  1.5-2.0   TTR:  88.5% (1 y)   Target end date:  Indefinite   Send INR reminders to:  [facility]    Indications    Long-term (current) use of anticoagulants [Z79.01]  [Z79.01]  Hx pulmonary embolism [Z86.711]  History of DVT of lower extremity [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

## 2023-10-13 NOTE — CONFIDENTIAL NOTE
Dr. Alonzo: Patient has a procedure on 10/23/23. He has been encouraged to hold warfarin for 3 days prior to procedure. He does not like the idea of taking Enoxaparin, due to risk of another GI bleed and the cost of the injections. Please let us know if he agrees to Lovenox injections. Are you in agreement with the plan? Thank you, Pamella TRINH RN

## 2023-10-16 NOTE — H&P (VIEW-ONLY)
[facility] [place]  [address] [place] [address]  Phone: [phone]  Primary Provider: Kenneth Hagen  Pre-op Performing Provider: KENNETH HAGEN      PREOPERATIVE EVALUATION:  Today's date: 10/17/2023    Rey is a 83 year old male who presents for a preoperative evaluation.      10/17/2023     8:48 AM   Additional Questions   Roomed by Jessica Almendarez       Surgical Information:  Surgery/Procedure: Hernia Repair  [place] [facility] [place]geon: Dr. SARAH Dempsey  Surgery Date: 10/23/23  Time of Surgery: TBD  Where patient plans to recover: At home with family  Fax number for surgical facility: Note does not need to be faxed, will be available electronically in Epic.    Assessment & Plan     The proposed surgical procedure is considered INTERMEDIATE risk.    Preoperative examination  - EKG 12-lead complete w/read - Clinics  - CBC with Platelets & Differential  - Comprehensive metabolic panel    Left inguinal hernia    Hyperlipidemia, unspecified hyperlipidemia type  - Lipid Profile  - TSH with free T4 reflex    Chronic anticoagulation    History of DVT of lower extremity    History of upper gastrointestinal bleeding           - No identified additional risk factors other than previously addressed    Antiplatelet or Anticoagulation Medication Instructions:  Discussed warfarin with INR Clinic as well as Heme/Onc Dr. Mendieta.  Instructions have changed a few times, but final decision is holding warfarin x5 days prior to surgery, restarting after surgery, no Lovenox bridge.  See Heme office note 10/19/23.    Additional Medication Instructions:  Supplements already held - x2 weeks prior to surgery      RECOMMENDATION:  APPROVAL GIVEN to proceed with proposed procedure, without further diagnostic evaluation.        Subjective       HPI related to upcoming procedure: preop left inguinal hernia          10/17/2023     8:43 AM   Preop Questions   1. Have you ever had a heart attack or stroke? No   2. Have  you ever had surgery on your heart or blood vessels, such as a stent placement, a coronary artery bypass, or surgery on an artery in your head, neck, heart, or legs? No   3. Do you have chest pain with activity? No   4. Do you have a history of  heart failure? No   5. Do you currently have a cold, bronchitis or symptoms of other infection? No   6. Do you have a cough, shortness of breath, or wheezing? No   7. Do you or anyone in your family have previous history of blood clots? YES -    8. Do you or does anyone in your family have a serious bleeding problem such as prolonged bleeding following surgeries or cuts? No   9. Have you ever had problems with anemia or been told to take iron pills? YES - h/o anemic, takes iron EOD   10. Have you had any abnormal blood loss such as black, tarry or bloody stools? Nothing recent - 2017 upper GI bleed     No latex allergy  No pacer, no artificial joints, no metal in body  No past anesthesia issues  Snores - never tested for CAROLINA        Health Care Directive:  Patient does not have a Health Care Directive or Living Will: Discussed advance care planning with patient; information given to patient to review.    Preoperative Review of :   reviewed - no record of controlled substances prescribed.      Status of Chronic Conditions:  History of DVT/PE  Chronic Warfarin, INR target has been 1.5-2.0  MGUS  Holt's Esophagus  History of Upper GI Bleed (2017?)  HLP    Review of Systems   Constitutional:  Negative for fever.   Respiratory:  Negative for chest tightness and shortness of breath.    Cardiovascular:  Negative for chest pain, palpitations and peripheral edema.   Gastrointestinal:  Negative for abdominal pain.   Neurological:  Negative for light-headedness and headaches.         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 08/23/2016     Priority: Medium    PUD (peptic ulcer disease) 08/23/2016     Priority: Medium    Long-term (current) use of anticoagulants [Z79.01] 07/27/2016     Priority: Medium    Elevated serum alkaline phosphatase level 05/04/2016     Priority: Medium    Normocytic anemia 05/04/2016     Priority: Medium    Colonoscopy refused 04/28/2016     Priority: Medium     Patient refused colonoscopy given occult stool kit        ACP (advance care planning) 04/28/2016     Priority: Medium     Advance Care Planning 4/28/2016: ACP Review of Chart / Resources Provided:  Reviewed chart for advance care plan.  Rey TERRA Tristan has been provided information and resources to begin or update their advance care plan.  Added by Jayleen Fleming            Hyperlipidemia      Priority: Medium    Hx pulmonary embolism 05/03/2013     Priority: Medium    History of DVT of lower extremity 04/19/2013     Priority: Medium    Advanced care planning/counseling discussion 11/27/2012     Priority: Medium    Colitis due to Clostridium difficile 05/19/2011     Priority: Medium    Pulmonary embolism (H) 05/18/2011     Priority: Medium     Overview:   Bilateral noted on 5/18/2011      History of tobacco use 05/17/2011     Priority: Medium    Hyponatremia 05/17/2011     Priority: Medium    Deep vein thrombosis (DVT) of lower extremity (H) 05/17/2011     Priority: Medium    Vitamin D deficiency 05/17/2011     Priority: Medium      Past Medical History:   Diagnosis Date    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 Simeon; silent; transient hyperthyroid, now normal; monitor TSH  monthly for hypothyroidism    Chronic anticoagulation 1/1/2012    Elevated serum alkaline phosphatase level 5/4/2016    normal GGT, normal bone skeletal survery    Gastric ulcer     endoscopy 6/2016, repeat 6 months; PPI Carafate    Hyperlipidemia     Normocytic anemia 5/4/2016    Pulmonary nodule     CT 5/2014, right; consider repeat 1 year if high risk    Upper GI bleed 11/30/2017    Vitamin D deficiency 1/1/2012     Past Surgical History:   Procedure Laterality Date    ANGIOGRAM  6/23/2014    BIOPSY  2018    bone marrow biopsy    BONE MARROW BIOPSY, BONE SPECIMEN, NEEDLE/TROCAR N/A 1/16/2018    Procedure: BIOPSY BONE MARROW;  BONE MARROW BIOPSY;  Surgeon: Nuno Castro MD;  Location: [facility] OR    C. Difficile with intussuseption      cataract extraction      colonoscopy      COLONOSCOPY  6/17    ENDOSCOPY UPPER, COLONOSCOPY, COMBINED N/A 6/17/2016    Procedure: COMBINED ENDOSCOPY UPPER, COLONOSCOPY;  Surgeon: Andrea Stearns DO;  Location: [facility] OR    ESOPHAGOSCOPY, GASTROSCOPY, DUODENOSCOPY (EGD), COMBINED N/A 11/30/2017    Procedure: COMBINED ESOPHAGOSCOPY, GASTROSCOPY, DUODENOSCOPY (EGD);  UPPER ENDOSCOPY;  Surgeon: Narinder Torres MD;  Location: [facility] OR    left knee meniscal tear      Left lower extremity DVT      lens implant      nasal polypectomy      Pulmonary Embolism      ZZC REGULAR ECHO (FL)  6/23/2014    Dr. Cavazos     Current Outpatient Medications   Medication Sig Dispense Refill    Ascorbic Acid (VITAMIN C) 500 MG CAPS       atorvastatin (LIPITOR) 10 MG tablet Take 1 tablet (10 mg) by mouth daily 90 tablet 2    ferrous sulfate (FEROSUL) 325 (65 Fe) MG tablet Take 325 mg by mouth every other day      ketoconazole (NIZORAL) 2 % external cream Apply to eyelids and red spots lower face at bedtime 30 g 3    omeprazole (PRILOSEC) 40 MG DR capsule Take 1 capsule (40 mg) by mouth daily 90 capsule 3    triamcinolone (KENALOG) 0.1 % external ointment Apply topically 2 times daily 30 g 1    warfarin  "ANTICOAGULANT (COUMADIN) 4 MG tablet TAKE 1 1/2 TABLETS BY MOUTH ON  AND , TAKE 1 TABLET ON ALL OTHER DAYS 120 tablet 3    Biotin 5000 MCG PO CAPS Take 1,000 mg by mouth  (Patient not taking: Reported on 10/17/2023)      Cholecalciferol (VITAMIN D) 400 UNITS tablet Take 1,000 Units by mouth daily  (Patient not taking: Reported on 10/17/2023)      cyanocobalamin ([employer] VITAMIN  B12) 1000 MCG TABS Take 1,000 mcg by mouth daily (Patient not taking: Reported on 10/17/2023) 90 tablet 3    Multiple Vitamins-Minerals (MULTIVITAMIN ADULT PO) Take 1 tablet by mouth (Patient not taking: Reported on 10/17/2023)      pyridoxine (VITAMIN B-6) 50 MG TABS Take 1 tablet (50 mg) by mouth daily (Patient not taking: Reported on 10/17/2023) 90 tablet 3       No Known Allergies     Social History     Tobacco Use    Smoking status: Former     Types: Cigarettes     Start date: 5/15/1958     Quit date: 10/28/2009     Years since quittin.9    Smokeless tobacco: Never    Tobacco comments:     Quit    Substance Use Topics    Alcohol use: No     Alcohol/week: 0.0 standard drinks of alcohol       History   Drug Use No         Objective     /62 (BP Location: Left arm, Patient Position: Sitting, Cuff Size: Adult Regular)   Pulse 57   Temp 97  F (36.1  C) (Tympanic)   Resp 16   Ht 1.638 m (5' 4.5\")   Wt 68.5 kg (151 lb)   SpO2 97%   BMI 25.52 kg/m      Physical Exam  Constitutional:       General: He is not in acute distress.     Appearance: Normal appearance.   HENT:      Head: Normocephalic and atraumatic.      Right Ear: Tympanic membrane normal.      Left Ear: Tympanic membrane normal.      Mouth/Throat:      Mouth: Mucous membranes are moist.      Pharynx: Oropharynx is clear.   Eyes:      Conjunctiva/sclera: Conjunctivae normal.      Pupils: Pupils are equal, round, and reactive to light.   Neck:      Vascular: No carotid bruit.   Cardiovascular:      Rate and Rhythm: Normal rate and regular rhythm.      " Heart sounds: Normal heart sounds. No murmur heard.  Pulmonary:      Effort: Pulmonary effort is normal.      Breath sounds: Normal breath sounds. No wheezing, rhonchi or rales.   Abdominal:      General: Bowel sounds are normal.      Palpations: Abdomen is soft.      Tenderness: There is no abdominal tenderness.   Musculoskeletal:      Cervical back: Normal range of motion.      Right lower leg: No edema.      Left lower leg: No edema.   Lymphadenopathy:      Cervical: No cervical adenopathy.   Neurological:      Mental Status: He is alert and oriented to person, place, and time.           Recent Labs   Lab Test 10/13/23  1045 09/22/23  1040 02/17/23  1019 02/17/23  1018 04/15/22  1033 03/15/22  1322   HGB  --   --   --  12.4*  --  13.2*   PLT  --   --   --  168  --  166   INR 1.3* 1.4*   < >  --    < > 1.65*   NA  --   --   --  140  --  137   POTASSIUM  --   --   --  4.5  --  4.2   CR  --   --   --  0.70  --  0.72    < > = values in this interval not displayed.        Diagnostics:  Recent Results (from the past 168 hour(s))   EKG 12-lead complete w/read - Clinics    Collection Time: 10/17/23  9:56 AM   Result Value Ref Range    Systolic Blood Pressure  mmHg    Diastolic Blood Pressure  mmHg    Ventricular Rate 54 BPM    Atrial Rate 54 BPM    NY Interval 140 ms    QRS Duration 102 ms     ms    QTc 417 ms    P Axis 77 degrees    R AXIS -39 degrees    T Axis 48 degrees    Interpretation ECG       Sinus bradycardia  Left axis deviation  Abnormal ECG  No previous ECGs available     Lipid Profile    Collection Time: 10/17/23 10:11 AM   Result Value Ref Range    Cholesterol 131 <200 mg/dL    Triglycerides 74 <150 mg/dL    Direct Measure HDL 54 >=40 mg/dL    LDL Cholesterol Calculated 62 <=100 mg/dL    Non HDL Cholesterol 77 <130 mg/dL   Comprehensive metabolic panel    Collection Time: 10/17/23 10:11 AM   Result Value Ref Range    Sodium 139 135 - 145 mmol/L    Potassium 4.2 3.4 - 5.3 mmol/L    Carbon Dioxide (CO2)  27 22 - 29 mmol/L    Anion Gap 11 7 - 15 mmol/L    Urea Nitrogen 10.5 8.0 - 23.0 mg/dL    Creatinine 0.70 0.67 - 1.17 mg/dL    GFR Estimate >90 >60 mL/min/1.73m2    Calcium 9.0 8.8 - 10.2 mg/dL    Chloride 101 98 - 107 mmol/L    Glucose 96 70 - 99 mg/dL    Alkaline Phosphatase 133 (H) 40 - 129 U/L    AST 38 0 - 45 U/L    ALT 23 0 - 70 U/L    Protein Total 7.8 6.4 - 8.3 g/dL    Albumin 4.1 3.5 - 5.2 g/dL    Bilirubin Total 0.7 <=1.2 mg/dL   TSH with free T4 reflex    Collection Time: 10/17/23 10:11 AM   Result Value Ref Range    TSH 3.40 0.30 - 4.20 uIU/mL   CBC with platelets and differential    Collection Time: 10/17/23 10:11 AM   Result Value Ref Range    WBC Count 4.9 4.0 - 11.0 10e3/uL    RBC Count 4.14 (L) 4.40 - 5.90 10e6/uL    Hemoglobin 12.5 (L) 13.3 - 17.7 g/dL    Hematocrit 38.4 (L) 40.0 - 53.0 %    MCV 93 78 - 100 fL    MCH 30.2 26.5 - 33.0 pg    MCHC 32.6 31.5 - 36.5 g/dL    RDW 13.4 10.0 - 15.0 %    Platelet Count 169 150 - 450 10e3/uL    % Neutrophils 64 %    % Lymphocytes 23 %    % Monocytes 10 %    Mids % (Monos, Eos, Basos)      % Eosinophils 2 %    % Basophils 1 %    % Immature Granulocytes 0 %    NRBCs per 100 WBC 0 <1 /100    Absolute Neutrophils 3.1 1.6 - 8.3 10e3/uL    Absolute Lymphocytes 1.1 0.8 - 5.3 10e3/uL    Absolute Monocytes 0.5 0.0 - 1.3 10e3/uL    Mids Abs (Monos, Eos, Basos)      Absolute Eosinophils 0.1 0.0 - 0.7 10e3/uL    Absolute Basophils 0.1 0.0 - 0.2 10e3/uL    Absolute Immature Granulocytes 0.0 <=0.4 10e3/uL    Absolute NRBCs 0.0 10e3/uL        EKG: sinus tabatha, LAD, rate 54, .  No significant change compared to EKG from 3/15/22.      Time spent on date of service: >30 minutes including F2F, chart review, speaking with Heme, INR coordinator, etc.      Signed Electronically by: BETTY HAGEN DO  Copy of this evaluation report is provided to requesting physician.

## 2023-10-16 NOTE — PROGRESS NOTES
[facility] [place]  [address] [place] [address]  Phone: [phone]  Primary Provider: Kenneth Hagen  Pre-op Performing Provider: KENNETH HAGEN      PREOPERATIVE EVALUATION:  Today's date: 10/17/2023    Rey is a 83 year old male who presents for a preoperative evaluation.      10/17/2023     8:48 AM   Additional Questions   Roomed by Jessica Almendarez       Surgical Information:  Surgery/Procedure: Hernia Repair  [place] [facility] [place]geon: Dr. SARAH Dempsey  Surgery Date: 10/23/23  Time of Surgery: TBD  Where patient plans to recover: At home with family  Fax number for surgical facility: Note does not need to be faxed, will be available electronically in Epic.    Assessment & Plan     The proposed surgical procedure is considered INTERMEDIATE risk.    Preoperative examination  - EKG 12-lead complete w/read - Clinics  - CBC with Platelets & Differential  - Comprehensive metabolic panel    Left inguinal hernia    Hyperlipidemia, unspecified hyperlipidemia type  - Lipid Profile  - TSH with free T4 reflex    Chronic anticoagulation    History of DVT of lower extremity    History of upper gastrointestinal bleeding           - No identified additional risk factors other than previously addressed    Antiplatelet or Anticoagulation Medication Instructions:  Discussed warfarin with INR Clinic as well as Heme/Onc Dr. Mendieta.  Instructions have changed a few times, but final decision is holding warfarin x5 days prior to surgery, restarting after surgery, no Lovenox bridge.  See Heme office note 10/19/23.    Additional Medication Instructions:  Supplements already held - x2 weeks prior to surgery      RECOMMENDATION:  APPROVAL GIVEN to proceed with proposed procedure, without further diagnostic evaluation.        Subjective       HPI related to upcoming procedure: preop left inguinal hernia          10/17/2023     8:43 AM   Preop Questions   1. Have you ever had a heart attack or stroke? No   2. Have  you ever had surgery on your heart or blood vessels, such as a stent placement, a coronary artery bypass, or surgery on an artery in your head, neck, heart, or legs? No   3. Do you have chest pain with activity? No   4. Do you have a history of  heart failure? No   5. Do you currently have a cold, bronchitis or symptoms of other infection? No   6. Do you have a cough, shortness of breath, or wheezing? No   7. Do you or anyone in your family have previous history of blood clots? YES -    8. Do you or does anyone in your family have a serious bleeding problem such as prolonged bleeding following surgeries or cuts? No   9. Have you ever had problems with anemia or been told to take iron pills? YES - h/o anemic, takes iron EOD   10. Have you had any abnormal blood loss such as black, tarry or bloody stools? Nothing recent - 2017 upper GI bleed     No latex allergy  No pacer, no artificial joints, no metal in body  No past anesthesia issues  Snores - never tested for CAROLINA        Health Care Directive:  Patient does not have a Health Care Directive or Living Will: Discussed advance care planning with patient; information given to patient to review.    Preoperative Review of :   reviewed - no record of controlled substances prescribed.      Status of Chronic Conditions:  History of DVT/PE  Chronic Warfarin, INR target has been 1.5-2.0  MGUS  Holt's Esophagus  History of Upper GI Bleed (2017?)  HLP    Review of Systems   Constitutional:  Negative for fever.   Respiratory:  Negative for chest tightness and shortness of breath.    Cardiovascular:  Negative for chest pain, palpitations and peripheral edema.   Gastrointestinal:  Negative for abdominal pain.   Neurological:  Negative for light-headedness and headaches.         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 08/23/2016     Priority: Medium    PUD (peptic ulcer disease) 08/23/2016     Priority: Medium    Long-term (current) use of anticoagulants [Z79.01] 07/27/2016     Priority: Medium    Elevated serum alkaline phosphatase level 05/04/2016     Priority: Medium    Normocytic anemia 05/04/2016     Priority: Medium    Colonoscopy refused 04/28/2016     Priority: Medium     Patient refused colonoscopy given occult stool kit        ACP (advance care planning) 04/28/2016     Priority: Medium     Advance Care Planning 4/28/2016: ACP Review of Chart / Resources Provided:  Reviewed chart for advance care plan.  Rey TERRA Tristan has been provided information and resources to begin or update their advance care plan.  Added by Jayleen Fleming            Hyperlipidemia      Priority: Medium    Hx pulmonary embolism 05/03/2013     Priority: Medium    History of DVT of lower extremity 04/19/2013     Priority: Medium    Advanced care planning/counseling discussion 11/27/2012     Priority: Medium    Colitis due to Clostridium difficile 05/19/2011     Priority: Medium    Pulmonary embolism (H) 05/18/2011     Priority: Medium     Overview:   Bilateral noted on 5/18/2011      History of tobacco use 05/17/2011     Priority: Medium    Hyponatremia 05/17/2011     Priority: Medium    Deep vein thrombosis (DVT) of lower extremity (H) 05/17/2011     Priority: Medium    Vitamin D deficiency 05/17/2011     Priority: Medium      Past Medical History:   Diagnosis Date    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 Simeon; silent; transient hyperthyroid, now normal; monitor TSH  monthly for hypothyroidism    Chronic anticoagulation 1/1/2012    Elevated serum alkaline phosphatase level 5/4/2016    normal GGT, normal bone skeletal survery    Gastric ulcer     endoscopy 6/2016, repeat 6 months; PPI Carafate    Hyperlipidemia     Normocytic anemia 5/4/2016    Pulmonary nodule     CT 5/2014, right; consider repeat 1 year if high risk    Upper GI bleed 11/30/2017    Vitamin D deficiency 1/1/2012     Past Surgical History:   Procedure Laterality Date    ANGIOGRAM  6/23/2014    BIOPSY  2018    bone marrow biopsy    BONE MARROW BIOPSY, BONE SPECIMEN, NEEDLE/TROCAR N/A 1/16/2018    Procedure: BIOPSY BONE MARROW;  BONE MARROW BIOPSY;  Surgeon: Nuno Castro MD;  Location: [facility] OR    C. Difficile with intussuseption      cataract extraction      colonoscopy      COLONOSCOPY  6/17    ENDOSCOPY UPPER, COLONOSCOPY, COMBINED N/A 6/17/2016    Procedure: COMBINED ENDOSCOPY UPPER, COLONOSCOPY;  Surgeon: Andrea Stearns DO;  Location: [facility] OR    ESOPHAGOSCOPY, GASTROSCOPY, DUODENOSCOPY (EGD), COMBINED N/A 11/30/2017    Procedure: COMBINED ESOPHAGOSCOPY, GASTROSCOPY, DUODENOSCOPY (EGD);  UPPER ENDOSCOPY;  Surgeon: Narinder Torres MD;  Location: [facility] OR    left knee meniscal tear      Left lower extremity DVT      lens implant      nasal polypectomy      Pulmonary Embolism      ZZC REGULAR ECHO (FL)  6/23/2014    Dr. Cavazos     Current Outpatient Medications   Medication Sig Dispense Refill    Ascorbic Acid (VITAMIN C) 500 MG CAPS       atorvastatin (LIPITOR) 10 MG tablet Take 1 tablet (10 mg) by mouth daily 90 tablet 2    ferrous sulfate (FEROSUL) 325 (65 Fe) MG tablet Take 325 mg by mouth every other day      ketoconazole (NIZORAL) 2 % external cream Apply to eyelids and red spots lower face at bedtime 30 g 3    omeprazole (PRILOSEC) 40 MG DR capsule Take 1 capsule (40 mg) by mouth daily 90 capsule 3    triamcinolone (KENALOG) 0.1 % external ointment Apply topically 2 times daily 30 g 1    warfarin  "ANTICOAGULANT (COUMADIN) 4 MG tablet TAKE 1 1/2 TABLETS BY MOUTH ON  AND , TAKE 1 TABLET ON ALL OTHER DAYS 120 tablet 3    Biotin 5000 MCG PO CAPS Take 1,000 mg by mouth  (Patient not taking: Reported on 10/17/2023)      Cholecalciferol (VITAMIN D) 400 UNITS tablet Take 1,000 Units by mouth daily  (Patient not taking: Reported on 10/17/2023)      cyanocobalamin ([employer] VITAMIN  B12) 1000 MCG TABS Take 1,000 mcg by mouth daily (Patient not taking: Reported on 10/17/2023) 90 tablet 3    Multiple Vitamins-Minerals (MULTIVITAMIN ADULT PO) Take 1 tablet by mouth (Patient not taking: Reported on 10/17/2023)      pyridoxine (VITAMIN B-6) 50 MG TABS Take 1 tablet (50 mg) by mouth daily (Patient not taking: Reported on 10/17/2023) 90 tablet 3       No Known Allergies     Social History     Tobacco Use    Smoking status: Former     Types: Cigarettes     Start date: 5/15/1958     Quit date: 10/28/2009     Years since quittin.9    Smokeless tobacco: Never    Tobacco comments:     Quit    Substance Use Topics    Alcohol use: No     Alcohol/week: 0.0 standard drinks of alcohol       History   Drug Use No         Objective     /62 (BP Location: Left arm, Patient Position: Sitting, Cuff Size: Adult Regular)   Pulse 57   Temp 97  F (36.1  C) (Tympanic)   Resp 16   Ht 1.638 m (5' 4.5\")   Wt 68.5 kg (151 lb)   SpO2 97%   BMI 25.52 kg/m      Physical Exam  Constitutional:       General: He is not in acute distress.     Appearance: Normal appearance.   HENT:      Head: Normocephalic and atraumatic.      Right Ear: Tympanic membrane normal.      Left Ear: Tympanic membrane normal.      Mouth/Throat:      Mouth: Mucous membranes are moist.      Pharynx: Oropharynx is clear.   Eyes:      Conjunctiva/sclera: Conjunctivae normal.      Pupils: Pupils are equal, round, and reactive to light.   Neck:      Vascular: No carotid bruit.   Cardiovascular:      Rate and Rhythm: Normal rate and regular rhythm.      " Heart sounds: Normal heart sounds. No murmur heard.  Pulmonary:      Effort: Pulmonary effort is normal.      Breath sounds: Normal breath sounds. No wheezing, rhonchi or rales.   Abdominal:      General: Bowel sounds are normal.      Palpations: Abdomen is soft.      Tenderness: There is no abdominal tenderness.   Musculoskeletal:      Cervical back: Normal range of motion.      Right lower leg: No edema.      Left lower leg: No edema.   Lymphadenopathy:      Cervical: No cervical adenopathy.   Neurological:      Mental Status: He is alert and oriented to person, place, and time.           Recent Labs   Lab Test 10/13/23  1045 09/22/23  1040 02/17/23  1019 02/17/23  1018 04/15/22  1033 03/15/22  1322   HGB  --   --   --  12.4*  --  13.2*   PLT  --   --   --  168  --  166   INR 1.3* 1.4*   < >  --    < > 1.65*   NA  --   --   --  140  --  137   POTASSIUM  --   --   --  4.5  --  4.2   CR  --   --   --  0.70  --  0.72    < > = values in this interval not displayed.        Diagnostics:  Recent Results (from the past 168 hour(s))   EKG 12-lead complete w/read - Clinics    Collection Time: 10/17/23  9:56 AM   Result Value Ref Range    Systolic Blood Pressure  mmHg    Diastolic Blood Pressure  mmHg    Ventricular Rate 54 BPM    Atrial Rate 54 BPM    LA Interval 140 ms    QRS Duration 102 ms     ms    QTc 417 ms    P Axis 77 degrees    R AXIS -39 degrees    T Axis 48 degrees    Interpretation ECG       Sinus bradycardia  Left axis deviation  Abnormal ECG  No previous ECGs available     Lipid Profile    Collection Time: 10/17/23 10:11 AM   Result Value Ref Range    Cholesterol 131 <200 mg/dL    Triglycerides 74 <150 mg/dL    Direct Measure HDL 54 >=40 mg/dL    LDL Cholesterol Calculated 62 <=100 mg/dL    Non HDL Cholesterol 77 <130 mg/dL   Comprehensive metabolic panel    Collection Time: 10/17/23 10:11 AM   Result Value Ref Range    Sodium 139 135 - 145 mmol/L    Potassium 4.2 3.4 - 5.3 mmol/L    Carbon Dioxide (CO2)  27 22 - 29 mmol/L    Anion Gap 11 7 - 15 mmol/L    Urea Nitrogen 10.5 8.0 - 23.0 mg/dL    Creatinine 0.70 0.67 - 1.17 mg/dL    GFR Estimate >90 >60 mL/min/1.73m2    Calcium 9.0 8.8 - 10.2 mg/dL    Chloride 101 98 - 107 mmol/L    Glucose 96 70 - 99 mg/dL    Alkaline Phosphatase 133 (H) 40 - 129 U/L    AST 38 0 - 45 U/L    ALT 23 0 - 70 U/L    Protein Total 7.8 6.4 - 8.3 g/dL    Albumin 4.1 3.5 - 5.2 g/dL    Bilirubin Total 0.7 <=1.2 mg/dL   TSH with free T4 reflex    Collection Time: 10/17/23 10:11 AM   Result Value Ref Range    TSH 3.40 0.30 - 4.20 uIU/mL   CBC with platelets and differential    Collection Time: 10/17/23 10:11 AM   Result Value Ref Range    WBC Count 4.9 4.0 - 11.0 10e3/uL    RBC Count 4.14 (L) 4.40 - 5.90 10e6/uL    Hemoglobin 12.5 (L) 13.3 - 17.7 g/dL    Hematocrit 38.4 (L) 40.0 - 53.0 %    MCV 93 78 - 100 fL    MCH 30.2 26.5 - 33.0 pg    MCHC 32.6 31.5 - 36.5 g/dL    RDW 13.4 10.0 - 15.0 %    Platelet Count 169 150 - 450 10e3/uL    % Neutrophils 64 %    % Lymphocytes 23 %    % Monocytes 10 %    Mids % (Monos, Eos, Basos)      % Eosinophils 2 %    % Basophils 1 %    % Immature Granulocytes 0 %    NRBCs per 100 WBC 0 <1 /100    Absolute Neutrophils 3.1 1.6 - 8.3 10e3/uL    Absolute Lymphocytes 1.1 0.8 - 5.3 10e3/uL    Absolute Monocytes 0.5 0.0 - 1.3 10e3/uL    Mids Abs (Monos, Eos, Basos)      Absolute Eosinophils 0.1 0.0 - 0.7 10e3/uL    Absolute Basophils 0.1 0.0 - 0.2 10e3/uL    Absolute Immature Granulocytes 0.0 <=0.4 10e3/uL    Absolute NRBCs 0.0 10e3/uL        EKG: sinus tabatha, LAD, rate 54, .  No significant change compared to EKG from 3/15/22.      Time spent on date of service: >30 minutes including F2F, chart review, speaking with Heme, INR coordinator, etc.      Signed Electronically by: BETTY HAGEN DO  Copy of this evaluation report is provided to requesting physician.

## 2023-10-17 ENCOUNTER — TELEPHONE (OUTPATIENT)
Dept: FAMILY MEDICINE | Facility: OTHER | Age: 83
End: 2023-10-17

## 2023-10-17 ENCOUNTER — OFFICE VISIT (OUTPATIENT)
Dept: FAMILY MEDICINE | Facility: OTHER | Age: 83
End: 2023-10-17
Attending: FAMILY MEDICINE
Payer: COMMERCIAL

## 2023-10-17 VITALS
OXYGEN SATURATION: 97 % | DIASTOLIC BLOOD PRESSURE: 62 MMHG | HEIGHT: 65 IN | HEART RATE: 57 BPM | BODY MASS INDEX: 25.16 KG/M2 | TEMPERATURE: 97 F | RESPIRATION RATE: 16 BRPM | SYSTOLIC BLOOD PRESSURE: 116 MMHG | WEIGHT: 151 LBS

## 2023-10-17 DIAGNOSIS — Z86.711 HX PULMONARY EMBOLISM: ICD-10-CM

## 2023-10-17 DIAGNOSIS — E78.5 HYPERLIPIDEMIA, UNSPECIFIED HYPERLIPIDEMIA TYPE: ICD-10-CM

## 2023-10-17 DIAGNOSIS — Z87.19 HISTORY OF UPPER GASTROINTESTINAL BLEEDING: ICD-10-CM

## 2023-10-17 DIAGNOSIS — Z86.718 HISTORY OF DVT OF LOWER EXTREMITY: ICD-10-CM

## 2023-10-17 DIAGNOSIS — K40.90 LEFT INGUINAL HERNIA: ICD-10-CM

## 2023-10-17 DIAGNOSIS — I82.4Y2 DEEP VEIN THROMBOSIS (DVT) OF PROXIMAL VEIN OF LEFT LOWER EXTREMITY, UNSPECIFIED CHRONICITY (H): Primary | Chronic | ICD-10-CM

## 2023-10-17 DIAGNOSIS — Z79.01 CHRONIC ANTICOAGULATION: ICD-10-CM

## 2023-10-17 DIAGNOSIS — Z01.818 PREOPERATIVE EXAMINATION: Primary | ICD-10-CM

## 2023-10-17 LAB
ALBUMIN SERPL BCG-MCNC: 4.1 G/DL (ref 3.5–5.2)
ALP SERPL-CCNC: 133 U/L (ref 40–129)
ALT SERPL W P-5'-P-CCNC: 23 U/L (ref 0–70)
ANION GAP SERPL CALCULATED.3IONS-SCNC: 11 MMOL/L (ref 7–15)
AST SERPL W P-5'-P-CCNC: 38 U/L (ref 0–45)
BASO+EOS+MONOS # BLD AUTO: ABNORMAL 10*3/UL
BASO+EOS+MONOS NFR BLD AUTO: ABNORMAL %
BASOPHILS # BLD AUTO: 0.1 10E3/UL (ref 0–0.2)
BASOPHILS NFR BLD AUTO: 1 %
BILIRUB SERPL-MCNC: 0.7 MG/DL
BUN SERPL-MCNC: 10.5 MG/DL (ref 8–23)
CALCIUM SERPL-MCNC: 9 MG/DL (ref 8.8–10.2)
CHLORIDE SERPL-SCNC: 101 MMOL/L (ref 98–107)
CHOLEST SERPL-MCNC: 131 MG/DL
CREAT SERPL-MCNC: 0.7 MG/DL (ref 0.67–1.17)
DEPRECATED HCO3 PLAS-SCNC: 27 MMOL/L (ref 22–29)
EGFRCR SERPLBLD CKD-EPI 2021: >90 ML/MIN/1.73M2
EOSINOPHIL # BLD AUTO: 0.1 10E3/UL (ref 0–0.7)
EOSINOPHIL NFR BLD AUTO: 2 %
ERYTHROCYTE [DISTWIDTH] IN BLOOD BY AUTOMATED COUNT: 13.4 % (ref 10–15)
GLUCOSE SERPL-MCNC: 96 MG/DL (ref 70–99)
HCT VFR BLD AUTO: 38.4 % (ref 40–53)
HDLC SERPL-MCNC: 54 MG/DL
HGB BLD-MCNC: 12.5 G/DL (ref 13.3–17.7)
IMM GRANULOCYTES # BLD: 0 10E3/UL
IMM GRANULOCYTES NFR BLD: 0 %
LDLC SERPL CALC-MCNC: 62 MG/DL
LYMPHOCYTES # BLD AUTO: 1.1 10E3/UL (ref 0.8–5.3)
LYMPHOCYTES NFR BLD AUTO: 23 %
MCH RBC QN AUTO: 30.2 PG (ref 26.5–33)
MCHC RBC AUTO-ENTMCNC: 32.6 G/DL (ref 31.5–36.5)
MCV RBC AUTO: 93 FL (ref 78–100)
MONOCYTES # BLD AUTO: 0.5 10E3/UL (ref 0–1.3)
MONOCYTES NFR BLD AUTO: 10 %
NEUTROPHILS # BLD AUTO: 3.1 10E3/UL (ref 1.6–8.3)
NEUTROPHILS NFR BLD AUTO: 64 %
NONHDLC SERPL-MCNC: 77 MG/DL
NRBC # BLD AUTO: 0 10E3/UL
NRBC BLD AUTO-RTO: 0 /100
PLATELET # BLD AUTO: 169 10E3/UL (ref 150–450)
POTASSIUM SERPL-SCNC: 4.2 MMOL/L (ref 3.4–5.3)
PROT SERPL-MCNC: 7.8 G/DL (ref 6.4–8.3)
RBC # BLD AUTO: 4.14 10E6/UL (ref 4.4–5.9)
SODIUM SERPL-SCNC: 139 MMOL/L (ref 135–145)
TRIGL SERPL-MCNC: 74 MG/DL
TSH SERPL DL<=0.005 MIU/L-ACNC: 3.4 UIU/ML (ref 0.3–4.2)
WBC # BLD AUTO: 4.9 10E3/UL (ref 4–11)

## 2023-10-17 PROCEDURE — G0463 HOSPITAL OUTPT CLINIC VISIT: HCPCS

## 2023-10-17 PROCEDURE — 80053 COMPREHEN METABOLIC PANEL: CPT | Mod: ZL | Performed by: FAMILY MEDICINE

## 2023-10-17 PROCEDURE — 99214 OFFICE O/P EST MOD 30 MIN: CPT | Performed by: FAMILY MEDICINE

## 2023-10-17 PROCEDURE — 93005 ELECTROCARDIOGRAM TRACING: CPT | Performed by: FAMILY MEDICINE

## 2023-10-17 PROCEDURE — G0463 HOSPITAL OUTPT CLINIC VISIT: HCPCS | Mod: 25

## 2023-10-17 PROCEDURE — 36415 COLL VENOUS BLD VENIPUNCTURE: CPT | Mod: ZL | Performed by: FAMILY MEDICINE

## 2023-10-17 PROCEDURE — 93010 ELECTROCARDIOGRAM REPORT: CPT | Mod: 77 | Performed by: INTERNAL MEDICINE

## 2023-10-17 PROCEDURE — 84443 ASSAY THYROID STIM HORMONE: CPT | Mod: ZL | Performed by: FAMILY MEDICINE

## 2023-10-17 PROCEDURE — 85018 HEMOGLOBIN: CPT | Mod: ZL | Performed by: FAMILY MEDICINE

## 2023-10-17 PROCEDURE — 80061 LIPID PANEL: CPT | Mod: ZL | Performed by: FAMILY MEDICINE

## 2023-10-17 PROCEDURE — 85048 AUTOMATED LEUKOCYTE COUNT: CPT | Mod: ZL | Performed by: FAMILY MEDICINE

## 2023-10-17 RX ORDER — ENOXAPARIN SODIUM 100 MG/ML
1 INJECTION SUBCUTANEOUS EVERY 12 HOURS
Qty: 4.2 ML | Refills: 1 | Status: SHIPPED | OUTPATIENT
Start: 2023-10-17 | End: 2023-10-19

## 2023-10-17 RX ORDER — ENOXAPARIN SODIUM 100 MG/ML
40 INJECTION SUBCUTANEOUS DAILY
Status: CANCELLED | OUTPATIENT
Start: 2023-10-17

## 2023-10-17 RX ORDER — ENOXAPARIN SODIUM 100 MG/ML
0.65 INJECTION SUBCUTANEOUS EVERY 12 HOURS
Qty: 2.8 ML | Refills: 1 | Status: CANCELLED | OUTPATIENT
Start: 2023-10-24

## 2023-10-17 RX ORDER — RESPIRATORY SYNCYTIAL VIRUS VACCINE 120MCG/0.5
0.5 KIT INTRAMUSCULAR ONCE
Qty: 1 EACH | Refills: 0 | Status: CANCELLED | OUTPATIENT
Start: 2023-10-17 | End: 2023-10-17

## 2023-10-17 ASSESSMENT — PAIN SCALES - GENERAL: PAINLEVEL: NO PAIN (0)

## 2023-10-17 NOTE — TELEPHONE ENCOUNTER
Reason for call:  Medication    PLEASE CALL PT PT AT [facility]- [phone]  Have you contacted your pharmacy? No   new prescription for pt If patient has contacted Pharmacy and it has been over 72hrs, continue to #2  Medication enoxaparin   What Pharmacy do you use? [facility]       (Please note that the turn-around-time for prescriptions is 72 business hours; I am sending your request at this time. SEND TO  [place] Refill Pool  )

## 2023-10-17 NOTE — TELEPHONE ENCOUNTER
"Rey Tristan  [address]  [place] [address]      Procedure Instructions for Anticoagulation     For your upcoming hernia repair on 10/23 your healthcare provider wants you to stop warfarin as directed below. To protect you from a clot while your off your warfarin, your provider wants you to inject a short-acting medication called enoxaparin (Lovenox), this is called \"bridging.\"      Enoxaparin (Lovenox) is an injection that you or a caregiver can give at home. It is injected just underneath the skin in your stomach. Your anticoagulation nurse can explain to you how to give the injection if you have not done so before. Attached is more information on enoxaparin. Additionally a video is available at [email or web address]-self-[email or web address]-video    Bring these instructions with you to your procedure to show the provider doing the procedure. Please discuss with the provider doing the procedure if it is okay to restart warfarin and enoxaparin as we have planned.      You will take enoxaparin 60 mg every 12 hours (one full syringe) as outlined below. A prescription was sent to [facility] pharmacy.    10/19, Take last dose of warfarin  10/20, NO warfarin  10/21, NO warfarin  10/22, NO warfarin    10/23, DAY OF PROCEDURE, NO enoxaparin. Restart warfarin 8mg in the evening, if okay with provider doing the procedure.    Make sure to ask the provider doing your procedure if it is okay to begin your warfarin and enoxaparin as planned     10/24, Restart enoxaparin every 12 hours (AM and PM), unless instructed otherwise by the physician, and 6 mg warfarin in the evening.   10/25, Enoxaparin every 12 hours (AM and PM) and 6 mg warfarin in the evening.  10/26, Enoxaparin every 12 hours (AM and PM) and 6 mg warfarin in the evening.  10/27, Enoxaparin in the AM. Recheck INR. Anticoagulation clinic to call with further dosing instructions.     Please watch for symptoms of both bleeding and clotting while on warfarin and " enoxaparin:    Call 911 or go to the emergency room if you are experiencing any of the following:   Coughing or throwing up blood, can't control bleeding from a cut or injury after putting pressure on it, have a sudden severe headache, have red or black stools, or have red or orange urine.  Chest pain or shortness of breath  Any symptoms of a stroke including: sudden numbness or weakness in your face, arm or leg; sudden confusion or trouble speaking, reading or understanding; sudden blurred or decreased vision; sudden trouble walking or moving a part of the body; sudden severe headache for no reason.    Call your care team if you have:   Bleeding gums, large bruises, pale skin.  Sudden pain, tenderness or swelling in your leg or arm    Please contact the Anticoagulation Clinic at  [phone]  if you have any questions or concerns.     Aisha Minor RN

## 2023-10-17 NOTE — TELEPHONE ENCOUNTER
Rx signed.    Also put in a referral for him to see Hematology after his surgery to determine if he actually needs to continue his warfarin long-term or not.

## 2023-10-17 NOTE — TELEPHONE ENCOUNTER
Teed up full dose enoxaparin for patient. Patient will check at pharmacy if he can afford prescription. I will also increase warfarin dose after procedure. Please review and sign if appropriate

## 2023-10-17 NOTE — TELEPHONE ENCOUNTER
I have 40 mg Lovenox injection attached. Unsure if patient will need Lovenox for procedure? Patient is not if he can even afford Lovenox. 2016 he had prophylaxis dose of 40 mg daily otherwise full dose 0.65-1 kg/mg depending on CrCl and BMI? Please advise and I can finish filling in [employer].    Thank you

## 2023-10-18 NOTE — OR NURSING
Received telephone call from patient.  He states he is very upset that his Lovenox medication has not been called into the pharmacy yet.  He waited all day yesterday and has called his pharmacy again this morning and no script called in.  Urgent message sent to Dr. Alonzo regarding patient need script called in to [facility] pharmacy.

## 2023-10-19 ENCOUNTER — ANESTHESIA EVENT (OUTPATIENT)
Dept: SURGERY | Facility: HOSPITAL | Age: 83
End: 2023-10-19
Payer: MEDICARE

## 2023-10-19 ENCOUNTER — ONCOLOGY VISIT (OUTPATIENT)
Dept: ONCOLOGY | Facility: OTHER | Age: 83
End: 2023-10-19
Attending: INTERNAL MEDICINE
Payer: COMMERCIAL

## 2023-10-19 VITALS
BODY MASS INDEX: 25.11 KG/M2 | WEIGHT: 148.59 LBS | OXYGEN SATURATION: 94 % | TEMPERATURE: 97.1 F | DIASTOLIC BLOOD PRESSURE: 64 MMHG | SYSTOLIC BLOOD PRESSURE: 122 MMHG | HEART RATE: 60 BPM

## 2023-10-19 DIAGNOSIS — Z86.718 HISTORY OF DVT OF LOWER EXTREMITY: Primary | ICD-10-CM

## 2023-10-19 PROCEDURE — G0463 HOSPITAL OUTPT CLINIC VISIT: HCPCS

## 2023-10-19 PROCEDURE — 99214 OFFICE O/P EST MOD 30 MIN: CPT | Performed by: INTERNAL MEDICINE

## 2023-10-19 ASSESSMENT — PATIENT HEALTH QUESTIONNAIRE - PHQ9: SUM OF ALL RESPONSES TO PHQ QUESTIONS 1-9: 0

## 2023-10-19 ASSESSMENT — LIFESTYLE VARIABLES: TOBACCO_USE: 1

## 2023-10-19 ASSESSMENT — PAIN SCALES - GENERAL: PAINLEVEL: NO PAIN (0)

## 2023-10-19 NOTE — PROGRESS NOTES
HEMATOLOGY CONSULT NOTE  Oct 19, 2023    Reason for referral: History of DVT    HISTORY OF PRESENT ILLNESS  Rey Tristan is a 83 year old male with PMH as stated below who is seen in the hematology clinic for his history of DVT.    His history in short is as follows:    Mr. Tristan was initially diagnosed with a left groin DVT in in 2011.  At that time he does not remember any preceding illness or hospitalization.  He states he does remember twisting his left leg prior to the diagnosis.  He states he was able to still walk and was not laid up.  He was then put on warfarin and continued on warfarin as this was felt to be an unprovoked clot.    He was then admitted to the hospital with an acute GI bleed from 11/30/2017 to 12/1/2017.  At that time his hemoglobin was 6.5.  He underwent EGD and colonoscopy and was found to have duodenitis as well as PUD.    He was referred to hematology and was evaluated by Dr. Alfaro.  At that time work-up showed a negative protein C, protein S, Antithrombin III, anticardiolipin, beta-2 glycoprotein and lupus anticoagulant.  He was not found to have a mutation and factor V and factor II.  He also underwent a bone marrow biopsy on 1/16/2018 which showed iron deficiency anemia with associated morphological changes.  Slightly increased plasma cells, approximately 2% kappa.  Toxic neutrophils and reactive lymphocytes.  Increased rouleaux formation, unremarkable immuno phenotyping/flow cytometry.    He denies any family history of blood clots.  He denies any other prior history of blood other than his DVT in 2011.  He is planning to undergo a hernia repair next week. He does complain of intermittent epitaxis while on coumadin. Denies any other bleeding.     REVIEW OF SYSTEMS  A 12-point ROS negative except as in HPI      Current Outpatient Medications   Medication Sig Dispense Refill    Ascorbic Acid (VITAMIN C) 500 MG CAPS       atorvastatin (LIPITOR) 10 MG tablet Take 1 tablet (10  mg) by mouth daily 90 tablet 2    Biotin 5000 MCG PO CAPS Take 1,000 mg by mouth      Cholecalciferol (VITAMIN D) 400 UNITS tablet Take 1,000 Units by mouth daily      cyanocobalamin ([employer] VITAMIN  B12) 1000 MCG TABS Take 1,000 mcg by mouth daily 90 tablet 3    enoxaparin ANTICOAGULANT (LOVENOX) 60 MG/0.6ML syringe Inject 0.6 mLs (60 mg) Subcutaneous every 12 hours 4.2 mL 1    ferrous sulfate (FEROSUL) 325 (65 Fe) MG tablet Take 325 mg by mouth every other day      ketoconazole (NIZORAL) 2 % external cream Apply to eyelids and red spots lower face at bedtime 30 g 3    Multiple Vitamins-Minerals (MULTIVITAMIN ADULT PO) Take 1 tablet by mouth      omeprazole (PRILOSEC) 40 MG DR capsule Take 1 capsule (40 mg) by mouth daily 90 capsule 3    pyridoxine (VITAMIN B-6) 50 MG TABS Take 1 tablet (50 mg) by mouth daily 90 tablet 3    triamcinolone (KENALOG) 0.1 % external ointment Apply topically 2 times daily 30 g 1    warfarin ANTICOAGULANT (COUMADIN) 4 MG tablet TAKE 1 1/2 TABLETS BY MOUTH ON MONDAYS AND FRIDAYS, TAKE 1 TABLET ON ALL OTHER DAYS 120 tablet 3       No Known Allergies  Immunization History   Administered Date(s) Administered    COVID-19 Bivalent 18+ (Moderna) 02/24/2023    COVID-19 Monovalent 18+ (Moderna) 02/15/2021, 03/15/2021, 10/28/2021, 07/27/2022    DTaP, Unspecified 09/28/2018    Pneumo Conj 13-V (2010&after) 01/01/2018    Pneumococcal 23 valent 04/06/2009, 06/15/2015    TD,PF 7+ (Tenivac) 04/06/2009    TDAP Vaccine (Adacel) 09/28/2018    Tdap (Adult) Unspecified Formulation 09/01/1998       Past Medical History:   Diagnosis Date    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 Simeon; silent; transient hyperthyroid, now normal; monitor TSH monthly for hypothyroidism    Chronic anticoagulation 01/01/2012    COPD (chronic obstructive pulmonary disease) (H)     Elevated serum alkaline phosphatase level 05/04/2016    normal GGT, normal bone skeletal survery    Gastric ulcer     endoscopy 6/2016, repeat 6 months;  PPI Carafate    History of blood transfusion     Hyperlipidemia     Normocytic anemia 05/04/2016    Pulmonary nodule     CT 5/2014, right; consider repeat 1 year if high risk    Upper GI bleed 11/30/2017    Vitamin D deficiency 01/01/2012       Past Surgical History:   Procedure Laterality Date    ANGIOGRAM  6/23/2014    BIOPSY  2018    bone marrow biopsy    BONE MARROW BIOPSY, BONE SPECIMEN, NEEDLE/TROCAR N/A 1/16/2018    Procedure: BIOPSY BONE MARROW;  BONE MARROW BIOPSY;  Surgeon: Nuno Castro MD;  Location: [facility]    C. Difficile with intussuseption      cataract extraction      colonoscopy      COLONOSCOPY  6/17    ENDOSCOPY UPPER, COLONOSCOPY, COMBINED N/A 6/17/2016    Procedure: COMBINED ENDOSCOPY UPPER, COLONOSCOPY;  Surgeon: Andrea Stearns DO;  Location: [facility]    ESOPHAGOSCOPY, GASTROSCOPY, DUODENOSCOPY (EGD), COMBINED N/A 11/30/2017    Procedure: COMBINED ESOPHAGOSCOPY, GASTROSCOPY, DUODENOSCOPY (EGD);  UPPER ENDOSCOPY;  Surgeon: Narinder Torres MD;  Location: [facility]    left knee meniscal tear      Left lower extremity DVT      lens implant      nasal polypectomy      Pulmonary Embolism      ZZC REGULAR ECHO ([place])  6/23/2014    Dr. Cavazos       SOCIAL HISTORY  History   Smoking Status    Former    Types: Cigarettes    Start date: 5/15/1958    Quit date: 10/28/2009   Smokeless Tobacco    Never    Social History    Substance and Sexual Activity      Alcohol use: No        Alcohol/week: 0.0 standard drinks of alcohol     History   Drug Use No       FAMILY HISTORY  Family History   Problem Relation Age of Onset    Cancer Mother         Ovarian    Other Cancer Mother         lung/breast/ cervical ?    Respiratory Father         emphysemia    Lung Cancer Brother     Diabetes No family hx of     Hypertension No family hx of     Hyperlipidemia No family hx of     Thyroid Disease No family hx of     Asthma No family hx of     Colon Cancer No family hx of     Prostate Cancer No family hx of     Anesthesia  Reaction No family hx of     Genetic Disorder No family hx of     Cerebrovascular Disease No family hx of     Coronary Artery Disease No family hx of     Breast Cancer No family hx of        PHYSICAL EXAMINATION  /64   Pulse 60   Temp 97.1  F (36.2  C) (Tympanic)   Wt 67.4 kg (148 lb 9.4 oz)   SpO2 94%   BMI 25.11 kg/m    Wt Readings from Last 2 Encounters:   10/19/23 67.4 kg (148 lb 9.4 oz)   10/17/23 68.5 kg (151 lb)     Physical Exam  Constitutional:       Appearance: Normal appearance.   Musculoskeletal:         General: No swelling.   Skin:     General: Skin is warm.   Neurological:      Mental Status: He is alert.   Psychiatric:         Mood and Affect: Mood normal.         Behavior: Behavior normal.         ASSESSMENT AND PLAN    History of DVT:    Prior history of DVT in 2011.  At that time was started on warfarin.  He had a GI bleed requiring transfusion in 2017.  Following that he was continued on warfarin with a goal INR of 1.5-2.    Discussed today recommendation for ongoing anticoagulation.  As this was unprovoked blood clot long-term anticoagulation is indicated.  However that has to be balanced with the risks of staying on anticoagulation..  While he is currently being anticoagulated at an INR of 1.5-2. I discussed that it is hard to say if he is truly getting a benefit of anticoagulation at this INR especially if he is below 1.5. However currently he has not had any major bleeding since 2017 and has not had any recurrent anticoagulation so the status quo has been maintained so he could if he chooses continue with anticoagulation with warfarin at the current goal INR.     The other 2 options  given the risk of bleeding in his case could be to stop anticoagulation completely and monitor.  He did have a hypercoagulable work-up and it did not show any strong thrombophilia.  The third option is to switch him to a DOAC where we do have level 3 evidence of switching to a lower dose to continue  with anticoagulation for Eliquis and Xarelto.DOACs also have a lower risk of bleeding compared to warfarin which may be safer in his case.     He unsure what to do and I recommended he discuss this with his primary care as this is more a long term decision and not urgent.     Regarding his upcoming surgery.  He will be going off warfarin and I do not think he needs bridging pre or post surgery.  First of all this is because he does not have a strong thrombophilia and his blood clot was in 2011.  Also he has not been challenged with full dose anticoagulation since 2017 and he may again have a bleed.    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  He has appointment coming up in February 2024 with Nirmala Mahoney which he will keep.    Virginia Mendieta MD

## 2023-10-19 NOTE — ANESTHESIA PREPROCEDURE EVALUATION
Anesthesia Pre-Procedure Evaluation    Patient: Rey Tristan   MRN: [id number] : 1940        Procedure : Procedure(s):  Open Left Inguinal Hernia Repair          Past Medical History:   Diagnosis Date    Autoimmune thyroiditis 2017    Dr. Simeon; silent; transient hyperthyroid, now normal; monitor TSH monthly for hypothyroidism    Chronic anticoagulation 2012    Elevated serum alkaline phosphatase level 2016    normal GGT, normal bone skeletal survery    Gastric ulcer     endoscopy 2016, repeat 6 months; PPI Carafate    Hyperlipidemia     Normocytic anemia 2016    Pulmonary nodule     CT 2014, right; consider repeat 1 year if high risk    Upper GI bleed 2017    Vitamin D deficiency 2012      Past Surgical History:   Procedure Laterality Date    ANGIOGRAM  2014    BIOPSY  2018    bone marrow biopsy    BONE MARROW BIOPSY, BONE SPECIMEN, NEEDLE/TROCAR N/A 2018    Procedure: BIOPSY BONE MARROW;  BONE MARROW BIOPSY;  Surgeon: Nuno Castro MD;  Location: [facility]    C. Difficile with intussuseption      cataract extraction      colonoscopy      COLONOSCOPY      ENDOSCOPY UPPER, COLONOSCOPY, COMBINED N/A 2016    Procedure: COMBINED ENDOSCOPY UPPER, COLONOSCOPY;  Surgeon: Andrea Stearns DO;  Location: [facility]    ESOPHAGOSCOPY, GASTROSCOPY, DUODENOSCOPY (EGD), COMBINED N/A 2017    Procedure: COMBINED ESOPHAGOSCOPY, GASTROSCOPY, DUODENOSCOPY (EGD);  UPPER ENDOSCOPY;  Surgeon: Narinder Torres MD;  Location: [facility]    left knee meniscal tear      Left lower extremity DVT      lens implant      nasal polypectomy      Pulmonary Embolism      Z REGULAR ECHO ([place])  2014    Dr. Cavazos      No Known Allergies   Social History     Tobacco Use    Smoking status: Former     Types: Cigarettes     Start date: 5/15/1958     Quit date: 10/28/2009     Years since quittin.9    Smokeless tobacco: Never    Tobacco comments:     Quit    Substance Use  Topics    Alcohol use: No     Alcohol/week: 0.0 standard drinks of alcohol      Wt Readings from Last 1 Encounters:   10/17/23 68.5 kg (151 lb)        Anesthesia Evaluation   Pt has had prior anesthetic. Type: MAC and General.    No history of anesthetic complications       ROS/MED HX  ENT/Pulmonary: Comment: Pulmonary nodule      (+)                tobacco use, Past use,       mild,  COPD,              Neurologic:  - neg neurologic ROS     Cardiovascular:     (+) Dyslipidemia - -   -  - -   Taking blood thinners  Instructions Given to patient: goal INR 1.5-2, stop warfarin 10/19.                                 METS/Exercise Tolerance: >4 METS    Hematologic: Comments: History of Pulmonary embolism  Vitamin D deficiency      (+) History of blood clots,    pt is anticoagulated, anemia, history of blood transfusion, no previous transfusion reaction,  - for GI bleed,      Musculoskeletal:   (+)  arthritis,             GI/Hepatic: Comment: Left inguinal hernia   Gastric ulcer  Upper GI bleed  C.Diff Colitis   peptic ulcer disease    (+) GERD, Asymptomatic on medication, esophageal disease (barretts esophagus),          liver disease (Elevated serum alkaline phosphatase level),       Renal/Genitourinary:       Endo:     (+)          thyroid problem, Autoimmune thyroiditis transient; now normal,           Psychiatric/Substance Use:     (+) psychiatric history anxiety       Infectious Disease: Comment: History of C diff with intussesecption      Malignancy:  - neg malignancy ROS     Other: Comment: IgM monoclonal gammopathy of uncertain significance           Physical Exam    Airway        Mallampati: III   TM distance: > 3 FB   Neck ROM: full   Mouth opening: > 3 cm    Respiratory Devices and Support         Dental       (+) Edentulous      Cardiovascular   cardiovascular exam normal       Rhythm and rate: regular and normal     Pulmonary   pulmonary exam normal        breath sounds clear to auscultation       OUTSIDE  "LABS:  CBC:   Lab Results   Component Value Date    WBC 4.9 10/17/2023    WBC 4.8 02/17/2023    HGB 12.5 (L) 10/17/2023    HGB 12.4 (L) 02/17/2023    HCT 38.4 (L) 10/17/2023    HCT 38.0 (L) 02/17/2023     10/17/2023     02/17/2023     BMP:   Lab Results   Component Value Date     10/17/2023     02/17/2023    POTASSIUM 4.2 10/17/2023    POTASSIUM 4.5 02/17/2023    CHLORIDE 101 10/17/2023    CHLORIDE 104 02/17/2023    CO2 27 10/17/2023    CO2 24 02/17/2023    BUN 10.5 10/17/2023    BUN 11.1 02/17/2023    CR 0.70 10/17/2023    CR 0.70 02/17/2023    GLC 96 10/17/2023    GLC 86 02/17/2023     COAGS:   Lab Results   Component Value Date    INR 1.3 (H) 10/13/2023     POC: No results found for: \"BGM\", \"HCG\", \"HCGS\"  HEPATIC:   Lab Results   Component Value Date    ALBUMIN 4.1 10/17/2023    PROTTOTAL 7.8 10/17/2023    ALT 23 10/17/2023    AST 38 10/17/2023    GGT 19 04/27/2016    ALKPHOS 133 (H) 10/17/2023    BILITOTAL 0.7 10/17/2023     OTHER:   Lab Results   Component Value Date    LACT 3.3 (H) 11/30/2017    RANDI 9.0 10/17/2023    LIPASE 192 11/30/2017    TSH 3.40 10/17/2023    T4 0.96 02/14/2020    SED 82 (H) 01/02/2018       Anesthesia Plan    ASA Status:  3    NPO Status:  NPO Appropriate    Anesthesia Type: General.     - Airway: LMA   Induction: Intravenous, Propofol.   Maintenance: Balanced.        Consents    Anesthesia Plan(s) and associated risks, benefits, and realistic alternatives discussed. Questions answered and patient/representative(s) expressed understanding.     - Discussed: Risks, Benefits and Alternatives for BOTH SEDATION and the PROCEDURE were discussed     - Discussed with:  Patient      - Extended Intubation/Ventilatory Support Discussed: No.      - Patient is DNR/DNI Status: No     Use of blood products discussed: No .     Postoperative Care    Pain management: Peripheral nerve block (Single Shot), Multi-modal analgesia.   PONV prophylaxis: Ondansetron (or other 5HT-3), " Dexamethasone or Solumedrol     Comments:    Other Comments: 10/17/23   INR today 1.12              VALERIE FERNANDEZ CRNA

## 2023-10-22 ASSESSMENT — ENCOUNTER SYMPTOMS
LIGHT-HEADEDNESS: 0
PALPITATIONS: 0
HEADACHES: 0
FEVER: 0
ABDOMINAL PAIN: 0
CHEST TIGHTNESS: 0
SHORTNESS OF BREATH: 0

## 2023-10-23 ENCOUNTER — ANESTHESIA (OUTPATIENT)
Dept: SURGERY | Facility: HOSPITAL | Age: 83
End: 2023-10-23
Payer: MEDICARE

## 2023-10-23 ENCOUNTER — HOSPITAL ENCOUNTER (OUTPATIENT)
Facility: HOSPITAL | Age: 83
Discharge: HOME | End: 2023-10-23
Attending: SURGERY | Admitting: SURGERY
Payer: MEDICARE

## 2023-10-23 ENCOUNTER — APPOINTMENT (OUTPATIENT)
Dept: ULTRASOUND IMAGING | Facility: HOSPITAL | Age: 83
End: 2023-10-23
Attending: NURSE ANESTHETIST, CERTIFIED REGISTERED
Payer: MEDICARE

## 2023-10-23 ENCOUNTER — APPOINTMENT (OUTPATIENT)
Dept: LAB | Facility: HOSPITAL | Age: 83
End: 2023-10-23
Attending: SURGERY
Payer: MEDICARE

## 2023-10-23 ENCOUNTER — TELEPHONE (OUTPATIENT)
Dept: FAMILY MEDICINE | Facility: OTHER | Age: 83
End: 2023-10-23

## 2023-10-23 VITALS
HEIGHT: 64 IN | OXYGEN SATURATION: 92 % | SYSTOLIC BLOOD PRESSURE: 124 MMHG | BODY MASS INDEX: 24.59 KG/M2 | TEMPERATURE: 96.9 F | HEART RATE: 56 BPM | RESPIRATION RATE: 18 BRPM | DIASTOLIC BLOOD PRESSURE: 69 MMHG | WEIGHT: 144 LBS

## 2023-10-23 DIAGNOSIS — K40.90 LEFT INGUINAL HERNIA: Primary | ICD-10-CM

## 2023-10-23 LAB
HOLD SPECIMEN: NORMAL
INR PPP: 1.12 (ref 0.85–1.15)

## 2023-10-23 PROCEDURE — 272N000001 HC OR GENERAL SUPPLY STERILE: Performed by: SURGERY

## 2023-10-23 PROCEDURE — 88304 TISSUE EXAM BY PATHOLOGIST: CPT | Mod: 26 | Performed by: PATHOLOGY

## 2023-10-23 PROCEDURE — 250N000013 HC RX MED GY IP 250 OP 250 PS 637: Performed by: SURGERY

## 2023-10-23 PROCEDURE — 250N000009 HC RX 250: Performed by: NURSE ANESTHETIST, CERTIFIED REGISTERED

## 2023-10-23 PROCEDURE — 99100 ANES PT EXTEME AGE<1 YR&>70: CPT | Performed by: NURSE ANESTHETIST, CERTIFIED REGISTERED

## 2023-10-23 PROCEDURE — 88304 TISSUE EXAM BY PATHOLOGIST: CPT | Mod: TC | Performed by: SURGERY

## 2023-10-23 PROCEDURE — 710N000010 HC RECOVERY PHASE 1, LEVEL 2, PER MIN: Performed by: SURGERY

## 2023-10-23 PROCEDURE — 999N000141 HC STATISTIC PRE-PROCEDURE NURSING ASSESSMENT: Performed by: SURGERY

## 2023-10-23 PROCEDURE — 49505 PRP I/HERN INIT REDUC >5 YR: CPT | Mod: LT | Performed by: SURGERY

## 2023-10-23 PROCEDURE — 85610 PROTHROMBIN TIME: CPT | Performed by: NURSE ANESTHETIST, CERTIFIED REGISTERED

## 2023-10-23 PROCEDURE — 250N000011 HC RX IP 250 OP 636: Performed by: SURGERY

## 2023-10-23 PROCEDURE — 258N000003 HC RX IP 258 OP 636: Performed by: NURSE ANESTHETIST, CERTIFIED REGISTERED

## 2023-10-23 PROCEDURE — 64486 TAP BLOCK UNIL BY INJECTION: CPT | Mod: XU | Performed by: NURSE ANESTHETIST, CERTIFIED REGISTERED

## 2023-10-23 PROCEDURE — 250N000011 HC RX IP 250 OP 636: Performed by: NURSE ANESTHETIST, CERTIFIED REGISTERED

## 2023-10-23 PROCEDURE — C1781 MESH (IMPLANTABLE): HCPCS | Performed by: SURGERY

## 2023-10-23 PROCEDURE — 49505 PRP I/HERN INIT REDUC >5 YR: CPT | Performed by: NURSE ANESTHETIST, CERTIFIED REGISTERED

## 2023-10-23 PROCEDURE — 250N000025 HC SEVOFLURANE, PER MIN: Performed by: SURGERY

## 2023-10-23 PROCEDURE — 710N000012 HC RECOVERY PHASE 2, PER MINUTE: Performed by: SURGERY

## 2023-10-23 PROCEDURE — 370N000017 HC ANESTHESIA TECHNICAL FEE, PER MIN: Performed by: SURGERY

## 2023-10-23 PROCEDURE — 36415 COLL VENOUS BLD VENIPUNCTURE: CPT | Performed by: NURSE ANESTHETIST, CERTIFIED REGISTERED

## 2023-10-23 PROCEDURE — 360N000075 HC SURGERY LEVEL 2, PER MIN: Performed by: SURGERY

## 2023-10-23 PROCEDURE — C9290 INJ, BUPIVACAINE LIPOSOME: HCPCS | Performed by: NURSE ANESTHETIST, CERTIFIED REGISTERED

## 2023-10-23 DEVICE — MACROPOROUS MESH, MONOFILAMENT POLYPROPYLENE
Type: IMPLANTABLE DEVICE | Site: GROIN | Status: FUNCTIONAL
Brand: PARIETENE

## 2023-10-23 RX ORDER — HALOPERIDOL 5 MG/ML
1 INJECTION INTRAMUSCULAR
Status: DISCONTINUED | OUTPATIENT
Start: 2023-10-23 | End: 2023-10-23 | Stop reason: HOSPADM

## 2023-10-23 RX ORDER — ONDANSETRON 4 MG/1
4 TABLET, ORALLY DISINTEGRATING ORAL EVERY 30 MIN PRN
Status: DISCONTINUED | OUTPATIENT
Start: 2023-10-23 | End: 2023-10-23 | Stop reason: HOSPADM

## 2023-10-23 RX ORDER — HYDROMORPHONE HYDROCHLORIDE 1 MG/ML
0.4 INJECTION, SOLUTION INTRAMUSCULAR; INTRAVENOUS; SUBCUTANEOUS EVERY 5 MIN PRN
Status: DISCONTINUED | OUTPATIENT
Start: 2023-10-23 | End: 2023-10-23 | Stop reason: HOSPADM

## 2023-10-23 RX ORDER — GLYCOPYRROLATE 0.2 MG/ML
INJECTION, SOLUTION INTRAMUSCULAR; INTRAVENOUS PRN
Status: DISCONTINUED | OUTPATIENT
Start: 2023-10-23 | End: 2023-10-23

## 2023-10-23 RX ORDER — FENTANYL CITRATE 50 UG/ML
INJECTION, SOLUTION INTRAMUSCULAR; INTRAVENOUS PRN
Status: DISCONTINUED | OUTPATIENT
Start: 2023-10-23 | End: 2023-10-23

## 2023-10-23 RX ORDER — ALBUTEROL SULFATE 0.83 MG/ML
2.5 SOLUTION RESPIRATORY (INHALATION) EVERY 4 HOURS PRN
Status: DISCONTINUED | OUTPATIENT
Start: 2023-10-23 | End: 2023-10-23 | Stop reason: HOSPADM

## 2023-10-23 RX ORDER — LABETALOL 20 MG/4 ML (5 MG/ML) INTRAVENOUS SYRINGE
10
Status: DISCONTINUED | OUTPATIENT
Start: 2023-10-23 | End: 2023-10-23 | Stop reason: HOSPADM

## 2023-10-23 RX ORDER — EPHEDRINE SULFATE 50 MG/ML
INJECTION, SOLUTION INTRAMUSCULAR; INTRAVENOUS; SUBCUTANEOUS PRN
Status: DISCONTINUED | OUTPATIENT
Start: 2023-10-23 | End: 2023-10-23

## 2023-10-23 RX ORDER — ONDANSETRON 2 MG/ML
INJECTION INTRAMUSCULAR; INTRAVENOUS PRN
Status: DISCONTINUED | OUTPATIENT
Start: 2023-10-23 | End: 2023-10-23

## 2023-10-23 RX ORDER — OXYCODONE HYDROCHLORIDE 5 MG/1
5 TABLET ORAL
Status: COMPLETED | OUTPATIENT
Start: 2023-10-23 | End: 2023-10-23

## 2023-10-23 RX ORDER — BUPIVACAINE HYDROCHLORIDE 2.5 MG/ML
INJECTION, SOLUTION EPIDURAL; INFILTRATION; INTRACAUDAL
Status: COMPLETED | OUTPATIENT
Start: 2023-10-23 | End: 2023-10-23

## 2023-10-23 RX ORDER — HYDROCODONE BITARTRATE AND ACETAMINOPHEN 5; 325 MG/1; MG/1
1 TABLET ORAL EVERY 6 HOURS PRN
Qty: 18 TABLET | Refills: 0 | Status: SHIPPED | OUTPATIENT
Start: 2023-10-23 | End: 2023-10-26

## 2023-10-23 RX ORDER — SODIUM CHLORIDE, SODIUM LACTATE, POTASSIUM CHLORIDE, CALCIUM CHLORIDE 600; 310; 30; 20 MG/100ML; MG/100ML; MG/100ML; MG/100ML
INJECTION, SOLUTION INTRAVENOUS CONTINUOUS
Status: DISCONTINUED | OUTPATIENT
Start: 2023-10-23 | End: 2023-10-23 | Stop reason: HOSPADM

## 2023-10-23 RX ORDER — ONDANSETRON 2 MG/ML
4 INJECTION INTRAMUSCULAR; INTRAVENOUS EVERY 30 MIN PRN
Status: DISCONTINUED | OUTPATIENT
Start: 2023-10-23 | End: 2023-10-23 | Stop reason: HOSPADM

## 2023-10-23 RX ORDER — LIDOCAINE 40 MG/G
CREAM TOPICAL
Status: DISCONTINUED | OUTPATIENT
Start: 2023-10-23 | End: 2023-10-23 | Stop reason: HOSPADM

## 2023-10-23 RX ORDER — DEXAMETHASONE SODIUM PHOSPHATE 10 MG/ML
INJECTION, SOLUTION INTRAMUSCULAR; INTRAVENOUS
Status: COMPLETED | OUTPATIENT
Start: 2023-10-23 | End: 2023-10-23

## 2023-10-23 RX ORDER — CEFAZOLIN SODIUM/WATER 2 G/20 ML
2 SYRINGE (ML) INTRAVENOUS SEE ADMIN INSTRUCTIONS
Status: DISCONTINUED | OUTPATIENT
Start: 2023-10-23 | End: 2023-10-23 | Stop reason: HOSPADM

## 2023-10-23 RX ORDER — FENTANYL CITRATE 50 UG/ML
25 INJECTION, SOLUTION INTRAMUSCULAR; INTRAVENOUS EVERY 5 MIN PRN
Status: DISCONTINUED | OUTPATIENT
Start: 2023-10-23 | End: 2023-10-23 | Stop reason: HOSPADM

## 2023-10-23 RX ORDER — HYDROMORPHONE HYDROCHLORIDE 1 MG/ML
0.2 INJECTION, SOLUTION INTRAMUSCULAR; INTRAVENOUS; SUBCUTANEOUS EVERY 5 MIN PRN
Status: DISCONTINUED | OUTPATIENT
Start: 2023-10-23 | End: 2023-10-23 | Stop reason: HOSPADM

## 2023-10-23 RX ORDER — CEFAZOLIN SODIUM/WATER 2 G/20 ML
2 SYRINGE (ML) INTRAVENOUS
Status: COMPLETED | OUTPATIENT
Start: 2023-10-23 | End: 2023-10-23

## 2023-10-23 RX ORDER — PROPOFOL 10 MG/ML
INJECTION, EMULSION INTRAVENOUS PRN
Status: DISCONTINUED | OUTPATIENT
Start: 2023-10-23 | End: 2023-10-23

## 2023-10-23 RX ORDER — HYDRALAZINE HYDROCHLORIDE 20 MG/ML
2.5-5 INJECTION INTRAMUSCULAR; INTRAVENOUS EVERY 10 MIN PRN
Status: DISCONTINUED | OUTPATIENT
Start: 2023-10-23 | End: 2023-10-23 | Stop reason: HOSPADM

## 2023-10-23 RX ORDER — FENTANYL CITRATE 50 UG/ML
50 INJECTION, SOLUTION INTRAMUSCULAR; INTRAVENOUS EVERY 5 MIN PRN
Status: DISCONTINUED | OUTPATIENT
Start: 2023-10-23 | End: 2023-10-23 | Stop reason: HOSPADM

## 2023-10-23 RX ADMIN — PROPOFOL 120 MG: 10 INJECTION, EMULSION INTRAVENOUS at 09:20

## 2023-10-23 RX ADMIN — GLYCOPYRROLATE 0.2 MG: 0.2 INJECTION, SOLUTION INTRAMUSCULAR; INTRAVENOUS at 09:30

## 2023-10-23 RX ADMIN — SODIUM CHLORIDE, POTASSIUM CHLORIDE, SODIUM LACTATE AND CALCIUM CHLORIDE: 600; 310; 30; 20 INJECTION, SOLUTION INTRAVENOUS at 09:05

## 2023-10-23 RX ADMIN — Medication 10 MG: at 09:36

## 2023-10-23 RX ADMIN — DEXAMETHASONE SODIUM PHOSPHATE 6 MG: 10 INJECTION, SOLUTION INTRAMUSCULAR; INTRAVENOUS at 09:00

## 2023-10-23 RX ADMIN — Medication 5 MG: at 09:30

## 2023-10-23 RX ADMIN — ONDANSETRON 4 MG: 2 INJECTION INTRAMUSCULAR; INTRAVENOUS at 09:34

## 2023-10-23 RX ADMIN — BUPIVACAINE 10 ML: 13.3 INJECTION, SUSPENSION, LIPOSOMAL INFILTRATION at 09:00

## 2023-10-23 RX ADMIN — FENTANYL CITRATE 50 MCG: 50 INJECTION INTRAMUSCULAR; INTRAVENOUS at 09:19

## 2023-10-23 RX ADMIN — BUPIVACAINE HYDROCHLORIDE 20 ML: 2.5 INJECTION, SOLUTION EPIDURAL; INFILTRATION; INTRACAUDAL at 09:00

## 2023-10-23 RX ADMIN — Medication 10 MG: at 09:27

## 2023-10-23 RX ADMIN — OXYCODONE HYDROCHLORIDE 5 MG: 5 TABLET ORAL at 11:11

## 2023-10-23 RX ADMIN — Medication 5 MG: at 09:39

## 2023-10-23 RX ADMIN — FENTANYL CITRATE 50 MCG: 50 INJECTION INTRAMUSCULAR; INTRAVENOUS at 09:32

## 2023-10-23 RX ADMIN — Medication 2 G: at 08:54

## 2023-10-23 ASSESSMENT — ACTIVITIES OF DAILY LIVING (ADL)
ADLS_ACUITY_SCORE: 35
ADLS_ACUITY_SCORE: 35

## 2023-10-23 ASSESSMENT — COPD QUESTIONNAIRES
CAT_SEVERITY: MILD
COPD: 1

## 2023-10-23 NOTE — ANESTHESIA PROCEDURE NOTES
Airway       Patient location during procedure: OR       Procedure Start/Stop Times: 10/23/2023 9:22 AM  Staff -        Resident/Fellow: Georgi Kang       CRNA: Lisa Harper APRN CRNA       Performed By: SRNA  Consent for Airway        Urgency: elective  Indications and Patient Condition       Indications for airway management: amie-procedural and airway protection       Induction type:intravenous       Mask difficulty assessment: 0 - not attempted    Final Airway Details       Final airway type: supraglottic airway    Supraglottic Airway Details        Type: LMA       Brand: I-Gel       LMA size: 4    Post intubation assessment        Placement verified by: capnometry, equal breath sounds and chest rise        Number of attempts at approach: 1       Number of other approaches attempted: 0       Secured with: plastic tape       Ease of procedure: easy       Dentition: Intact    Medication(s) Administered   Medication Administration Time: 10/23/2023 9:22 AM

## 2023-10-23 NOTE — TELEPHONE ENCOUNTER
Per Dr Mendieta OV 10/19/23  The other 2 options  given the risk of bleeding in his case could be to stop anticoagulation completely and monitor.  He did have a hypercoagulable work-up and it did not show any strong thrombophilia.  The third option is to switch him to a DOAC where we do have level 3 evidence of switching to a lower dose to continue with anticoagulation for Eliquis and Xarelto.DOACs also have a lower risk of bleeding compared to warfarin which may be safer in his case.      He unsure what to do and I recommended he discuss this with his primary care as this is more a long term decision and not urgent.       Spoke with patient today after his procedure and about resuming warfarin. Patient does not want to resume warfarin after thinking about what Dr Mendieta said in her OV. He will have another OV with Dr Alonzo and Dr Mendieta. He wants to cancel all anticoag appts for now and stay off warfarin for now.

## 2023-10-23 NOTE — ANESTHESIA CARE TRANSFER NOTE
Patient: Rey Tristan    Procedure: Procedure(s):  Open Left Inguinal Hernia Repair with mesh       Diagnosis: Left inguinal hernia [K40.90]  Diagnosis Additional Information: No value filed.    Anesthesia Type:   General     Note:    Oropharynx: oropharynx clear of all foreign objects  Level of Consciousness: drowsy  Oxygen Supplementation: nasal cannula  Level of Supplemental Oxygen (L/min / FiO2): 2  Independent Airway: airway patency satisfactory and stable  Dentition: dentition unchanged  Vital Signs Stable: post-procedure vital signs reviewed and stable  Report to RN Given: handoff report given  Patient transferred to: PACU    Handoff Report: Identifed the Patient, Identified the Reponsible Provider, Reviewed the pertinent medical history, Discussed the surgical course, Reviewed Intra-OP anesthesia mangement and issues during anesthesia, Set expectations for post-procedure period and Allowed opportunity for questions and acknowledgement of understanding    Vitals:  Vitals Value Taken Time   BP     Temp     Pulse     Resp     SpO2         Electronically Signed By: VALERIE Bernard CRNA  October 23, 2023  10:01 AM

## 2023-10-23 NOTE — ANESTHESIA POSTPROCEDURE EVALUATION
Patient: Rey Tristan    Procedure: Procedure(s):  Open Left Inguinal Hernia Repair with mesh       Anesthesia Type:  General    Note:  Disposition: Outpatient   Postop Pain Control: Uneventful            Sign Out: Well controlled pain   PONV: No   Neuro/Psych: Uneventful            Sign Out: Acceptable/Baseline neuro status   Airway/Respiratory: Uneventful            Sign Out: Acceptable/Baseline resp. status   CV/Hemodynamics: Uneventful            Sign Out: Acceptable CV status; No obvious hypovolemia; No obvious fluid overload   Other NRE: NONE   DID A NON-ROUTINE EVENT OCCUR? No         Last vitals:  Vitals Value Taken Time   /67 10/23/23 1035   Temp 97.7  F (36.5  C) 10/23/23 1030   Pulse 54 10/23/23 1035   Resp 18 10/23/23 1035   SpO2 100 % 10/23/23 1035       Electronically Signed By: VALERIE Bernard CRNA  October 23, 2023  11:24 AM

## 2023-10-23 NOTE — DISCHARGE INSTRUCTIONS
Post-Anesthesia Patient Instructions    IMMEDIATELY FOLLOWING SURGERY:  Do not drive or operate machinery for the first twenty four hours after surgery.  Do not make any important decisions for twenty four hours after surgery or while taking narcotic pain medications or sedatives.  If you develop intractable nausea and vomiting or a severe headache please notify your doctor immediately.    FOLLOW-UP:  Please make an appointment with your surgeon as instructed. You do not need to follow up with anesthesia unless specifically instructed to do so.    WOUND CARE INSTRUCTIONS (if applicable):  Keep a dry clean dressing on the anesthesia/puncture wound site if there is drainage.  Once the wound has quit draining you may leave it open to air.  Generally you should leave the bandage intact for twenty four hours unless there is drainage.  If the epidural site drains for more than 36-48 hours please call the anesthesia department.    QUESTIONS?:  Please feel free to call your physician or the hospital  if you have any questions, and they will be happy to assist you.       Inguinal Hernia Repair Surgery: What to Expect at Home  Your Recovery     After surgery to repair a hernia, you're likely to have pain for a few days. You may also feel tired and have less energy than normal. This is common.  You should start to feel better after a few days. And you'll probably feel much better in 7 days. For a few weeks you may feel discomfort or pulling in the groin area when you move. You may have some bruising near the repair site and on your genitals. This is normal.  This care sheet gives you a general idea about how long it will take for you to recover. But each person recovers at a different pace. Follow the steps below to get better as quickly as possible.  How can you care for yourself at home?  Activity    Rest when you feel tired.     You may shower 24 to 48 hours after surgery, if your doctor okays it. Pat the  incision dry. Do not take a bath for the first 2 weeks, or until your doctor tells you it is okay.     Allow the area to heal. Don't move quickly or lift anything heavy until you are feeling better.     Be active. Walking is a good choice.     You most likely can return to light activity after 1 to 3 weeks, depending on the type of surgery you had.   Diet    You can eat your normal diet. If your stomach is upset, try bland, low-fat foods like plain rice, broiled chicken, toast, and yogurt.     If your bowel movements are not regular right after surgery, try to avoid constipation and straining. Drink plenty of water. Your doctor may suggest fiber, a stool softener, or a mild laxative.   Medicines    Be safe with medicines. Read and follow all instructions on the label.  If the doctor gave you a prescription medicine for pain, take it as prescribed.  If you are not taking a prescription pain medicine, ask your doctor if you can take an over-the-counter medicine.     Your doctor will tell you if and when you can restart your medicines. He or she will also give you instructions about taking any new medicines.   Incision care    You will have a dressing over the cut (incision). A dressing helps the cut heal and protects it. Your doctor will tell you how to take care of this.     If you have skin adhesive on the cut, leave it on until it falls off. Skin adhesive is also called liquid stitches or glue.     If you have strips of tape on the cut, leave the tape on for a week or until it falls off.     If you had stitches, your doctor will tell you when to come back to have them removed.     Wash the area daily with warm, soapy water, and pat it dry. Don't use hydrogen peroxide or alcohol. They can slow healing.   Ice    Put ice or a cold pack on the area for 10 to 20 minutes at a time. Try to do this every 1 to 2 hours for the next 3 days (when you are awake) or until the swelling goes down. Put a thin cloth between the ice  "and your skin.   Follow-up care is a key part of your treatment and safety. Be sure to make and go to all appointments, and call your doctor if you are having problems. It's also a good idea to know your test results and keep a list of the medicines you take.  When should you call for help?   Call 911 anytime you think you may need emergency care. For example, call if:    You passed out (lost consciousness).     You are short of breath.   Call your doctor now or seek immediate medical care if:    You have pain that does not get better after you take pain medicine.     You have loose stitches, or your incision comes open.     Bright red blood has soaked through your bandage.     You are sick to your stomach or cannot drink fluids.     You have signs of a blood clot in your leg (called a deep vein thrombosis), such as:  Pain in your calf, back of the knee, thigh, or groin.  Redness and swelling in your leg or groin.     You cannot pass stools or gas.     You have symptoms of infection, such as:  Increased pain, swelling, warmth, or redness.  Red streaks leading from the incision.  Pus draining from the incision.  A fever.   Watch closely for changes in your health, and be sure to contact your doctor if you have any problems.  Where can you learn more?  Go to https://www.[employer].net/patiented  Enter D758 in the search box to learn more about \"Inguinal Hernia Repair Surgery: What to Expect at Home.\"  Current as of: November 30, 2022               Content Version: 13.7    6127-7010 [employer].   Care instructions adapted under license by your healthcare professional. If you have questions about a medical condition or this instruction, always ask your healthcare professional. [employer] disclaims any warranty or liability for your use of this information.      "

## 2023-10-23 NOTE — ANESTHESIA PROCEDURE NOTES
TAP Procedure Note    Pre-Procedure   Staff -        CRNA: Lisa Harper APRN CRNA       Performed By: CRNA       Location: pre-op       Procedure Start/Stop Times: 10/23/2023 9:00 AM and 10/23/2023 9:05 AM       Pre-Anesthestic Checklist: patient identified, IV checked, site marked, risks and benefits discussed, informed consent, monitors and equipment checked, pre-op evaluation, at physician/surgeon's request and post-op pain management  Timeout:       Correct Patient: Yes        Correct Procedure: Yes        Correct Site: Yes        Correct Position: Yes        Correct Laterality: Yes        Site Marked: Yes  Procedure Documentation  Procedure: TAP       Laterality: left       Patient Position: supine       Skin prep: Chloraprep       Needle Type: insulated and short bevel       Needle Gauge: 20.        Needle Length (Inches): 4        Ultrasound guided       1. Ultrasound was used to identify targeted nerve, plexus, vascular marker, or fascial plane and place a needle adjacent to it in real-time.       2. Ultrasound was used to visualize the spread of anesthetic in close proximity to the above referenced structure.       3. A permanent image is entered into the patient's record.       4. The visualized anatomic structures appeared normal.       5. There were no apparent abnormal pathologic findings.    Assessment/Narrative         The placement was negative for: blood aspirated, painful injection and site bleeding       Paresthesias: No.       Bolus given via needle..        Secured via.        Insertion/Infusion Method: Single Shot       Complications: none       Injection made incrementally with aspirations every 5 mL.    Medication(s) Administered   Bupivacaine 0.25% PF (Infiltration) - Infiltration   20 mL - 10/23/2023 9:00:00 AM  Bupivacaine liposome (Exparel) 1.3% LA inj susp (Infiltration) - Infiltration   10 mL - 10/23/2023 9:00:00 AM  Dexamethasone 10 mg/mL PF (Perineural) - Perineural   6 mg -  "10/23/2023 9:00:00 AM  Medication Administration Time: 10/23/2023 9:00 AM      FOR [facility]) ONLY:   Pain Team Contact information: please page the Pain Team Via [employer] (?????). Search \"Pain\". During daytime hours, please page the attending first. At night please page the resident first.      "

## 2023-10-23 NOTE — OR NURSING
Patient and responsible adult given discharge instructions with no questions regarding instructions. Ravi score 19/20. Pain level 1/10.  Discharged from unit via wheelchair. Patient discharged to home with wife and dtr.

## 2023-10-23 NOTE — OR NURSING
PACU Respiratory Event Documentation     1) Episodes of Apnea greater than or equal to 10 seconds: 0    2) Bradypnea - less than 8 breaths per minute: 0    3) Pain score on 0 to 10 scale: 2    4) Pain-sedation mismatch (yes or no): no    5) Repeated 02 desaturation less than 90% (yes or no): no    Anesthesia notified? (yes or no): na    Any of the above events occuring repeatedly in separate 30 minute intervals may be considered recurrent PACU respiratory events.

## 2023-10-23 NOTE — OP NOTE
REPORT OF OPERATION  DATE OF PROCEDURE: 10/23/2023    PATIENT: Rey Tristan    SURGERY PERFORMED: Left Inguinal Hernia Repair    PREOPERATIVE DIAGNOSIS: Left  Inguinal Hernia    POSTOPERATIVE DIAGNOSIS: Left Non-incarcerated Direct Inguinal hernia    SURGEON: Noah Dempsey MD    ASSISTANTS: None    ANESTHESIA: General Endotracheal Anesthesia    COMPLICATIONS: None apparent    TRANSFUSIONS: None    TISSUE TO PATHOLOGY: Cord Lipoma    FINDINGS: Left Non-incarcerated Direct Ingunial hernia    INDICATIONS:  Rey Tristan is a 83 year old male with a left inguinal hernia.  The patient will be taken to the operating room for a left inguinal hernia repair.    DESCRIPTIONS OF PROCEDURE IN DETAIL: After consent was obtained the patient was taken to the operative suite and jareth in the supine position.  The patient was identified and the correct patient was confirmed.  General endotracheal anesthesia was induced by anesthesia.  The patient was sterilely prepped and draped in the usual fashion.  A time out was performed verifying the correct patient and the correct procedure.  The entire operative team was in agreement.  All necessary equipment and supplies were in the room.    After landmarks were identified, a left groin incision was made and dissection was careied down to the external oblique.  The external oblique was opened in the direction of its fibers and carried through the external ring.  The ilioinguinal nerve was preserved.  The spermatic cord was then isolated and retracted using a penrose drain.      The cord was skeletonized and an indirect hernia sac was not identified.      A cord lipoma was identified.  The cord lipoma was lysed from the surrounding tissue down to its base then ligated and removed, with suture ligature for hemostasis.  This was then send to pathology.      Investigation of the inguinal floor did identify a direct hernia defect.  The floor was repaired by bringing the Conjoint  tendon to Poupart's  ligament using interrupted 0 Ethibond sutures.  A Mesh patch was then overlain the floor and tacked into place using 2-0 Vicryl sutures, taking the patch to the pubic tubercle medially, Poupart's ligament inferiorly and the Conjoint tendon superiorly.  The tails were brought out around the cord structures and tacked together to the internal oblique laterally.  The spermatic cord was place back into its anatomical position.  The external oblique was closed with running 2-0 vicryl sutures.  Harry's fascia was re-approximated with interrupted 3-0 vicryl sutures.  The skin was closed with stainless steel staples.  Sterile dressing were applied.  All needle, sponge and instrument counts were correct.  The patient was awaked and taken to the recovery room in stable conditions tolerating the procedure well.

## 2023-10-23 NOTE — INTERVAL H&P NOTE
"I have reviewed the surgical (or preoperative) H&P that is linked to this encounter, and examined the patient. There are no significant changes    Clinical Conditions Present on Arrival:  Clinically Significant Risk Factors Present on Admission                # Drug Induced Coagulation Defect: home medication list includes an anticoagulant medication   # Overweight: Estimated body mass index is 25.11 kg/m  as calculated from the following:    Height as of 10/17/23: 1.638 m (5' 4.5\").    Weight as of 10/19/23: 67.4 kg (148 lb 9.4 oz).       "

## 2023-10-25 LAB
PATH REPORT.COMMENTS IMP SPEC: NORMAL
PATH REPORT.COMMENTS IMP SPEC: NORMAL
PATH REPORT.FINAL DX SPEC: NORMAL
PATH REPORT.GROSS SPEC: NORMAL
PATH REPORT.MICROSCOPIC SPEC OTHER STN: NORMAL
PATH REPORT.RELEVANT HX SPEC: NORMAL
PHOTO IMAGE: NORMAL

## 2023-10-26 LAB
ATRIAL RATE - MUSE: 54 BPM
DIASTOLIC BLOOD PRESSURE - MUSE: NORMAL MMHG
INTERPRETATION ECG - MUSE: NORMAL
P AXIS - MUSE: 77 DEGREES
PR INTERVAL - MUSE: 140 MS
QRS DURATION - MUSE: 102 MS
QT - MUSE: 440 MS
QTC - MUSE: 417 MS
R AXIS - MUSE: -39 DEGREES
SYSTOLIC BLOOD PRESSURE - MUSE: NORMAL MMHG
T AXIS - MUSE: 48 DEGREES
VENTRICULAR RATE- MUSE: 54 BPM

## 2023-11-14 ENCOUNTER — OFFICE VISIT (OUTPATIENT)
Dept: SURGERY | Facility: OTHER | Age: 83
End: 2023-11-14
Attending: NURSE PRACTITIONER
Payer: MEDICARE

## 2023-11-14 VITALS
DIASTOLIC BLOOD PRESSURE: 62 MMHG | OXYGEN SATURATION: 95 % | TEMPERATURE: 97.3 F | RESPIRATION RATE: 18 BRPM | SYSTOLIC BLOOD PRESSURE: 102 MMHG | HEART RATE: 72 BPM

## 2023-11-14 DIAGNOSIS — Z87.19 STATUS POST HERNIA REPAIR: Primary | ICD-10-CM

## 2023-11-14 DIAGNOSIS — Z98.890 STATUS POST HERNIA REPAIR: Primary | ICD-10-CM

## 2023-11-14 PROCEDURE — G0463 HOSPITAL OUTPT CLINIC VISIT: HCPCS

## 2023-11-14 PROCEDURE — 99024 POSTOP FOLLOW-UP VISIT: CPT | Performed by: NURSE PRACTITIONER

## 2023-11-14 ASSESSMENT — PAIN SCALES - GENERAL: PAINLEVEL: NO PAIN (0)

## 2023-11-14 NOTE — PROGRESS NOTES
CLINIC NOTE - POST-OP SURGERY  11/14/2023    Patient:Rey Tristan    Procedure: Left Inguinal Hernia Repair (open)    This is a 83 year old male who is 3 weeks s/p Left Inguinal Hernia Repair (open).  The patient has no complaints today.     Current Medications:  Current Outpatient Medications   Medication Sig Dispense Refill    Ascorbic Acid (VITAMIN C) 500 MG CAPS       atorvastatin (LIPITOR) 10 MG tablet Take 1 tablet (10 mg) by mouth daily 90 tablet 2    Biotin 5000 MCG PO CAPS Take 1,000 mg by mouth      Cholecalciferol (VITAMIN D) 400 UNITS tablet Take 1,000 Units by mouth daily      cyanocobalamin ([employer] VITAMIN  B12) 1000 MCG TABS Take 1,000 mcg by mouth daily 90 tablet 3    ferrous sulfate (FEROSUL) 325 (65 Fe) MG tablet Take 325 mg by mouth every other day      ketoconazole (NIZORAL) 2 % external cream Apply to eyelids and red spots lower face at bedtime 30 g 3    Multiple Vitamins-Minerals (MULTIVITAMIN ADULT PO) Take 1 tablet by mouth      omeprazole (PRILOSEC) 40 MG DR capsule Take 1 capsule (40 mg) by mouth daily 90 capsule 3    pyridoxine (VITAMIN B-6) 50 MG TABS Take 1 tablet (50 mg) by mouth daily 90 tablet 3    triamcinolone (KENALOG) 0.1 % external ointment Apply topically 2 times daily 30 g 1    warfarin ANTICOAGULANT (COUMADIN) 4 MG tablet TAKE 1 1/2 TABLETS BY MOUTH ON MONDAYS AND FRIDAYS, TAKE 1 TABLET ON ALL OTHER DAYS (Patient not taking: Reported on 11/14/2023) 120 tablet 3       Allergies:  No Known Allergies    PHYSICAL EXAM:   Vital signs: /62 (BP Location: Right arm, Patient Position: Sitting)   Pulse 72   Temp 97.3  F (36.3  C) (Tympanic)   Resp 18   SpO2 95%    BMI: There is no height or weight on file to calculate BMI.   General: Normal, healthy, cooperative, in no acute distress, alert   Lungs: respirations are non-labored   Abdominal: non-distended   Wounds:  Well healed surgical scars consistent with his operation.     PATHOLOGY:  Case Report   Date Value Ref Range  Status   10/23/2023   Final    Surgical Pathology Report                         Case: [id number]                                  Authorizing Provider:  Noah Dempsey MD  Collected:           10/23/2023 09:39 AM          Ordering Location:     [facility] Main Operating Room     Received:            10/23/2023 12:09 PM          Pathologist:           Ana María Reyes MD                                                                           Specimen:    Spermatic Cord, spermatic cord lipoma                                                       Final Diagnosis   Date Value Ref Range Status   10/23/2023   Final    Soft tissue, left inguinal area, excision:  -Mature fatty tissue consistent with lipoma; sample also includes skeletal muscle and medium caliber vascular structures            ASSESSMENT:    83 year old male who is 3 weeks s/p Left Inguinal Hernia Repair (open).  Doing well.     PLAN:   Staples were removed without problems    Follow-up as needed      Restrictions : Will continue lifting restrictions of no more than 10 pounds until 6 weeks after surgery

## 2023-11-22 ENCOUNTER — TELEPHONE (OUTPATIENT)
Dept: FAMILY MEDICINE | Facility: OTHER | Age: 83
End: 2023-11-22

## 2023-11-22 NOTE — TELEPHONE ENCOUNTER
Called patient and left voice message to see if he is still off his warfarin per his own choice and to confirm if the onc. Visit with Dr. Mahoney on 2/23/23 is to discuss anticoagulation or if it is for something else. Rhonda Lopez RN on 11/22/2023 at 4:07 PM

## 2023-11-27 ENCOUNTER — TELEPHONE (OUTPATIENT)
Dept: FAMILY MEDICINE | Facility: OTHER | Age: 83
End: 2023-11-27

## 2023-11-27 NOTE — TELEPHONE ENCOUNTER
Patient called back he is off warfarin and will be staying off it after reviewing with Dr Mendieta.

## 2024-02-16 ENCOUNTER — LAB (OUTPATIENT)
Dept: LAB | Facility: OTHER | Age: 84
End: 2024-02-16
Payer: MEDICARE

## 2024-02-16 DIAGNOSIS — D64.9 ANEMIA, UNSPECIFIED TYPE: ICD-10-CM

## 2024-02-16 DIAGNOSIS — D47.2 IGM MONOCLONAL GAMMOPATHY OF UNCERTAIN SIGNIFICANCE: ICD-10-CM

## 2024-02-16 LAB
ALBUMIN SERPL BCG-MCNC: 4.1 G/DL (ref 3.5–5.2)
ALP SERPL-CCNC: 147 U/L (ref 40–150)
ALT SERPL W P-5'-P-CCNC: 22 U/L (ref 0–70)
ANION GAP SERPL CALCULATED.3IONS-SCNC: 9 MMOL/L (ref 7–15)
AST SERPL W P-5'-P-CCNC: 32 U/L (ref 0–45)
BASOPHILS # BLD AUTO: 0.1 10E3/UL (ref 0–0.2)
BASOPHILS NFR BLD AUTO: 1 %
BILIRUB SERPL-MCNC: 0.4 MG/DL
BUN SERPL-MCNC: 14.3 MG/DL (ref 8–23)
CALCIUM SERPL-MCNC: 9 MG/DL (ref 8.8–10.2)
CHLORIDE SERPL-SCNC: 103 MMOL/L (ref 98–107)
CREAT SERPL-MCNC: 0.73 MG/DL (ref 0.67–1.17)
DEPRECATED HCO3 PLAS-SCNC: 28 MMOL/L (ref 22–29)
EGFRCR SERPLBLD CKD-EPI 2021: >90 ML/MIN/1.73M2
EOSINOPHIL # BLD AUTO: 0.2 10E3/UL (ref 0–0.7)
EOSINOPHIL NFR BLD AUTO: 4 %
ERYTHROCYTE [DISTWIDTH] IN BLOOD BY AUTOMATED COUNT: 13.3 % (ref 10–15)
GLUCOSE SERPL-MCNC: 85 MG/DL (ref 70–99)
HCT VFR BLD AUTO: 39.8 % (ref 40–53)
HGB BLD-MCNC: 12.9 G/DL (ref 13.3–17.7)
IMM GRANULOCYTES # BLD: 0 10E3/UL
IMM GRANULOCYTES NFR BLD: 0 %
LDH SERPL L TO P-CCNC: 177 U/L (ref 0–250)
LYMPHOCYTES # BLD AUTO: 0.8 10E3/UL (ref 0.8–5.3)
LYMPHOCYTES NFR BLD AUTO: 14 %
MCH RBC QN AUTO: 30 PG (ref 26.5–33)
MCHC RBC AUTO-ENTMCNC: 32.4 G/DL (ref 31.5–36.5)
MCV RBC AUTO: 93 FL (ref 78–100)
MONOCYTES # BLD AUTO: 0.4 10E3/UL (ref 0–1.3)
MONOCYTES NFR BLD AUTO: 8 %
NEUTROPHILS # BLD AUTO: 4.1 10E3/UL (ref 1.6–8.3)
NEUTROPHILS NFR BLD AUTO: 73 %
NRBC # BLD AUTO: 0 10E3/UL
NRBC BLD AUTO-RTO: 0 /100
PLATELET # BLD AUTO: 187 10E3/UL (ref 150–450)
POTASSIUM SERPL-SCNC: 4 MMOL/L (ref 3.4–5.3)
PROT SERPL-MCNC: 7.6 G/DL (ref 6.4–8.3)
RBC # BLD AUTO: 4.3 10E6/UL (ref 4.4–5.9)
SODIUM SERPL-SCNC: 140 MMOL/L (ref 135–145)
WBC # BLD AUTO: 5.6 10E3/UL (ref 4–11)

## 2024-02-16 PROCEDURE — 84155 ASSAY OF PROTEIN SERUM: CPT | Mod: ZL

## 2024-02-16 PROCEDURE — 85025 COMPLETE CBC W/AUTO DIFF WBC: CPT | Mod: ZL

## 2024-02-16 PROCEDURE — 82784 ASSAY IGA/IGD/IGG/IGM EACH: CPT | Mod: ZL

## 2024-02-16 PROCEDURE — 84165 PROTEIN E-PHORESIS SERUM: CPT | Mod: 26 | Performed by: PATHOLOGY

## 2024-02-16 PROCEDURE — 83615 LACTATE (LD) (LDH) ENZYME: CPT | Mod: ZL

## 2024-02-16 PROCEDURE — 83550 IRON BINDING TEST: CPT | Mod: ZL

## 2024-02-16 PROCEDURE — 36415 COLL VENOUS BLD VENIPUNCTURE: CPT | Mod: ZL

## 2024-02-16 PROCEDURE — 84165 PROTEIN E-PHORESIS SERUM: CPT | Mod: ZL | Performed by: PATHOLOGY

## 2024-02-16 PROCEDURE — 82232 ASSAY OF BETA-2 PROTEIN: CPT | Mod: ZL

## 2024-02-16 PROCEDURE — 83521 IG LIGHT CHAINS FREE EACH: CPT | Mod: ZL

## 2024-02-16 PROCEDURE — 84295 ASSAY OF SERUM SODIUM: CPT | Mod: ZL

## 2024-02-16 PROCEDURE — 82728 ASSAY OF FERRITIN: CPT | Mod: ZL

## 2024-02-16 PROCEDURE — 85810 BLOOD VISCOSITY EXAMINATION: CPT | Mod: ZL

## 2024-02-17 LAB — TOTAL PROTEIN SERUM FOR ELP: 7.3 G/DL (ref 6.4–8.3)

## 2024-02-19 LAB
B2 MICROGLOB TUMOR MARKER SER-MCNC: 2.1 MG/L
IGA SERPL-MCNC: 87 MG/DL (ref 84–499)
IGG SERPL-MCNC: 702 MG/DL (ref 610–1616)
IGM SERPL-MCNC: 1368 MG/DL (ref 35–242)
KAPPA LC FREE SER-MCNC: 5.57 MG/DL (ref 0.33–1.94)
KAPPA LC FREE/LAMBDA FREE SER NEPH: 3.57 {RATIO} (ref 0.26–1.65)
LAMBDA LC FREE SERPL-MCNC: 1.56 MG/DL (ref 0.57–2.63)
VISC SER: 1.7 CP (ref 1.4–1.8)

## 2024-02-20 DIAGNOSIS — D64.9 ANEMIA, UNSPECIFIED TYPE: Primary | ICD-10-CM

## 2024-02-20 LAB
FERRITIN SERPL-MCNC: 117 NG/ML (ref 31–409)
IRON BINDING CAPACITY (ROCHE): 288 UG/DL (ref 240–430)
IRON SATN MFR SERPL: 31 % (ref 15–46)
IRON SERPL-MCNC: 90 UG/DL (ref 61–157)

## 2024-02-21 LAB
ALBUMIN SERPL ELPH-MCNC: 3.8 G/DL (ref 3.7–5.1)
ALPHA1 GLOB SERPL ELPH-MCNC: 0.3 G/DL (ref 0.2–0.4)
ALPHA2 GLOB SERPL ELPH-MCNC: 0.8 G/DL (ref 0.5–0.9)
B-GLOBULIN SERPL ELPH-MCNC: 1.8 G/DL (ref 0.6–1)
GAMMA GLOB SERPL ELPH-MCNC: 0.6 G/DL (ref 0.7–1.6)
M PROTEIN SERPL ELPH-MCNC: 1 G/DL
PROT PATTERN SERPL ELPH-IMP: ABNORMAL

## 2024-02-23 ENCOUNTER — ONCOLOGY VISIT (OUTPATIENT)
Dept: ONCOLOGY | Facility: OTHER | Age: 84
End: 2024-02-23
Attending: NURSE PRACTITIONER
Payer: COMMERCIAL

## 2024-02-23 VITALS
DIASTOLIC BLOOD PRESSURE: 68 MMHG | BODY MASS INDEX: 26.26 KG/M2 | OXYGEN SATURATION: 96 % | TEMPERATURE: 96.6 F | SYSTOLIC BLOOD PRESSURE: 118 MMHG | WEIGHT: 157.63 LBS | HEIGHT: 65 IN | HEART RATE: 66 BPM | RESPIRATION RATE: 20 BRPM

## 2024-02-23 DIAGNOSIS — D64.9 ANEMIA, UNSPECIFIED TYPE: ICD-10-CM

## 2024-02-23 DIAGNOSIS — D47.2 IGM MONOCLONAL GAMMOPATHY OF UNCERTAIN SIGNIFICANCE: Primary | ICD-10-CM

## 2024-02-23 PROCEDURE — 99213 OFFICE O/P EST LOW 20 MIN: CPT | Performed by: NURSE PRACTITIONER

## 2024-02-23 PROCEDURE — G0463 HOSPITAL OUTPT CLINIC VISIT: HCPCS

## 2024-02-23 ASSESSMENT — PAIN SCALES - GENERAL: PAINLEVEL: NO PAIN (0)

## 2024-02-23 NOTE — NURSING NOTE
"Oncology Rooming Note    February 23, 2024 10:40 AM   Rey Tristan is a 83 year old male who presents for:    Chief Complaint   Patient presents with    Oncology Clinic Visit     Follow up IgM monoclonal gammopathy        Initial Vitals: /68   Pulse 66   Temp (!) 96.6  F (35.9  C) (Tympanic)   Resp 20   Ht 1.638 m (5' 4.5\")   Wt 71.5 kg (157 lb 10.1 oz)   SpO2 96%   BMI 26.64 kg/m   Estimated body mass index is 26.64 kg/m  as calculated from the following:    Height as of this encounter: 1.638 m (5' 4.5\").    Weight as of this encounter: 71.5 kg (157 lb 10.1 oz). Body surface area is 1.8 meters squared.  No Pain (0) Comment: Data Unavailable   No LMP for male patient.  Allergies reviewed: Yes  Medications reviewed: Yes    Medications: Medication refills not needed today.  Pharmacy name entered into [facility]: [id number] [place] [address] AT [facility] [address]    Frailty Screening:   Is the patient here for a new oncology consult visit in cancer care? 2. No          Jayleen Fleming LPN             "

## 2024-02-23 NOTE — PROGRESS NOTES
Hematology Follow-up Visit:  February 23, 2024    Reason for Visit:  Patient presents with:  Oncology Clinic Visit: Follow up IgM monoclonal gammopathy         Nursing Notes and Documentation reviewed:  yes    HPI: This is a 83-year-old male patient who presents to the clinic today in followup of monoclonal gammopathy of undetermined significance diagnosed 2/2018 during workup for hypercoag state, along with previous diagnosis of anemia related to GI bleed.  Patient does have a history of previous DVT/PE and had been on long-term anticoagulation with Coumadin.  Coumadin was recently discontinued. He's been on iron every other day.    He presents to the clinic today accompanied by his wife stating he is doing well and offers no new complaints.  He did undergo hernia surgery in October.  He is happy that he was taken off of the Coumadin.  He does have some moderate shortness of breath that is unchanged related to his previous diagnosis of COPD.    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   Protein C and protein S along with antiphospholipid profile were negative.  SPEP was completed showing an M-spike of 1.1 with serum immunofixation showing monoclonal IgM immunoglobulin, kappa light chain type with IgM of 1530.  Bone marrow aspiration biopsy showed iron deficiency, slightly increased plasma cell proximally 2% kappa and toxic neutrophils with reactive lymphocytes and increased rouleaux formation; flow cytometry and immunophenotyping was unremarkable. Skeletal bone survey was completed on 2/6/2018 showing a stable T12 compression fracture but no evidence of lytic lesions.      He was placed on oral iron with improvement in his hemoglobin and iron studies.      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 Simeon; silent; transient hyperthyroid, now  normal; monitor TSH monthly for hypothyroidism    Chronic anticoagulation 2012    COPD (chronic obstructive pulmonary disease) (H)     Elevated serum alkaline phosphatase level 2016    normal GGT, normal bone skeletal survery    Gastric ulcer     endoscopy 2016, repeat 6 months; PPI Carafate    History of blood transfusion     Hyperlipidemia     Normocytic anemia 2016    Pulmonary nodule     CT 2014, right; consider repeat 1 year if high risk    Upper GI bleed 2017    Vitamin D deficiency 2012       Social History     Socioeconomic History    Marital status:      Spouse name: Not on file    Number of children: Not on file    Years of education: Not on file    Highest education level: Not on file   Occupational History     Employer: [employer]     Comment: Retired    Tobacco Use    Smoking status: Former     Types: Cigarettes     Start date: 5/15/1958     Quit date: 10/28/2009     Years since quittin.3    Smokeless tobacco: Never    Tobacco comments:     Quit    Vaping Use    Vaping Use: Never used   Substance and Sexual Activity    Alcohol use: No     Alcohol/week: 0.0 standard drinks of alcohol    Drug use: No    Sexual activity: Not on file   Other Topics Concern     Service Yes     Comment: [employer]    Blood Transfusions Yes     Comment: Permits if needed    Caffeine Concern Yes     Comment: Tea    Occupational Exposure No    Hobby Hazards No    Sleep Concern No    Stress Concern No    Weight Concern No    Special Diet No    Back Care No    Exercise No    Bike Helmet Not Asked    Seat Belt Yes    Self-Exams Yes    Parent/sibling w/ CABG, MI or angioplasty before 65F 55M? No   Social History Narrative    Not on file     Social Determinants of Health     Financial Resource Strain: Not on file   Food Insecurity: Not on file   Transportation Needs: Not on file   Physical Activity: Not on file   Stress: Not on file   Social  Connections: Not on file   Interpersonal Safety: Low Risk  (10/17/2023)    Interpersonal Safety     Do you feel physically and emotionally safe where you currently live?: Yes     Within the past 12 months, have you been hit, slapped, kicked or otherwise physically hurt by someone?: No     Within the past 12 months, have you been humiliated or emotionally abused in other ways by your partner or ex-partner?: No   Housing Stability: Not on file       Past Surgical History:   Procedure Laterality Date    ANGIOGRAM  6/23/2014    BIOPSY  2018    bone marrow biopsy    BONE MARROW BIOPSY, BONE SPECIMEN, NEEDLE/TROCAR N/A 1/16/2018    Procedure: BIOPSY BONE MARROW;  BONE MARROW BIOPSY;  Surgeon: Nuno Castro MD;  Location: [facility]    C. Difficile with intussuseption      cataract extraction      colonoscopy      COLONOSCOPY  6/17    ENDOSCOPY UPPER, COLONOSCOPY, COMBINED N/A 6/17/2016    Procedure: COMBINED ENDOSCOPY UPPER, COLONOSCOPY;  Surgeon: Andrea Stearns DO;  Location: [facility]    ESOPHAGOSCOPY, GASTROSCOPY, DUODENOSCOPY (EGD), COMBINED N/A 11/30/2017    Procedure: COMBINED ESOPHAGOSCOPY, GASTROSCOPY, DUODENOSCOPY (EGD);  UPPER ENDOSCOPY;  Surgeon: Narinder Torres MD;  Location: [facility]    HERNIORRHAPHY INGUINAL Left 10/23/2023    Procedure: Open Left Inguinal Hernia Repair with mesh;  Surgeon: Noah Dempsey MD;  Location: [facility]    left knee meniscal tear      Left lower extremity DVT      lens implant      nasal polypectomy      Pulmonary Embolism      ZZC REGULAR ECHO ([place])  6/23/2014    Dr. Cavazos       Family History   Problem Relation Age of Onset    Cancer Mother         Ovarian    Other Cancer Mother         lung/breast/ cervical ?    Respiratory Father         emphysemia    Lung Cancer Brother     Diabetes No family hx of     Hypertension No family hx of     Hyperlipidemia No family hx of     Thyroid Disease No family hx of     Asthma No family hx of     Colon Cancer No family hx of     Prostate  "Cancer No family hx of     Anesthesia Reaction No family hx of     Genetic Disorder No family hx of     Cerebrovascular Disease No family hx of     Coronary Artery Disease No family hx of     Breast Cancer No family hx of        Allergies:  Allergies as of 02/23/2024    (No Known Allergies)       Current Medications:  Current Outpatient Medications   Medication Sig Dispense Refill    Ascorbic Acid (VITAMIN C) 500 MG CAPS       atorvastatin (LIPITOR) 10 MG tablet Take 1 tablet (10 mg) by mouth daily 90 tablet 2    Biotin 5000 MCG PO CAPS Take 1,000 mg by mouth      Cholecalciferol (VITAMIN D) 400 UNITS tablet Take 1,000 Units by mouth daily      cyanocobalamin ([employer] VITAMIN  B12) 1000 MCG TABS Take 1,000 mcg by mouth daily 90 tablet 3    ferrous sulfate (FEROSUL) 325 (65 Fe) MG tablet Take 325 mg by mouth every other day      ketoconazole (NIZORAL) 2 % external cream Apply to eyelids and red spots lower face at bedtime 30 g 3    Multiple Vitamins-Minerals (MULTIVITAMIN ADULT PO) Take 1 tablet by mouth      omeprazole (PRILOSEC) 40 MG DR capsule Take 1 capsule (40 mg) by mouth daily 90 capsule 3    pyridoxine (VITAMIN B-6) 50 MG TABS Take 1 tablet (50 mg) by mouth daily 90 tablet 3    triamcinolone (KENALOG) 0.1 % external ointment Apply topically 2 times daily 30 g 1    warfarin ANTICOAGULANT (COUMADIN) 4 MG tablet TAKE 1 1/2 TABLETS BY MOUTH ON MONDAYS AND FRIDAYS, TAKE 1 TABLET ON ALL OTHER DAYS (Patient not taking: Reported on 11/14/2023) 120 tablet 3          Review Of Systems: See HPI      Physical Exam:  /68   Pulse 66   Temp (!) 96.6  F (35.9  C) (Tympanic)   Resp 20   Ht 1.638 m (5' 4.5\")   Wt 71.5 kg (157 lb 10.1 oz)   SpO2 96%   BMI 26.64 kg/m    GENERAL APPEARANCE: Healthy, alert and in no acute distress.  HEENT: Normocephalic, Sclerae anicteric.   NECK:  No asymmetry or masses, no thyromegaly.  LYMPHATICS: No palpable cervical, supraclavicular nodes   RESP: Lungs clear to auscultation " bilaterally, respirations regular and easy  CARDIOVASCULAR: Regular rate and rhythm. Normal S1, S2  NEURO: Alert and oriented x 3.  Gait steady.  PSYCHIATRIC: Mentation and affect appear normal.  Mood appropriate.    Laboratory/Imaging Studies:    Component      Latest Ref Rng 2/16/2024  10:14 AM   WBC      4.0 - 11.0 10e3/uL 5.6    RBC Count      4.40 - 5.90 10e6/uL 4.30 (L)    Hemoglobin      13.3 - 17.7 g/dL 12.9 (L)    Hematocrit      40.0 - 53.0 % 39.8 (L)    MCV      78 - 100 fL 93    MCH      26.5 - 33.0 pg 30.0    MCHC      31.5 - 36.5 g/dL 32.4    RDW      10.0 - 15.0 % 13.3    Platelet Count      150 - 450 10e3/uL 187    % Neutrophils      % 73    % Lymphocytes      % 14    % Monocytes      % 8    % Eosinophils      % 4    % Basophils      % 1    % Immature Granulocytes      % 0    NRBCs per 100 WBC      <1 /100 0    Absolute Neutrophils      1.6 - 8.3 10e3/uL 4.1    Absolute Lymphocytes      0.8 - 5.3 10e3/uL 0.8    Absolute Monocytes      0.0 - 1.3 10e3/uL 0.4    Absolute Eosinophils      0.0 - 0.7 10e3/uL 0.2    Absolute Basophils      0.0 - 0.2 10e3/uL 0.1    Absolute Immature Granulocytes      <=0.4 10e3/uL 0.0    Absolute NRBCs      10e3/uL 0.0    Sodium      135 - 145 mmol/L 140    Potassium      3.4 - 5.3 mmol/L 4.0    Carbon Dioxide (CO2)      22 - 29 mmol/L 28    Anion Gap      7 - 15 mmol/L 9    Urea Nitrogen      8.0 - 23.0 mg/dL 14.3    Creatinine      0.67 - 1.17 mg/dL 0.73    GFR Estimate      >60 mL/min/1.73m2 >90    Calcium      8.8 - 10.2 mg/dL 9.0    Chloride      98 - 107 mmol/L 103    Glucose      70 - 99 mg/dL 85    Alkaline Phosphatase      40 - 150 U/L 147    AST      0 - 45 U/L 32    ALT      0 - 70 U/L 22    Protein Total      6.4 - 8.3 g/dL 7.6    Albumin      3.5 - 5.2 g/dL 4.1    Bilirubin Total      <=1.2 mg/dL 0.4    Albumin Fraction      3.7 - 5.1 g/dL 3.8    Alpha 1 Fraction      0.2 - 0.4 g/dL 0.3    Alpha 2 Fraction      0.5 - 0.9 g/dL 0.8    Beta Fraction      0.6 - 1.0  g/dL 1.8 (H)    Gamma Fraction      0.7 - 1.6 g/dL 0.6 (L)    Monoclonal Peak      <=0.0 g/dL 1.0 (H)    ELP Interpretation: Monoclonal protein (1.0 g/dL) seen in the beta fraction. Previously characterized in our laboratory on 2/14/22 as a monoclonal IgM immunoglobulin of kappa light chain type. Pathologic significance requires clinical correlation. FREDDIE Jones M.D., Ph.D., Pathologist.    IGG      610 - 1,616 mg/dL 702    IGA      84 - 499 mg/dL 87    IGM      35 - 242 mg/dL 1,368 (H)    Pettus Free Lt Chain      0.33 - 1.94 mg/dL 5.57 (H)    Lambda Free Lt Chain      0.57 - 2.63 mg/dL 1.56    Kappa Lambda Ratio      0.26 - 1.65  3.57 (H)    Iron      61 - 157 ug/dL 90    Iron Binding Capacity      240 - 430 ug/dL 288    Iron Sat Index      15 - 46 % 31    Beta-2-Microglobulin      <2.3 mg/L 2.1    Lactate Dehydrogenase      0 - 250 U/L 177    Viscosity Index      1.4 - 1.8  1.7    Total Protein Serum for ELP      6.4 - 8.3 g/dL 7.3    Ferritin      31 - 409 ng/mL 117       Legend:  (L) Low  (H) High    ASSESSMENT/PLAN:    #1 MGUS: IgM Monoclonal gammopathy of undetermined significance diagnosed January 2018.  MGUS stable. No evidence of end organ damage.  We'll see him back in 1 year with the same lab work.      #2  Anemia: Hemoglobin stable with normal iron studies.  He will continue on the iron tablet every other day at this point.    I encouraged patient to call with any questions or concerns.       Britney Mahoney, NP APRN, FNP-BC, AOCNP

## 2024-02-23 NOTE — PATIENT INSTRUCTIONS
We would like to see you back in 1 year. Please come 1 week prior for lab work.     If you have any questions please call [phone]    Other instructions:  none

## 2024-04-12 DIAGNOSIS — E78.5 HYPERLIPIDEMIA, UNSPECIFIED HYPERLIPIDEMIA TYPE: ICD-10-CM

## 2024-04-12 RX ORDER — ATORVASTATIN CALCIUM 10 MG/1
10 TABLET, FILM COATED ORAL DAILY
Qty: 90 TABLET | Refills: 2 | Status: SHIPPED | OUTPATIENT
Start: 2024-04-12 | End: 2024-05-22

## 2024-05-15 ENCOUNTER — OFFICE VISIT (OUTPATIENT)
Dept: FAMILY MEDICINE | Facility: OTHER | Age: 84
End: 2024-05-15
Attending: FAMILY MEDICINE
Payer: COMMERCIAL

## 2024-05-15 VITALS
OXYGEN SATURATION: 94 % | HEIGHT: 65 IN | TEMPERATURE: 96.4 F | BODY MASS INDEX: 25.54 KG/M2 | WEIGHT: 153.3 LBS | DIASTOLIC BLOOD PRESSURE: 68 MMHG | SYSTOLIC BLOOD PRESSURE: 110 MMHG | HEART RATE: 58 BPM

## 2024-05-15 DIAGNOSIS — Z87.19 HISTORY OF UPPER GASTROINTESTINAL BLEEDING: ICD-10-CM

## 2024-05-15 DIAGNOSIS — Z23 ENCOUNTER FOR VACCINATION: ICD-10-CM

## 2024-05-15 DIAGNOSIS — Z00.00 MEDICARE ANNUAL WELLNESS VISIT, SUBSEQUENT: Primary | ICD-10-CM

## 2024-05-15 DIAGNOSIS — D47.2 MONOCLONAL GAMMOPATHY OF UNKNOWN SIGNIFICANCE (MGUS): ICD-10-CM

## 2024-05-15 DIAGNOSIS — K22.70 BARRETT'S ESOPHAGUS WITHOUT DYSPLASIA: ICD-10-CM

## 2024-05-15 DIAGNOSIS — D64.9 NORMOCYTIC ANEMIA: ICD-10-CM

## 2024-05-15 DIAGNOSIS — E78.5 HYPERLIPIDEMIA, UNSPECIFIED HYPERLIPIDEMIA TYPE: ICD-10-CM

## 2024-05-15 PROCEDURE — G0009 ADMIN PNEUMOCOCCAL VACCINE: HCPCS

## 2024-05-15 PROCEDURE — G0463 HOSPITAL OUTPT CLINIC VISIT: HCPCS | Mod: 25

## 2024-05-15 PROCEDURE — G0439 PPPS, SUBSEQ VISIT: HCPCS | Performed by: FAMILY MEDICINE

## 2024-05-15 PROCEDURE — 99214 OFFICE O/P EST MOD 30 MIN: CPT | Mod: 25 | Performed by: FAMILY MEDICINE

## 2024-05-15 SDOH — HEALTH STABILITY: PHYSICAL HEALTH: ON AVERAGE, HOW MANY DAYS PER WEEK DO YOU ENGAGE IN MODERATE TO STRENUOUS EXERCISE (LIKE A BRISK WALK)?: 1 DAY

## 2024-05-15 ASSESSMENT — PATIENT HEALTH QUESTIONNAIRE - PHQ9
SUM OF ALL RESPONSES TO PHQ QUESTIONS 1-9: 3
SUM OF ALL RESPONSES TO PHQ QUESTIONS 1-9: 3
10. IF YOU CHECKED OFF ANY PROBLEMS, HOW DIFFICULT HAVE THESE PROBLEMS MADE IT FOR YOU TO DO YOUR WORK, TAKE CARE OF THINGS AT HOME, OR GET ALONG WITH OTHER PEOPLE: NOT DIFFICULT AT ALL

## 2024-05-15 ASSESSMENT — PAIN SCALES - GENERAL: PAINLEVEL: NO PAIN (0)

## 2024-05-15 ASSESSMENT — SOCIAL DETERMINANTS OF HEALTH (SDOH): HOW OFTEN DO YOU GET TOGETHER WITH FRIENDS OR RELATIVES?: ONCE A WEEK

## 2024-05-15 NOTE — PROGRESS NOTES
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  Does not need labs today - extensive labs drawn by vinayak on 2/16/24.  These are reviewed, along with Heme note.  Will schedule shave excision of his skin lesion.  Done with colon screening.          BMI  Estimated body mass index is 25.91 kg/m  as calculated from the following:    Height as of this encounter: 1.638 m (5' 4.5\").    Weight as of this encounter: 69.5 kg (153 lb 4.8 oz).       Counseling  Appropriate preventive services were discussed with this patient, including applicable screening as appropriate for fall prevention, nutrition, physical activity, Tobacco-use cessation, weight loss and cognition.  Checklist reviewing preventive services available has been given to the patient.  Reviewed patient's diet, addressing concerns and/or questions.   He is at risk for lack of exercise and has been provided with information to increase physical activity for the benefit of his well-being.   The patient was instructed to see the dentist every 6 months.   Patient reported safety concerns were addressed today.            Atul Le is a 84 year old, presenting for the following:  Physical        5/15/2024     1:00 PM   Additional Questions   Roomed by Saranya Andrade   Accompanied by wife         5/15/2024     1:00 PM   Patient Reported Additional Medications   Patient reports taking the following new medications biotin changes by patient to taking 1000 mcg (2 caps to make 2000 mcg)       Health Care Directive  Patient has a Health Care Directive on file  Advance care " planning document is on file and is current.    HPI    83yo male.    History of DVT/PE - per recent Heme note Dr. Mendieta - thought unprovoked so had been on long-term warfarin, but negative hypercoag workup, chronic lower INR target 1.5-2.0, past GI bleed.  Decision was made to stop his warfarin.  MGUS  Anemia - stable on Iron  Following with Heme  Holt's Esophagus - taking omeprazole - no heartburn sx.  History of Upper GI Bleed (2017?) while on higher dose warfarin.  HLP - tolerating statin    Skin lesion of left upper back - bleeds when drying after a shower.  Present for a while, not sure how long, unsure if growing.          5/15/2024   General Health   How would you rate your overall physical health? Excellent   Feel stress (tense, anxious, or unable to sleep) Not at all         5/15/2024   Nutrition   Diet: Regular (no restrictions)         5/15/2024   Exercise   Days per week of moderate/strenous exercise 1 day   (!) EXERCISE CONCERN    Walks daily, except recently too busy.  Also yard work raking, etc.      5/15/2024   Social Factors   Frequency of gathering with friends or relatives Once a week   Worry food won't last until get money to buy more No   Food not last or not have enough money for food? No   Do you have housing?  Yes   Are you worried about losing your housing? No   Lack of transportation? No   Unable to get utilities (heat,electricity)? No         5/15/2024   Fall Risk   Fallen 2 or more times in the past year? No   Trouble with walking or balance? No          5/15/2024   Activities of Daily Living- Home Safety   Needs help with the following daily activites None of the above   Safety concerns in the home No grab bars in the bathroom         5/15/2024   Dental   Dentist two times every year? (!) NO     Dentures.  Last eye exam last year - goes annually        5/15/2024   Hearing Screening   Hearing concerns? 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  Feels doesn't need  hearings aides yet.      5/15/2024   Driving Risk Screening   Patient/family members have concerns about driving No         5/15/2024   General Alertness/Fatigue Screening   Have you been more tired than usual lately? No         5/15/2024   Urinary Incontinence Screening   Bothered by leaking urine in past 6 months No         5/15/2024   TB Screening   Were you born outside of the [place]? No       Today's PHQ-9 Score:       5/15/2024    12:48 PM   PHQ-9 SCORE   PHQ-9 Total Score MyChart 3 (Minimal depression)   PHQ-9 Total Score 3         5/15/2024   Substance Use   Alcohol more than 3/day or more than 7/wk No   Do you have a current opioid prescription? No   How severe/bad is pain from 1 to 10? 0/10 (No Pain)   Do you use any other substances recreationally? No     Social History     Tobacco Use    Smoking status: Former     Current packs/day: 0.00     Types: Cigarettes     Start date: 5/15/1958     Quit date: 10/28/2009     Years since quittin.5    Smokeless tobacco: Never    Tobacco comments:     Quit    Vaping Use    Vaping status: Never Used   Substance Use Topics    Alcohol use: No     Alcohol/week: 0.0 standard drinks of alcohol    Drug use: No             Reviewed and updated as needed this visit by Provider   Tobacco  Allergies  Meds    Surg Hx   Soc Hx Sexual Activity            Current providers sharing in care for this patient include:  Patient Care Team:  Kenneth Alonzo DO as PCP - General (Family Medicine)  Kenneth Alonzo DO as Assigned PCP  Liane Macias NP as Assigned Surgical Provider  Britney Mahoney NP as Assigned Cancer Care Provider    The following health maintenance items are reviewed in Epic and correct as of today:  Health Maintenance   Topic Date Due    ZOSTER IMMUNIZATION (1 of 2) Never done    RSV VACCINE (Pregnancy & 60+) (1 - 1-dose 60+ series) Never done    MEDICARE ANNUAL WELLNESS VISIT  11/15/2019    COVID-19 Vaccine (2023- season) 2024     "INFLUENZA VACCINE (Season Ended) 09/01/2024    LIPID  10/17/2024    PHQ-9  11/15/2024    FALL RISK ASSESSMENT  05/15/2025    DTAP/TDAP/TD IMMUNIZATION (5 - Td or Tdap) 09/28/2028    ADVANCE CARE PLANNING  05/15/2029    PHQ-2 (once per calendar year)  Completed    Pneumococcal Vaccine: 65+ Years  Completed    IPV IMMUNIZATION  Aged Out    HPV IMMUNIZATION  Aged Out    MENINGITIS IMMUNIZATION  Aged Out    RSV MONOCLONAL ANTIBODY  Aged Out    LUNG CANCER SCREENING  Discontinued       Patient declines shingles vaccines.  Wants Prevnar 20 today.  Discussed RSV - will consider getting from pharmacy.      Review of Systems  Constitutional, HEENT, cardiovascular, pulmonary, GI, , musculoskeletal, neuro, skin, endocrine and psych systems are negative, except as otherwise noted.     Objective    Exam  /68 (BP Location: Left arm, Patient Position: Sitting, Cuff Size: Adult Regular)   Pulse 58   Temp (!) 96.4  F (35.8  C) (Tympanic)   Ht 1.638 m (5' 4.5\")   Wt 69.5 kg (153 lb 4.8 oz)   SpO2 94%   BMI 25.91 kg/m     Estimated body mass index is 25.91 kg/m  as calculated from the following:    Height as of this encounter: 1.638 m (5' 4.5\").    Weight as of this encounter: 69.5 kg (153 lb 4.8 oz).    Physical Exam  Constitutional:       General: He is not in acute distress.     Appearance: Normal appearance.   HENT:      Head: Normocephalic and atraumatic.      Right Ear: Tympanic membrane normal.      Left Ear: Tympanic membrane normal.      Mouth/Throat:      Mouth: Mucous membranes are moist.      Pharynx: Oropharynx is clear.   Eyes:      Conjunctiva/sclera: Conjunctivae normal.      Pupils: Pupils are equal, round, and reactive to light.   Neck:      Vascular: No carotid bruit.   Cardiovascular:      Rate and Rhythm: Normal rate and regular rhythm.      Heart sounds: Normal heart sounds. No murmur heard.  Pulmonary:      Effort: Pulmonary effort is normal.      Breath sounds: Normal breath sounds.   Abdominal: "      General: Bowel sounds are normal. There is no distension.      Palpations: Abdomen is soft.      Tenderness: There is no abdominal tenderness. There is no guarding.   Musculoskeletal:      Right lower leg: No edema.      Left lower leg: No edema.   Lymphadenopathy:      Cervical: No cervical adenopathy.   Skin:     Comments: Lesion left upper back, looks like an inflamed VK, small scabbed area - looks like bled recently.   Neurological:      General: No focal deficit present.      Mental Status: He is alert and oriented to person, place, and time.      Deep Tendon Reflexes: Reflexes normal.                5/15/2024   Mini Cog   Clock Draw Score 2 Normal   3 Item Recall 2 objects recalled   Mini Cog Total Score 4              Signed Electronically by: BETTY HAGEN DO    Answers submitted by the patient for this visit:  Patient Health Questionnaire (Submitted on 5/15/2024)  If you checked off any problems, how difficult have these problems made it for you to do your work, take care of things at home, or get along with other people?: Not difficult at all  PHQ9 TOTAL SCORE: 3

## 2024-05-21 ENCOUNTER — OFFICE VISIT (OUTPATIENT)
Dept: FAMILY MEDICINE | Facility: OTHER | Age: 84
End: 2024-05-21
Attending: FAMILY MEDICINE
Payer: MEDICARE

## 2024-05-21 VITALS
HEART RATE: 61 BPM | OXYGEN SATURATION: 91 % | HEIGHT: 65 IN | BODY MASS INDEX: 25.64 KG/M2 | TEMPERATURE: 97.5 F | SYSTOLIC BLOOD PRESSURE: 106 MMHG | WEIGHT: 153.9 LBS | DIASTOLIC BLOOD PRESSURE: 66 MMHG | RESPIRATION RATE: 16 BRPM

## 2024-05-21 DIAGNOSIS — D48.5 NEOPLASM OF UNCERTAIN BEHAVIOR OF SKIN: Primary | ICD-10-CM

## 2024-05-21 PROCEDURE — 88305 TISSUE EXAM BY PATHOLOGIST: CPT | Mod: 26 | Performed by: PATHOLOGY

## 2024-05-21 PROCEDURE — 88305 TISSUE EXAM BY PATHOLOGIST: CPT | Mod: TC | Performed by: FAMILY MEDICINE

## 2024-05-21 PROCEDURE — G0463 HOSPITAL OUTPT CLINIC VISIT: HCPCS

## 2024-05-21 PROCEDURE — 11301 SHAVE SKIN LESION 0.6-1.0 CM: CPT | Performed by: FAMILY MEDICINE

## 2024-05-21 RX ORDER — GINSENG 100 MG
CAPSULE ORAL ONCE
Status: COMPLETED | OUTPATIENT
Start: 2024-05-21 | End: 2024-05-21

## 2024-05-21 RX ADMIN — Medication: at 10:07

## 2024-05-21 ASSESSMENT — PAIN SCALES - GENERAL: PAINLEVEL: NO PAIN (0)

## 2024-05-21 NOTE — PROGRESS NOTES
"  Assessment & Plan     Neoplasm of uncertain behavior of skin  - lidocaine 2%-EPINEPHrine 1:100,000 injection 3 mL  - bacitracin ointment  - Surgical Pathology Exam        Subjective   Rey is a 84 year old, presenting for the following health issues:  Procedure (Shave biopsy  )        5/21/2024     9:47 AM   Additional Questions   Roomed by Saranya Andrade   Accompanied by self         5/21/2024     9:47 AM   Patient Reported Additional Medications   Patient reports taking the following new medications none       Here for planned shave excision of a skin lesion from his left upper back that bleeds when drying off after a shower.  Unclear how long its been present.            Objective    /66 (BP Location: Left arm, Patient Position: Sitting, Cuff Size: Adult Regular)   Pulse 61   Temp 97.5  F (36.4  C) (Tympanic)   Resp 16   Ht 1.638 m (5' 4.5\")   Wt 69.8 kg (153 lb 14.4 oz)   SpO2 91%   BMI 26.01 kg/m    Body mass index is 26.01 kg/m .  Physical Exam  Constitutional:       General: He is not in acute distress.     Appearance: Normal appearance.   Pulmonary:      Effort: Pulmonary effort is normal.   Neurological:      Mental Status: He is alert and oriented to person, place, and time.                    Procedure: Shave excision of skin lesion.  Indication: Lesion removal, obtain tissue sample for pathology diagnosis.  Lesion description: 1 cm diameter pink mildly coarse textured area left scapular area.  Written informed consent obtained.  Discussion of risk, benefits, alternatives.  We did discuss the risk of lesion recurrence, repeat procedure, scarring, bleeding, infection, referral, other unforeseen complications.  Patient wishes to proceed.  Time out performed including allergies and site location.  Area cleaned with alcohol swabs.  Adequate local anesthesia using 2.5 ml of lidocaine 2% with epi.  Lesion shaved off using a Dermablade and placed in pathology cup.  Hemostasis ensured using silver " nitrate sticks and direct pressure.  Bacitracin antibiotic ointment applied.  Patient tolerated well without any complications.  EBL: trace.    Post procedure instructions: Okay to shower but not get wet in dirty water for few days until it heals.  Daily antibiotic ointment.  Monitor for infection, call for any concerns.      We will notify patient of pathology results once available.            Signed Electronically by: BETTY HAGEN DO

## 2024-05-22 RX ORDER — OMEPRAZOLE 40 MG/1
40 CAPSULE, DELAYED RELEASE ORAL DAILY
Qty: 90 CAPSULE | Refills: 3 | Status: SHIPPED | OUTPATIENT
Start: 2024-05-22

## 2024-05-22 RX ORDER — ATORVASTATIN CALCIUM 10 MG/1
10 TABLET, FILM COATED ORAL DAILY
Qty: 90 TABLET | Refills: 2 | Status: SHIPPED | OUTPATIENT
Start: 2024-05-22

## 2024-05-24 LAB
PATH REPORT.COMMENTS IMP SPEC: NORMAL
PATH REPORT.FINAL DX SPEC: NORMAL
PATH REPORT.GROSS SPEC: NORMAL
PATH REPORT.MICROSCOPIC SPEC OTHER STN: NORMAL
PATH REPORT.RELEVANT HX SPEC: NORMAL
PHOTO IMAGE: NORMAL

## 2024-12-30 NOTE — MR AVS SNAPSHOT
Rey Tristan   2/15/2017 11:00 AM   Anticoagulation Therapy Visit    Description:  76 year old male   Provider:   ANTI COAGULATION   Department:  [facility] Anti Coagulation           INR as of 2/15/2017     Today's INR 3.2!      Anticoagulation Summary as of 2/15/2017     INR goal 2.0-3.0   Today's INR 3.2!   Full instructions 6 mg every day   Next INR check 3/1/2017    Indications   Long-term (current) use of anticoagulants [Z79.01] [Z79.01]  DVT (deep venous thrombosis) (H) [I82.409]  Pulmonary embolism (H) [I26.99]         Your next Anticoagulation Clinic appointment(s)     Feb 15, 2017 11:00 AM CST   Anticoagulation Visit with  ANTI COAGULATION   [facility] [place] [facility]    [address]  [place] [address]   [phone]            Mar 01, 2017 11:15 AM CST   Anticoagulation Visit with  ANTI COAGULATION   [facility] [place] [facility]    [address]  [facility] [address]   [phone]              February 2017 Details    Sun Mon Tue Wed u Fri Sat        1               2               3               4                 5               6               7               8               9               10               11                 12               13               14               15      6 mg   See details      16      6 mg         17      6 mg         18      6 mg           19      6 mg         20      6 mg         21      6 mg         22      6 mg         23      6 mg         24      6 mg         25      6 mg           26      6 mg         27      6 mg         28      6 mg              Date Details   02/15 This INR check               How to take your warfarin dose     To take:  6 mg Take 1.5 of the 4 mg tablets.           March 2017 Details    Sun Mon Tue Wed u Fri Sat        1            2               3               4                 5               6               7               8               9               10               11                  12               13               14               15               16               17               18                 19               20               21               22               23               24               25                 26               27               28               29               30               31                 Date Details   No additional details    Date of next INR:  3/1/2017         How to take your warfarin dose     To take:  6 mg Take 1.5 of the 4 mg tablets.            104

## 2025-02-14 ENCOUNTER — LAB (OUTPATIENT)
Dept: LAB | Facility: OTHER | Age: 85
End: 2025-02-14
Payer: MEDICARE

## 2025-02-14 DIAGNOSIS — D64.9 ANEMIA, UNSPECIFIED TYPE: ICD-10-CM

## 2025-02-14 DIAGNOSIS — D47.2 IGM MONOCLONAL GAMMOPATHY OF UNCERTAIN SIGNIFICANCE: ICD-10-CM

## 2025-02-14 LAB
ALBUMIN SERPL BCG-MCNC: 4.1 G/DL (ref 3.5–5.2)
ALP SERPL-CCNC: 149 U/L (ref 40–150)
ALT SERPL W P-5'-P-CCNC: 23 U/L (ref 0–70)
ANION GAP SERPL CALCULATED.3IONS-SCNC: 10 MMOL/L (ref 7–15)
AST SERPL W P-5'-P-CCNC: 35 U/L (ref 0–45)
BASOPHILS # BLD AUTO: 0.1 10E3/UL (ref 0–0.2)
BASOPHILS NFR BLD AUTO: 1 %
BILIRUB SERPL-MCNC: 0.4 MG/DL
BUN SERPL-MCNC: 13.8 MG/DL (ref 8–23)
CALCIUM SERPL-MCNC: 9 MG/DL (ref 8.8–10.4)
CHLORIDE SERPL-SCNC: 103 MMOL/L (ref 98–107)
CREAT SERPL-MCNC: 0.75 MG/DL (ref 0.67–1.17)
EGFRCR SERPLBLD CKD-EPI 2021: 90 ML/MIN/1.73M2
EOSINOPHIL # BLD AUTO: 0.3 10E3/UL (ref 0–0.7)
EOSINOPHIL NFR BLD AUTO: 6 %
ERYTHROCYTE [DISTWIDTH] IN BLOOD BY AUTOMATED COUNT: 13.2 % (ref 10–15)
FERRITIN SERPL-MCNC: 133 NG/ML (ref 31–409)
GLUCOSE SERPL-MCNC: 83 MG/DL (ref 70–99)
HCO3 SERPL-SCNC: 28 MMOL/L (ref 22–29)
HCT VFR BLD AUTO: 39.3 % (ref 40–53)
HGB BLD-MCNC: 12.8 G/DL (ref 13.3–17.7)
IMM GRANULOCYTES # BLD: 0 10E3/UL
IMM GRANULOCYTES NFR BLD: 0 %
IRON BINDING CAPACITY (ROCHE): 267 UG/DL (ref 240–430)
IRON SATN MFR SERPL: 28 % (ref 15–46)
IRON SERPL-MCNC: 75 UG/DL (ref 61–157)
LDH SERPL L TO P-CCNC: 194 U/L (ref 0–250)
LYMPHOCYTES # BLD AUTO: 0.9 10E3/UL (ref 0.8–5.3)
LYMPHOCYTES NFR BLD AUTO: 20 %
MCH RBC QN AUTO: 30.3 PG (ref 26.5–33)
MCHC RBC AUTO-ENTMCNC: 32.6 G/DL (ref 31.5–36.5)
MCV RBC AUTO: 93 FL (ref 78–100)
MONOCYTES # BLD AUTO: 0.4 10E3/UL (ref 0–1.3)
MONOCYTES NFR BLD AUTO: 10 %
NEUTROPHILS # BLD AUTO: 2.9 10E3/UL (ref 1.6–8.3)
NEUTROPHILS NFR BLD AUTO: 64 %
NRBC # BLD AUTO: 0 10E3/UL
NRBC BLD AUTO-RTO: 0 /100
PLATELET # BLD AUTO: 156 10E3/UL (ref 150–450)
POTASSIUM SERPL-SCNC: 3.9 MMOL/L (ref 3.4–5.3)
PROT SERPL-MCNC: 7.9 G/DL (ref 6.4–8.3)
RBC # BLD AUTO: 4.23 10E6/UL (ref 4.4–5.9)
SODIUM SERPL-SCNC: 141 MMOL/L (ref 135–145)
TOTAL PROTEIN SERUM FOR ELP: 7.6 G/DL (ref 6.4–8.3)
WBC # BLD AUTO: 4.6 10E3/UL (ref 4–11)

## 2025-02-14 PROCEDURE — 84165 PROTEIN E-PHORESIS SERUM: CPT | Mod: ZL | Performed by: PATHOLOGY

## 2025-02-14 PROCEDURE — 83540 ASSAY OF IRON: CPT | Mod: ZL

## 2025-02-14 PROCEDURE — 82728 ASSAY OF FERRITIN: CPT | Mod: ZL

## 2025-02-14 PROCEDURE — 82607 VITAMIN B-12: CPT | Mod: ZL

## 2025-02-14 PROCEDURE — 86334 IMMUNOFIX E-PHORESIS SERUM: CPT | Mod: ZL | Performed by: PATHOLOGY

## 2025-02-14 PROCEDURE — 85810 BLOOD VISCOSITY EXAMINATION: CPT | Mod: ZL

## 2025-02-14 PROCEDURE — 84155 ASSAY OF PROTEIN SERUM: CPT | Mod: ZL

## 2025-02-14 PROCEDURE — 82232 ASSAY OF BETA-2 PROTEIN: CPT | Mod: ZL

## 2025-02-14 PROCEDURE — 82784 ASSAY IGA/IGD/IGG/IGM EACH: CPT | Mod: ZL

## 2025-02-14 PROCEDURE — 36415 COLL VENOUS BLD VENIPUNCTURE: CPT | Mod: ZL

## 2025-02-14 PROCEDURE — 83521 IG LIGHT CHAINS FREE EACH: CPT | Mod: ZL

## 2025-02-14 PROCEDURE — 83615 LACTATE (LD) (LDH) ENZYME: CPT | Mod: ZL

## 2025-02-14 PROCEDURE — 82747 ASSAY OF FOLIC ACID RBC: CPT | Mod: ZL

## 2025-02-14 PROCEDURE — 85025 COMPLETE CBC W/AUTO DIFF WBC: CPT | Mod: ZL

## 2025-02-14 PROCEDURE — 82040 ASSAY OF SERUM ALBUMIN: CPT | Mod: ZL

## 2025-02-15 LAB — VIT B12 SERPL-MCNC: 1912 PG/ML (ref 232–1245)

## 2025-02-16 LAB — FOLATE RBC-MCNC: 618 NG/ML

## 2025-02-17 LAB
ALBUMIN SERPL ELPH-MCNC: 4 G/DL (ref 3.7–5.1)
ALPHA1 GLOB SERPL ELPH-MCNC: 0.3 G/DL (ref 0.2–0.4)
ALPHA2 GLOB SERPL ELPH-MCNC: 0.8 G/DL (ref 0.5–0.9)
B-GLOBULIN SERPL ELPH-MCNC: 1.9 G/DL (ref 0.6–1)
B2 MICROGLOB TUMOR MARKER SER-MCNC: 2.5 MG/L
GAMMA GLOB SERPL ELPH-MCNC: 0.6 G/DL (ref 0.7–1.6)
IGA SERPL-MCNC: 86 MG/DL (ref 84–499)
IGG SERPL-MCNC: 689 MG/DL (ref 610–1616)
IGM SERPL-MCNC: 1644 MG/DL (ref 35–242)
KAPPA LC FREE SER-MCNC: 4.71 MG/DL (ref 0.33–1.94)
KAPPA LC FREE/LAMBDA FREE SER NEPH: 3.44 {RATIO} (ref 0.26–1.65)
LAMBDA LC FREE SERPL-MCNC: 1.37 MG/DL (ref 0.57–2.63)
M PROTEIN SERPL ELPH-MCNC: 1 G/DL
PROT PATTERN SERPL ELPH-IMP: ABNORMAL
PROT PATTERN SERPL IFE-IMP: NORMAL
VISC SER: 1.9 CP (ref 1.4–1.8)

## 2025-05-03 ENCOUNTER — HOSPITAL ENCOUNTER (EMERGENCY)
Facility: HOSPITAL | Age: 85
Discharge: HOME | End: 2025-05-03
Attending: NURSE PRACTITIONER
Payer: MEDICARE

## 2025-05-03 VITALS
DIASTOLIC BLOOD PRESSURE: 68 MMHG | RESPIRATION RATE: 10 BRPM | TEMPERATURE: 97 F | OXYGEN SATURATION: 97 % | HEART RATE: 51 BPM | SYSTOLIC BLOOD PRESSURE: 101 MMHG

## 2025-05-03 DIAGNOSIS — R55 NEAR SYNCOPE: ICD-10-CM

## 2025-05-03 DIAGNOSIS — I95.9 HYPOTENSION, UNSPECIFIED HYPOTENSION TYPE: ICD-10-CM

## 2025-05-03 DIAGNOSIS — E16.2 HYPOGLYCEMIA: ICD-10-CM

## 2025-05-03 LAB
ALBUMIN SERPL BCG-MCNC: 3.8 G/DL (ref 3.5–5.2)
ALP SERPL-CCNC: 143 U/L (ref 40–150)
ALT SERPL W P-5'-P-CCNC: 20 U/L (ref 0–70)
ANION GAP SERPL CALCULATED.3IONS-SCNC: 9 MMOL/L (ref 7–15)
AST SERPL W P-5'-P-CCNC: 33 U/L (ref 0–45)
BILIRUB SERPL-MCNC: 0.5 MG/DL
BUN SERPL-MCNC: 16.6 MG/DL (ref 8–23)
CALCIUM SERPL-MCNC: 8.7 MG/DL (ref 8.8–10.4)
CHLORIDE SERPL-SCNC: 105 MMOL/L (ref 98–107)
CREAT SERPL-MCNC: 0.86 MG/DL (ref 0.67–1.17)
EGFRCR SERPLBLD CKD-EPI 2021: 86 ML/MIN/1.73M2
ERYTHROCYTE [DISTWIDTH] IN BLOOD BY AUTOMATED COUNT: 13.5 % (ref 10–15)
FLUAV RNA SPEC QL NAA+PROBE: NEGATIVE
FLUBV RNA RESP QL NAA+PROBE: NEGATIVE
GLUCOSE BLDC GLUCOMTR-MCNC: 107 MG/DL (ref 70–99)
GLUCOSE SERPL-MCNC: 60 MG/DL (ref 70–99)
HCO3 SERPL-SCNC: 24 MMOL/L (ref 22–29)
HCT VFR BLD AUTO: 36.7 % (ref 40–53)
HGB BLD-MCNC: 12 G/DL (ref 13.3–17.7)
HOLD SPECIMEN: NORMAL
LIPASE SERPL-CCNC: 40 U/L (ref 13–60)
MAGNESIUM SERPL-MCNC: 2 MG/DL (ref 1.7–2.3)
MCH RBC QN AUTO: 30.9 PG (ref 26.5–33)
MCHC RBC AUTO-ENTMCNC: 32.7 G/DL (ref 31.5–36.5)
MCV RBC AUTO: 95 FL (ref 78–100)
PLATELET # BLD AUTO: 156 10E3/UL (ref 150–450)
POTASSIUM SERPL-SCNC: 4.5 MMOL/L (ref 3.4–5.3)
PROT SERPL-MCNC: 7.3 G/DL (ref 6.4–8.3)
RBC # BLD AUTO: 3.88 10E6/UL (ref 4.4–5.9)
RSV RNA SPEC NAA+PROBE: NEGATIVE
SARS-COV-2 RNA RESP QL NAA+PROBE: NEGATIVE
SODIUM SERPL-SCNC: 138 MMOL/L (ref 135–145)
T4 FREE SERPL-MCNC: 1.04 NG/DL (ref 0.9–1.7)
TROPONIN T SERPL HS-MCNC: 19 NG/L
TROPONIN T SERPL HS-MCNC: 22 NG/L
TSH SERPL DL<=0.005 MIU/L-ACNC: 7.57 UIU/ML (ref 0.3–4.2)
WBC # BLD AUTO: 5.5 10E3/UL (ref 4–11)

## 2025-05-03 PROCEDURE — 85027 COMPLETE CBC AUTOMATED: CPT | Performed by: EMERGENCY MEDICINE

## 2025-05-03 PROCEDURE — 84439 ASSAY OF FREE THYROXINE: CPT | Performed by: NURSE PRACTITIONER

## 2025-05-03 PROCEDURE — 84443 ASSAY THYROID STIM HORMONE: CPT | Performed by: NURSE PRACTITIONER

## 2025-05-03 PROCEDURE — 36415 COLL VENOUS BLD VENIPUNCTURE: CPT | Performed by: EMERGENCY MEDICINE

## 2025-05-03 PROCEDURE — 85027 COMPLETE CBC AUTOMATED: CPT | Performed by: NURSE PRACTITIONER

## 2025-05-03 PROCEDURE — 93005 ELECTROCARDIOGRAM TRACING: CPT

## 2025-05-03 PROCEDURE — 82247 BILIRUBIN TOTAL: CPT | Performed by: EMERGENCY MEDICINE

## 2025-05-03 PROCEDURE — 83690 ASSAY OF LIPASE: CPT | Performed by: NURSE PRACTITIONER

## 2025-05-03 PROCEDURE — 93010 ELECTROCARDIOGRAM REPORT: CPT | Performed by: INTERNAL MEDICINE

## 2025-05-03 PROCEDURE — 99284 EMERGENCY DEPT VISIT MOD MDM: CPT | Performed by: NURSE PRACTITIONER

## 2025-05-03 PROCEDURE — 80053 COMPREHEN METABOLIC PANEL: CPT | Performed by: NURSE PRACTITIONER

## 2025-05-03 PROCEDURE — 258N000003 HC RX IP 258 OP 636: Performed by: NURSE PRACTITIONER

## 2025-05-03 PROCEDURE — 82962 GLUCOSE BLOOD TEST: CPT

## 2025-05-03 PROCEDURE — 87637 SARSCOV2&INF A&B&RSV AMP PRB: CPT | Performed by: NURSE PRACTITIONER

## 2025-05-03 PROCEDURE — 36415 COLL VENOUS BLD VENIPUNCTURE: CPT | Performed by: NURSE PRACTITIONER

## 2025-05-03 PROCEDURE — 84484 ASSAY OF TROPONIN QUANT: CPT | Performed by: NURSE PRACTITIONER

## 2025-05-03 PROCEDURE — 99284 EMERGENCY DEPT VISIT MOD MDM: CPT

## 2025-05-03 PROCEDURE — 83735 ASSAY OF MAGNESIUM: CPT | Performed by: NURSE PRACTITIONER

## 2025-05-03 PROCEDURE — 87637 SARSCOV2&INF A&B&RSV AMP PRB: CPT | Performed by: EMERGENCY MEDICINE

## 2025-05-03 RX ADMIN — SODIUM CHLORIDE, SODIUM LACTATE, POTASSIUM CHLORIDE, AND CALCIUM CHLORIDE 1000 ML: .6; .31; .03; .02 INJECTION, SOLUTION INTRAVENOUS at 14:04

## 2025-05-03 ASSESSMENT — ENCOUNTER SYMPTOMS
LIGHT-HEADEDNESS: 1
SEIZURES: 0
MUSCULOSKELETAL NEGATIVE: 1
VOMITING: 0
CARDIOVASCULAR NEGATIVE: 1
NUMBNESS: 0
ALLERGIC/IMMUNOLOGIC NEGATIVE: 1
BLOOD IN STOOL: 0
ENDOCRINE NEGATIVE: 1
NAUSEA: 0
HEMATOLOGIC/LYMPHATIC NEGATIVE: 1
HEADACHES: 0
WEAKNESS: 1
DIARRHEA: 1
ABDOMINAL PAIN: 0
CONSTITUTIONAL NEGATIVE: 1
DIZZINESS: 0
EYES NEGATIVE: 1
PSYCHIATRIC NEGATIVE: 1
RESPIRATORY NEGATIVE: 1
CONSTIPATION: 0

## 2025-05-03 ASSESSMENT — COLUMBIA-SUICIDE SEVERITY RATING SCALE - C-SSRS
1. IN THE PAST MONTH, HAVE YOU WISHED YOU WERE DEAD OR WISHED YOU COULD GO TO SLEEP AND NOT WAKE UP?: NO
2. HAVE YOU ACTUALLY HAD ANY THOUGHTS OF KILLING YOURSELF IN THE PAST MONTH?: NO
6. HAVE YOU EVER DONE ANYTHING, STARTED TO DO ANYTHING, OR PREPARED TO DO ANYTHING TO END YOUR LIFE?: NO

## 2025-05-03 ASSESSMENT — ACTIVITIES OF DAILY LIVING (ADL)
ADLS_ACUITY_SCORE: 41

## 2025-05-03 NOTE — ED TRIAGE NOTES
RN assessed patient in triage and determined patient not Urgent Care appropriate. Will be seen in Emergency Department.

## 2025-05-03 NOTE — DISCHARGE INSTRUCTIONS
You will be mailed a Zio patch to place and wear for 2 weeks and then mail back in.  Follow-up with your primary care provider in regards to this.    Recommend trying some protein with breakfast to avoid post high carbohydrate meal glucose crash.    Contact your primary care provider to discuss your low blood pressure.       Follow-up with your primary care provider for reevaluation.  Contact your primary care provider if you have any questions or concerns.  Do not hesitate to return to the ER if any new or worsening symptoms.     Please read the attached instructions (if any).  They highlight more specific treatments and interventions for you at home.              Thank you for letting me participate in your care and wish you a fast and uneventful recovery,    Kenneth PADILLA, CNP    Do not hesitate to contact me with questions or concerns.  [email or web address]

## 2025-05-03 NOTE — ED NOTES
Provider requested pt to get some beverages and snack d/t BG 60. Pt handed pudding and juice, receptive to eating

## 2025-05-03 NOTE — ED PROVIDER NOTES
"  History     Chief Complaint   Patient presents with    Dizziness    Generalized Weakness     HPI  Rey Tristan is a 85 year old individual with history of peptic ulcer disease, bradycardia, autoimmune thyroiditis, comes in for near syncope.  Patient states that he had 3 episodes of loose stool last night.  States that he woke up and ate breakfast without any issues and has been doing well since.  Laid down and then got up and became lightheaded and dizzy and \"felt like he was going to pass out\".  Patient did not lose consciousness.  Has been feeling weak since so comes in.  No fever or chills.  No abdominal pain.  No melena or hematochezia reported.    Allergies:  No Known Allergies    Problem List:    Patient Active Problem List    Diagnosis Date Noted    Thyroiditis 02/26/2019     Priority: Medium    IgM monoclonal gammopathy of uncertain significance 08/15/2018     Priority: Medium    Acute upper gastrointestinal bleeding 11/30/2017     Priority: Medium    Syncope, unspecified syncope type 10/20/2017     Priority: Medium    Bradycardia 10/20/2017     Priority: Medium    Autoimmune thyroiditis 09/01/2017     Priority: Medium     Dr. 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  Rey Tristan has been provided information " and resources to begin or update their advance care plan.  Added by Jayleen Fleming            Hyperlipidemia      Priority: Medium    Hx pulmonary embolism 05/03/2013     Priority: Medium    History of DVT of lower extremity 04/19/2013     Priority: Medium    Advanced care planning/counseling discussion 11/27/2012     Priority: Medium    Colitis due to Clostridium difficile 05/19/2011     Priority: Medium    Pulmonary embolism (H) 05/18/2011     Priority: Medium     Overview:   Bilateral noted on 5/18/2011      History of tobacco use 05/17/2011     Priority: Medium    Hyponatremia 05/17/2011     Priority: Medium    Deep vein thrombosis (DVT) of lower extremity (H) 05/17/2011     Priority: Medium    Vitamin D deficiency 05/17/2011     Priority: Medium        Past Medical History:    Past Medical History:   Diagnosis Date    Autoimmune thyroiditis 09/2017    Chronic anticoagulation 01/01/2012    COPD (chronic obstructive pulmonary disease) (H)     Elevated serum alkaline phosphatase level 05/04/2016    Gastric ulcer     History of blood transfusion     Hyperlipidemia     Normocytic anemia 05/04/2016    Pulmonary nodule     Upper GI bleed 11/30/2017    Vitamin D deficiency 01/01/2012       Past Surgical History:    Past Surgical History:   Procedure Laterality Date    ANGIOGRAM  06/23/2014    BIOPSY  2018    bone marrow biopsy    BONE MARROW BIOPSY, BONE SPECIMEN, NEEDLE/TROCAR N/A 01/16/2018    Procedure: BIOPSY BONE MARROW;  BONE MARROW BIOPSY;  Surgeon: Nuno Castro MD;  Location: [facility]    C. Difficile with intussuseption      cataract extraction      colonoscopy      COLONOSCOPY  06/2017    ENDOSCOPY UPPER, COLONOSCOPY, COMBINED N/A 06/17/2016    Procedure: COMBINED ENDOSCOPY UPPER, COLONOSCOPY;  Surgeon: Andrea Stearns DO;  Location: [facility] OR    ESOPHAGOSCOPY, GASTROSCOPY, DUODENOSCOPY (EGD), COMBINED N/A 11/30/2017    Procedure: COMBINED ESOPHAGOSCOPY, GASTROSCOPY, DUODENOSCOPY (EGD);  UPPER ENDOSCOPY;   Surgeon: Narinder Torres MD;  Location: [facility] OR    HERNIORRHAPHY INGUINAL Left 10/23/2023    Procedure: Open Left Inguinal Hernia Repair with mesh;  Surgeon: Noah Dempsey MD;  Location: [facility] OR    Left lower extremity DVT      lens implant      nasal polypectomy      Pulmonary Embolism      ZZC REGULAR ECHO (FL)  06/23/2014    Dr. Cavazos       Family History:    Family History   Problem Relation Age of Onset    Cancer Mother         Ovarian    Other Cancer Mother         lung/breast/ cervical ?    Respiratory Father         emphysemia    Lung Cancer Brother     Diabetes No family hx of     Hypertension No family hx of     Hyperlipidemia No family hx of     Thyroid Disease No family hx of     Asthma No family hx of     Colon Cancer No family hx of     Prostate Cancer No family hx of     Anesthesia Reaction No family hx of     Genetic Disorder No family hx of     Cerebrovascular Disease No family hx of     Coronary Artery Disease No family hx of     Breast Cancer No family hx of        Social History:  Marital Status:   [2]  Social History     Tobacco Use    Smoking status: Former     Current packs/day: 0.00     Types: Cigarettes     Start date: 5/15/1958     Quit date: 10/28/2009     Years since quitting: 15.5    Smokeless tobacco: Never    Tobacco comments:     Quit 2009   Vaping Use    Vaping status: Never Used   Substance Use Topics    Alcohol use: No     Alcohol/week: 0.0 standard drinks of alcohol    Drug use: No        Medications:    Ascorbic Acid (VITAMIN C) 500 MG CAPS  atorvastatin (LIPITOR) 10 MG tablet  Biotin 5000 MCG PO CAPS  Cholecalciferol (VITAMIN D) 400 UNITS tablet  coenzyme Q-10 capsule  cyanocobalamin ([employer] VITAMIN  B12) 1000 MCG TABS  ferrous sulfate (FEROSUL) 325 (65 Fe) MG tablet  ketoconazole (NIZORAL) 2 % external cream  Lysine 1000 MG TABS  Multiple Vitamins-Minerals (MULTIVITAMIN ADULT PO)  omeprazole (PRILOSEC) 40 MG DR capsule  pyridoxine (VITAMIN B-6) 50 MG  TABS  triamcinolone (KENALOG) 0.1 % external ointment          Review of Systems   Constitutional: Negative.    HENT: Negative.     Eyes: Negative.    Respiratory: Negative.     Cardiovascular: Negative.    Gastrointestinal:  Positive for diarrhea. Negative for abdominal pain, blood in stool, constipation, nausea and vomiting.   Endocrine: Negative.    Genitourinary: Negative.    Musculoskeletal: Negative.    Skin: Negative.    Allergic/Immunologic: Negative.    Neurological:  Positive for weakness and light-headedness. Negative for dizziness, seizures, numbness and headaches.   Hematological: Negative.    Psychiatric/Behavioral: Negative.         Physical Exam   BP: 101/59  Pulse: 63  Temp: 97  F (36.1  C)  Resp: 16  SpO2: 95 %  Lying Orthostatic BP: 98/61  Lying Orthostatic Pulse: 54 bpm  Sitting Orthostatic BP: 94/65  Sitting Orthostatic Pulse: 57 bpm  Standing Orthostatic BP: 104/56  Standing Orthostatic Pulse: 56 bpm      GENERAL APPEARANCE:  The patient is a 85 year old well-developed, well-nourished individual that appears as stated age.  NECK:  Supple.  Trachea is midline.   LUNGS:  Breathing is easy.  Breath sounds are equal and clear bilaterally.  No wheezes, rhonchi, or rales.  HEART: Bradycardic rate with regular rhythm with normal S1 and S2.  No murmurs, gallops, or rubs.  ABDOMEN:  Soft.  No mass, tenderness, guarding, or rebound.  No organomegaly or hernia.  Bowel sounds are present.  No CVA tenderness or flank mass.  No abdominal bruits or thrills present upon auscultation/palpation.  GENITOURINARY:  No obvious anterior pelvic tenderness, hernia, mass noted to palpation.  NEUROLOGIC:  No focal sensory or motor deficits are noted.   PSYCHIATRIC:  The patient is awake, alert, and oriented x4.  Recent and remote memory is intact.  Appropriate mood and affect.  Calm and cooperative with history and physical exam.  SKIN:  Warm, dry, and well perfused.  Good turgor.  No lesions, nodules, or rashes are  noted.  No bruising noted.      ED Course     ED Course as of 05/03/25 1411   Sat May 03, 2025   1129 Labs and ECG ordered.   1146 In to see patient and history/physical completed.    1230 Troponin T, High Sensitivity: 22  High sensitivity troponin 22.  2-hour repeat ordered.   1230 Glucose(!): 60  Glucose noted to be 60.  Food given.   1232 Orthostatic vitals show no significant change.   1312 Repeat glucose 107.   1400 Patient feeling better after 1 L of LR.     1411 Discharge patient with Zio patch study.  Follow-up recommendations and return precautions given.            ECG:    ECG competed at 1134 and personally reviewed at 1136 showing sinus bradycardia ventricular rate of 50 and QTc of 397.  Left axis deviation present.  Incomplete right bundle branch block present.  Abnormal ECG.  When compared to ECG from 10/17/2023, no significant changes noted.         Results for orders placed or performed during the hospital encounter of 05/03/25 (from the past 24 hours)   Influenza A/B, RSV and SARS-CoV2 PCR (COVID-19) Nose    Specimen: Nose; Swab   Result Value Ref Range    Influenza A PCR Negative Negative    Influenza B PCR Negative Negative    RSV PCR Negative Negative    SARS CoV2 PCR Negative Negative    Narrative    Testing was performed using the Xpert Xpress CoV2/Flu/RSV Assay on the [employer] GeneXpert Instrument. This test should be ordered for the detection of SARS-CoV2, influenza, and RSV viruses in individuals with signs and symptoms of respiratory tract infection. This test is for in vitro diagnostic use under the [place] FDA for laboratories certified under CLIA to perform high or moderate complexity testing. This test has been [place] FDA cleared. A negative result does not rule out the presence of PCR inhibitors in the specimen or target RNA in concentration below the limit of detection for the assay. If only one viral target is positive but coinfection with multiple targets is suspected, the sample should be  re-tested with another FDA cleared, approved, or authorized test, if coninfection would change clinical management. This test was validated by the [facility]. These laboratories are certified under the Clinical Laboratory Improvement Amendments of 1988 (CLIA-88) as qualified to perfom high complexity laboratory testing.   [place] Draw    Narrative    The following orders were created for panel order [place] Draw.  Procedure                               Abnormality         Status                     ---------                               -----------         ------                     Extra Blue Top Tube[[id number]]                             Final result               Extra Red Top Tube[[id number]]                              Final result               Extra Green Top (Lithiu...[[id number]]                                                 Extra Purple Top Tube[[id number]]                                                      Extra Green Top (Lithiu...[[id number]]                      Final result                 Please view results for these tests on the individual orders.   CBC with platelets   Result Value Ref Range    WBC Count 5.5 4.0 - 11.0 10e3/uL    RBC Count 3.88 (L) 4.40 - 5.90 10e6/uL    Hemoglobin 12.0 (L) 13.3 - 17.7 g/dL    Hematocrit 36.7 (L) 40.0 - 53.0 %    MCV 95 78 - 100 fL    MCH 30.9 26.5 - 33.0 pg    MCHC 32.7 31.5 - 36.5 g/dL    RDW 13.5 10.0 - 15.0 %    Platelet Count 156 150 - 450 10e3/uL   Comprehensive metabolic panel   Result Value Ref Range    Sodium 138 135 - 145 mmol/L    Potassium 4.5 3.4 - 5.3 mmol/L    Carbon Dioxide (CO2) 24 22 - 29 mmol/L    Anion Gap 9 7 - 15 mmol/L    Urea Nitrogen 16.6 8.0 - 23.0 mg/dL    Creatinine 0.86 0.67 - 1.17 mg/dL    GFR Estimate 86 >60 mL/min/1.73m2    Calcium 8.7 (L) 8.8 - 10.4 mg/dL    Chloride 105 98 - 107 mmol/L    Glucose 60 (L) 70 - 99 mg/dL    Alkaline Phosphatase 143 40 - 150 U/L    AST 33 0 - 45 U/L    ALT 20 0 - 70 U/L     Protein Total 7.3 6.4 - 8.3 g/dL    Albumin 3.8 3.5 - 5.2 g/dL    Bilirubin Total 0.5 <=1.2 mg/dL   Extra Blue Top Tube   Result Value Ref Range    Hold Specimen JIC    Extra Red Top Tube   Result Value Ref Range    Hold Specimen JIC    Extra Green Top (Lithium Heparin) ON ICE   Result Value Ref Range    Hold Specimen JIC    Lipase   Result Value Ref Range    Lipase 40 13 - 60 U/L   TSH with free T4 reflex   Result Value Ref Range    TSH 7.57 (H) 0.30 - 4.20 uIU/mL   Magnesium   Result Value Ref Range    Magnesium 2.0 1.7 - 2.3 mg/dL   Troponin T, High Sensitivity   Result Value Ref Range    Troponin T, High Sensitivity 22 <=22 ng/L   T4 free   Result Value Ref Range    Free T4 1.04 0.90 - 1.70 ng/dL   EKG 12-lead, tracing only   Result Value Ref Range    Systolic Blood Pressure  mmHg    Diastolic Blood Pressure  mmHg    Ventricular Rate 50 BPM    Atrial Rate 50 BPM    DE Interval 166 ms    QRS Duration 100 ms     ms    QTc 397 ms    P Axis 62 degrees    R AXIS -33 degrees    T Axis 33 degrees    Interpretation ECG       Sinus bradycardia  Left axis deviation  Incomplete right bundle branch block  Abnormal ECG  When compared with ECG of 17-Oct-2023 09:56,  No significant change was found     Troponin T, High Sensitivity   Result Value Ref Range    Troponin T, High Sensitivity 19 <=22 ng/L   Glucose by meter   Result Value Ref Range    GLUCOSE BY METER POCT 107 (H) 70 - 99 mg/dL       Medications   lactated ringers BOLUS 1,000 mL (1,000 mLs Intravenous $New Bag 5/3/25 1974)       Assessments & Plan (with Medical Decision Making)     I have reviewed the nursing notes.    I have reviewed the findings, diagnosis, plan and need for follow up with the patient.    Summary:  Patient presents to the ER today for near syncope.  Potential diagnosis which have been considered and evaluated include orthostasis, vasovagal, arrhythmia, MI/NSTEMI, as well as others. Many of these have been excluded using the various  modalities and assessment as noted on the chart. At the present time, the diagnosis is near syncope.  Upon arrival, vitals signs are normal.  The patient is alert and oriented.  Patient has a bradycardic heart rate on examination.  Respiratory examination normal.  No abdominal or CVA tenderness upon palpation.  No hernia or mass.  ECG obtained on arrival showing sinus bradycardia.  Patient does have history of bradycardia.  Lab work returned showing WBC of 5.5 with hemoglobin 12.0.  Electrolytes, renal, hepatic functions normal.  Did have a glucose of 6 0.  Given juice and pudding and glucose did go up to 107.  Lipase normal at 40.  TSH is elevated at 7.57 with a free T4 of 1.04.  High-sensitivity troponin 22.  Repeat 2-hour repeat high-sensitivity troponin 19 making ACS unlikely.  Influenza, COVID, RSV is negative.  Orthostatic vital signs were conducted showing no obvious abnormality.  1 L LR given for hydration and did feel better after this but systolic blood pressure only in the 100's..  Review of medical records does show patient has blood pressure systolically usually 100s-110s.  States that has been low for a little while.  As patient doing better now will discharge home for follow-up with PCP.  Outpatient Zio patch study ordered.  Close follow-up in regards to low systolic blood pressure recommended.  Likely patient had post high carb meal glucose crash today and that is why was hypoglycemic (ate a bowl of cereal, toast, juice for breakfast).  Advised patient to return to ER if any new or worsening symptoms otherwise follow-up with PCP.  Patient verbalized understand [facility] plan of care.  Patient discharged home.      Critical Care Time: None    Impression and plan discussed with patient. Questions answered, concerns addressed, indications for urgent re-evaluation reviewed, and  given. Patient/Parent/Caregiver agree with treatment plan and have no further questions at this time.  AVS provided at  discharge.    This document was prepared using a combination of typing and voice generated software.  While every attempt was made for accuracy, spelling and grammatical errors may exist.              New Prescriptions    No medications on file       Final diagnoses:   Near syncope   Hypoglycemia   Hypotension, unspecified hypotension type       5/3/2025   [facility]       Kenneth Hernandez APRN CNP  05/03/25 [id number]

## 2025-05-03 NOTE — ED TRIAGE NOTES
"Patient presents with c/o diarrhea throughout the night and then this morning when getting out of chair felt lightheaded/dizziness, like he was \"going to pass out.\" Reports generalized weakness.         "

## 2025-05-04 LAB
ATRIAL RATE - MUSE: 50 BPM
DIASTOLIC BLOOD PRESSURE - MUSE: NORMAL MMHG
INTERPRETATION ECG - MUSE: NORMAL
P AXIS - MUSE: 62 DEGREES
PR INTERVAL - MUSE: 166 MS
QRS DURATION - MUSE: 100 MS
QT - MUSE: 436 MS
QTC - MUSE: 397 MS
R AXIS - MUSE: -33 DEGREES
SYSTOLIC BLOOD PRESSURE - MUSE: NORMAL MMHG
T AXIS - MUSE: 33 DEGREES
VENTRICULAR RATE- MUSE: 50 BPM

## 2025-05-14 DIAGNOSIS — Z87.19 HISTORY OF UPPER GASTROINTESTINAL BLEEDING: ICD-10-CM

## 2025-05-14 DIAGNOSIS — E78.5 HYPERLIPIDEMIA, UNSPECIFIED HYPERLIPIDEMIA TYPE: ICD-10-CM

## 2025-05-14 DIAGNOSIS — K22.70 BARRETT'S ESOPHAGUS WITHOUT DYSPLASIA: ICD-10-CM

## 2025-05-14 RX ORDER — OMEPRAZOLE 40 MG/1
40 CAPSULE, DELAYED RELEASE ORAL DAILY
Qty: 90 CAPSULE | Refills: 3 | Status: SHIPPED | OUTPATIENT
Start: 2025-05-14

## 2025-05-14 RX ORDER — ATORVASTATIN CALCIUM 10 MG/1
10 TABLET, FILM COATED ORAL DAILY
Qty: 90 TABLET | Refills: 0 | Status: SHIPPED | OUTPATIENT
Start: 2025-05-14

## 2025-05-21 ENCOUNTER — TELEPHONE (OUTPATIENT)
Dept: CARE COORDINATION | Facility: OTHER | Age: 85
End: 2025-05-21

## 2025-05-21 ENCOUNTER — TELEPHONE (OUTPATIENT)
Dept: FAMILY MEDICINE | Facility: OTHER | Age: 85
End: 2025-05-21

## 2025-05-21 NOTE — TELEPHONE ENCOUNTER
Patient scheduled for Friday 05/23/2025 at 10:30 AM approved by Dr Alonzo.    RN called patient to let him know be he did not answer the phone. VM message left for patient with date and time of appointment.

## 2025-05-21 NOTE — TELEPHONE ENCOUNTER
Symptom or reason needing to speak to RN: Patient was at [facility] ER 05/03 for low blood pressure. Patient is wearing a heart monitor that ends this Friday. Please call patient for follow up appointment.     Best number to return call: [phone]      Best time to return call: Anytime

## 2025-05-21 NOTE — TELEPHONE ENCOUNTER
"Patient calling to scheduled office visit with Dr Alonzo. Patient was seen in the ER on 05/02/2025     ER Notes: PRIYANK Tristan is a 85 year old individual with history of peptic ulcer disease, bradycardia, autoimmune thyroiditis, comes in for near syncope.  Patient states that he had 3 episodes of loose stool last night.  States that he woke up and ate breakfast without any issues and has been doing well since.  Laid down and then got up and became lightheaded and dizzy and \"felt like he was going to pass out\".  Patient did not lose consciousness.  Has been feeling weak since so comes in.  No fever or chills.  No abdominal pain.  No melena or hematochezia reported.    Approval from Dr Alonzo to add patient to the schedule for Friday 05/23/2025 at 10:30 AM.  "

## 2025-05-22 NOTE — PROGRESS NOTES
"  Assessment & Plan     Lightheadedness  - Glucose    Abnormal TSH        Glucose was low in the ER - recheck today - normal.  TSH was 7.57 with a normal FT4 in the ER - will recheck in 4-6 week range.    Discussed hydration, electrolytes, the next time he has diarrhea   Follow-up if symptoms return.      Atul Le is a 85 year old, presenting for the following health issues:  ER F/U        5/23/2025    10:17 AM   Additional Questions   Roomed by john YOST        ER follow-up from 5/3/25.  In short from speaking with patient, he had 3 episodes of diarrhea, didn't drink any fluids, and subsequently developed presyncope/lightheadedness when he got up and was walking.  No additional diarrhea - feeling well.  Since recovered with fluids in the ER.    ZioPatch was ordered by the ER - pending      ED/UC Followup:    Facility:  [facility] [place]  Date of visit: 5/3  Reason for visit: Syncope  Current Status: states is doing good, hasn't had an episode of dizziness since            Objective    /66 (BP Location: Left arm, Patient Position: Sitting, Cuff Size: Adult Regular)   Pulse 60   Temp 96.9  F (36.1  C) (Tympanic)   Resp 16   Ht 1.638 m (5' 4.5\")   Wt 69.8 kg (153 lb 14.4 oz)   SpO2 95%   BMI 26.01 kg/m    Body mass index is 26.01 kg/m .  Physical Exam  Constitutional:       General: He is not in acute distress.     Appearance: Normal appearance.   Neck:      Thyroid: No thyroid mass, thyromegaly or thyroid tenderness.      Vascular: No carotid bruit.   Cardiovascular:      Rate and Rhythm: Normal rate and regular rhythm.      Heart sounds: Normal heart sounds. No murmur heard.  Pulmonary:      Effort: Pulmonary effort is normal.      Breath sounds: Normal breath sounds.   Abdominal:      General: There is no distension.      Tenderness: There is no abdominal tenderness. There is no guarding.   Neurological:      Mental Status: He is alert and oriented to person, place, and time.                "     Signed Electronically by: Kenneth Alonzo, DO    Answers submitted by the patient for this visit:  Patient Health Questionnaire (Submitted on 5/23/2025)  If you checked off any problems, how difficult have these problems made it for you to do your work, take care of things at home, or get along with other people?: Not difficult at all  PHQ9 TOTAL SCORE: 0

## 2025-05-23 ENCOUNTER — OFFICE VISIT (OUTPATIENT)
Dept: FAMILY MEDICINE | Facility: OTHER | Age: 85
End: 2025-05-23
Attending: FAMILY MEDICINE
Payer: COMMERCIAL

## 2025-05-23 VITALS
RESPIRATION RATE: 16 BRPM | BODY MASS INDEX: 25.64 KG/M2 | SYSTOLIC BLOOD PRESSURE: 119 MMHG | HEIGHT: 65 IN | DIASTOLIC BLOOD PRESSURE: 66 MMHG | HEART RATE: 60 BPM | WEIGHT: 153.9 LBS | TEMPERATURE: 96.9 F | OXYGEN SATURATION: 95 %

## 2025-05-23 DIAGNOSIS — R79.89 ABNORMAL TSH: ICD-10-CM

## 2025-05-23 DIAGNOSIS — R42 LIGHTHEADEDNESS: Primary | ICD-10-CM

## 2025-05-23 LAB
FASTING STATUS PATIENT QL REPORTED: NO
GLUCOSE SERPL-MCNC: 98 MG/DL (ref 70–99)

## 2025-05-23 PROCEDURE — G0463 HOSPITAL OUTPT CLINIC VISIT: HCPCS

## 2025-05-23 PROCEDURE — 82947 ASSAY GLUCOSE BLOOD QUANT: CPT | Mod: ZL | Performed by: FAMILY MEDICINE

## 2025-05-23 PROCEDURE — 3074F SYST BP LT 130 MM HG: CPT | Performed by: FAMILY MEDICINE

## 2025-05-23 PROCEDURE — 36415 COLL VENOUS BLD VENIPUNCTURE: CPT | Mod: ZL | Performed by: FAMILY MEDICINE

## 2025-05-23 PROCEDURE — 99213 OFFICE O/P EST LOW 20 MIN: CPT | Performed by: FAMILY MEDICINE

## 2025-05-23 PROCEDURE — 3078F DIAST BP <80 MM HG: CPT | Performed by: FAMILY MEDICINE

## 2025-05-23 RX ORDER — UBIDECARENONE 100 MG
100 CAPSULE ORAL DAILY
COMMUNITY

## 2025-05-23 ASSESSMENT — PATIENT HEALTH QUESTIONNAIRE - PHQ9
SUM OF ALL RESPONSES TO PHQ QUESTIONS 1-9: 0
SUM OF ALL RESPONSES TO PHQ QUESTIONS 1-9: 0
10. IF YOU CHECKED OFF ANY PROBLEMS, HOW DIFFICULT HAVE THESE PROBLEMS MADE IT FOR YOU TO DO YOUR WORK, TAKE CARE OF THINGS AT HOME, OR GET ALONG WITH OTHER PEOPLE: NOT DIFFICULT AT ALL

## 2025-06-02 ENCOUNTER — RESULTS FOLLOW-UP (OUTPATIENT)
Dept: FAMILY MEDICINE | Facility: OTHER | Age: 85
End: 2025-06-02

## 2025-06-12 ENCOUNTER — RESULTS FOLLOW-UP (OUTPATIENT)
Dept: FAMILY MEDICINE | Facility: OTHER | Age: 85
End: 2025-06-12

## 2025-06-27 ENCOUNTER — OFFICE VISIT (OUTPATIENT)
Dept: FAMILY MEDICINE | Facility: OTHER | Age: 85
End: 2025-06-27
Attending: FAMILY MEDICINE
Payer: COMMERCIAL

## 2025-06-27 VITALS
RESPIRATION RATE: 16 BRPM | BODY MASS INDEX: 25.66 KG/M2 | SYSTOLIC BLOOD PRESSURE: 124 MMHG | DIASTOLIC BLOOD PRESSURE: 62 MMHG | HEIGHT: 65 IN | OXYGEN SATURATION: 94 % | WEIGHT: 154 LBS | TEMPERATURE: 97 F | HEART RATE: 55 BPM

## 2025-06-27 DIAGNOSIS — R79.89 ABNORMAL TSH: Primary | ICD-10-CM

## 2025-06-27 DIAGNOSIS — E06.3 AUTOIMMUNE THYROIDITIS: ICD-10-CM

## 2025-06-27 LAB
T4 FREE SERPL-MCNC: 1.04 NG/DL (ref 0.9–1.7)
TSH SERPL DL<=0.005 MIU/L-ACNC: 5.39 UIU/ML (ref 0.3–4.2)

## 2025-06-27 PROCEDURE — 99213 OFFICE O/P EST LOW 20 MIN: CPT | Performed by: FAMILY MEDICINE

## 2025-06-27 PROCEDURE — 3078F DIAST BP <80 MM HG: CPT | Performed by: FAMILY MEDICINE

## 2025-06-27 PROCEDURE — 84439 ASSAY OF FREE THYROXINE: CPT | Mod: ZL | Performed by: FAMILY MEDICINE

## 2025-06-27 PROCEDURE — G0463 HOSPITAL OUTPT CLINIC VISIT: HCPCS

## 2025-06-27 PROCEDURE — 3074F SYST BP LT 130 MM HG: CPT | Performed by: FAMILY MEDICINE

## 2025-06-27 PROCEDURE — 36415 COLL VENOUS BLD VENIPUNCTURE: CPT | Mod: ZL | Performed by: FAMILY MEDICINE

## 2025-06-27 PROCEDURE — 84443 ASSAY THYROID STIM HORMONE: CPT | Mod: ZL | Performed by: FAMILY MEDICINE

## 2025-06-27 PROCEDURE — 1126F AMNT PAIN NOTED NONE PRSNT: CPT | Performed by: FAMILY MEDICINE

## 2025-06-27 PROCEDURE — G0463 HOSPITAL OUTPT CLINIC VISIT: HCPCS | Mod: 25

## 2025-06-27 ASSESSMENT — PAIN SCALES - GENERAL: PAINLEVEL_OUTOF10: NO PAIN (0)

## 2025-07-03 ENCOUNTER — RESULTS FOLLOW-UP (OUTPATIENT)
Dept: FAMILY MEDICINE | Facility: OTHER | Age: 85
End: 2025-07-03

## 2025-07-03 DIAGNOSIS — R79.89 ABNORMAL TSH: ICD-10-CM

## 2025-07-03 DIAGNOSIS — E06.3 AUTOIMMUNE THYROIDITIS: Primary | ICD-10-CM

## 2025-07-10 ENCOUNTER — TELEPHONE (OUTPATIENT)
Dept: FAMILY MEDICINE | Facility: OTHER | Age: 85
End: 2025-07-10

## 2025-07-10 NOTE — TELEPHONE ENCOUNTER
8:14 AM    Reason for Call: OVERBOOK    Patient is having the following symptoms: Rash around his ear and into his hairline. Not itchy, but is bothering him.    The patient is requesting an appointment for TODAY with PCP Dr Alonzo. Patient is open to seeing a covering provider if PCP is unable to work in today.     Was an appointment offered for this call? No -- no appointments available today as patient is requesting.    Preferred method for responding to this message: Telephone Call  What is your phone number ? [phone]    If we cannot reach you directly, may we leave a detailed response at the number you provided? Yes    Can this message wait until your PCP/provider returns, if unavailable today? Not applicable, PCP in office     Maru Cook   .

## 2025-07-11 NOTE — TELEPHONE ENCOUNTER
Patient has an appt with [facility] for Tuesday felt it was related to skin.  He elected not to come today.

## 2025-07-14 ENCOUNTER — OFFICE VISIT (OUTPATIENT)
Dept: FAMILY MEDICINE | Facility: OTHER | Age: 85
End: 2025-07-14
Attending: STUDENT IN AN ORGANIZED HEALTH CARE EDUCATION/TRAINING PROGRAM
Payer: MEDICARE

## 2025-07-14 ENCOUNTER — TELEPHONE (OUTPATIENT)
Dept: FAMILY MEDICINE | Facility: OTHER | Age: 85
End: 2025-07-14

## 2025-07-14 ENCOUNTER — TELEPHONE (OUTPATIENT)
Dept: CARE COORDINATION | Facility: OTHER | Age: 85
End: 2025-07-14

## 2025-07-14 VITALS
TEMPERATURE: 99.6 F | HEART RATE: 72 BPM | SYSTOLIC BLOOD PRESSURE: 110 MMHG | BODY MASS INDEX: 26.28 KG/M2 | OXYGEN SATURATION: 95 % | DIASTOLIC BLOOD PRESSURE: 65 MMHG | WEIGHT: 155.5 LBS

## 2025-07-14 DIAGNOSIS — L03.211 CELLULITIS OF FACE: ICD-10-CM

## 2025-07-14 DIAGNOSIS — R23.8 VESICULAR ERUPTION: ICD-10-CM

## 2025-07-14 DIAGNOSIS — R42 DIZZINESS: Primary | ICD-10-CM

## 2025-07-14 DIAGNOSIS — L30.9 DERMATITIS: ICD-10-CM

## 2025-07-14 PROBLEM — L24.9 IRRITANT DERMATITIS: Status: ACTIVE | Noted: 2025-07-14

## 2025-07-14 LAB
ALBUMIN SERPL BCG-MCNC: 3.7 G/DL (ref 3.5–5.2)
ALBUMIN UR-MCNC: NEGATIVE MG/DL
ALP SERPL-CCNC: 127 U/L (ref 40–150)
ALT SERPL W P-5'-P-CCNC: 19 U/L (ref 0–70)
ANION GAP SERPL CALCULATED.3IONS-SCNC: 13 MMOL/L (ref 7–15)
APPEARANCE UR: CLEAR
AST SERPL W P-5'-P-CCNC: 32 U/L (ref 0–45)
BASOPHILS # BLD MANUAL: 0 10E3/UL (ref 0–0.2)
BASOPHILS NFR BLD MANUAL: 1 %
BILIRUB SERPL-MCNC: 0.5 MG/DL
BILIRUB UR QL STRIP: NEGATIVE
BUN SERPL-MCNC: 15.2 MG/DL (ref 8–23)
CALCIUM SERPL-MCNC: 8.6 MG/DL (ref 8.8–10.4)
CHLORIDE SERPL-SCNC: 94 MMOL/L (ref 98–107)
COLOR UR AUTO: ABNORMAL
CREAT SERPL-MCNC: 0.82 MG/DL (ref 0.67–1.17)
EGFRCR SERPLBLD CKD-EPI 2021: 87 ML/MIN/1.73M2
EOSINOPHIL # BLD MANUAL: 0 10E3/UL (ref 0–0.7)
EOSINOPHIL NFR BLD MANUAL: 0 %
ERYTHROCYTE [DISTWIDTH] IN BLOOD BY AUTOMATED COUNT: 13.5 % (ref 10–15)
GLUCOSE SERPL-MCNC: 118 MG/DL (ref 70–99)
GLUCOSE UR STRIP-MCNC: NEGATIVE MG/DL
HCO3 SERPL-SCNC: 23 MMOL/L (ref 22–29)
HCT VFR BLD AUTO: 38.4 % (ref 40–53)
HGB BLD-MCNC: 12.9 G/DL (ref 13.3–17.7)
HGB UR QL STRIP: NEGATIVE
KETONES UR STRIP-MCNC: NEGATIVE MG/DL
LEUKOCYTE ESTERASE UR QL STRIP: NEGATIVE
LYMPHOCYTES # BLD MANUAL: 0.6 10E3/UL (ref 0.8–5.3)
LYMPHOCYTES NFR BLD MANUAL: 14 %
MCH RBC QN AUTO: 30.9 PG (ref 26.5–33)
MCHC RBC AUTO-ENTMCNC: 33.6 G/DL (ref 31.5–36.5)
MCV RBC AUTO: 92 FL (ref 78–100)
MONOCYTES # BLD MANUAL: 0.5 10E3/UL (ref 0–1.3)
MONOCYTES NFR BLD MANUAL: 10 %
MUCOUS THREADS #/AREA URNS LPF: PRESENT /LPF
NEUTROPHILS # BLD MANUAL: 3.5 10E3/UL (ref 1.6–8.3)
NEUTROPHILS NFR BLD MANUAL: 75 %
NITRATE UR QL: NEGATIVE
PH UR STRIP: 5.5 [PH] (ref 4.7–8)
PLAT MORPH BLD: ABNORMAL
PLATELET # BLD AUTO: 123 10E3/UL (ref 150–450)
POTASSIUM SERPL-SCNC: 4.1 MMOL/L (ref 3.4–5.3)
PROT SERPL-MCNC: 7.4 G/DL (ref 6.4–8.3)
RBC # BLD AUTO: 4.17 10E6/UL (ref 4.4–5.9)
RBC MORPH BLD: ABNORMAL
RBC URINE: <1 /HPF
SODIUM SERPL-SCNC: 130 MMOL/L (ref 135–145)
SP GR UR STRIP: 1.01 (ref 1–1.03)
SQUAMOUS EPITHELIAL: 0 /HPF
UROBILINOGEN UR STRIP-MCNC: NORMAL MG/DL
VARIANT LYMPHS BLD QL SMEAR: PRESENT
WBC # BLD AUTO: 4.6 10E3/UL (ref 4–11)
WBC URINE: <1 /HPF

## 2025-07-14 PROCEDURE — G0463 HOSPITAL OUTPT CLINIC VISIT: HCPCS

## 2025-07-14 PROCEDURE — 81003 URINALYSIS AUTO W/O SCOPE: CPT | Mod: ZL | Performed by: STUDENT IN AN ORGANIZED HEALTH CARE EDUCATION/TRAINING PROGRAM

## 2025-07-14 PROCEDURE — 84155 ASSAY OF PROTEIN SERUM: CPT | Mod: ZL | Performed by: STUDENT IN AN ORGANIZED HEALTH CARE EDUCATION/TRAINING PROGRAM

## 2025-07-14 PROCEDURE — 85007 BL SMEAR W/DIFF WBC COUNT: CPT | Mod: ZL | Performed by: STUDENT IN AN ORGANIZED HEALTH CARE EDUCATION/TRAINING PROGRAM

## 2025-07-14 PROCEDURE — 36415 COLL VENOUS BLD VENIPUNCTURE: CPT | Mod: ZL | Performed by: STUDENT IN AN ORGANIZED HEALTH CARE EDUCATION/TRAINING PROGRAM

## 2025-07-14 PROCEDURE — 85018 HEMOGLOBIN: CPT | Mod: ZL | Performed by: STUDENT IN AN ORGANIZED HEALTH CARE EDUCATION/TRAINING PROGRAM

## 2025-07-14 RX ORDER — PREDNISONE 20 MG/1
20 TABLET ORAL DAILY
Qty: 7 TABLET | Refills: 0 | Status: SHIPPED | OUTPATIENT
Start: 2025-07-14

## 2025-07-14 RX ORDER — CEPHALEXIN 500 MG/1
500 CAPSULE ORAL 4 TIMES DAILY
Qty: 20 CAPSULE | Refills: 0 | Status: SHIPPED | OUTPATIENT
Start: 2025-07-14

## 2025-07-14 RX ORDER — SILVER SULFADIAZINE 10 MG/G
CREAM TOPICAL 2 TIMES DAILY
Qty: 400 G | Refills: 0 | Status: SHIPPED | OUTPATIENT
Start: 2025-07-14

## 2025-07-14 RX ORDER — VALACYCLOVIR HYDROCHLORIDE 1 G/1
1000 TABLET, FILM COATED ORAL 3 TIMES DAILY
Qty: 30 TABLET | Refills: 0 | Status: SHIPPED | OUTPATIENT
Start: 2025-07-14 | End: 2025-07-24

## 2025-07-14 ASSESSMENT — PAIN SCALES - GENERAL: PAINLEVEL_OUTOF10: NO PAIN (0)

## 2025-07-14 NOTE — TELEPHONE ENCOUNTER
Patients wife called and LVM on triage line reporting patient has fallen twice and hit his head and he is very week but will not let her call an ambulance. RN called twice but got no answer. RN left VM message with direct contact number.

## 2025-07-14 NOTE — PROGRESS NOTES
Assessment & Plan     Dizziness  2 falls this morning.  Shortly after getting up out of bed.  Per his usual routine, he did sit at the edge of the bed for about 5 minutes.  Then got into kitchen for some water and felt weak, heavy and fell to ground, got himself up. Wife tried to help him to sofa but he fell again and struck the left side of forehead.  He denies any headaches, vision changes, or any other neurologic symptoms.    Orthostatics negative today.  VSS.  Neurologic exam done today, normal  No deficits appreciated.  No red flag signs or symptoms present.  Therefore no clinical indication for neuro-imaging at this time.    Patient tells me he has not been drinking enough fluids over the weekend.  We will check labs and UA as well due to his slightly elevated temp and acute onset dizziness.    - CBC with platelets and differential; Future  - Comprehensive metabolic panel; Future  - UA Macroscopic with reflex to Microscopic and Culture; Future  - CBC with platelets and differential  - Comprehensive metabolic panel  - UA Macroscopic with reflex to Microscopic and Culture    Dermatitis  See below.  Will Rx Silvadene to help soothe the skin + antimicrobial properties. Unsure if contact dermatitis but given distinct unilateral nature, will Rx Valtrex to cover for shingles.  Has had rash for about 72 hours now.  No prodrome that he can recall.  Blood work is pending.   - silver sulfADIAZINE (SILVADENE) 1 % external cream; Apply topically 2 times daily. Apply to a thickness of 1/16 inch once or twice daily; reapply as needed to areas where the cream is removed by patient activity as the burned area should be covered with cream at all times.  - cephALEXin (KEFLEX) 500 MG capsule; Take 1 capsule (500 mg) by mouth 4 times daily.  - predniSONE (DELTASONE) 20 MG tablet; Take 1 tablet (20 mg) by mouth daily.    Cellulitis of face  Extensive erythema of face- mildly elevated temp.  Will Rx keflex as above.      Vesicular  "eruption  Extensive eruption in a dermatomal distrubution on head and neck (V3, C2 and C3)  Not all of V-3 is involved but certainly part of it.  It is most certainly unilateral.  Both anteriorly and posteriorly.  There is extensive erythema with significant edema of the outer ear and some of the ear canal as well.  There is extensive crusting with weeping of many lesions on the left and active blisters on the posterior neck as well.   Patient states he was outside over the weekend and thinking he brushed against a plant.  This is possible but less likely given how unilateral this is.   Will treat with course of valtrex.  Will also Rx antibiotics due to extensive weeping of lesions (mostly serous active weeping) but need to cover for infection as well with how extensive the erythema is.  He is having chills and his temperature is mildly elevated as well  Reviewed signs and symptoms of infection and reviewed when to return for re-evaluation as well as ER precautions  Needs to see Dr. Alonzo THIS Friday for close follow-up  - valACYclovir (VALTREX) 1000 mg tablet; Take 1 tablet (1,000 mg) by mouth 3 times daily for 10 days.    BMI  Estimated body mass index is 26.28 kg/m  as calculated from the following:    Height as of 6/27/25: 1.638 m (5' 4.5\").    Weight as of this encounter: 70.5 kg (155 lb 8 oz).       Atul Le is a 85 year old, presenting for the following health issues:  Fall (Pt stated that he went to get a drink of water this morning and got lightheaded and fell down, The wife helped him up and then about 5 min later he fell back to the ground and  was able to get up on his own but felt dizzy so the wife helped him to the chair,Pt stated that he feels weak and has been tired a lot lately.) and Derm Problem (Pt has what he thinks is poison ivy or poison sumac on right ear and that started 7/8/2025)        7/14/2025    11:41 AM   Additional Questions   Roomed by Edilma Castro   Accompanied by " Wife- Maritza         7/14/2025    11:41 AM   Patient Reported Additional Medications   Patient reports taking the following new medications None     HPI      Patient presents to clinic after sustaining 2 falls today.  First occurrence was when he got out bed- sat at edge of bed for 5 minutes- walked out to kitchen, got class of water and put ice cubes.  All of a sudden went down.  Got himself up.  Wife helped to sofa, got dizzy.  Before got to sofa, fell again- wife couldn't catch him and hit his head on the floor. Sat there for quite a while.  Would doze off and fall asleep.  Finally came around enough  Does not have diabetes.      Orthostatics- vitals are     No CP, SOB, no palpitationas.  Felt tired and weak.  No headaches, no vision changes, no trouble swallowing or speaking.  Still feels weak.  Has been drinking water.  Stayed home over the weekend.      Acute fall today- 2x    How many servings of fruits and vegetables do you eat daily?  2-3  On average, how many sweetened beverages do you drink each day (Examples: soda, juice, sweet tea, etc.  Do NOT count diet or artificially sweetened beverages)?   0  How many days per week do you exercise enough to make your heart beat faster? 7  How many minutes a day do you exercise enough to make your heart beat faster? 20 - 29  How many days per week do you miss taking your medication? 0  Rash  Onset/Duration: 07/08/2025  Description  Location: right ear  Character: burning, draining, red  Itching: severe  Intensity:  severe  Progression of Symptoms:  same  Accompanying signs and symptoms:   Fever: No  Body aches or joint pain: No  Sore throat symptoms: No  Recent cold symptoms: No  History:           Previous episodes of similar rash: None  New exposures:  None  Recent travel: No  Exposure to similar rash: No  Precipitating or alleviating factors: calamine lotion  Therapies tried and outcome: none and calamine lotion      Review of Systems  Constitutional, HEENT,  cardiovascular, pulmonary, GI, , musculoskeletal, neuro, skin, endocrine and psych systems are negative, except as otherwise noted.      Objective    /65 (BP Location: Right arm, Patient Position: Sitting, Cuff Size: Adult Regular)   Pulse 72   Temp 99.6  F (37.6  C) (Tympanic)   Wt 70.5 kg (155 lb 8 oz)   SpO2 95%   BMI 26.28 kg/m    Body mass index is 26.28 kg/m .  Physical Exam   GENERAL: alert and no distress  EYES: Eyes grossly normal to inspection, PERRL and conjunctivae and sclerae normal  HENT: ear canals and TM's normal, nose and mouth without ulcers or lesions  NECK: no adenopathy, no asymmetry, masses, or scars  RESP: lungs clear to auscultation - no rales, rhonchi or wheezes  CV: regular rate and rhythm, normal S1 S2, no S3 or S4, no murmur, click or rub, no peripheral edema  ABDOMEN: soft, nontender, no hepatosplenomegaly, no masses and bowel sounds normal  MS: no gross musculoskeletal defects noted, no edema  SKIN: no suspicious lesions or rashes  NEURO: Normal strength and tone, mentation intact and speech normal.  CN II- XII grossly intact. Sensation normal.  Cerebellar normal.  Reflexes 2+ Strength 5/5 throughout. Negative Romberg. Alert and oriented x4  PSYCH: mentation appears normal, affect normal/bright          Signed Electronically by: Karen Duran MD

## 2025-07-14 NOTE — TELEPHONE ENCOUNTER
11:24 AM    Reason for Call: OVERBOOK    Patient is having the following symptoms: Pt had a fall this morning, 7/14/25.    The patient is requesting an appointment for ASAP with Dr. Alonzo.    Was an appointment offered for this call? No  If yes : Appointment type              Date    Preferred method for responding to this message: Telephone Call  What is your phone number ? [phone]    If we cannot reach you directly, may we leave a detailed response at the number you provided? Yes    Can this message wait until your PCP/provider returns, if unavailable today? Not applicable    Daysi Juan

## 2025-07-18 ENCOUNTER — OFFICE VISIT (OUTPATIENT)
Dept: FAMILY MEDICINE | Facility: OTHER | Age: 85
End: 2025-07-18
Attending: FAMILY MEDICINE
Payer: COMMERCIAL

## 2025-07-18 VITALS
DIASTOLIC BLOOD PRESSURE: 54 MMHG | WEIGHT: 151.31 LBS | TEMPERATURE: 97.4 F | SYSTOLIC BLOOD PRESSURE: 104 MMHG | OXYGEN SATURATION: 96 % | RESPIRATION RATE: 18 BRPM | HEART RATE: 60 BPM | BODY MASS INDEX: 25.57 KG/M2

## 2025-07-18 DIAGNOSIS — B02.8 HERPES ZOSTER WITH OTHER COMPLICATION: Primary | ICD-10-CM

## 2025-07-18 DIAGNOSIS — B02.21 RAMSAY HUNT SYNDROME (GENICULATE HERPES ZOSTER): ICD-10-CM

## 2025-07-18 PROCEDURE — 3078F DIAST BP <80 MM HG: CPT | Performed by: FAMILY MEDICINE

## 2025-07-18 PROCEDURE — 3074F SYST BP LT 130 MM HG: CPT | Performed by: FAMILY MEDICINE

## 2025-07-18 PROCEDURE — G0463 HOSPITAL OUTPT CLINIC VISIT: HCPCS

## 2025-07-18 PROCEDURE — 1125F AMNT PAIN NOTED PAIN PRSNT: CPT | Performed by: FAMILY MEDICINE

## 2025-07-18 PROCEDURE — 99214 OFFICE O/P EST MOD 30 MIN: CPT | Performed by: FAMILY MEDICINE

## 2025-07-18 RX ORDER — PREDNISONE 20 MG/1
20 TABLET ORAL 2 TIMES DAILY
Qty: 10 TABLET | Refills: 0 | Status: SHIPPED | OUTPATIENT
Start: 2025-07-18

## 2025-07-18 RX ORDER — CEPHALEXIN 500 MG/1
500 CAPSULE ORAL 4 TIMES DAILY
Qty: 20 CAPSULE | Refills: 0 | Status: SHIPPED | OUTPATIENT
Start: 2025-07-18

## 2025-07-18 RX ORDER — CEPHALEXIN 500 MG/1
500 CAPSULE ORAL 4 TIMES DAILY
Qty: 20 CAPSULE | Refills: 0 | Status: SHIPPED | OUTPATIENT
Start: 2025-07-18 | End: 2025-07-18

## 2025-07-18 RX ORDER — PREDNISONE 20 MG/1
20 TABLET ORAL 2 TIMES DAILY
Qty: 10 TABLET | Refills: 0 | Status: SHIPPED | OUTPATIENT
Start: 2025-07-18 | End: 2025-07-18

## 2025-07-18 ASSESSMENT — PAIN SCALES - GENERAL: PAINLEVEL_OUTOF10: MILD PAIN (2)

## 2025-07-18 NOTE — PROGRESS NOTES
{PROVIDER CHARTING PREFERENCE:898610}    Subjective   Rey is a 85 year old, presenting for the following health issues:  Derm Problem        7/18/2025     9:53 AM   Additional Questions   Roomed by shashi YOST      Rash  Onset/Duration: roughly 8 days  Description  Location: right side and back of neck and up the right side face   Character: raised, painful, burning, draining, red  Itching: no  Intensity:  mild  Progression of Symptoms:  improving but feels its weeping more  Accompanying signs and symptoms:   Fever: No  Body aches or joint pain: No  Sore throat symptoms: No  Recent cold symptoms: No  History:           Previous episodes of similar rash: None  New exposures:  None  Recent travel: No  Exposure to similar rash: No  Precipitating or alleviating factors: calamine cream helped to dry it up. Silvadene seemed to open it up more and made it weep more  Therapies tried and outcome: calamine lotion and silvadene  {additonal problems for provider to add (Optional):694520}    {ROS Picklists (Optional):451573}      Objective    There were no vitals taken for this visit.  There is no height or weight on file to calculate BMI.  Physical Exam   {Exam List (Optional):587947}    {Diagnostic Test Results (Optional):621435}        Signed Electronically by: Kenneth Alonzo DO  {Email feedback regarding this note to [email or web address]   :638918}   96%   BMI 25.57 kg/m    Body mass index is 25.57 kg/m .  Physical Exam  Constitutional:       General: He is not in acute distress.     Appearance: Normal appearance.   HENT:      Right Ear: Tympanic membrane normal.      Left Ear: Tympanic membrane normal.      Nose: Nose normal.      Mouth/Throat:      Mouth: Mucous membranes are moist.      Pharynx: Oropharynx is clear.   Eyes:      Extraocular Movements: Extraocular movements intact.      Conjunctiva/sclera: Conjunctivae normal.      Pupils: Pupils are equal, round, and reactive to light.   Cardiovascular:      Rate and Rhythm: Normal rate and regular rhythm.      Heart sounds: Normal heart sounds. No murmur heard.  Pulmonary:      Effort: Pulmonary effort is normal.      Breath sounds: Normal breath sounds.   Abdominal:      General: Bowel sounds are normal.      Palpations: Abdomen is soft.      Tenderness: There is no abdominal tenderness.   Lymphadenopathy:      Cervical: No cervical adenopathy.   Skin:     Comments: See image below   Neurological:      General: No focal deficit present.      Mental Status: He is alert.                      Signed Electronically by: Kenneth Alonzo DO

## 2025-07-22 ENCOUNTER — TELEPHONE (OUTPATIENT)
Dept: OTOLARYNGOLOGY | Facility: OTHER | Age: 85
End: 2025-07-22

## 2025-07-22 NOTE — TELEPHONE ENCOUNTER
Received a referral to get patient in to ENT for dizziness and herpes zoster on side of face and ear.  Per Destini Murphy PA-C patient needs to have an audiogram then ENT.  I called patient and let him know that I put him on the Audiology schedule for 07/24 at 10:30 AM.  He said that he doesn't want to see ENT until the results come back from Audiogram due to being 45 minutes away and the $10 copay per visit.

## 2025-07-23 DIAGNOSIS — H91.93 DECREASED HEARING OF BOTH EARS: Primary | ICD-10-CM

## 2025-07-24 ENCOUNTER — OFFICE VISIT (OUTPATIENT)
Dept: AUDIOLOGY | Facility: OTHER | Age: 85
End: 2025-07-24
Attending: AUDIOLOGIST
Payer: MEDICARE

## 2025-07-24 DIAGNOSIS — H91.93 DECREASED HEARING OF BOTH EARS: ICD-10-CM

## 2025-07-24 DIAGNOSIS — H90.A31 MIXED CONDUCTIVE AND SENSORINEURAL HEARING LOSS OF RIGHT EAR WITH RESTRICTED HEARING OF LEFT EAR: Primary | ICD-10-CM

## 2025-07-24 DIAGNOSIS — H90.A22 SENSORINEURAL HEARING LOSS (SNHL) OF LEFT EAR WITH RESTRICTED HEARING OF RIGHT EAR: ICD-10-CM

## 2025-07-24 PROCEDURE — 92567 TYMPANOMETRY: CPT | Performed by: AUDIOLOGIST

## 2025-07-24 PROCEDURE — 92557 COMPREHENSIVE HEARING TEST: CPT | Performed by: AUDIOLOGIST

## 2025-07-24 NOTE — PROGRESS NOTES
Audiology Evaluation Completed. Please refer SCANNED AUDIOGRAM and/or TYMPANOGRAM for BACKGROUND, RESULTS, RECOMMENDATIONS.      Marly VALVERDE, [facility]  Audiologist #[id number]

## 2025-07-29 ENCOUNTER — OFFICE VISIT (OUTPATIENT)
Dept: FAMILY MEDICINE | Facility: OTHER | Age: 85
End: 2025-07-29
Attending: FAMILY MEDICINE
Payer: MEDICARE

## 2025-07-29 ENCOUNTER — TELEPHONE (OUTPATIENT)
Dept: OTOLARYNGOLOGY | Facility: OTHER | Age: 85
End: 2025-07-29

## 2025-07-29 VITALS
TEMPERATURE: 99.3 F | SYSTOLIC BLOOD PRESSURE: 114 MMHG | HEART RATE: 76 BPM | OXYGEN SATURATION: 93 % | RESPIRATION RATE: 16 BRPM | DIASTOLIC BLOOD PRESSURE: 64 MMHG | BODY MASS INDEX: 25.57 KG/M2 | HEIGHT: 65 IN

## 2025-07-29 DIAGNOSIS — B02.21 RAMSAY HUNT SYNDROME (GENICULATE HERPES ZOSTER): Primary | ICD-10-CM

## 2025-07-29 PROCEDURE — G0463 HOSPITAL OUTPT CLINIC VISIT: HCPCS

## 2025-07-29 PROCEDURE — G0463 HOSPITAL OUTPT CLINIC VISIT: HCPCS | Mod: 25

## 2025-07-29 ASSESSMENT — PAIN SCALES - GENERAL: PAINLEVEL_OUTOF10: MODERATE PAIN (4)

## 2025-07-30 ENCOUNTER — OFFICE VISIT (OUTPATIENT)
Dept: OTOLARYNGOLOGY | Facility: OTHER | Age: 85
End: 2025-07-30
Attending: NURSE PRACTITIONER
Payer: MEDICARE

## 2025-07-30 VITALS
HEIGHT: 65 IN | DIASTOLIC BLOOD PRESSURE: 62 MMHG | BODY MASS INDEX: 25.2 KG/M2 | HEART RATE: 95 BPM | OXYGEN SATURATION: 94 % | TEMPERATURE: 98.2 F | RESPIRATION RATE: 20 BRPM | WEIGHT: 151.24 LBS | SYSTOLIC BLOOD PRESSURE: 110 MMHG

## 2025-07-30 DIAGNOSIS — H91.21 SUDDEN HEARING LOSS, RIGHT: ICD-10-CM

## 2025-07-30 DIAGNOSIS — B02.7 DISSEMINATED HERPES ZOSTER: ICD-10-CM

## 2025-07-30 DIAGNOSIS — B02.21 RAMSAY HUNT AURICULAR SYNDROME: Primary | ICD-10-CM

## 2025-07-30 DIAGNOSIS — R26.89 IMBALANCE: ICD-10-CM

## 2025-07-30 PROCEDURE — G0463 HOSPITAL OUTPT CLINIC VISIT: HCPCS

## 2025-07-30 RX ORDER — FLUOCINONIDE 0.5 MG/G
OINTMENT TOPICAL
Qty: 15 G | Refills: 1 | Status: SHIPPED | OUTPATIENT
Start: 2025-07-30

## 2025-07-30 RX ORDER — PREDNISONE 20 MG/1
40 TABLET ORAL DAILY
Qty: 10 TABLET | Refills: 0 | Status: SHIPPED | OUTPATIENT
Start: 2025-07-30 | End: 2025-08-04

## 2025-07-30 ASSESSMENT — PAIN SCALES - GENERAL: PAINLEVEL_OUTOF10: MILD PAIN (2)

## 2025-07-30 NOTE — PATIENT INSTRUCTIONS
Complete MRI of the inner ear and brain  Follow up in 2-3 months for repeat audiogram   Follow up sooner with concerns or changes in symptoms  Lidex ointment as prescribed for skin irritation    Thank you for allowing Ritika Jara and our ENT team to participate in your care.  If your medications are too expensive, please give the nurse a call.  We can possibly change this medication.  If you have a scheduling or an appointment question please contact our Health Unit Coordinator at their direct line [phone]  ALL nursing questions or concerns can be directed to your ENT nurse at: [phone] - Diana

## 2025-07-30 NOTE — LETTER
7/30/2025      Rey Tristan  [address] [place] [address]      Dear Colleague,    Thank you for referring your patient, Rey Tristan, to the [facility]. Please see a copy of my visit note below.      Otolaryngology Note         Chief Complaint:     Patient presents with:  Hearing Problem: Right hearing loss following shingles, Kenneth Alonzo,  referring           History of Present Illness:     Rey Tristan is a 85 year old male seen today for hearing loss.      He reports Monday 7/14 he woke up with dizziness   Previously was treatin the right ear and face for poison oak, poison ivy  Had a fall, reports no rotary vertigo, just felt fatigued, lost balance.  No LOC  He was able to make it to the couch and rest.    Was started on valtrex and abx   He had decreased hearing in the right ear at the same time.   The hearing in the right ear seems to be improving    He has completed prednisone   He has right sided shooting pains with touching the head   He tolerated the prednisone well    Balance has been stable  He has to be careful with walking, not having to use a cane  Right sided dull/tingling sensaiton around the right ear  No facial droop further noted  No pacemaker   No claustrophobia      This has been present for *** and is mostly *** sided.   There have been not been many infections.  The onset was gradual.    Treatment has included ***  There is *** otalgia or otorrhea.   *** vertigo  *** tinnitus  *** facial numbness or tingling.   *** history of otological surgery  *** history of COM or frequent OM  *** history of ear surgery or trauma to the ear   *** noise exposure.    *** family history of hearing loss    *** prior audiograms  *** hearing aide use   *** recent inciting events *** URI, flying or scuba diving recently.   *** history of nasal allergies or recurring sinus infections.      Audiogram ***:   ***    Previous imaging:          Medications:     Current  Outpatient Rx   Medication Sig Dispense Refill     Ascorbic Acid (VITAMIN C) 500 MG CAPS        atorvastatin (LIPITOR) 10 MG tablet TAKE 1 TABLET(10 MG) BY MOUTH DAILY 90 tablet 0     Biotin 5000 MCG PO CAPS Take by mouth daily. Take 3,000 MCG po daily       Cholecalciferol (VITAMIN D) 400 UNITS tablet Take 1,000 Units by mouth daily       co-enzyme Q-10 100 MG CAPS capsule Take 100 mg by mouth daily.       coenzyme Q-10 capsule Take 1 capsule by mouth daily.       cyanocobalamin ([employer] VITAMIN  B12) 1000 MCG TABS Take 1,000 mcg by mouth daily 90 tablet 3     ferrous sulfate (FEROSUL) 325 (65 Fe) MG tablet Take 325 mg by mouth every other day       Lysine 1000 MG TABS Take 1 tablet by mouth daily       Multiple Vitamins-Minerals (MULTIVITAMIN ADULT PO) Take 2 tablets by mouth.       omeprazole (PRILOSEC) 40 MG DR capsule TAKE 1 CAPSULE(40 MG) BY MOUTH DAILY 90 capsule 3     predniSONE (DELTASONE) 20 MG tablet Take 1 tablet (20 mg) by mouth 2 times daily. 10 tablet 0     pyridoxine (VITAMIN B-6) 50 MG TABS Take 1 tablet (50 mg) by mouth daily 90 tablet 3     silver sulfADIAZINE (SILVADENE) 1 % external cream Apply topically 2 times daily. Apply to a thickness of 1/16 inch once or twice daily; reapply as needed to areas where the cream is removed by patient activity as the burned area should be covered with cream at all times. 400 g 0     cephALEXin (KEFLEX) 500 MG capsule Take 1 capsule (500 mg) by mouth 4 times daily. (Patient not taking: Reported on 7/30/2025) 20 capsule 0     ketoconazole (NIZORAL) 2 % external cream Apply to eyelids and red spots lower face at bedtime (Patient not taking: Reported on 7/30/2025) 30 g 3     triamcinolone (KENALOG) 0.1 % external ointment Apply topically 2 times daily (Patient not taking: Reported on 7/30/2025) 30 g 1     valACYclovir (VALTREX) 1000 mg tablet Take 1 tablet (1,000 mg) by mouth 3 times daily for 10 days. (Patient not taking: Reported on 7/30/2025) 30 tablet 0             Allergies:     Allergies: Patient has no known allergies.          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 Simeon; silent; transient hyperthyroid, now normal; monitor TSH monthly for hypothyroidism     Chronic anticoagulation 01/01/2012     COPD (chronic obstructive pulmonary disease) ([facility])      Elevated serum alkaline phosphatase level 05/04/2016    normal GGT, normal bone skeletal survery     Gastric ulcer     endoscopy 6/2016, repeat 6 months; PPI Carafate     History of blood transfusion      Hyperlipidemia      Normocytic anemia 05/04/2016     Pulmonary nodule     CT 5/2014, right; consider repeat 1 year if high risk     Upper GI bleed 11/30/2017     Vitamin D deficiency 01/01/2012            Past Surgical History:     Past Surgical History:   Procedure Laterality Date     ANGIOGRAM  06/23/2014     BIOPSY  2018    bone marrow biopsy     BONE MARROW BIOPSY, BONE SPECIMEN, NEEDLE/TROCAR N/A 01/16/2018    Procedure: BIOPSY BONE MARROW;  BONE MARROW BIOPSY;  Surgeon: Nuno Castro MD;  Location: [facility]     C. Difficile with intussuseption       cataract extraction       colonoscopy       COLONOSCOPY  06/2017     ENDOSCOPY UPPER, COLONOSCOPY, COMBINED N/A 06/17/2016    Procedure: COMBINED ENDOSCOPY UPPER, COLONOSCOPY;  Surgeon: Andrea Stearns DO;  Location: [facility]     ESOPHAGOSCOPY, GASTROSCOPY, DUODENOSCOPY (EGD), COMBINED N/A 11/30/2017    Procedure: COMBINED ESOPHAGOSCOPY, GASTROSCOPY, DUODENOSCOPY (EGD);  UPPER ENDOSCOPY;  Surgeon: Narinder Torres MD;  Location: [facility]     HERNIORRHAPHY INGUINAL Left 10/23/2023    Procedure: Open Left Inguinal Hernia Repair with mesh;  Surgeon: Noah Dempsey MD;  Location: [facility]     Left lower extremity DVT       lens implant       nasal polypectomy       Pulmonary Embolism       [facility]CHO ([place]  06/23/2014    Dr. Cavazos            Social History:     Social History     Tobacco Use      "Smoking status: Former     Current packs/day: 0.00     Types: Cigarettes     Start date: 5/15/1958     Quit date: 10/28/2009     Years since quitting: 15.7     Passive exposure: Past     Smokeless tobacco: Never     Tobacco comments:     Quit 2009   Vaping Use     Vaping status: Never Used   Substance Use Topics     Alcohol use: No     Alcohol/week: 0.0 standard drinks of alcohol     Drug use: No            Review of Systems:     ROS: See HPI         Physical Exam:     /62 (BP Location: Right arm, Patient Position: Sitting, Cuff Size: Adult Regular)   Pulse 95   Temp 98.2  F (36.8  C) (Tympanic)   Resp 20   Ht 1.638 m (5' 4.5\")   Wt 68.6 kg (151 lb 3.8 oz)   SpO2 94%   BMI 25.56 kg/m      General - The patient is well nourished and well developed, and appears to have good nutritional status.  Alert and oriented to person and place, answers questions and cooperates with examination appropriately.   Head and Face - Normocephalic and atraumatic, with no gross asymmetry noted.  The facial nerve is intact, with strong symmetric movements.  Voice and Breathing - The patient was breathing comfortably without the use of accessory muscles. There was no wheezing, stridor. The patients voice was clear and strong, and had appropriate pitch and quality.  Ears - External ear normal. Canals are patent. Right tympanic membrane is intact without effusion, retraction or mass. Left tympanic membrane is intact without effusion, retraction or mass.  Eyes - Extraocular movements intact, sclera were not icteric or injected, conjunctiva were pink and moist.  Mouth - Examination of the oral cavity showed pink, healthy oral mucosa. Dentition in good condition. No lesions or ulcerations noted. The tongue was mobile and midline.   Throat - The walls of the oropharynx were smooth, pink, moist, symmetric, and had no lesions or ulcerations.  The tonsillar pillars and soft palate were symmetric. The uvula was midline on elevation.  "   Neck - Normal midline excursion of the laryngotracheal complex during swallowing.  Full range of motion on passive movement.  Palpation of the occipital, submental, submandibular, internal jugular chain, and supraclavicular nodes did not demonstrate any abnormal lymph nodes or masses.  Palpation of the thyroid was soft and smooth, with no nodules or goiter appreciated.  The trachea was mobile and midline.  Nose - External contour is symmetric, no gross deflection or scars.  Nasal mucosa is pink and moist with no abnormal mucus.  The septum and turbinates were evaluated: ***.  No polyps, masses, or purulence noted on examination.         Assessment and Plan:     No diagnosis found.    Again, thank you for allowing me to participate in the care of your patient.        Sincerely,        Ritika Jara NP    Electronically signed

## 2025-07-30 NOTE — PROGRESS NOTES
Otolaryngology Note         Chief Complaint:     Patient presents with:  Hearing Problem: Right hearing loss following shingldionisio, Kenneth Alonzo DO referring           History of Present Illness:     Rey Tristan is a 85 year old male seen today for hearing loss.      He reports Monday 7/14 he woke up with dizziness   Previously was treatin the right ear and face for poison oak, poison ivy  Had a fall, reports no rotary vertigo, just felt fatigued, lost balance.  No LOC  He was able to make it to the couch and rest.    Was started on valtrex and abx   He had decreased hearing in the right ear at the same time.   The hearing in the right ear seems to be improving    He has completed prednisone   He has right sided shooting pains with touching the head   He tolerated the prednisone well    Balance has been stable  He has to be careful with walking, not having to use a cane  Right sided dull/tingling sensaiton around the right ear  No facial droop further noted  No pacemaker   No claustrophobia      This has been present for *** and is mostly *** sided.   There have been not been many infections.  The onset was gradual.    Treatment has included ***  There is *** otalgia or otorrhea.   *** vertigo  *** tinnitus  *** facial numbness or tingling.   *** history of otological surgery  *** history of COM or frequent OM  *** history of ear surgery or trauma to the ear   *** noise exposure.    *** family history of hearing loss    *** prior audiograms  *** hearing aide use   *** recent inciting events *** URI, flying or scuba diving recently.   *** history of nasal allergies or recurring sinus infections.      Audiogram ***:   ***    Previous imaging:          Medications:     Current Outpatient Rx   Medication Sig Dispense Refill    Ascorbic Acid (VITAMIN C) 500 MG CAPS       atorvastatin (LIPITOR) 10 MG tablet TAKE 1 TABLET(10 MG) BY MOUTH DAILY 90 tablet 0    Biotin 5000 MCG PO CAPS Take by mouth daily. Take 3,000 MCG  po daily      Cholecalciferol (VITAMIN D) 400 UNITS tablet Take 1,000 Units by mouth daily      co-enzyme Q-10 100 MG CAPS capsule Take 100 mg by mouth daily.      coenzyme Q-10 capsule Take 1 capsule by mouth daily.      cyanocobalamin ([employer] VITAMIN  B12) 1000 MCG TABS Take 1,000 mcg by mouth daily 90 tablet 3    ferrous sulfate (FEROSUL) 325 (65 Fe) MG tablet Take 325 mg by mouth every other day      Lysine 1000 MG TABS Take 1 tablet by mouth daily      Multiple Vitamins-Minerals (MULTIVITAMIN ADULT PO) Take 2 tablets by mouth.      omeprazole (PRILOSEC) 40 MG DR capsule TAKE 1 CAPSULE(40 MG) BY MOUTH DAILY 90 capsule 3    predniSONE (DELTASONE) 20 MG tablet Take 1 tablet (20 mg) by mouth 2 times daily. 10 tablet 0    pyridoxine (VITAMIN B-6) 50 MG TABS Take 1 tablet (50 mg) by mouth daily 90 tablet 3    silver sulfADIAZINE (SILVADENE) 1 % external cream Apply topically 2 times daily. Apply to a thickness of 1/16 inch once or twice daily; reapply as needed to areas where the cream is removed by patient activity as the burned area should be covered with cream at all times. 400 g 0    cephALEXin (KEFLEX) 500 MG capsule Take 1 capsule (500 mg) by mouth 4 times daily. (Patient not taking: Reported on 7/30/2025) 20 capsule 0    ketoconazole (NIZORAL) 2 % external cream Apply to eyelids and red spots lower face at bedtime (Patient not taking: Reported on 7/30/2025) 30 g 3    triamcinolone (KENALOG) 0.1 % external ointment Apply topically 2 times daily (Patient not taking: Reported on 7/30/2025) 30 g 1    valACYclovir (VALTREX) 1000 mg tablet Take 1 tablet (1,000 mg) by mouth 3 times daily for 10 days. (Patient not taking: Reported on 7/30/2025) 30 tablet 0            Allergies:     Allergies: Patient has no known allergies.          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 Simeon; silent; transient hyperthyroid, now normal; monitor TSH monthly for  hypothyroidism    Chronic anticoagulation 01/01/2012    COPD (chronic obstructive pulmonary disease) ([facility])     Elevated serum alkaline phosphatase level 05/04/2016    normal GGT, normal bone skeletal survery    Gastric ulcer     endoscopy 6/2016, repeat 6 months; PPI Carafate    History of blood transfusion     Hyperlipidemia     Normocytic anemia 05/04/2016    Pulmonary nodule     CT 5/2014, right; consider repeat 1 year if high risk    Upper GI bleed 11/30/2017    Vitamin D deficiency 01/01/2012            Past Surgical History:     Past Surgical History:   Procedure Laterality Date    ANGIOGRAM  06/23/2014    BIOPSY  2018    bone marrow biopsy    BONE MARROW BIOPSY, BONE SPECIMEN, NEEDLE/TROCAR N/A 01/16/2018    Procedure: BIOPSY BONE MARROW;  BONE MARROW BIOPSY;  Surgeon: Nuno Castro MD;  Location: [facility]    C. Difficile with intussuseption      cataract extraction      colonoscopy      COLONOSCOPY  06/2017    ENDOSCOPY UPPER, COLONOSCOPY, COMBINED N/A 06/17/2016    Procedure: COMBINED ENDOSCOPY UPPER, COLONOSCOPY;  Surgeon: Andrea Stearns DO;  Location: [facility]    ESOPHAGOSCOPY, GASTROSCOPY, DUODENOSCOPY (EGD), COMBINED N/A 11/30/2017    Procedure: COMBINED ESOPHAGOSCOPY, GASTROSCOPY, DUODENOSCOPY (EGD);  UPPER ENDOSCOPY;  Surgeon: Narinder Torres MD;  Location: [facility]    HERNIORRHAPHY INGUINAL Left 10/23/2023    Procedure: Open Left Inguinal Hernia Repair with mesh;  Surgeon: Noah Dempsey MD;  Location: [facility]    Left lower extremity DVT      lens implant      nasal polypectomy      Pulmonary Embolism      [facility]  06/23/2014    Dr. Cavazos            Social History:     Social History     Tobacco Use    Smoking status: Former     Current packs/day: 0.00     Types: Cigarettes     Start date: 5/15/1958     Quit date: 10/28/2009     Years since quitting: 15.7     Passive exposure: Past    Smokeless tobacco: Never    Tobacco comments:     Quit 2009   Vaping Use    Vaping status:  "Never Used   Substance Use Topics    Alcohol use: No     Alcohol/week: 0.0 standard drinks of alcohol    Drug use: No            Review of Systems:     ROS: See HPI         Physical Exam:     /62 (BP Location: Right arm, Patient Position: Sitting, Cuff Size: Adult Regular)   Pulse 95   Temp 98.2  F (36.8  C) (Tympanic)   Resp 20   Ht 1.638 m (5' 4.5\")   Wt 68.6 kg (151 lb 3.8 oz)   SpO2 94%   BMI 25.56 kg/m      General - The patient is well nourished and well developed, and appears to have good nutritional status.  Alert and oriented to person and place, answers questions and cooperates with examination appropriately.   Head and Face - Normocephalic and atraumatic, with no gross asymmetry noted at rest.  House Brackmann score 2/6 right with very slight asymmetry of right lower lip with smiling and asymmetry of right cheek with blowing out cheeks.   Skin - right C2-C4 dermatome with generalized erythema and patchy scabbed over vesicles dispersed throughout.  Voice and Breathing - The patient was breathing comfortably without the use of accessory muscles. There was no wheezing, stridor. The patients voice was clear and strong, and had appropriate pitch and quality.  Ears -right pinna with generalized erythema and edema.  Canals are patent. Right tympanic membrane is intact without effusion, retraction or mass. Left tympanic membrane is intact without effusion, retraction or mass.  Eyes - Extraocular movements intact, sclera were not icteric or injected, conjunctiva were pink and moist.  Mouth - Examination of the oral cavity showed pink, healthy oral mucosa. Dentition in good condition. No lesions or ulcerations noted. The tongue was mobile and midline.   Throat - The walls of the oropharynx were smooth, pink, moist, symmetric, and had no lesions or ulcerations.  The tonsillar pillars and soft palate were symmetric. The uvula was midline on elevation.    Neck - Normal midline excursion of the " laryngotracheal complex during swallowing.  Full range of motion on passive movement.  Palpation of the occipital, submental, submandibular, internal jugular chain, and supraclavicular nodes did not demonstrate any abnormal lymph nodes or masses.  Palpation of the thyroid was soft and smooth, with no nodules or goiter appreciated.  The trachea was mobile and midline.  Nose - External contour is symmetric, no gross deflection or scars.  Nasal mucosa is pink and moist with no abnormal mucus.  The septum and turbinates were evaluated: ***.  No polyps, masses, or purulence noted on examination.         Assessment and Plan:     No diagnosis found.

## 2025-07-31 ENCOUNTER — TELEPHONE (OUTPATIENT)
Dept: OTOLARYNGOLOGY | Facility: OTHER | Age: 85
End: 2025-07-31

## 2025-07-31 NOTE — TELEPHONE ENCOUNTER
"Called patient back. Patient is supposed to be on Prednisone x5 days. Patient started having severe pain behind his right ear, shooting pains into his scalp. 10/10 at first, now 2/10. Pain has been present about 1.5 hours. Previously on Prednisone without reaction due to Shingles. Today was his first dose of this round of Prednisone. He took Prednisone about 4 hours prior to reaction. Discussed with Destini Murphy. Per Destini, more likely related to his shingles vs. reaction to Prednisone. Recommend Gabapentin if patient is interested. Patient declines. Patient wanted to know if the other two medications that were prescribed to him by Dr. Alonzo were \"buffering\" the Prednisone reactions. Explained to patient that he was taking an antibiotic and an antiviral for the shingles. These wouldn't be \"buffers\". Patient verbalizes understanding. He says he will call back if he has another flare up of pain. Patient also wondering if he can be seen for audio on 9/12 when he comes for ENT follow-up as he will already have to be in town this day for a couple other things. He drives 26 miles, which is a long distance for him, and would like to complete both appointments around the same time. Marly Lawrence has a  Only appointment available 9/12 at 1000. Will check with her if okay to schedule. In the meantime, patient informed that there are currently no available openings with Marly. Will notify him if that changes. Patient verbalizes agreement.   "

## 2025-07-31 NOTE — CONFIDENTIAL NOTE
July 31, 2025    Patient had appt with Ritika Jara 7/30, states having reaction to medication prescribed. Requesting nurse please return call as soon as able    -Monisha Anderson on 7/31/2025 at 1:57 PM

## 2025-08-18 DIAGNOSIS — L30.9 DERMATITIS: ICD-10-CM

## 2025-08-18 RX ORDER — SILVER SULFADIAZINE 10 G/1000G
CREAM TOPICAL
Qty: 400 G | Refills: 0 | Status: SHIPPED | OUTPATIENT
Start: 2025-08-18

## 2025-08-26 ENCOUNTER — HOSPITAL ENCOUNTER (OUTPATIENT)
Dept: MRI IMAGING | Facility: HOSPITAL | Age: 85
Discharge: HOME | End: 2025-08-26
Attending: NURSE PRACTITIONER
Payer: MEDICARE

## 2025-08-26 DIAGNOSIS — H91.21 SUDDEN HEARING LOSS, RIGHT: ICD-10-CM

## 2025-08-26 DIAGNOSIS — R26.89 IMBALANCE: ICD-10-CM

## 2025-08-26 DIAGNOSIS — B02.21 RAMSAY HUNT AURICULAR SYNDROME: ICD-10-CM

## 2025-08-26 PROCEDURE — 70543 MRI ORBT/FAC/NCK W/O &W/DYE: CPT

## 2025-08-26 PROCEDURE — A9585 GADOBUTROL INJECTION: HCPCS | Performed by: RADIOLOGY

## 2025-08-26 PROCEDURE — 70543 MRI ORBT/FAC/NCK W/O &W/DYE: CPT | Mod: 26 | Performed by: RADIOLOGY

## 2025-08-26 PROCEDURE — 255N000002 HC RX 255 OP 636: Performed by: RADIOLOGY

## 2025-08-26 RX ORDER — GADOBUTROL 604.72 MG/ML
7.5 INJECTION INTRAVENOUS ONCE
Status: COMPLETED | OUTPATIENT
Start: 2025-08-26 | End: 2025-08-26

## 2025-08-26 RX ADMIN — GADOBUTROL 7.5 ML: 604.72 INJECTION INTRAVENOUS at 15:39

## 2025-08-28 ENCOUNTER — OFFICE VISIT (OUTPATIENT)
Dept: FAMILY MEDICINE | Facility: OTHER | Age: 85
End: 2025-08-28
Attending: FAMILY MEDICINE
Payer: MEDICARE

## 2025-08-28 ENCOUNTER — TELEPHONE (OUTPATIENT)
Dept: FAMILY MEDICINE | Facility: OTHER | Age: 85
End: 2025-08-28

## 2025-08-28 VITALS
OXYGEN SATURATION: 95 % | WEIGHT: 152.6 LBS | TEMPERATURE: 97.8 F | SYSTOLIC BLOOD PRESSURE: 106 MMHG | BODY MASS INDEX: 25.79 KG/M2 | RESPIRATION RATE: 18 BRPM | DIASTOLIC BLOOD PRESSURE: 64 MMHG | HEART RATE: 64 BPM
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
## 2025-08-28 DIAGNOSIS — B02.29 POST HERPETIC NEURALGIA: ICD-10-CM

## 2025-08-28 LAB
ANION GAP SERPL CALCULATED.3IONS-SCNC: 10 MMOL/L (ref 7–15)
BUN SERPL-MCNC: 12 MG/DL (ref 8–23)
CALCIUM SERPL-MCNC: 8.9 MG/DL (ref 8.8–10.4)
CHLORIDE SERPL-SCNC: 102 MMOL/L (ref 98–107)
CREAT SERPL-MCNC: 0.69 MG/DL (ref 0.67–1.17)
EGFRCR SERPLBLD CKD-EPI 2021: >90 ML/MIN/1.73M2
GLUCOSE SERPL-MCNC: 102 MG/DL (ref 70–99)
HCO3 SERPL-SCNC: 26 MMOL/L (ref 22–29)
POTASSIUM SERPL-SCNC: 4.6 MMOL/L (ref 3.4–5.3)
SODIUM SERPL-SCNC: 138 MMOL/L (ref 135–145)

## 2025-08-28 PROCEDURE — 80048 BASIC METABOLIC PNL TOTAL CA: CPT | Mod: ZL | Performed by: FAMILY MEDICINE

## 2025-08-28 PROCEDURE — 36415 COLL VENOUS BLD VENIPUNCTURE: CPT | Mod: ZL | Performed by: FAMILY MEDICINE

## 2025-08-28 ASSESSMENT — PAIN SCALES - GENERAL: PAINLEVEL_OUTOF10: NO PAIN (0)

## 2025-09-03 ENCOUNTER — TELEPHONE (OUTPATIENT)
Dept: OTOLARYNGOLOGY | Facility: OTHER | Age: 85
End: 2025-09-03

## (undated) DEVICE — IRRIGATION-H2O 1000ML

## (undated) DEVICE — APPLICATOR BNZN TNCT RYN SWBSTK NWVN SNGL APLS1106

## (undated) DEVICE — Device

## (undated) DEVICE — SU VICRYL 2-0 SH 27" UND J417H

## (undated) DEVICE — PENROSE DRAIN 1/2 X 18 LATEX] 30416-050

## (undated) DEVICE — PREP CHLORAPREP 26ML TINTED HI-LITE ORANGE 930815

## (undated) DEVICE — SOL NACL 0.9% IRRIG 1000ML BOTTLE 2F7124

## (undated) DEVICE — TUBING SUCTION 20FT N620A

## (undated) DEVICE — TRAY SKIN PREP POVIDONE/IODINE DYND70372

## (undated) DEVICE — GOWN SURG XL LVL 3 REINFORCED 9541

## (undated) DEVICE — PACK LAPAROTOMY CUSTOM SBA32LPMBG

## (undated) DEVICE — FORCEP-COLON C NEEDLE  SWING JAW  FB-220UA

## (undated) DEVICE — BIN-COVIDIEN MESH BIN BN50

## (undated) DEVICE — STPL SKIN 35W 6.9MM  PXW35

## (undated) DEVICE — CANISTER SUCTION MEDI-VAC GUARDIAN 2000ML 90D 65651-220

## (undated) DEVICE — CANISTER-SUCTION 2000CC

## (undated) DEVICE — DRSG-SPONGE STERILE 4 X 4

## (undated) DEVICE — SLEEVE SCD EXPRESS KNEE LENGTH MED 9529

## (undated) DEVICE — TUBING-SUCTION 20FT

## (undated) DEVICE — ESU GROUND PAD ADULT W/CORD E7507

## (undated) DEVICE — SU ETHIBOND 0 MO-7 CR 8X18" CX41D

## (undated) DEVICE — SYRINGE-30CC SLIP TIP

## (undated) DEVICE — GLOVE 8.5 PROTEXIS PI CLSC PF BD CUF STRL LF 12IN 2D72PL85X

## (undated) DEVICE — LUBRICANT JELLY 2OZ. TUBE

## (undated) DEVICE — COVER LT HANDLE 2/PK 5160-2FG

## (undated) DEVICE — DRESSING MEPILEX AG SILVER 4X8 395890

## (undated) DEVICE — LABEL STERILE PREPRINTED FOR OR FRRH01-2M

## (undated) DEVICE — GLV-7.5 PROTEXIS PI CLASSIC LF/PF

## (undated) DEVICE — SU VICRYL 3-0 SH 27" UND J416H

## (undated) DEVICE — TRAY-BIOPSY BONE W/JAMSHIDI NEEDLE

## (undated) DEVICE — TAPE-MICROFOAM 2 INCH

## (undated) DEVICE — PACK BASIN SET UP SUTCNBSBBA

## (undated) DEVICE — SU MONOCRYL 4-0 PS-2 18" UND Y496G

## (undated) DEVICE — MOUTHPIECE W/GUARD FOR ENDOSCOPY

## (undated) DEVICE — SPONGE PEANUT 3/8IN  7103

## (undated) DEVICE — SU PDS II 2-0 CT-2 27"  Z333H

## (undated) DEVICE — SWABSTICK-BENZOIN

## (undated) DEVICE — BLADE CLIPPER SGL USE 9680

## (undated) RX ORDER — ONDANSETRON 2 MG/ML
INJECTION INTRAMUSCULAR; INTRAVENOUS
Status: DISPENSED
Start: 2023-10-23

## (undated) RX ORDER — EPHEDRINE SULFATE 50 MG/ML
INJECTION, SOLUTION INTRAMUSCULAR; INTRAVENOUS; SUBCUTANEOUS
Status: DISPENSED
Start: 2023-10-23

## (undated) RX ORDER — KETAMINE HCL IN 0.9 % NACL 20 MG/2 ML
SYRINGE (ML) INTRAVENOUS
Status: DISPENSED
Start: 2017-11-30

## (undated) RX ORDER — FENTANYL CITRATE 50 UG/ML
INJECTION, SOLUTION INTRAMUSCULAR; INTRAVENOUS
Status: DISPENSED
Start: 2023-10-23

## (undated) RX ORDER — GLYCOPYRROLATE 0.2 MG/ML
INJECTION, SOLUTION INTRAMUSCULAR; INTRAVENOUS
Status: DISPENSED
Start: 2023-10-23

## (undated) RX ORDER — PROPOFOL 10 MG/ML
INJECTION, EMULSION INTRAVENOUS
Status: DISPENSED
Start: 2023-10-23

## (undated) RX ORDER — PROPOFOL 10 MG/ML
INJECTION, EMULSION INTRAVENOUS
Status: DISPENSED
Start: 2018-01-16

## (undated) RX ORDER — FENTANYL CITRATE 50 UG/ML
INJECTION, SOLUTION INTRAMUSCULAR; INTRAVENOUS
Status: DISPENSED
Start: 2018-01-16

## (undated) RX ORDER — LIDOCAINE HYDROCHLORIDE 20 MG/ML
INJECTION, SOLUTION EPIDURAL; INFILTRATION; INTRACAUDAL; PERINEURAL
Status: DISPENSED
Start: 2017-11-30